# Patient Record
Sex: MALE | Race: WHITE | NOT HISPANIC OR LATINO | Employment: FULL TIME | URBAN - METROPOLITAN AREA
[De-identification: names, ages, dates, MRNs, and addresses within clinical notes are randomized per-mention and may not be internally consistent; named-entity substitution may affect disease eponyms.]

---

## 2017-09-11 ENCOUNTER — TRANSCRIBE ORDERS (OUTPATIENT)
Dept: ADMINISTRATIVE | Facility: HOSPITAL | Age: 62
End: 2017-09-11

## 2017-09-11 DIAGNOSIS — K51.90 ULCERATIVE COLITIS WITHOUT COMPLICATIONS, UNSPECIFIED LOCATION (HCC): Primary | ICD-10-CM

## 2017-09-15 ENCOUNTER — HOSPITAL ENCOUNTER (OUTPATIENT)
Dept: CT IMAGING | Facility: HOSPITAL | Age: 62
Discharge: HOME | End: 2017-09-15
Attending: COLON & RECTAL SURGERY | Admitting: COLON & RECTAL SURGERY

## 2017-09-15 ENCOUNTER — APPOINTMENT (OUTPATIENT)
Dept: PREADMISSION TESTING | Facility: HOSPITAL | Age: 62
End: 2017-09-15

## 2017-09-15 VITALS — HEIGHT: 67 IN | WEIGHT: 165.34 LBS | BODY MASS INDEX: 25.95 KG/M2

## 2017-09-15 DIAGNOSIS — K51.90 ULCERATIVE COLITIS WITHOUT COMPLICATIONS, UNSPECIFIED LOCATION (HCC): ICD-10-CM

## 2017-09-15 LAB
ABO GROUP BLD: NORMAL
ANION GAP SERPL CALCULATED.3IONS-SCNC: 8 MMOL/L (ref 3–11)
BUN BLD-MCNC: 11 MG/DL (ref 9–23)
BUN/CREAT SERPL: 13.8 (ref 7–25)
CALCIUM SPEC-SCNC: 8.7 MG/DL (ref 8.7–10.4)
CHLORIDE SERPL-SCNC: 107 MMOL/L (ref 99–109)
CO2 SERPL-SCNC: 25 MMOL/L (ref 20–31)
CREAT BLD-MCNC: 0.8 MG/DL (ref 0.6–1.3)
DEPRECATED RDW RBC AUTO: 50.3 FL (ref 37–54)
ERYTHROCYTE [DISTWIDTH] IN BLOOD BY AUTOMATED COUNT: 15.7 % (ref 11.3–14.5)
GFR SERPL CREATININE-BSD FRML MDRD: 97 ML/MIN/1.73
GLUCOSE BLD-MCNC: 124 MG/DL (ref 70–100)
HBA1C MFR BLD: 6.1 % (ref 4.8–5.6)
HCT VFR BLD AUTO: 35.1 % (ref 38.9–50.9)
HGB BLD-MCNC: 10.9 G/DL (ref 13.1–17.5)
INR PPP: 1.22
MCH RBC QN AUTO: 27.5 PG (ref 27–31)
MCHC RBC AUTO-ENTMCNC: 31.1 G/DL (ref 32–36)
MCV RBC AUTO: 88.4 FL (ref 80–99)
PLATELET # BLD AUTO: 324 10*3/MM3 (ref 150–450)
PMV BLD AUTO: 8.2 FL (ref 6–12)
POTASSIUM BLD-SCNC: 4.5 MMOL/L (ref 3.5–5.5)
PROTHROMBIN TIME: 13.4 SECONDS (ref 9.6–11.5)
RBC # BLD AUTO: 3.97 10*6/MM3 (ref 4.2–5.76)
RH BLD: POSITIVE
SODIUM BLD-SCNC: 140 MMOL/L (ref 132–146)
WBC NRBC COR # BLD: 8.71 10*3/MM3 (ref 3.5–10.8)

## 2017-09-15 PROCEDURE — 85610 PROTHROMBIN TIME: CPT | Performed by: COLON & RECTAL SURGERY

## 2017-09-15 PROCEDURE — 86900 BLOOD TYPING SEROLOGIC ABO: CPT

## 2017-09-15 PROCEDURE — 80048 BASIC METABOLIC PNL TOTAL CA: CPT | Performed by: COLON & RECTAL SURGERY

## 2017-09-15 PROCEDURE — 83036 HEMOGLOBIN GLYCOSYLATED A1C: CPT | Performed by: COLON & RECTAL SURGERY

## 2017-09-15 PROCEDURE — 74177 CT ABD & PELVIS W/CONTRAST: CPT

## 2017-09-15 PROCEDURE — 85027 COMPLETE CBC AUTOMATED: CPT | Performed by: COLON & RECTAL SURGERY

## 2017-09-15 PROCEDURE — 86901 BLOOD TYPING SEROLOGIC RH(D): CPT

## 2017-09-15 PROCEDURE — 93005 ELECTROCARDIOGRAM TRACING: CPT

## 2017-09-15 PROCEDURE — 36415 COLL VENOUS BLD VENIPUNCTURE: CPT

## 2017-09-15 PROCEDURE — 0 DIATRIZOATE MEGLUMINE & SODIUM PER 1 ML: Performed by: COLON & RECTAL SURGERY

## 2017-09-15 PROCEDURE — 93010 ELECTROCARDIOGRAM REPORT: CPT | Performed by: INTERNAL MEDICINE

## 2017-09-15 PROCEDURE — 0 IOPAMIDOL 61 % SOLUTION: Performed by: COLON & RECTAL SURGERY

## 2017-09-15 RX ORDER — SACCHAROMYCES BOULARDII 250 MG
250 CAPSULE ORAL DAILY
COMMUNITY
End: 2019-04-09

## 2017-09-15 RX ORDER — FOLIC ACID 1 MG/1
1 TABLET ORAL DAILY
COMMUNITY
End: 2019-04-09

## 2017-09-15 RX ORDER — VALSARTAN 160 MG/1
160 TABLET ORAL DAILY
Status: ON HOLD | COMMUNITY
End: 2018-09-07 | Stop reason: ALTCHOICE

## 2017-09-15 RX ORDER — AZATHIOPRINE 50 MG/1
200 TABLET ORAL DAILY
Status: ON HOLD | COMMUNITY
End: 2017-09-24

## 2017-09-15 RX ADMIN — IOPAMIDOL 95 ML: 612 INJECTION, SOLUTION INTRAVENOUS at 17:36

## 2017-09-15 RX ADMIN — Medication 15 ML: at 16:20

## 2017-09-15 NOTE — PAT
Attempted to call wound ostomy, rn for stoma markings in pat, unavailable, blue note lef ton chart for preop to notify and do in pre op- also message left with woc, rn

## 2017-09-15 NOTE — PAT
MEASURED FOR TEDS/SCDS IN PAT    CALF MEASUREMENT    14in  LENGTH MEASUREMENT 16in    cintia rodriguez gi navigator notified of pt upcoming surgery on 9-21-17    Eras instructions given to pt in pat

## 2017-09-18 NOTE — PAT
Message to Lenora/Gia regarding low hemoglobin and hx of MRSA to see if Dr. Nielsen would like vancomycin ordered.

## 2017-09-19 ENCOUNTER — HOSPITAL ENCOUNTER (INPATIENT)
Facility: HOSPITAL | Age: 62
LOS: 5 days | Discharge: HOME | End: 2017-09-24
Attending: COLON & RECTAL SURGERY | Admitting: COLON & RECTAL SURGERY

## 2017-09-19 ENCOUNTER — ANESTHESIA EVENT (OUTPATIENT)
Dept: PERIOP | Facility: HOSPITAL | Age: 62
End: 2017-09-19

## 2017-09-19 ENCOUNTER — ANESTHESIA (OUTPATIENT)
Dept: PERIOP | Facility: HOSPITAL | Age: 62
End: 2017-09-19

## 2017-09-19 DIAGNOSIS — Z74.09 IMPAIRED FUNCTIONAL MOBILITY, BALANCE, GAIT, AND ENDURANCE: Primary | ICD-10-CM

## 2017-09-19 DIAGNOSIS — K50.90 CROHN'S DISEASE (HCC): ICD-10-CM

## 2017-09-19 PROBLEM — R73.03 PREDIABETES: Status: ACTIVE | Noted: 2017-09-19

## 2017-09-19 PROBLEM — D64.9 ANEMIA: Status: ACTIVE | Noted: 2017-09-19

## 2017-09-19 PROBLEM — K51.90 ULCERATIVE COLITIS (HCC): Status: ACTIVE | Noted: 2017-09-19

## 2017-09-19 LAB
ABO GROUP BLD: NORMAL
BLD GP AB SCN SERPL QL: NEGATIVE
GLUCOSE BLDC GLUCOMTR-MCNC: 134 MG/DL (ref 70–130)
HCT VFR BLD AUTO: 35.1 % (ref 38.9–50.9)
HGB BLD-MCNC: 10.9 G/DL (ref 13.1–17.5)
POTASSIUM BLDA-SCNC: 4.24 MMOL/L (ref 3.5–5.3)
RH BLD: POSITIVE

## 2017-09-19 PROCEDURE — 25010000002 PROPOFOL 10 MG/ML EMULSION: Performed by: NURSE ANESTHETIST, CERTIFIED REGISTERED

## 2017-09-19 PROCEDURE — 85018 HEMOGLOBIN: CPT | Performed by: COLON & RECTAL SURGERY

## 2017-09-19 PROCEDURE — 25010000002 HEPARIN (PORCINE) PER 1000 UNITS: Performed by: COLON & RECTAL SURGERY

## 2017-09-19 PROCEDURE — 86850 RBC ANTIBODY SCREEN: CPT | Performed by: COLON & RECTAL SURGERY

## 2017-09-19 PROCEDURE — 82962 GLUCOSE BLOOD TEST: CPT

## 2017-09-19 PROCEDURE — 86900 BLOOD TYPING SEROLOGIC ABO: CPT | Performed by: COLON & RECTAL SURGERY

## 2017-09-19 PROCEDURE — 25010000002 DEXAMETHASONE SODIUM PHOSPHATE 10 MG/ML SOLUTION 1 ML VIAL: Performed by: NURSE ANESTHETIST, CERTIFIED REGISTERED

## 2017-09-19 PROCEDURE — 25010000002 ONDANSETRON PER 1 MG: Performed by: ANESTHESIOLOGY

## 2017-09-19 PROCEDURE — 84132 ASSAY OF SERUM POTASSIUM: CPT | Performed by: ANESTHESIOLOGY

## 2017-09-19 PROCEDURE — 86901 BLOOD TYPING SEROLOGIC RH(D): CPT | Performed by: COLON & RECTAL SURGERY

## 2017-09-19 PROCEDURE — 85014 HEMATOCRIT: CPT | Performed by: COLON & RECTAL SURGERY

## 2017-09-19 PROCEDURE — 0D1B0Z4 BYPASS ILEUM TO CUTANEOUS, OPEN APPROACH: ICD-10-PCS | Performed by: COLON & RECTAL SURGERY

## 2017-09-19 PROCEDURE — 25010000002 BUPRENORPHINE PER 0.1 MG: Performed by: NURSE ANESTHETIST, CERTIFIED REGISTERED

## 2017-09-19 PROCEDURE — 25010000002 NEOSTIGMINE PER 0.5 MG: Performed by: ANESTHESIOLOGY

## 2017-09-19 PROCEDURE — 88307 TISSUE EXAM BY PATHOLOGIST: CPT | Performed by: COLON & RECTAL SURGERY

## 2017-09-19 PROCEDURE — 25010000002 PHENYLEPHRINE PER 1 ML: Performed by: NURSE ANESTHETIST, CERTIFIED REGISTERED

## 2017-09-19 PROCEDURE — 25010000002 DEXAMETHASONE PER 1 MG: Performed by: NURSE ANESTHETIST, CERTIFIED REGISTERED

## 2017-09-19 PROCEDURE — 0DTE0ZZ RESECTION OF LARGE INTESTINE, OPEN APPROACH: ICD-10-PCS | Performed by: COLON & RECTAL SURGERY

## 2017-09-19 PROCEDURE — 94640 AIRWAY INHALATION TREATMENT: CPT

## 2017-09-19 RX ORDER — HYDROMORPHONE HYDROCHLORIDE 1 MG/ML
0.5 INJECTION, SOLUTION INTRAMUSCULAR; INTRAVENOUS; SUBCUTANEOUS
Status: DISCONTINUED | OUTPATIENT
Start: 2017-09-19 | End: 2017-09-19 | Stop reason: HOSPADM

## 2017-09-19 RX ORDER — HYDROCODONE BITARTRATE AND ACETAMINOPHEN 5; 325 MG/1; MG/1
1 TABLET ORAL EVERY 4 HOURS PRN
Status: DISCONTINUED | OUTPATIENT
Start: 2017-09-19 | End: 2017-09-24 | Stop reason: HOSPADM

## 2017-09-19 RX ORDER — FAMOTIDINE 10 MG/ML
20 INJECTION, SOLUTION INTRAVENOUS ONCE
Status: DISCONTINUED | OUTPATIENT
Start: 2017-09-19 | End: 2017-09-19

## 2017-09-19 RX ORDER — DEXTROSE MONOHYDRATE 25 G/50ML
25 INJECTION, SOLUTION INTRAVENOUS
Status: DISCONTINUED | OUTPATIENT
Start: 2017-09-19 | End: 2017-09-24 | Stop reason: HOSPADM

## 2017-09-19 RX ORDER — BUDESONIDE AND FORMOTEROL FUMARATE DIHYDRATE 80; 4.5 UG/1; UG/1
2 AEROSOL RESPIRATORY (INHALATION)
Status: DISCONTINUED | OUTPATIENT
Start: 2017-09-19 | End: 2017-09-20

## 2017-09-19 RX ORDER — FAMOTIDINE 20 MG/1
20 TABLET, FILM COATED ORAL ONCE
Status: COMPLETED | OUTPATIENT
Start: 2017-09-19 | End: 2017-09-19

## 2017-09-19 RX ORDER — BUDESONIDE 0.5 MG/2ML
0.5 INHALANT ORAL
Status: DISCONTINUED | OUTPATIENT
Start: 2017-09-19 | End: 2017-09-24 | Stop reason: HOSPADM

## 2017-09-19 RX ORDER — CELECOXIB 200 MG/1
400 CAPSULE ORAL ONCE
Status: COMPLETED | OUTPATIENT
Start: 2017-09-19 | End: 2017-09-19

## 2017-09-19 RX ORDER — HYDROMORPHONE HYDROCHLORIDE 1 MG/ML
0.5 INJECTION, SOLUTION INTRAMUSCULAR; INTRAVENOUS; SUBCUTANEOUS
Status: DISCONTINUED | OUTPATIENT
Start: 2017-09-19 | End: 2017-09-24 | Stop reason: HOSPADM

## 2017-09-19 RX ORDER — PROMETHAZINE HYDROCHLORIDE 25 MG/ML
6.25 INJECTION, SOLUTION INTRAMUSCULAR; INTRAVENOUS ONCE AS NEEDED
Status: DISCONTINUED | OUTPATIENT
Start: 2017-09-19 | End: 2017-09-19 | Stop reason: HOSPADM

## 2017-09-19 RX ORDER — ACETAMINOPHEN 325 MG/1
650 TABLET ORAL EVERY 4 HOURS PRN
Status: DISCONTINUED | OUTPATIENT
Start: 2017-09-19 | End: 2017-09-24 | Stop reason: HOSPADM

## 2017-09-19 RX ORDER — MORPHINE SULFATE 2 MG/ML
1 INJECTION, SOLUTION INTRAMUSCULAR; INTRAVENOUS EVERY 4 HOURS PRN
Status: DISCONTINUED | OUTPATIENT
Start: 2017-09-19 | End: 2017-09-24 | Stop reason: HOSPADM

## 2017-09-19 RX ORDER — HEPARIN SODIUM 5000 [USP'U]/ML
5000 INJECTION, SOLUTION INTRAVENOUS; SUBCUTANEOUS ONCE
Status: COMPLETED | OUTPATIENT
Start: 2017-09-19 | End: 2017-09-19

## 2017-09-19 RX ORDER — MONTELUKAST SODIUM 10 MG/1
10 TABLET ORAL NIGHTLY
Status: DISCONTINUED | OUTPATIENT
Start: 2017-09-19 | End: 2017-09-24 | Stop reason: HOSPADM

## 2017-09-19 RX ORDER — GLYCOPYRROLATE 0.2 MG/ML
INJECTION INTRAMUSCULAR; INTRAVENOUS AS NEEDED
Status: DISCONTINUED | OUTPATIENT
Start: 2017-09-19 | End: 2017-09-19 | Stop reason: SURG

## 2017-09-19 RX ORDER — FENTANYL CITRATE 50 UG/ML
50 INJECTION, SOLUTION INTRAMUSCULAR; INTRAVENOUS
Status: DISCONTINUED | OUTPATIENT
Start: 2017-09-19 | End: 2017-09-19 | Stop reason: HOSPADM

## 2017-09-19 RX ORDER — ALVIMOPAN 12 MG/1
12 CAPSULE ORAL 2 TIMES DAILY
Status: DISCONTINUED | OUTPATIENT
Start: 2017-09-20 | End: 2017-09-24 | Stop reason: HOSPADM

## 2017-09-19 RX ORDER — ESMOLOL HYDROCHLORIDE 10 MG/ML
INJECTION INTRAVENOUS AS NEEDED
Status: DISCONTINUED | OUTPATIENT
Start: 2017-09-19 | End: 2017-09-19 | Stop reason: SURG

## 2017-09-19 RX ORDER — ALBUTEROL SULFATE 2.5 MG/3ML
2.5 SOLUTION RESPIRATORY (INHALATION) ONCE
Status: COMPLETED | OUTPATIENT
Start: 2017-09-19 | End: 2017-09-19

## 2017-09-19 RX ORDER — FLUTICASONE PROPIONATE 50 MCG
2 SPRAY, SUSPENSION (ML) NASAL 2 TIMES DAILY
Status: DISCONTINUED | OUTPATIENT
Start: 2017-09-19 | End: 2017-09-24 | Stop reason: HOSPADM

## 2017-09-19 RX ORDER — HYDRALAZINE HYDROCHLORIDE 20 MG/ML
10 INJECTION INTRAMUSCULAR; INTRAVENOUS EVERY 6 HOURS PRN
Status: DISCONTINUED | OUTPATIENT
Start: 2017-09-19 | End: 2017-09-24 | Stop reason: HOSPADM

## 2017-09-19 RX ORDER — SODIUM CHLORIDE, SODIUM LACTATE, POTASSIUM CHLORIDE, CALCIUM CHLORIDE 600; 310; 30; 20 MG/100ML; MG/100ML; MG/100ML; MG/100ML
9 INJECTION, SOLUTION INTRAVENOUS CONTINUOUS
Status: DISCONTINUED | OUTPATIENT
Start: 2017-09-19 | End: 2017-09-24 | Stop reason: HOSPADM

## 2017-09-19 RX ORDER — NALOXONE HCL 0.4 MG/ML
0.4 VIAL (ML) INJECTION
Status: DISCONTINUED | OUTPATIENT
Start: 2017-09-19 | End: 2017-09-24 | Stop reason: HOSPADM

## 2017-09-19 RX ORDER — SODIUM CHLORIDE 0.9 % (FLUSH) 0.9 %
1-10 SYRINGE (ML) INJECTION AS NEEDED
Status: DISCONTINUED | OUTPATIENT
Start: 2017-09-19 | End: 2017-09-19 | Stop reason: HOSPADM

## 2017-09-19 RX ORDER — EPHEDRINE SULFATE 50 MG/ML
5 INJECTION, SOLUTION INTRAVENOUS ONCE AS NEEDED
Status: DISCONTINUED | OUTPATIENT
Start: 2017-09-19 | End: 2017-09-19 | Stop reason: HOSPADM

## 2017-09-19 RX ORDER — CLOBETASOL PROPIONATE 0.5 MG/ML
LOTION TOPICAL 2 TIMES DAILY
Status: ON HOLD | COMMUNITY
End: 2018-09-07

## 2017-09-19 RX ORDER — LIDOCAINE HYDROCHLORIDE 10 MG/ML
0.5 INJECTION, SOLUTION EPIDURAL; INFILTRATION; INTRACAUDAL; PERINEURAL ONCE AS NEEDED
Status: COMPLETED | OUTPATIENT
Start: 2017-09-19 | End: 2017-09-19

## 2017-09-19 RX ORDER — VITAMIN E 268 MG
400 CAPSULE ORAL 3 TIMES DAILY
COMMUNITY

## 2017-09-19 RX ORDER — IPRATROPIUM BROMIDE 42 UG/1
2 SPRAY, METERED NASAL 4 TIMES DAILY
Status: DISCONTINUED | OUTPATIENT
Start: 2017-09-19 | End: 2017-09-24 | Stop reason: HOSPADM

## 2017-09-19 RX ORDER — MULTIVITAMIN WITH IRON
100 TABLET ORAL DAILY
COMMUNITY

## 2017-09-19 RX ORDER — PROPOFOL 10 MG/ML
VIAL (ML) INTRAVENOUS CONTINUOUS PRN
Status: DISCONTINUED | OUTPATIENT
Start: 2017-09-19 | End: 2017-09-19 | Stop reason: SURG

## 2017-09-19 RX ORDER — PREGABALIN 75 MG/1
75 CAPSULE ORAL ONCE
Status: COMPLETED | OUTPATIENT
Start: 2017-09-19 | End: 2017-09-19

## 2017-09-19 RX ORDER — PROMETHAZINE HYDROCHLORIDE 25 MG/1
25 TABLET ORAL ONCE AS NEEDED
Status: DISCONTINUED | OUTPATIENT
Start: 2017-09-19 | End: 2017-09-19 | Stop reason: HOSPADM

## 2017-09-19 RX ORDER — PROPOFOL 10 MG/ML
VIAL (ML) INTRAVENOUS AS NEEDED
Status: DISCONTINUED | OUTPATIENT
Start: 2017-09-19 | End: 2017-09-19 | Stop reason: SURG

## 2017-09-19 RX ORDER — ALBUTEROL SULFATE 90 UG/1
2 AEROSOL, METERED RESPIRATORY (INHALATION) EVERY 4 HOURS PRN
COMMUNITY
End: 2018-03-13

## 2017-09-19 RX ORDER — DIAZEPAM 5 MG/ML
5 INJECTION, SOLUTION INTRAMUSCULAR; INTRAVENOUS EVERY 6 HOURS PRN
Status: DISCONTINUED | OUTPATIENT
Start: 2017-09-19 | End: 2017-09-24 | Stop reason: HOSPADM

## 2017-09-19 RX ORDER — MAGNESIUM HYDROXIDE 1200 MG/15ML
LIQUID ORAL AS NEEDED
Status: DISCONTINUED | OUTPATIENT
Start: 2017-09-19 | End: 2017-09-19 | Stop reason: HOSPADM

## 2017-09-19 RX ORDER — LIDOCAINE HYDROCHLORIDE 10 MG/ML
INJECTION, SOLUTION INFILTRATION; PERINEURAL AS NEEDED
Status: DISCONTINUED | OUTPATIENT
Start: 2017-09-19 | End: 2017-09-19 | Stop reason: SURG

## 2017-09-19 RX ORDER — ALVIMOPAN 12 MG/1
12 CAPSULE ORAL ONCE
Status: COMPLETED | OUTPATIENT
Start: 2017-09-19 | End: 2017-09-19

## 2017-09-19 RX ORDER — DEXTROSE MONOHYDRATE, SODIUM CHLORIDE, SODIUM LACTATE, POTASSIUM CHLORIDE, CALCIUM CHLORIDE 5; 600; 310; 179; 20 G/100ML; MG/100ML; MG/100ML; MG/100ML; MG/100ML
75 INJECTION, SOLUTION INTRAVENOUS CONTINUOUS
Status: DISCONTINUED | OUTPATIENT
Start: 2017-09-19 | End: 2017-09-24 | Stop reason: HOSPADM

## 2017-09-19 RX ORDER — DEXAMETHASONE SODIUM PHOSPHATE 4 MG/ML
INJECTION, SOLUTION INTRA-ARTICULAR; INTRALESIONAL; INTRAMUSCULAR; INTRAVENOUS; SOFT TISSUE AS NEEDED
Status: DISCONTINUED | OUTPATIENT
Start: 2017-09-19 | End: 2017-09-19 | Stop reason: SURG

## 2017-09-19 RX ORDER — ONDANSETRON 2 MG/ML
INJECTION INTRAMUSCULAR; INTRAVENOUS AS NEEDED
Status: DISCONTINUED | OUTPATIENT
Start: 2017-09-19 | End: 2017-09-19 | Stop reason: SURG

## 2017-09-19 RX ORDER — AMLODIPINE BESYLATE 5 MG/1
5 TABLET ORAL NIGHTLY
Status: DISCONTINUED | OUTPATIENT
Start: 2017-09-19 | End: 2017-09-24 | Stop reason: HOSPADM

## 2017-09-19 RX ORDER — BUSPIRONE HYDROCHLORIDE 15 MG/1
15 TABLET ORAL 3 TIMES DAILY
Status: DISCONTINUED | OUTPATIENT
Start: 2017-09-19 | End: 2017-09-24 | Stop reason: HOSPADM

## 2017-09-19 RX ORDER — LEVOCETIRIZINE DIHYDROCHLORIDE 5 MG/1
5 TABLET, FILM COATED ORAL EVERY EVENING
COMMUNITY

## 2017-09-19 RX ORDER — SCOLOPAMINE TRANSDERMAL SYSTEM 1 MG/1
1 PATCH, EXTENDED RELEASE TRANSDERMAL ONCE
Status: COMPLETED | OUTPATIENT
Start: 2017-09-19 | End: 2017-09-22

## 2017-09-19 RX ORDER — ACETAMINOPHEN 500 MG
1000 TABLET ORAL ONCE
Status: COMPLETED | OUTPATIENT
Start: 2017-09-19 | End: 2017-09-19

## 2017-09-19 RX ORDER — PROMETHAZINE HYDROCHLORIDE 25 MG/1
25 SUPPOSITORY RECTAL ONCE AS NEEDED
Status: DISCONTINUED | OUTPATIENT
Start: 2017-09-19 | End: 2017-09-19 | Stop reason: HOSPADM

## 2017-09-19 RX ORDER — SACCHAROMYCES BOULARDII 250 MG
250 CAPSULE ORAL 2 TIMES DAILY
Status: DISCONTINUED | OUTPATIENT
Start: 2017-09-19 | End: 2017-09-24 | Stop reason: HOSPADM

## 2017-09-19 RX ORDER — ATRACURIUM BESYLATE 10 MG/ML
INJECTION, SOLUTION INTRAVENOUS AS NEEDED
Status: DISCONTINUED | OUTPATIENT
Start: 2017-09-19 | End: 2017-09-19 | Stop reason: SURG

## 2017-09-19 RX ORDER — HEPARIN SODIUM 5000 [USP'U]/ML
5000 INJECTION, SOLUTION INTRAVENOUS; SUBCUTANEOUS EVERY 8 HOURS SCHEDULED
Status: DISCONTINUED | OUTPATIENT
Start: 2017-09-20 | End: 2017-09-24 | Stop reason: HOSPADM

## 2017-09-19 RX ORDER — NICOTINE POLACRILEX 4 MG
15 LOZENGE BUCCAL
Status: DISCONTINUED | OUTPATIENT
Start: 2017-09-19 | End: 2017-09-24 | Stop reason: HOSPADM

## 2017-09-19 RX ORDER — IPRATROPIUM BROMIDE 42 UG/1
2 SPRAY, METERED NASAL 4 TIMES DAILY
COMMUNITY
End: 2019-04-09

## 2017-09-19 RX ORDER — ALBUTEROL SULFATE 90 UG/1
2 AEROSOL, METERED RESPIRATORY (INHALATION) EVERY 4 HOURS PRN
COMMUNITY

## 2017-09-19 RX ORDER — IBUPROFEN 400 MG/1
400 TABLET ORAL EVERY 6 HOURS PRN
Status: DISCONTINUED | OUTPATIENT
Start: 2017-09-19 | End: 2017-09-24 | Stop reason: HOSPADM

## 2017-09-19 RX ORDER — ONDANSETRON 2 MG/ML
4 INJECTION INTRAMUSCULAR; INTRAVENOUS EVERY 6 HOURS PRN
Status: DISCONTINUED | OUTPATIENT
Start: 2017-09-19 | End: 2017-09-24 | Stop reason: HOSPADM

## 2017-09-19 RX ORDER — VALSARTAN 160 MG/1
160 TABLET ORAL DAILY
Status: DISCONTINUED | OUTPATIENT
Start: 2017-09-20 | End: 2017-09-24 | Stop reason: HOSPADM

## 2017-09-19 RX ADMIN — DEXAMETHASONE SODIUM PHOSPHATE 61.4 ML: 10 INJECTION, SOLUTION INTRAMUSCULAR; INTRAVENOUS at 16:07

## 2017-09-19 RX ADMIN — FLUTICASONE PROPIONATE 2 SPRAY: 50 SPRAY, METERED NASAL at 22:51

## 2017-09-19 RX ADMIN — METOPROLOL TARTRATE 2.5 MG: 5 INJECTION, SOLUTION INTRAVENOUS at 16:40

## 2017-09-19 RX ADMIN — PHENYLEPHRINE HYDROCHLORIDE 100 MCG: 10 INJECTION INTRAVENOUS at 16:35

## 2017-09-19 RX ADMIN — METOPROLOL TARTRATE 2.5 MG: 5 INJECTION, SOLUTION INTRAVENOUS at 16:50

## 2017-09-19 RX ADMIN — HEPARIN SODIUM 5000 UNITS: 5000 INJECTION, SOLUTION INTRAVENOUS; SUBCUTANEOUS at 12:49

## 2017-09-19 RX ADMIN — ATRACURIUM BESYLATE 50 MG: 10 INJECTION, SOLUTION INTRAVENOUS at 16:01

## 2017-09-19 RX ADMIN — ONDANSETRON 4 MG: 2 INJECTION INTRAMUSCULAR; INTRAVENOUS at 18:29

## 2017-09-19 RX ADMIN — AMLODIPINE BESYLATE 5 MG: 5 TABLET ORAL at 22:48

## 2017-09-19 RX ADMIN — PREGABALIN 75 MG: 75 CAPSULE ORAL at 13:27

## 2017-09-19 RX ADMIN — PHENYLEPHRINE HYDROCHLORIDE 100 MCG: 10 INJECTION INTRAVENOUS at 16:20

## 2017-09-19 RX ADMIN — ATRACURIUM BESYLATE 20 MG: 10 INJECTION, SOLUTION INTRAVENOUS at 17:15

## 2017-09-19 RX ADMIN — MONTELUKAST SODIUM 10 MG: 10 TABLET, FILM COATED ORAL at 22:48

## 2017-09-19 RX ADMIN — PROPOFOL 150 MG: 10 INJECTION, EMULSION INTRAVENOUS at 16:01

## 2017-09-19 RX ADMIN — ACETAMINOPHEN 1000 MG: 500 TABLET ORAL at 13:27

## 2017-09-19 RX ADMIN — FAMOTIDINE 20 MG: 20 TABLET ORAL at 13:28

## 2017-09-19 RX ADMIN — LIDOCAINE HYDROCHLORIDE 0.2 ML: 10 INJECTION, SOLUTION EPIDURAL; INFILTRATION; INTRACAUDAL; PERINEURAL at 12:35

## 2017-09-19 RX ADMIN — SCOPALAMINE 1 PATCH: 1 PATCH, EXTENDED RELEASE TRANSDERMAL at 13:14

## 2017-09-19 RX ADMIN — Medication 2 MG: at 18:31

## 2017-09-19 RX ADMIN — CELECOXIB 400 MG: 200 CAPSULE ORAL at 13:28

## 2017-09-19 RX ADMIN — IPRATROPIUM BROMIDE 2 SPRAY: 42 SPRAY NASAL at 22:50

## 2017-09-19 RX ADMIN — POTASSIUM CHLORIDE, SODIUM CHLORIDE, CALCIUM CHLORIDE, SODIUM LACTATE, AND DEXTROSE MONOHYDRATE 125 ML/HR: 1.79; 6; .2; 3.1; 5 INJECTION, SOLUTION INTRAVENOUS at 22:50

## 2017-09-19 RX ADMIN — BUSPIRONE HYDROCHLORIDE 15 MG: 15 TABLET ORAL at 22:48

## 2017-09-19 RX ADMIN — GLYCOPYRROLATE 0.4 MG: 0.2 INJECTION, SOLUTION INTRAMUSCULAR; INTRAVENOUS at 18:31

## 2017-09-19 RX ADMIN — PROPOFOL 25 MCG/KG/MIN: 10 INJECTION, EMULSION INTRAVENOUS at 16:15

## 2017-09-19 RX ADMIN — LIDOCAINE HYDROCHLORIDE 50 MG: 10 INJECTION, SOLUTION INFILTRATION; PERINEURAL at 16:01

## 2017-09-19 RX ADMIN — ALBUTEROL SULFATE 2.5 MG: 2.5 SOLUTION RESPIRATORY (INHALATION) at 15:28

## 2017-09-19 RX ADMIN — SODIUM CHLORIDE, POTASSIUM CHLORIDE, SODIUM LACTATE AND CALCIUM CHLORIDE 9 ML/HR: 600; 310; 30; 20 INJECTION, SOLUTION INTRAVENOUS at 12:35

## 2017-09-19 RX ADMIN — ALVIMOPAN 12 MG: 12 CAPSULE ORAL at 13:28

## 2017-09-19 RX ADMIN — ESMOLOL HYDROCHLORIDE 20 MG: 10 INJECTION, SOLUTION INTRAVENOUS at 16:01

## 2017-09-19 RX ADMIN — DEXAMETHASONE SODIUM PHOSPHATE 8 MG: 4 INJECTION, SOLUTION INTRAMUSCULAR; INTRAVENOUS at 16:01

## 2017-09-19 NOTE — H&P
"Admission HP     Patient Name: Zackary Noonan II  MRN: [id number]  : 1955  DOS: 2017    Attending: David Nielsen MD    Primary Care Provider: Jillian Layne MD      Patient Care Team:  Jillian Layne MD as PCP - General (Family Medicine)    Chief complaint:  Ulcerative colitis    Subjective   Patient is a 62 y.o. male presented for scheduled surgery by Dr. Nielsen. He anticipates a colectomy today. He has had diarrhea and abdominal cramping for at least 8 months. He reports he was hospitalized in 2017 for dehydration and diarrhea, he had sepsis, pneumonia, and required ventilatory support. He continued to have diarrhea and was found to have c-diff. In Aug 2017 he had fecal transplant by Dr. Roach [facility].     When seen preop he is doing well. He reports mild abd cramping. He denies nausea, shortness of breath or chest pain.    He does have history of several DVTs in BLE. The lastest about 3 weeks ago. He takes Coumadin and has been bridging with Lovenox.      ( Above reviewed, agreed.  Seen in his room after surgery.  He tolerated the procedure well.  His admitted to the hospital further management.  No complaints of nausea or vomiting.  Postoperative pain is under control.  No shortness breath or chest pain.)wy    Allergies:  Allergies   Allergen Reactions   • Beta Adrenergic Blockers Other (See Comments)     Vocal changes   • Levaquin [Levofloxacin In D5w] Other (See Comments)     Heaviness in legs, \"felt like lead\"       Meds:  Prescriptions Prior to Admission   Medication Sig Dispense Refill Last Dose   • albuterol (PROVENTIL HFA;VENTOLIN HFA) 108 (90 Base) MCG/ACT inhaler Inhale 2 puffs Every 4 (Four) Hours As Needed for Wheezing.   2017   • albuterol (PROVENTIL HFA;VENTOLIN HFA) 108 (90 Base) MCG/ACT inhaler Inhale 2 puffs Every 4 (Four) Hours As Needed for Wheezing.      • albuterol (PROVENTIL) (2.5 MG/3ML) 0.083% nebulizer solution Take 2.5 mg by nebulization Every 6 (Six) Hours " As Needed for Wheezing or Shortness of Air.   9/19/2017 at Unknown time   • amLODIPine (NORVASC) 5 MG tablet Take 5 mg by mouth Every Night.   9/18/2017 at Unknown time   • beclomethasone (QVAR) 80 MCG/ACT inhaler Inhale 1 puff 2 (Two) Times a Day.   9/19/2017 at 0745   • busPIRone (BUSPAR) 15 MG tablet Take 15 mg by mouth 3 (Three) Times a Day.   9/19/2017 at 0745   • clobetasol (CLOBEX) 0.05 % lotion Apply  topically 2 (Two) Times a Day.   9/19/2017 at Unknown time   • famotidine-calcium carb-mag hydroxide (PEPCID COMPLETE) -165 MG chewable tablet Chew 1 tablet Daily As Needed for Heartburn.   9/5/2017   • fluticasone (FLONASE) 50 MCG/ACT nasal spray 2 sprays into each nostril 2 (Two) Times a Day.   9/19/2017 at 0745    • Fluticasone Furoate-Vilanterol (BREO ELLIPTA) 200-25 MCG/INH aerosol powder  Inhale 1 puff Daily.   9/19/2017 at 0745   • folic acid (FOLVITE) 1 MG tablet Take 1 mg by mouth Daily.   9/18/2017 at Unknown time   • GUAIFENESIN PO Take 400 mg by mouth.   9/18/2017 at Unknown time   • ipratropium (ATROVENT) 0.06 % nasal spray 2 sprays into each nostril 4 (Four) Times a Day.   9/19/2017 at 0745   • levocetirizine (XYZAL) 5 MG tablet Take 5 mg by mouth Every Evening.   9/17/2017   • montelukast (SINGULAIR) 10 MG tablet Take 10 mg by mouth Every Night.   9/18/2017 at Unknown time   • Multiple Vitamins-Minerals (SENIOR MULTIVITAMIN PLUS PO) Take 1 tablet/day by mouth.   9/17/2017   • NON FORMULARY 40 mg.      • Pyridoxine HCl (VITAMIN B-6 PO) Take 100 mcg by mouth.   9/18/2017 at Unknown time   • Saccharomyces boulardii 250 MG pack Take  by mouth.      • Umeclidinium Bromide (INCRUSE ELLIPTA) 62.5 MCG/INH aerosol powder  Inhale 1 puff Daily.   9/18/2017 at Unknown time   • valsartan (DIOVAN) 160 MG tablet Take 160 mg by mouth Daily.   9/19/2017 at 0745   • vitamin E 400 UNIT capsule Take 400 Units by mouth Daily.   9/18/2017 at Unknown time   • azaTHIOprine (IMURAN) 50 MG tablet Take 200 mg by  mouth Daily.      • Enoxaparin Sodium (LOVENOX SC) Inject 75 mg under the skin 2 (Two) Times a Day. Pt taking to bride from warfarin, last dose will 9-16-17 per dr orr orders, pt pcp aware per pt report   9/16/2017   • saccharomyces boulardii (FLORASTOR) 250 MG capsule Take 250 mg by mouth 2 (Two) Times a Day.   9/17/2017   • valACYclovir (VALTREX) 500 MG tablet Take 500 mg by mouth Daily As Needed.      • warfarin (COUMADIN) 5 MG tablet Take 10 mg by mouth Daily.   9/12/2017       History:   Past Medical History:   Diagnosis Date   • Anxiety    • Arthritis    • Asthma    • Clostridium difficile infection 01/2017    treated per dr carter with fecal transplant 8-2017    • H/O recurrent pneumonia    • Hypertension    • MRSA infection 2016    right lower extremity wound   • Pulmonary nodule     Followed by Dr. Galaviz    • Recurrent deep vein thrombosis (DVT)     x 3 LLE; chronic anticoagulation therapy    • Ulcerative colitis    • Wears glasses    • Wears hearing aid      Past Surgical History:   Procedure Laterality Date   • BRONCHOSCOPY      by Dr. Galaviz for pulmonary nodule   • BRONCHOSCOPY N/A 11/7/2016    Procedure: BRONCHOSCOPY;  Surgeon: Eben Roberts MD;  Location: [facility] ENDOSCOPY;  Service:    • COLONOSCOPY  08/2017   • UMBILICAL HERNIA REPAIR     • VASECTOMY     • VASECTOMY REVERSAL       Family History   Problem Relation Age of Onset   • Hypertension Mother      Social History   Substance Use Topics   • Smoking status: Never Smoker   • Smokeless tobacco: Never Used   • Alcohol use Yes      Comment: 4 drinks/week   He is  with 3 children. He is a .     Review of Systems  All systems were reviewed and negative except for:  Respiratory: positive for  shortness of air  Gastrointestinal: postitive for  diarrhea    Vital Signs  /78 (BP Location: Right arm, Patient Position: Sitting)  Pulse 91  Temp 97.2 °F (36.2 °C) (Temporal Artery )   Resp 18  SpO2 97%    Physical  Exam:    General Appearance:    Alert, cooperative, in no acute distress. [place]   Head:    Normocephalic, without obvious abnormality, atraumatic   Eyes:            Lids and lashes normal, conjunctivae and sclerae normal, no   icterus, no pallor, corneas clear, PERRLA   Ears:    Ears appear intact with no abnormalities noted   Neck:   No adenopathy, supple, trachea midline, no thyromegaly   Lungs:     Clear to auscultation,respirations regular, even and                   unlabored    Heart:    Regular rhythm and normal rate, normal S1 and S2, no            murmur, no gallop, no rub, no click   Abdomen:     Normal bowel sounds, no masses, no organomegaly, soft        non-tender, non-distended, no guarding, no rebound                 Tenderness    ( Postop exam: Soft and benign, clean benign incisions, stoma is pink )wy   Genitalia:    Deferred   Extremities:   Moves all extremities well, no edema, no cyanosis, no              redness   Pulses:   Pulses palpable and equal bilaterally   Skin:   No bleeding, bruising or rash   Neurologic:   Cranial nerves 2 - 12 grossly intact, sensation intact       Results from last 7 days  Lab Units 09/15/17  1517   WBC 10*3/mm3 8.71   HEMOGLOBIN g/dL 10.9*   HEMATOCRIT % 35.1*   PLATELETS 10*3/mm3 324       Results from last 7 days  Lab Units 09/15/17  1517   SODIUM mmol/L 140   POTASSIUM mmol/L 4.5   CHLORIDE mmol/L 107   CO2 mmol/L 25.0   BUN mg/dL 11   CREATININE mg/dL 0.80   CALCIUM mg/dL 8.7   GLUCOSE mg/dL 124*     Lab Results   Component Value Date    HGBA1C 6.10 (H) 09/15/2017       Assessment and Plan:         S/p Total abdominal colectomy.     Ulcerative colitis, refractory     HTN (hypertension)    COPD (chronic obstructive pulmonary disease)    Prediabetes    Anemia    History of DVT (deep vein thrombosis)    Hx of IVC filter    Plan  1. Ambulation  2. Pain control-prns   3. IS-encourage  4. DVT proph- Mechs. Likely lovenox postop( Heparin/wy)  . Resume Coumadin when ok  with Dr. Nielsen  5. Bowel regimen  6. Resume home medications as appropriate  7. Monitor post-op labs  8. DC planning for home when evidence of return of bowel function  9. Diet/IVF per surgeon    HTN  - Continue home norvasc and diovan  - Monitor BP q4 hrs  - Holding parameters for BP meds  - PRN hydralazine for SBP >170    COPD  - Continue home albuterol, qvar, flonase, Breo, singulair, atrovent, and elipta (or pharmacy substitions)  - Monitor O2 sats  - O2 PRN    PreDM  - hgb A1c on 9/15/17 6.1  - Accuchecks ACHS with low dose SSI  - DM educator consult    Anemia  - CBC in AM    Seen and examined by me. Agree with above. Discussed with patient and wife. Discussed with SAMMY.HARMAN Lawrence  09/19/17  1:56 PM

## 2017-09-19 NOTE — OP NOTE
[facility] ([facility])    TOTAL ABDOMINAL COLECTOMY  (SUBTOTAL COLECTOMY)   Procedure Note    Zackary Noonan II  9/19/2017    Pre-op Diagnosis: Ulcerative Colitis, Refractory to Medical Therapy, Unable to work-diarrhea/rectal bleeding, Malnutrition, Hx of DVT, Filter in IVC placed preoperatively noting history of DVT.  Coumadin and Lovenox held preoperatively.    Post-op Diagnosis:   Mesh, colitis    Anesthesia: General    Staff:   Circulator: Irma Ziegler RN; Butch Mann RN  Scrub Person: Yuniel Strauss; Mague Kimble; Ivana Bullard  Assistant: SNEHA Triplett    Estimated Blood Loss: 300cc    Specimens: abdominal colon        Drains: none.    Findings: colitis, without findings to suggest Crohn's.    Complications: None evident.      David Nielsen MD     Date: 9/19/2017  Time: 6:50 PM     There've been no interval changes in health by history or physical exam in the preoperative area.    The patient been marked for potential stoma sites    The procedure and goals of postoperative course were reviewed her    With antibiotic and DVT prophylaxis the patient was brought into the operating room.    Positioning was modified lithotomy.    At Block was performed by anesthesia    An oral gastric tube and Morley were utilized.    Following timeout protocols the abdomen was prepped and draped in the procedure initiated with midline exploration.    The findings were more suggestive of ulcerative colitis with chronic inflammation of the colon.  There were no findings to suggest Crohn's disease, the small bowel was run from the ligament of Treitz to the distal ileum.    The hepatobiliary structures appeared healthy    A Bookwalter retractor was utilized as well as Tubbs retractor.    The rectosigmoid was mobilized there is particular redundancy and edematous nature of the tissues.    The left ureter was identified and left posterior lateral to the plane of  dissection    The ileocolic region was mobilized    In reverse Trendelenburg the hepatic flexure is taken down.  The atretic omentum was preserved in the left transverse colon.    The gastrocolic ligament was entered to facilitate mobilization of the splenic flexure    The left colon was mobilized    Having mobilized the entire colon the distal ileum was divided    The colonic mesentery was sequentially divided near its root with suture ligation of the right colic artery preserving the ileocolic artery.    Discontinued around the transverse colon, left colon, and sigmoid.    The distal sigmoid was divided with MARILU    The mesentery was sequentially divided with suture ligation of the left colic artery.    The rectosigmoid was secured to the left anterior abdominal wall with #1 Prolene.  Additional #1 Prolene were used to facilitate anticipated dissection of the end rectosigmoid as well her    The midline mesh was kept in place    In the preoperatively identified anticipated ileostomy site a ileostomy site was developed in the and ileum delivered across the abdominal wall which was quite thin.    All quadrants had good hemostasis on inspection    The initial and ultimately the final sponge needle and spent counts were announced as correct    Copious irrigation-aspiration was performed again demonstrating good hemostasis.    The midline was closed with internal retention above Vicryl no 1 loop PDS    Wound was irrigated and reapproximated skin clips    The right lower quadrant stoma was matured with prototype reversion utilizing 3-0 Vicryl.    The final sponge needle and spent counts were announced as correct.

## 2017-09-19 NOTE — PLAN OF CARE
Problem: Patient Care Overview (Adult)  Goal: Plan of Care Review  Outcome: Ongoing (interventions implemented as appropriate)    09/19/17 1224   Coping/Psychosocial Response Interventions   Plan Of Care Reviewed With patient   Patient Care Overview   Progress improving

## 2017-09-19 NOTE — INTERVAL H&P NOTE
H&P reviewed. The patient was examined and there are no changes to the H&P.     Heart:  RRR  Lungs: CTAB    Immunizations:    Tetanus: No      Influenza: Yes     Pneumo: Yes    Labs were reviewed.       Results for KAUR GUERRERO II (MRN [id number]) as of 9/19/2017 12:47   Ref. Range 9/15/2017 15:17   Glucose Latest Ref Range: 70 - 100 mg/dL 124 (H)   Sodium Latest Ref Range: 132 - 146 mmol/L 140   Potassium Latest Ref Range: 3.5 - 5.5 mmol/L 4.5   CO2 Latest Ref Range: 20.0 - 31.0 mmol/L 25.0   Chloride Latest Ref Range: 99 - 109 mmol/L 107   Anion Gap Latest Ref Range: 3.0 - 11.0 mmol/L 8.0   Creatinine Latest Ref Range: 0.60 - 1.30 mg/dL 0.80   BUN Latest Ref Range: 9 - 23 mg/dL 11   BUN/Creatinine Ratio Latest Ref Range: 7.0 - 25.0  13.8   Calcium Latest Ref Range: 8.7 - 10.4 mg/dL 8.7   eGFR Non African Amer Latest Ref Range: >60 mL/min/1.73 97   Hemoglobin A1C Latest Ref Range: 4.80 - 5.60 % 6.10 (H)   Protime Latest Ref Range: 9.6 - 11.5 Seconds 13.4 (H)   INR Unknown 1.22   WBC Latest Ref Range: 3.50 - 10.80 10*3/mm3 8.71   RBC Latest Ref Range: 4.20 - 5.76 10*6/mm3 3.97 (L)   Hemoglobin Latest Ref Range: 13.1 - 17.5 g/dL 10.9 (L)   Hematocrit Latest Ref Range: 38.9 - 50.9 % 35.1 (L)   RDW Latest Ref Range: 11.3 - 14.5 % 15.7 (H)   MCV Latest Ref Range: 80.0 - 99.0 fL 88.4   MCH Latest Ref Range: 27.0 - 31.0 pg 27.5   MCHC Latest Ref Range: 32.0 - 36.0 g/dL 31.1 (L)   MPV Latest Ref Range: 6.0 - 12.0 fL 8.2   Platelets Latest Ref Range: 150 - 450 10*3/mm3 324   RDW-SD Latest Ref Range: 37.0 - 54.0 fl 50.3     EKG: NSR    Plan: Total abdominal colectomy

## 2017-09-19 NOTE — PLAN OF CARE
Problem: Patient Care Overview (Adult)  Goal: Adult Individualization and Mutuality  Outcome: Ongoing (interventions implemented as appropriate)    09/19/17 1225   Individualization   Patient Specific Preferences none   Patient Specific Goals none   Patient Specific Interventions none   Mutuality/Individual Preferences   What Anxieties, Fears or Concerns Do You Have About Your Health or Care? none   What Questions Do You Have About Your Health or Care? none   What Information Would Help Us Give You More Personalized Care? none

## 2017-09-20 PROBLEM — Z95.828 PRESENCE OF IVC FILTER: Status: ACTIVE | Noted: 2017-09-20

## 2017-09-20 PROBLEM — D62 ACUTE BLOOD LOSS ANEMIA: Status: ACTIVE | Noted: 2017-09-20

## 2017-09-20 PROBLEM — Z90.49 S/P COLECTOMY: Status: ACTIVE | Noted: 2017-09-20

## 2017-09-20 LAB
ANION GAP SERPL CALCULATED.3IONS-SCNC: 2 MMOL/L (ref 3–11)
BASOPHILS # BLD AUTO: 0.02 10*3/MM3 (ref 0–0.2)
BASOPHILS NFR BLD AUTO: 0.2 % (ref 0–1)
BILIRUB UR QL STRIP: NEGATIVE
BUN BLD-MCNC: 8 MG/DL (ref 9–23)
BUN/CREAT SERPL: 10 (ref 7–25)
CALCIUM SPEC-SCNC: 8.1 MG/DL (ref 8.7–10.4)
CHLORIDE SERPL-SCNC: 105 MMOL/L (ref 99–109)
CLARITY UR: CLEAR
CO2 SERPL-SCNC: 25 MMOL/L (ref 20–31)
COLOR UR: YELLOW
CREAT BLD-MCNC: 0.8 MG/DL (ref 0.6–1.3)
DEPRECATED RDW RBC AUTO: 51 FL (ref 37–54)
EOSINOPHIL # BLD AUTO: 0.01 10*3/MM3 (ref 0–0.3)
EOSINOPHIL NFR BLD AUTO: 0.1 % (ref 0–3)
ERYTHROCYTE [DISTWIDTH] IN BLOOD BY AUTOMATED COUNT: 15.7 % (ref 11.3–14.5)
GFR SERPL CREATININE-BSD FRML MDRD: 97 ML/MIN/1.73
GLUCOSE BLD-MCNC: 168 MG/DL (ref 70–100)
GLUCOSE BLDC GLUCOMTR-MCNC: 124 MG/DL (ref 70–130)
GLUCOSE BLDC GLUCOMTR-MCNC: 134 MG/DL (ref 70–130)
GLUCOSE BLDC GLUCOMTR-MCNC: 158 MG/DL (ref 70–130)
GLUCOSE BLDC GLUCOMTR-MCNC: 177 MG/DL (ref 70–130)
GLUCOSE UR STRIP-MCNC: NEGATIVE MG/DL
HCT VFR BLD AUTO: 34.9 % (ref 38.9–50.9)
HGB BLD-MCNC: 11.1 G/DL (ref 13.1–17.5)
HGB UR QL STRIP.AUTO: NEGATIVE
IMM GRANULOCYTES # BLD: 0.07 10*3/MM3 (ref 0–0.03)
IMM GRANULOCYTES NFR BLD: 0.5 % (ref 0–0.6)
KETONES UR QL STRIP: ABNORMAL
LEUKOCYTE ESTERASE UR QL STRIP.AUTO: NEGATIVE
LYMPHOCYTES # BLD AUTO: 0.76 10*3/MM3 (ref 0.6–4.8)
LYMPHOCYTES NFR BLD AUTO: 5.8 % (ref 24–44)
MAGNESIUM SERPL-MCNC: 2 MG/DL (ref 1.3–2.7)
MCH RBC QN AUTO: 28 PG (ref 27–31)
MCHC RBC AUTO-ENTMCNC: 31.8 G/DL (ref 32–36)
MCV RBC AUTO: 87.9 FL (ref 80–99)
MONOCYTES # BLD AUTO: 1.15 10*3/MM3 (ref 0–1)
MONOCYTES NFR BLD AUTO: 8.7 % (ref 0–12)
NEUTROPHILS # BLD AUTO: 11.17 10*3/MM3 (ref 1.5–8.3)
NEUTROPHILS NFR BLD AUTO: 84.7 % (ref 41–71)
NITRITE UR QL STRIP: NEGATIVE
PH UR STRIP.AUTO: 5.5 [PH] (ref 5–8)
PLATELET # BLD AUTO: 365 10*3/MM3 (ref 150–450)
PMV BLD AUTO: 8.3 FL (ref 6–12)
POTASSIUM BLD-SCNC: 4.9 MMOL/L (ref 3.5–5.5)
PROT UR QL STRIP: NEGATIVE
RBC # BLD AUTO: 3.97 10*6/MM3 (ref 4.2–5.76)
SODIUM BLD-SCNC: 132 MMOL/L (ref 132–146)
SP GR UR STRIP: 1.02 (ref 1–1.03)
UROBILINOGEN UR QL STRIP: ABNORMAL
WBC NRBC COR # BLD: 13.18 10*3/MM3 (ref 3.5–10.8)

## 2017-09-20 PROCEDURE — 94760 N-INVAS EAR/PLS OXIMETRY 1: CPT

## 2017-09-20 PROCEDURE — 25010000002 HYDROMORPHONE PER 4 MG: Performed by: COLON & RECTAL SURGERY

## 2017-09-20 PROCEDURE — 80048 BASIC METABOLIC PNL TOTAL CA: CPT | Performed by: COLON & RECTAL SURGERY

## 2017-09-20 PROCEDURE — 83735 ASSAY OF MAGNESIUM: CPT | Performed by: COLON & RECTAL SURGERY

## 2017-09-20 PROCEDURE — 25010000002 HEPARIN (PORCINE) PER 1000 UNITS: Performed by: COLON & RECTAL SURGERY

## 2017-09-20 PROCEDURE — 97530 THERAPEUTIC ACTIVITIES: CPT

## 2017-09-20 PROCEDURE — 63710000001 INSULIN LISPRO (HUMAN) PER 5 UNITS: Performed by: NURSE PRACTITIONER

## 2017-09-20 PROCEDURE — 94799 UNLISTED PULMONARY SVC/PX: CPT

## 2017-09-20 PROCEDURE — 25010000002 ONDANSETRON PER 1 MG: Performed by: NURSE PRACTITIONER

## 2017-09-20 PROCEDURE — 81003 URINALYSIS AUTO W/O SCOPE: CPT | Performed by: INTERNAL MEDICINE

## 2017-09-20 PROCEDURE — 94640 AIRWAY INHALATION TREATMENT: CPT

## 2017-09-20 PROCEDURE — 82962 GLUCOSE BLOOD TEST: CPT

## 2017-09-20 PROCEDURE — G0108 DIAB MANAGE TRN  PER INDIV: HCPCS

## 2017-09-20 PROCEDURE — 85025 COMPLETE CBC W/AUTO DIFF WBC: CPT | Performed by: COLON & RECTAL SURGERY

## 2017-09-20 PROCEDURE — 97162 PT EVAL MOD COMPLEX 30 MIN: CPT

## 2017-09-20 RX ADMIN — BUSPIRONE HYDROCHLORIDE 15 MG: 15 TABLET ORAL at 22:06

## 2017-09-20 RX ADMIN — HEPARIN SODIUM 5000 UNITS: 5000 INJECTION, SOLUTION INTRAVENOUS; SUBCUTANEOUS at 22:06

## 2017-09-20 RX ADMIN — INSULIN LISPRO 2 UNITS: 100 INJECTION, SOLUTION INTRAVENOUS; SUBCUTANEOUS at 20:17

## 2017-09-20 RX ADMIN — HEPARIN SODIUM 5000 UNITS: 5000 INJECTION, SOLUTION INTRAVENOUS; SUBCUTANEOUS at 13:38

## 2017-09-20 RX ADMIN — IPRATROPIUM BROMIDE 2 SPRAY: 42 SPRAY NASAL at 18:50

## 2017-09-20 RX ADMIN — ONDANSETRON 4 MG: 2 INJECTION INTRAMUSCULAR; INTRAVENOUS at 16:08

## 2017-09-20 RX ADMIN — BUDESONIDE 0.5 MG: 0.5 INHALANT RESPIRATORY (INHALATION) at 19:13

## 2017-09-20 RX ADMIN — IPRATROPIUM BROMIDE 0.5 MG: 0.5 SOLUTION RESPIRATORY (INHALATION) at 12:17

## 2017-09-20 RX ADMIN — ONDANSETRON 4 MG: 2 INJECTION INTRAMUSCULAR; INTRAVENOUS at 06:00

## 2017-09-20 RX ADMIN — HYDROCODONE BITARTATE AND ACETAMINOPHEN 1 TABLET: 5; 325 TABLET ORAL at 12:29

## 2017-09-20 RX ADMIN — ONDANSETRON 4 MG: 2 INJECTION INTRAMUSCULAR; INTRAVENOUS at 23:54

## 2017-09-20 RX ADMIN — HYDROMORPHONE HYDROCHLORIDE 0.5 MG: 1 INJECTION, SOLUTION INTRAMUSCULAR; INTRAVENOUS; SUBCUTANEOUS at 13:38

## 2017-09-20 RX ADMIN — FLUTICASONE PROPIONATE 2 SPRAY: 50 SPRAY, METERED NASAL at 20:03

## 2017-09-20 RX ADMIN — HEPARIN SODIUM 5000 UNITS: 5000 INJECTION, SOLUTION INTRAVENOUS; SUBCUTANEOUS at 05:56

## 2017-09-20 RX ADMIN — HYDROCODONE BITARTATE AND ACETAMINOPHEN 1 TABLET: 5; 325 TABLET ORAL at 04:00

## 2017-09-20 RX ADMIN — Medication 250 MG: at 09:02

## 2017-09-20 RX ADMIN — IPRATROPIUM BROMIDE 0.5 MG: 0.5 SOLUTION RESPIRATORY (INHALATION) at 16:29

## 2017-09-20 RX ADMIN — IPRATROPIUM BROMIDE 2 SPRAY: 42 SPRAY NASAL at 13:39

## 2017-09-20 RX ADMIN — IPRATROPIUM BROMIDE 0.5 MG: 0.5 SOLUTION RESPIRATORY (INHALATION) at 07:46

## 2017-09-20 RX ADMIN — ALVIMOPAN 12 MG: 12 CAPSULE ORAL at 09:01

## 2017-09-20 RX ADMIN — MONTELUKAST SODIUM 10 MG: 10 TABLET, FILM COATED ORAL at 20:03

## 2017-09-20 RX ADMIN — IPRATROPIUM BROMIDE 0.5 MG: 0.5 SOLUTION RESPIRATORY (INHALATION) at 19:13

## 2017-09-20 RX ADMIN — BUSPIRONE HYDROCHLORIDE 15 MG: 15 TABLET ORAL at 13:38

## 2017-09-20 RX ADMIN — AMLODIPINE BESYLATE 5 MG: 5 TABLET ORAL at 20:03

## 2017-09-20 RX ADMIN — HYDROMORPHONE HYDROCHLORIDE 0.5 MG: 1 INJECTION, SOLUTION INTRAMUSCULAR; INTRAVENOUS; SUBCUTANEOUS at 19:51

## 2017-09-20 RX ADMIN — VALSARTAN 160 MG: 160 TABLET, FILM COATED ORAL at 09:02

## 2017-09-20 RX ADMIN — POTASSIUM CHLORIDE, SODIUM CHLORIDE, CALCIUM CHLORIDE, SODIUM LACTATE, AND DEXTROSE MONOHYDRATE 125 ML/HR: 1.79; 6; .2; 3.1; 5 INJECTION, SOLUTION INTRAVENOUS at 20:23

## 2017-09-20 RX ADMIN — ALVIMOPAN 12 MG: 12 CAPSULE ORAL at 18:50

## 2017-09-20 RX ADMIN — BUSPIRONE HYDROCHLORIDE 15 MG: 15 TABLET ORAL at 05:56

## 2017-09-20 RX ADMIN — IPRATROPIUM BROMIDE 2 SPRAY: 42 SPRAY NASAL at 08:00

## 2017-09-20 RX ADMIN — HYDROMORPHONE HYDROCHLORIDE 0.5 MG: 1 INJECTION, SOLUTION INTRAMUSCULAR; INTRAVENOUS; SUBCUTANEOUS at 06:01

## 2017-09-20 RX ADMIN — BUDESONIDE 0.5 MG: 0.5 INHALANT RESPIRATORY (INHALATION) at 07:46

## 2017-09-20 RX ADMIN — IPRATROPIUM BROMIDE 2 SPRAY: 42 SPRAY NASAL at 20:03

## 2017-09-20 RX ADMIN — Medication 250 MG: at 18:50

## 2017-09-20 NOTE — PROGRESS NOTES
Discharge Planning Assessment  [facility]     Patient Name: Zackary Noonan II  MRN: [id number]  Today's Date: 9/20/2017    Admit Date: 9/19/2017          Discharge Needs Assessment       09/20/17 1115    Living Environment    Lives With spouse    Living Arrangements house    Home Accessibility bed and bath are not on the first floor;bed and bath on same level;stairs within home    Number of Stairs to Enter Home 1    Number of Stairs Within Home 12    Stair Railings at Home inside, present at both sides    Type of Financial/Environmental Concern none    Transportation Available car    Living Environment Comment CM spoke with pt in room with permission in regards to discharge planning.  Pt resides in a house with spouse in [facility]. Pt is independent of ADLs    Living Environment    Provides Primary Care For no one    Quality Of Family Relationships supportive    Able to Return to Prior Living Arrangements yes    Living Arrangement Comments Spouse is supporitive and will help him at home.     Discharge Needs Assessment    Concerns To Be Addressed denies needs/concerns at this time    Readmission Within The Last 30 Days no previous admission in last 30 days    Anticipated Changes Related to Illness other (see comments)   denies discharge needs at this time.    Equipment Needed After Discharge Ostomy supplies    Discharge Disposition still a patient    Discharge Contact Information if Applicable Ronda Noonan(spouse):  [phone]    Discharge Planning Comments Pt has [employer] insurance and denies recent changes in insurance. Pt states usually has an affordable copay and pt uses [facility] in [place]. Plan is home with spouse when medically ready for discharge. Pt denies discharge needs. CM will cont to follow.             Discharge Plan       09/20/17 1120    Case Management/Social Work Plan    Plan discharge plan    Patient/Family In Agreement With Plan yes    Additional Comments Plan is home with  spouse when medically ready for discharge and spouse will help him.  P denies discharge needs. CM will cont  to follow        Discharge Placement     No information found        Expected Discharge Date and Time     Expected Discharge Date Expected Discharge Time    Sep 23, 2017               Demographic Summary       09/20/17 1113    Primary Care Physician Information    Name Jillian Layne            Functional Status       09/20/17 1114    Functional Status Prior    Ambulation 0-->independent    Transferring 0-->independent    Toileting 0-->independent    Bathing 0-->independent    Dressing 0-->independent    Eating 0-->independent    Communication 0-->understands/communicates without difficulty    Swallowing 0-->swallows foods/liquids without difficulty            Psychosocial     None            Abuse/Neglect     None            Legal     None            Substance Abuse     None            Patient Forms     None          Lashawn Wilburn RN

## 2017-09-20 NOTE — PLAN OF CARE
Problem: Patient Care Overview (Adult)  Goal: Plan of Care Review  Outcome: Ongoing (interventions implemented as appropriate)    09/20/17 1036   Coping/Psychosocial Response Interventions   Plan Of Care Reviewed With patient;spouse   Patient Care Overview   Progress no change   Outcome Evaluation   Outcome Summary/Follow up Plan Patient is new end ileostomy per Dr. Nielsen r/t ulcerative colitis. Appliance is intact. Stoma is red and moist. Dropped of education folder. Brief education performed with patient and spouse. Will follow daily for stoma support and education. Please contact [facility] nurse if needs arise. Thanks          Problem: Ileostomy (Adult)  Goal: Signs and Symptoms of Listed Potential Problems Will be Absent or Manageable (Ileostomy)  Outcome: Ongoing (interventions implemented as appropriate)    09/20/17 1036   Ileostomy   Problems Assessed (Ileostomy) all   Problems Present (Ileostomy) none

## 2017-09-20 NOTE — THERAPY EVALUATION
Acute Care - Physical Therapy Initial Evaluation  [facility]     Patient Name: Zackary Noonan II  : 1955  MRN: [id number]  Today's Date: 2017   Onset of Illness/Injury or Date of Surgery Date: 17  Date of Referral to PT: 17  Referring Physician: MD Gia      Admit Date: 2017     Visit Dx:    ICD-10-CM ICD-9-CM   1. Impaired functional mobility, balance, gait, and endurance Z74.09 V49.89   2. Crohn's disease K50.90 555.9     Patient Active Problem List   Diagnosis   • Pneumonia   • Sepsis   • Supratherapeutic INR   • History of DVT (deep vein thrombosis)   • HTN (hypertension)   • Leukocytosis   • Diarrhea   • Crohn's colitis   • Acute kidney injury   • COPD (chronic obstructive pulmonary disease)   • Asthma   • Infection of wound due to methicillin resistant Staphylococcus aureus (MRSA)   • H/O Pulmonary nodule   • Prediabetes   • Anemia   • Ulcerative colitis     Past Medical History:   Diagnosis Date   • Anxiety    • Arthritis    • Asthma    • Clostridium difficile infection 2017    treated per dr carter with fecal transplant     • H/O recurrent pneumonia    • Hypertension    • MRSA infection 2016    right lower extremity wound   • Pulmonary nodule     Followed by Dr. Galaviz    • Recurrent deep vein thrombosis (DVT)     x 3 LLE; chronic anticoagulation therapy    • Ulcerative colitis    • Wears glasses    • Wears hearing aid      Past Surgical History:   Procedure Laterality Date   • BRONCHOSCOPY      by Dr. Galaviz for pulmonary nodule   • BRONCHOSCOPY N/A 2016    Procedure: BRONCHOSCOPY;  Surgeon: Eben Roberts MD;  Location: [facility] ENDOSCOPY;  Service:    • COLONOSCOPY  2017   • UMBILICAL HERNIA REPAIR     • VASECTOMY     • VASECTOMY REVERSAL            PT ASSESSMENT (last 72 hours)      PT Evaluation       17 1115 17 1001    Rehab Evaluation    Document Type  evaluation  -    Subjective Information  agree to therapy;no complaints  -EH     Symptoms Noted During/After Treatment  increased pain  -    Symptoms Noted Comment  pain with mobility  -    General Information    Patient Profile Review  yes  -EH    Onset of Illness/Injury or Date of Surgery Date  09/19/17  -    Referring Physician  MD Gia  -    General Observations  Pt supine in bed with wound care NSG and NSG in room.  -    Pertinent History Of Current Problem  Pt s/p total abdominal colectomy and creation of iliostomy after hx of 8 months of abdominal cramping, diarrhea and dehydration.   -    Precautions/Limitations  other (see comments)   iliostomy.   -    Prior Level of Function  independent:;all household mobility;community mobility   had not worked since eary 2017  -    Equipment Currently Used at Home  none  -    Plans/Goals Discussed With  patient;spouse/S.O.;agreed upon  -    Risks Reviewed  patient:;spouse/S.O.:;LOB;increased discomfort;dizziness  -    Benefits Reviewed  patient:;improve function;increase independence  -    Barriers to Rehab  medically complex  -    Living Environment    Lives With spouse  -CS significant other  -    Living Arrangements house  - house  -    Home Accessibility bed and bath are not on the first floor;bed and bath on same level;stairs within home  - stairs to enter home;stairs within home  -    Number of Stairs to Enter Home 1  -CS 1  -EH    Number of Stairs Within Home 12  -CS 14   with landing after 7  -EH    Stair Railings at Home inside, present at both sides  -     Type of Financial/Environmental Concern none  -     Transportation Available car  -     Living Environment Comment CM spoke with pt in room with permission in regards to discharge planning.  Pt resides in a house with spouse in [place]. Pt is independent of ADLs  - works as , , [employer]  -    Clinical Impression    Date of Referral to PT  09/19/17  -    PT Diagnosis  decreased independence with mobility,  "decreased functional mobility, decreased strength after abdominal sx.  -    Criteria for Skilled Therapeutic Interventions Met  yes;treatment indicated  -    Rehab Potential  good, to achieve stated therapy goals  -    Pain Assessment    Pain Assessment  Buchanan-Sanches FACES  -    Buchanan-Baker FACES Pain Rating  4  -    Pain Type  Acute pain  -    Pain Location  Abdomen  -    Pain Orientation  Left;Right  -    Pain Intervention(s)  Medication (See MAR);Ambulation/increased activity  -    Vision Assessment/Intervention    Visual Impairment  WFL with corrective lenses  -    Cognitive Assessment/Intervention    Current Cognitive/Communication Assessment  functional  -    Orientation Status  oriented x 4  -    Follows Commands/Answers Questions  100% of the time;able to follow single-step instructions  -    Personal Safety  WNL/WFL  -    Personal Safety Interventions  gait belt;nonskid shoes/slippers when out of bed;fall prevention program maintained  -    ROM (Range of Motion)    General ROM Detail  WNL BLEs; Pt reports \"mild arthritis\" in shoulders.  -    MMT (Manual Muscle Testing)    General MMT Assessment Detail  5/5 knee extension; 4+/5 LLE, 5/5 RLE DF; 5/5 jd PF.  -    Bed Mobility, Assessment/Treatment    Bed Mobility, Roll Left, [place]  contact guard assist;verbal cues required  -    Bed Mob, Sidelying to Sit, [place]  contact guard assist;verbal cues required  -    Bed Mob, Sit to Sidelying, [place]  verbal cues required;minimum assist (75% patient effort)   with RLE only  -    Bed Mobility, Comment  used log roll technique on flat bed. Cues for pt to hold pillow, and for sequencing for log roll, sidelying to sit. MIn A for RLE into bed for sit>sidelying.   -    Transfer Assessment/Treatment    Transfers, Sit-Stand [place]  contact guard assist  -    Transfers, Stand-Sit [place]  contact guard assist  -    Gait Assessment/Treatment    Gait, " [place] Level  contact guard assist  -    Gait, Distance (Feet)  1210  -    Gait, Gait Pattern Analysis  swing-through gait  -    Gait, Gait Deviations  stride width increased;tika decreased   BLE externally rotated slightly  -    Gait, Safety Issues  step length decreased  -    Gait, Impairments  pain;strength decreased  -    Gait, Comment  Pt demonstrates gait abnormalities due to pain.   -    Sensory Assessment/Intervention    Light Touch  LLE;RLE  -    LLE Light Touch  WNL  -    RLE Light Touch  WNL  -    Positioning and Restraints    Pre-Treatment Position  in bed  -    Post Treatment Position  bed  -    In Bed  notified nsg;supine;call light within reach;encouraged to call for assist;with family/caregiver  -      09/19/17 2006       Living Environment    Lives With significant other  -       User Key  (r) = Recorded By, (t) = Taken By, (c) = Cosigned By    Initials Name Provider Type     Krysten Acosta, PT Physical Therapist    CS Lashawn Wilburn, RN Case Manager    DB Chinyere Tucker, RN Registered Nurse          Physical Therapy Education     Title: PT OT SLP Therapies (Active)     Topic: Physical Therapy (Active)     Point: Mobility training (Done)    Learning Progress Summary    Learner Readiness Method Response Comment Documented by Status   Patient Acceptance E VU,NR Education on log roll, use of pillow for abdominal bracing, importance of activity, POC for PT including stair training, safety.  09/20/17 1131 Done               Point: Body mechanics (Done)    Learning Progress Summary    Learner Readiness Method Response Comment Documented by Status   Patient Acceptance E VU,NR Education on log roll, use of pillow for abdominal bracing, importance of activity, POC for PT including stair training, safety.  09/20/17 1131 Done               Point: Precautions (Done)    Learning Progress Summary    Learner Readiness Method Response Comment Documented by Status    Patient Acceptance E VU,NR Education on log roll, use of pillow for abdominal bracing, importance of activity, POC for PT including stair training, safety.  09/20/17 1131 Done                      User Key     Initials Effective Dates Name Provider Type Discipline     06/19/15 -  Krysten Acosta, PT Physical Therapist PT                PT Recommendation and Plan  Anticipated Equipment Needs At Discharge:  (none)  Anticipated Discharge Disposition: home with assist (OPPT if needed to return to higher level activities (work))  Planned Therapy Interventions: stair training, gait training  PT Frequency: daily  Plan of Care Review  Plan Of Care Reviewed With: patient  Progress: improving  Outcome Summary/Follow up Plan: Pt demonstrates all mobility with CGA with VC. Pt educated on importance of mobility with MD recommendation of 5x per day and POC for stair training. Ambulates 1210ft this visit.          IP PT Goals       09/20/17 1132          Bed Mobility PT LTG    Bed Mobility PT LTG, Date Established 09/20/17  -      Bed Mobility PT LTG, Time to Achieve 2 wks  -EH      Bed Mobility PT LTG, Activity Type all bed mobility  -EH      Bed Mobility PT LTG, [place] Level independent  -EH      Transfer Training PT LTG    Transfer Training PT LTG, Date Established 09/20/17  -      Transfer Training PT LTG, Time to Achieve 2 wks  -EH      Transfer Training PT LTG, Activity Type sit to stand/stand to sit  -      Transfer Training PT LTG, [place] Level independent  -EH      Gait Training PT LTG    Gait Training Goal PT LTG, Date Established 09/20/17  -      Gait Training Goal PT LTG, Time to Achieve 2 wks  -EH      Gait Training Goal PT LTG, [place] Level independent  -EH      Gait Training Goal PT LTG, Distance to Achieve 350  -EH      Stair Training PT LTG    Stair Training Goal PT LTG, Date Established 09/20/17  -      Stair Training Goal PT LTG, Time to Achieve 2 wks  -EH      Stair Training  Goal PT LTG, Number of Steps 14  -      Stair Training Goal PT LTG, [place] Level supervision required  -      Stair Training Goal PT LTG, Assist Device 1 handrail  -        User Key  (r) = Recorded By, (t) = Taken By, (c) = Cosigned By    Initials Name Provider Type     Krysten Acosta PT Physical Therapist                Outcome Measures       09/20/17 1001          How much help from another person do you currently need...    Turning from your back to your side while in flat bed without using bedrails? 3  -EH      Moving from lying on back to sitting on the side of a flat bed without bedrails? 3  -EH      Moving to and from a bed to a chair (including a wheelchair)? 3  -EH      Standing up from a chair using your arms (e.g., wheelchair, bedside chair)? 3  -EH      Climbing 3-5 steps with a railing? 3  -EH      To walk in hospital room? 3  -EH      AM-PAC 6 Clicks Score 18  -      Functional Assessment    Outcome Measure Options AM-PAC 6 Clicks Basic Mobility (PT)  -        User Key  (r) = Recorded By, (t) = Taken By, (c) = Cosigned By    Initials Name Provider Type     Krysten Acosta, PT Physical Therapist           Time Calculation:         PT Charges       09/20/17 1136          Time Calculation    Start Time 1001  -      PT Received On 09/20/17  -      PT Goal Re-Cert Due Date 09/30/17  -      Time Calculation- PT    Total Timed Code Minutes- PT 12 minute(s)  -        User Key  (r) = Recorded By, (t) = Taken By, (c) = Cosigned By    Initials Name Provider Type    EH Krysten Acosta PT Physical Therapist          Therapy Charges for Today     Code Description Service Date Service Provider Modifiers Qty    61392458597 HC PT EVAL MOD COMPLEXITY 4 9/20/2017 Krysten Acosta, PT GP 1    66908176677 HC PT THERAPEUTIC ACT EA 15 MIN 9/20/2017 Krysten Acosta, PT GP 1          PT G-Codes  Outcome Measure Options: AM-PAC 6 Clicks Basic Mobility (PT)      Krysten Acosta,  PT  9/20/2017

## 2017-09-20 NOTE — PROGRESS NOTES
"IM progress note      Zackary Noonan II  [id number]  1955     LOS: 1 day     Attending: David Nielsen MD    Primary Care Provider: Jillian Layne MD      Chief Complaint/Reason for visit:  Abdominal pain    Subjective   Doing well. Pain control is adequate.  Tolerating liquids. Ambulating in halls. Light brown liquid from stoma. Denies f/c/n/v/sob/cp.    Objective     Vital Signs  Blood pressure 132/73, pulse 99, temperature 97.7 °F (36.5 °C), temperature source Oral, resp. rate 18, height 66.5\" (168.9 cm), weight 154 lb (69.9 kg), SpO2 96 %.  Temp (24hrs), Av.9 °F (36.6 °C), Min:97.7 °F (36.5 °C), Max:98 °F (36.7 °C)      Intake/Output:    Intake/Output Summary (Last 24 hours) at 17  Last data filed at 17 1911   Gross per 24 hour   Intake             1856 ml   Output             2275 ml   Net             -419 ml       Nutrition: CL    Respiratory: RA    Physical Therapy: Pt demonstrates all mobility with CGA with VC. Pt educated on importance of mobility with MD recommendation of 5x per day and POC for stair training. Ambulates 1210ft this visit.    Physical Exam:     General Appearance:    Alert, cooperative, in no acute distress   Head:    Normocephalic, without obvious abnormality, atraumatic    Lungs:     Normal effort, symmetric chest rise, no crepitus, clear to      auscultation bilaterally             Heart:    Regular rhythm and normal rate, normal S1 and S2   Abdomen:     Abd soft, mild expected tenderness. Soft and benign, clean benign incisions, stoma is pink, light brown liquid in ostomy bag   Extremities:   No clubbing, cyanosis or edema.  No deformities.    Pulses:   Pulses palpable and equal bilaterally   Skin:   No bleeding, bruising or rash   Neurologic:   Moves all extremities with no obvious focal motor deficit.  Cranial nerves 2 - 12 grossly intact     Results Review:     I reviewed the patient's new clinical results.     Results from last 7 days  Lab Units " 09/20/17  0425 09/19/17  1859 09/15/17  1517   WBC 10*3/mm3 13.18*  --  8.71   HEMOGLOBIN g/dL 11.1* 10.9* 10.9*   HEMATOCRIT % 34.9* 35.1* 35.1*   PLATELETS 10*3/mm3 365  --  324       Results from last 7 days  Lab Units 09/20/17  0425 09/15/17  1517   SODIUM mmol/L 132 140   POTASSIUM mmol/L 4.9 4.5   CHLORIDE mmol/L 105 107   CO2 mmol/L 25.0 25.0   BUN mg/dL 8* 11   CREATININE mg/dL 0.80 0.80   CALCIUM mg/dL 8.1* 8.7   GLUCOSE mg/dL 168* 124*     I reviewed the patient's new imaging including images and reports.    All medications reviewed.     alvimopan 12 mg Oral BID   amLODIPine 5 mg Oral Nightly   budesonide 0.5 mg Nebulization BID - RT   busPIRone 15 mg Oral TID   fluticasone 2 spray Nasal BID   heparin (porcine) 5,000 Units Subcutaneous Q8H   insulin lispro 0-7 Units Subcutaneous 4x Daily AC & at Bedtime   ipratropium 2 spray Each Nare 4x Daily   ipratropium 0.5 mg Nebulization 4x Daily - RT   montelukast 10 mg Oral Nightly   saccharomyces boulardii 250 mg Oral BID   Scopolamine 1 patch Transdermal Once   valsartan 160 mg Oral Daily       Assessment/Plan   Principal Problem:    S/P total abdominal colectomy  Active Problems:    History of DVT (deep vein thrombosis)    HTN (hypertension)    Leukocytosis, mild, likely reactive    COPD (chronic obstructive pulmonary disease)    Prediabetes    Anemia    Ulcerative colitis    Acute blood loss anemia, mild, asymptomatic    HO of IVC filter      Plan  1. Ambulation  2. Pain control-prns   3. IS-encouraged  4. DVT proph- mechs/ Heparin. Resume Coumadin when ok with Dr. Nielsen  5. Bowel regimen  6. Diet/IVF per surgery  7. Monitor post-op labs  8. DC planning for home when evidence of return of bowel function    HTN  - Continue home norvasc and diovan  - Monitor BP q4 hrs  - Holding parameters for BP meds  - PRN hydralazine for SBP >170     COPD  - Continue home albuterol, qvar, flonase, Breo, singulair, atrovent, and elipta (or pharmacy substitions)  - Monitor O2  sats  - O2 PRN     PreDM  - hgb A1c on 9/15/17 6.1  - Accuchecks ACHS with low dose SSI  - DM educator consult    Seen and examined by me. Agree with above. Discussed with patient and wife. Discussed with SAMMY.      Jennifer Mallory MD  09/20/17  10:03 PM

## 2017-09-20 NOTE — PLAN OF CARE
Problem: Patient Care Overview (Adult)  Goal: Plan of Care Review  Outcome: Ongoing (interventions implemented as appropriate)    09/20/17 1132   Coping/Psychosocial Response Interventions   Plan Of Care Reviewed With patient   Patient Care Overview   Progress improving   Outcome Evaluation   Outcome Summary/Follow up Plan Pt demonstrates all mobility with CGA with VC. Pt educated on importance of mobility with MD recommendation of 5x per day and POC for stair training. Ambulates 1210 ft this visit.         Problem: Inpatient Physical Therapy  Goal: Bed Mobility Goal LTG- PT  Outcome: Ongoing (interventions implemented as appropriate)    09/20/17 1132   Bed Mobility PT LTG   Bed Mobility PT LTG, Date Established 09/20/17   Bed Mobility PT LTG, Time to Achieve 2 wks   Bed Mobility PT LTG, Activity Type all bed mobility   Bed Mobility PT LTG, [place] Level independent       Goal: Transfer Training Goal 1 LTG- PT  Outcome: Ongoing (interventions implemented as appropriate)    09/20/17 1132   Transfer Training PT LTG   Transfer Training PT LTG, Date Established 09/20/17   Transfer Training PT LTG, Time to Achieve 2 wks   Transfer Training PT LTG, Activity Type sit to stand/stand to sit   Transfer Training PT LTG, [place] Level independent       Goal: Gait Training Goal LTG- PT  Outcome: Ongoing (interventions implemented as appropriate)    09/20/17 1132   Gait Training PT LTG   Gait Training Goal PT LTG, Date Established 09/20/17   Gait Training Goal PT LTG, Time to Achieve 2 wks   Gait Training Goal PT LTG, [place] Level independent   Gait Training Goal PT LTG, Distance to Achieve 350       Goal: Stair Training Goal LTG- PT  Outcome: Ongoing (interventions implemented as appropriate)    09/20/17 1132   Stair Training PT LTG   Stair Training Goal PT LTG, Date Established 09/20/17   Stair Training Goal PT LTG, Time to Achieve 2 wks   Stair Training Goal PT LTG, Number of Steps 14   Stair Training Goal PT  LTG, [place] Level supervision required   Stair Training Goal PT LTG, Assist Device 1 handrail

## 2017-09-20 NOTE — CONSULTS
"Diabetes Education  Assessment/Teaching    Patient Name:  Zackary Noonan II  YOB: 1955  MRN: [id number]  Admit Date:  9/19/2017      Assessment Date:  9/20/2017       Most Recent Value    General Information      Referral From:  A1c, Blood glucose, MD order    Height  5' 6.5\" (1.689 m)    Height Method  Stated    Weight  154 lb (69.9 kg)    Weight Method  Standing scale    Pregnancy Assessment     Diabetes History     What type of diabetes do you have?  Pre-diabetes    Length of Diabetes Diagnosis  Newly diagnosed <6 months    Current DM knowledge  poor    Have you had diabetes education/teaching in the past?  no    Do you test your blood sugar at home?  no    Education Preferences     What areas of diabetes would you like to learn about?  avoiding high blood sugar, diabetes complications, diet information, exercise information, understanding diabetes, testing my blood sugar at home, stress/coping skills, resources on diabetes    Nutrition Information     Assessment Topics     Healthy Eating - Assessment  Needs education    Being Active - Assessment  Needs education    Problem Solving - Assessment  Needs education    Reducing Risk - Assessment  Needs education    Healthy Coping - Assessment  Needs education    Monitoring - Assessment  Needs education    DM Goals     Healthy Eating - Goal  0-7 days from discharge    Being Active - Goal  0-30 days from discharge    Problem Solving - Goal  0-7 days from discharge    Reducing Risk - Goal  0-7 days from discharge    Healthy Coping - Goal  0-7 days from discharge    Monitoring - Goal  0-7 days from discharge               Most Recent Value    DM Education Needs     Meter  Meter provided    Meter Type  One Touch    Frequency of Testing  3 times a week    Blood Glucose Target Range  <100    Reducing Risks  A1C testing, Blood pressure, Immunizations    Healthy Eating  RD consult, Reviewed meal plan    Physical Activity  Walking    Physical Activity Frequency  " Occasionally    Healthy Coping  Appropriate    Motivation  Moderate    Teaching Method  Explanation, Discussion, Demonstration, Handouts, Teach back    Patient Response  Verbalized understanding            Other Comments:    Patient educated on Pre-diabetes, diabetes and the disease process, types of DM, diagnosis/A1C, monitoring, signs and symptoms, activity and exercise and how to prevent disease from progressing. Changing behavior and goal setting relative to diet, exercise and healthy lifestyle was emphasized. Patient provided a new donated meter and taught how to use it. Pt. was also advised to contact PCP for prescription for lancets and strips. Pt. verbalized understanding and also completed a return demonstration on using the meter using Teach Back method. Pt advised to consult with PCP for further instructions, discuss A1C result, and POC. Education handouts provided, questions answered and pt. advised to call with any other questions or concerns.        Electronically signed by:  Yogesh Blanc RN  09/20/17 1:55 PM

## 2017-09-20 NOTE — POST-PROCEDURE NOTE
"[facility] ([facility]    Length of stay: 1 days    Subjective:  Stable Post - Op course.    Vital Signs:  Blood pressure 129/79, pulse 93, temperature 98 °F (36.7 °C), temperature source Oral, resp. rate 18, height 66.5\" (168.9 cm), weight 154 lb (69.9 kg), SpO2 96 %.    Labs past 24 hours:  Lab Results (last 24 hours)     Procedure Component Value Units Date/Time    Hemoglobin & Hematocrit, Blood [[id number]]  (Abnormal) Collected:  09/19/17 1859    Specimen:  Blood Updated:  09/19/17 1911     Hemoglobin 10.9 (L) g/dL      Hematocrit 35.1 (L) %     POC Glucose Fingerstick [[id number]]  (Abnormal) Collected:  09/19/17 2045    Specimen:  Blood Updated:  09/19/17 2047     Glucose 134 (H) mg/dL     Narrative:       Meter: [id number] : [id number] Filmalter Joann    Urinalysis With / Culture If Indicated [[id number]]  (Abnormal) Collected:  09/20/17 0053    Specimen:  Urine from Urine, Catheter Updated:  09/20/17 0058     Color, UA Yellow     Appearance, UA Clear     pH, UA 5.5     Specific Gravity, UA 1.017     Glucose, UA Negative     Ketones, UA 40 mg/dL (2+) (A)     Bilirubin, UA Negative     Blood, UA Negative     Protein, UA Negative     Leuk Esterase, UA Negative     Nitrite, UA Negative     Urobilinogen, UA 0.2 E.U./dL    Narrative:       Urine microscopic not indicated.    CBC & Differential [[id number]] Collected:  09/20/17 0425    Specimen:  Blood Updated:  09/20/17 0527    Narrative:       The following orders were created for panel order CBC & Differential.  Procedure                               Abnormality         Status                     ---------                               -----------         ------                     CBC Auto Differential[[id number]]        Abnormal            Final result                 Please view results for these tests on the individual orders.    CBC Auto Differential [[id number]]  (Abnormal) Collected:  09/20/17 0425    Specimen:  Blood " Updated:  09/20/17 0527     WBC 13.18 (H) 10*3/mm3      RBC 3.97 (L) 10*6/mm3      Hemoglobin 11.1 (L) g/dL      Hematocrit 34.9 (L) %      MCV 87.9 fL      MCH 28.0 pg      MCHC 31.8 (L) g/dL      RDW 15.7 (H) %      RDW-SD 51.0 fl      MPV 8.3 fL      Platelets 365 10*3/mm3      Neutrophil % 84.7 (H) %      Lymphocyte % 5.8 (L) %      Monocyte % 8.7 %      Eosinophil % 0.1 %      Basophil % 0.2 %      Immature Grans % 0.5 %      Neutrophils, Absolute 11.17 (H) 10*3/mm3      Lymphocytes, Absolute 0.76 10*3/mm3      Monocytes, Absolute 1.15 (H) 10*3/mm3      Eosinophils, Absolute 0.01 10*3/mm3      Basophils, Absolute 0.02 10*3/mm3      Immature Grans, Absolute 0.07 (H) 10*3/mm3     Magnesium [[id number]]  (Normal) Collected:  09/20/17 0425    Specimen:  Blood Updated:  09/20/17 0603     Magnesium 2.0 mg/dL     Basic Metabolic Panel [[id number]]  (Abnormal) Collected:  09/20/17 0425    Specimen:  Blood Updated:  09/20/17 0603     Glucose 168 (H) mg/dL      BUN 8 (L) mg/dL      Creatinine 0.80 mg/dL      Sodium 132 mmol/L      Potassium 4.9 mmol/L      Chloride 105 mmol/L      CO2 25.0 mmol/L      Calcium 8.1 (L) mg/dL      eGFR Non African Amer 97 mL/min/1.73      BUN/Creatinine Ratio 10.0     Anion Gap 2.0 (L) mmol/L     Narrative:       [employer] Guidelines    Stage     Description        GFR  1         Normal or High     90+  2         Mild decrease      60-89  3         Moderate decrease  30-59  4         Severe decrease    15-29  5         Kidney failure     <15    Tissue Pathology Exam [[id number]] Collected:  09/19/17 1826    Specimen:  Tissue from Large Intestine, Left / Descending Colon Updated:  09/20/17 0734    POC Glucose Fingerstick [[id number]]  (Normal) Collected:  09/20/17 0816    Specimen:  Blood Updated:  09/20/17 0817     Glucose 124 mg/dL     Narrative:       Meter: [id number] : [id number] [facility]    POC Glucose Fingerstick [[id number]]  (Abnormal) Collected:  09/20/17  1235    Specimen:  Blood Updated:  09/20/17 1238     Glucose 134 (H) mg/dL     Narrative:       Meter: [id number] : [id number] Gary Samano          I/O last shift:  I/O this shift:  In: 1238 [I.V.:1238]  Out: -      PHYSICAL EXAM-  Comfortable  cv- rsr  Chest- clear, =  Abd- soft, mild distention, dressing ok, stoma -OK.    Pathology:    Order Name Source Comment Collection Info Order Time   OR POTASSIUM   Collected By: Karoline Mosher RN 9/19/2017 12:18 PM   HEMOGLOBIN AND HEMATOCRIT, BLOOD   Collected By: Abdirashid Castillo RN 9/19/2017  6:55 PM   TISSUE PATHOLOGY EXAM Large Intestine, Left / Descending Colon   COLON AND OMENTUM Collected By: David Nielsen MD 9/19/2017  6:49 PM   .    Assessment and Plan:  Seen at 6a- interval lab work results-OK.  Satisfactory early course.  Goal of ambulation. / Diet as tolerated.    David Nielsen MD  09/20/17  2:06 PM

## 2017-09-21 LAB
GLUCOSE BLDC GLUCOMTR-MCNC: 126 MG/DL (ref 70–130)
GLUCOSE BLDC GLUCOMTR-MCNC: 127 MG/DL (ref 70–130)
GLUCOSE BLDC GLUCOMTR-MCNC: 128 MG/DL (ref 70–130)
GLUCOSE BLDC GLUCOMTR-MCNC: 139 MG/DL (ref 70–130)

## 2017-09-21 PROCEDURE — 82962 GLUCOSE BLOOD TEST: CPT

## 2017-09-21 PROCEDURE — 94760 N-INVAS EAR/PLS OXIMETRY 1: CPT

## 2017-09-21 PROCEDURE — 94640 AIRWAY INHALATION TREATMENT: CPT

## 2017-09-21 PROCEDURE — 25010000002 HYDROMORPHONE PER 4 MG: Performed by: COLON & RECTAL SURGERY

## 2017-09-21 PROCEDURE — 25010000002 HEPARIN (PORCINE) PER 1000 UNITS: Performed by: COLON & RECTAL SURGERY

## 2017-09-21 PROCEDURE — 94799 UNLISTED PULMONARY SVC/PX: CPT

## 2017-09-21 PROCEDURE — 97116 GAIT TRAINING THERAPY: CPT

## 2017-09-21 RX ORDER — CETIRIZINE HYDROCHLORIDE 10 MG/1
10 TABLET ORAL DAILY
Status: DISCONTINUED | OUTPATIENT
Start: 2017-09-21 | End: 2017-09-24 | Stop reason: HOSPADM

## 2017-09-21 RX ADMIN — IPRATROPIUM BROMIDE 2 SPRAY: 42 SPRAY NASAL at 08:00

## 2017-09-21 RX ADMIN — Medication 250 MG: at 08:14

## 2017-09-21 RX ADMIN — HYDROMORPHONE HYDROCHLORIDE 0.5 MG: 1 INJECTION, SOLUTION INTRAMUSCULAR; INTRAVENOUS; SUBCUTANEOUS at 05:20

## 2017-09-21 RX ADMIN — HYDROMORPHONE HYDROCHLORIDE 0.5 MG: 1 INJECTION, SOLUTION INTRAMUSCULAR; INTRAVENOUS; SUBCUTANEOUS at 16:53

## 2017-09-21 RX ADMIN — IPRATROPIUM BROMIDE 2 SPRAY: 42 SPRAY NASAL at 18:32

## 2017-09-21 RX ADMIN — ALVIMOPAN 12 MG: 12 CAPSULE ORAL at 18:32

## 2017-09-21 RX ADMIN — BUSPIRONE HYDROCHLORIDE 15 MG: 15 TABLET ORAL at 14:42

## 2017-09-21 RX ADMIN — HEPARIN SODIUM 5000 UNITS: 5000 INJECTION, SOLUTION INTRAVENOUS; SUBCUTANEOUS at 20:55

## 2017-09-21 RX ADMIN — SODIUM CHLORIDE, POTASSIUM CHLORIDE, SODIUM LACTATE AND CALCIUM CHLORIDE 500 ML: 600; 310; 30; 20 INJECTION, SOLUTION INTRAVENOUS at 07:15

## 2017-09-21 RX ADMIN — MONTELUKAST SODIUM 10 MG: 10 TABLET, FILM COATED ORAL at 20:56

## 2017-09-21 RX ADMIN — FLUTICASONE PROPIONATE 2 SPRAY: 50 SPRAY, METERED NASAL at 20:56

## 2017-09-21 RX ADMIN — HYDROMORPHONE HYDROCHLORIDE 0.5 MG: 1 INJECTION, SOLUTION INTRAMUSCULAR; INTRAVENOUS; SUBCUTANEOUS at 14:48

## 2017-09-21 RX ADMIN — HYDROMORPHONE HYDROCHLORIDE 0.5 MG: 1 INJECTION, SOLUTION INTRAMUSCULAR; INTRAVENOUS; SUBCUTANEOUS at 23:23

## 2017-09-21 RX ADMIN — BUDESONIDE 0.5 MG: 0.5 INHALANT RESPIRATORY (INHALATION) at 07:24

## 2017-09-21 RX ADMIN — IPRATROPIUM BROMIDE 0.5 MG: 0.5 SOLUTION RESPIRATORY (INHALATION) at 19:45

## 2017-09-21 RX ADMIN — HEPARIN SODIUM 5000 UNITS: 5000 INJECTION, SOLUTION INTRAVENOUS; SUBCUTANEOUS at 05:15

## 2017-09-21 RX ADMIN — BUSPIRONE HYDROCHLORIDE 15 MG: 15 TABLET ORAL at 05:15

## 2017-09-21 RX ADMIN — IPRATROPIUM BROMIDE 0.5 MG: 0.5 SOLUTION RESPIRATORY (INHALATION) at 07:23

## 2017-09-21 RX ADMIN — ALVIMOPAN 12 MG: 12 CAPSULE ORAL at 08:14

## 2017-09-21 RX ADMIN — CETIRIZINE HYDROCHLORIDE 10 MG: 10 TABLET, FILM COATED ORAL at 16:53

## 2017-09-21 RX ADMIN — Medication 250 MG: at 18:34

## 2017-09-21 RX ADMIN — AMLODIPINE BESYLATE 5 MG: 5 TABLET ORAL at 20:56

## 2017-09-21 RX ADMIN — VALSARTAN 160 MG: 160 TABLET, FILM COATED ORAL at 08:14

## 2017-09-21 RX ADMIN — IPRATROPIUM BROMIDE 2 SPRAY: 42 SPRAY NASAL at 20:56

## 2017-09-21 RX ADMIN — BUSPIRONE HYDROCHLORIDE 15 MG: 15 TABLET ORAL at 20:55

## 2017-09-21 RX ADMIN — HYDROMORPHONE HYDROCHLORIDE 0.5 MG: 1 INJECTION, SOLUTION INTRAMUSCULAR; INTRAVENOUS; SUBCUTANEOUS at 08:22

## 2017-09-21 RX ADMIN — HEPARIN SODIUM 5000 UNITS: 5000 INJECTION, SOLUTION INTRAVENOUS; SUBCUTANEOUS at 14:42

## 2017-09-21 RX ADMIN — IPRATROPIUM BROMIDE 0.5 MG: 0.5 SOLUTION RESPIRATORY (INHALATION) at 11:18

## 2017-09-21 RX ADMIN — IPRATROPIUM BROMIDE 0.5 MG: 0.5 SOLUTION RESPIRATORY (INHALATION) at 15:07

## 2017-09-21 RX ADMIN — HYDROMORPHONE HYDROCHLORIDE 0.5 MG: 1 INJECTION, SOLUTION INTRAMUSCULAR; INTRAVENOUS; SUBCUTANEOUS at 02:51

## 2017-09-21 RX ADMIN — BUDESONIDE 0.5 MG: 0.5 INHALANT RESPIRATORY (INHALATION) at 19:45

## 2017-09-21 RX ADMIN — FLUTICASONE PROPIONATE 2 SPRAY: 50 SPRAY, METERED NASAL at 08:17

## 2017-09-21 NOTE — POST-PROCEDURE NOTE
"[facility]      Length of stay: 2 days    Subjective:  Stable Post - Op course.    Vital Signs:  Blood pressure 128/72, pulse 95, temperature 99 °F (37.2 °C), temperature source Oral, resp. rate 18, height 66.5\" (168.9 cm), weight 154 lb (69.9 kg), SpO2 96 %.    Labs past 24 hours:  Lab Results (last 24 hours)     Procedure Component Value Units Date/Time    Tissue Pathology Exam [[id number]] Collected:  09/19/17 1826    Specimen:  Tissue from Large Intestine, Left / Descending Colon Updated:  09/20/17 0734    POC Glucose Fingerstick [[id number]]  (Normal) Collected:  09/20/17 0816    Specimen:  Blood Updated:  09/20/17 0817     Glucose 124 mg/dL     Narrative:       Meter: [id number] : [id number] [employer]    POC Glucose Fingerstick [[id number]]  (Abnormal) Collected:  09/20/17 1235    Specimen:  Blood Updated:  09/20/17 1238     Glucose 134 (H) mg/dL     Narrative:       Meter: [id number] : [id number] [employer]    POC Glucose Fingerstick [[id number]]  (Abnormal) Collected:  09/20/17 1649    Specimen:  Blood Updated:  09/20/17 1651     Glucose 177 (H) mg/dL     Narrative:       Meter: [id number] : [id number] [employer]    POC Glucose Fingerstick [[id number]]  (Abnormal) Collected:  09/20/17 2016    Specimen:  Blood Updated:  09/20/17 2028     Glucose 158 (H) mg/dL     Narrative:       Meter: [id number] : [id number] Caitlin Whitlock          I/O last shift:  I/O this shift:  In: -   Out: 2775 [Urine:1075; Other:1600; Stool:100]     PHYSICAL EXAM-  Comfortable  cv- rsr  Chest- clear, =  Abd- soft, mild distention    Pathology:    Order Name Source Comment Collection Info Order Time   OR POTASSIUM   Collected By: Karoline Mosher RN 9/19/2017 12:18 PM   HEMOGLOBIN AND HEMATOCRIT, BLOOD   Collected By: Abdirashid Castillo RN 9/19/2017  6:55 PM   TISSUE PATHOLOGY EXAM Large Intestine, Left / Descending Colon   COLON AND OMENTUM Collected By: David PLATT" MD Gia 9/19/2017  6:49 PM   .    Assessment and Plan:  Better after NG (much).  Some output (bilious) from the stoma.  Continue/increase ambulation.  Clamp NG at 6a Friday.    David Nielsen MD  09/21/17  6:41 AM

## 2017-09-21 NOTE — PLAN OF CARE
Problem: Patient Care Overview (Adult)  Goal: Plan of Care Review  Outcome: Ongoing (interventions implemented as appropriate)    09/21/17 6409   Coping/Psychosocial Response Interventions   Plan Of Care Reviewed With patient   Patient Care Overview   Progress no change   Outcome Evaluation   Outcome Summary/Follow up Plan Appliance is intact. Stoma is red and moist. NG-tube still in place. Please contact [facility] nurse in needs arise. Thanks

## 2017-09-21 NOTE — PROGRESS NOTES
"IM progress note      Zackary Noonan II  [id number]  1955     LOS: 2 days     Attending: David Nielsen MD    Primary Care Provider: Jillian Layne MD      Chief Complaint/Reason for visit:  Abdominal pain    Subjective   Had increased nausea and abdominal discomfort last night, improved greatly after NGT decompression. Brown liquid in ostomy bag. Denies f/c/n/v/sob/cp at this time.  ( noted and agree)wy  Objective     Vital Signs  Blood pressure 130/68, pulse 99, temperature 98.3 °F (36.8 °C), temperature source Oral, resp. rate 16, height 66.5\" (168.9 cm), weight 154 lb (69.9 kg), SpO2 95 %.  Temp (24hrs), Av.2 °F (36.8 °C), Min:97.7 °F (36.5 °C), Max:99 °F (37.2 °C)      Intake/Output:    Intake/Output Summary (Last 24 hours) at 17 1711  Last data filed at 17 1137   Gross per 24 hour   Intake          1964.25 ml   Output             3475 ml   Net         -1510.75 ml       Nutrition: NPO    Respiratory: RA    Physical Therapy: Pt demonstrates all mobility with CGA with VC. Pt educated on importance of mobility with MD recommendation of 5x per day and POC for stair training. Ambulates 1210ft this visit.    Physical Exam:     General Appearance:    Alert, cooperative, in no acute distress   Head:    Normocephalic, without obvious abnormality, atraumatic. NGT present    Lungs:     Normal effort, symmetric chest rise, no crepitus, clear to      auscultation bilaterally             Heart:    Regular rhythm and normal rate, normal S1 and S2   Abdomen:     Abd soft, mild distention. clean benign incisions, stoma is pink, brown liquid in ostomy bag   Extremities:   No clubbing, cyanosis or edema.  No deformities.    Pulses:   Pulses palpable and equal bilaterally   Skin:   No bleeding, bruising or rash          Results Review:     I reviewed the patient's new clinical results.     Results from last 7 days  Lab Units 17  0425 17  1859 09/15/17  1517   WBC 10*3/mm3 13.18*  --  8.71 "   HEMOGLOBIN g/dL 11.1* 10.9* 10.9*   HEMATOCRIT % 34.9* 35.1* 35.1*   PLATELETS 10*3/mm3 365  --  324       Results from last 7 days  Lab Units 09/20/17  0425 09/15/17  1517   SODIUM mmol/L 132 140   POTASSIUM mmol/L 4.9 4.5   CHLORIDE mmol/L 105 107   CO2 mmol/L 25.0 25.0   BUN mg/dL 8* 11   CREATININE mg/dL 0.80 0.80   CALCIUM mg/dL 8.1* 8.7   GLUCOSE mg/dL 168* 124*   Results for KAUR GUERRERO II (MRN [id number]) as of 9/21/2017 10:26   Ref. Range 9/20/2017 08:16 9/20/2017 12:35 9/20/2017 16:49 9/20/2017 20:16 9/21/2017 07:37   Glucose Latest Ref Range: 70 - 130 mg/dL 124 134 (H) 177 (H) 158 (H) 126     I reviewed the patient's new imaging including images and reports.    All medications reviewed.     alvimopan 12 mg Oral BID   amLODIPine 5 mg Oral Nightly   budesonide 0.5 mg Nebulization BID - RT   busPIRone 15 mg Oral TID   cetirizine 10 mg Oral Daily   fluticasone 2 spray Nasal BID   heparin (porcine) 5,000 Units Subcutaneous Q8H   insulin lispro 0-7 Units Subcutaneous 4x Daily AC & at Bedtime   ipratropium 2 spray Each Nare 4x Daily   ipratropium 0.5 mg Nebulization 4x Daily - RT   montelukast 10 mg Oral Nightly   saccharomyces boulardii 250 mg Oral BID   Scopolamine 1 patch Transdermal Once   valsartan 160 mg Oral Daily       Assessment/Plan   Principal Problem:    S/P total abdominal colectomy  Active Problems:    History of DVT (deep vein thrombosis)    HTN (hypertension)    Leukocytosis, mild, likely reactive    COPD (chronic obstructive pulmonary disease)    Prediabetes    Anemia    Ulcerative colitis    Acute blood loss anemia, mild, asymptomatic    HO of IVC filter      Plan  1. Ambulation  2. Pain control-prns   3. IS-encouraged  4. DVT proph- mechs/ Heparin. Resume Coumadin when ok with Dr. Nielsen  5. Bowel regimen- entereg  6. Diet, wont advance till NGT is out/ IVF per surgery  7. Monitor post-op labs, BG  8. DC planning for home when tolerating po diet    HTN  - Continue home norvasc and  diovan  - Monitor BP q4 hrs  - Holding parameters for BP meds  - PRN hydralazine for SBP >170     COPD  - Continue home albuterol, qvar, flonase, Breo, singulair, atrovent, and elipta (or pharmacy substitions)  - Monitor O2 sats  - O2 PRN     PreDM  - hgb A1c on 9/15/17 6.1  - Accuchecks ACHS with low dose SSI  - Seen by DM educator 9/20    Follow up WBC tomorrow.     Jennifer Mallory MD  09/21/17  5:11 PM

## 2017-09-21 NOTE — THERAPY TREATMENT NOTE
Acute Care - Physical Therapy Treatment Note  [facility]     Patient Name: Zackary Noonan II  : 1955  MRN: [id number]  Today's Date: 2017  Onset of Illness/Injury or Date of Surgery Date: 17  Date of Referral to PT: 17  Referring Physician: MD Gia    Admit Date: 2017    Visit Dx:    ICD-10-CM ICD-9-CM   1. Impaired functional mobility, balance, gait, and endurance Z74.09 V49.89   2. Crohn's disease K50.90 555.9     Patient Active Problem List   Diagnosis   • Pneumonia   • Sepsis   • Supratherapeutic INR   • History of DVT (deep vein thrombosis)   • HTN (hypertension)   • Leukocytosis, mild, likely reactive   • Diarrhea   • Crohn's colitis   • Acute kidney injury   • COPD (chronic obstructive pulmonary disease)   • Asthma   • Infection of wound due to methicillin resistant Staphylococcus aureus (MRSA)   • H/O Pulmonary nodule   • Prediabetes   • Anemia   • Ulcerative colitis   • Acute blood loss anemia, mild, asymptomatic   • S/P total abdominal colectomy   • HO of IVC filter               Adult Rehabilitation Note       17 1458          Rehab Assessment/Intervention    Discipline physical therapy assistant  -UD      Document Type therapy note (daily note)  -UD      Subjective Information agree to therapy;complains of;pain;weakness  -UD      Patient Effort, Rehab Treatment excellent  -UD      Symptoms Noted During/After Treatment fatigue  -UD      Recorded by [UD] Kayleen Larose PTA      Vital Signs    Pre Patient Position Supine  -UD      Intra Patient Position Standing  -UD      Post Patient Position Supine  -UD      Recorded by [UD] Kayleen Larose PTA      Pain Assessment    Pain Assessment Buchanan-Sanches FACES  -UD      Buchanan-Sanches FACES Pain Rating 2  -UD      Pain Type Acute pain  -UD      Pain Location Abdomen  -UD      Pain Intervention(s) Medication (See MAR)  -UD      Recorded by [UD] Kayleen Larose PTA      Vision Assessment/Intervention    Visual Impairment WFL with  corrective lenses  -UD      Recorded by [UD] Kayleen Larose PTA      Cognitive Assessment/Intervention    Orientation Status oriented x 4  -UD      Follows Commands/Answers Questions 100% of the time  -UD      Recorded by [UD] Kayleen Larose PTA      Bed Mobility, Assessment/Treatment    Bed Mob, Supine to Sit, [place] contact guard assist  -UD      Bed Mob, Sit to Supine, [place] contact guard assist  -UD      Recorded by [UD] Kayleen Larose PTA      Transfer Assessment/Treatment    Transfers, Sit-Stand [place] contact guard assist  -UD      Transfers, Stand-Sit [place] contact guard assist  -UD      Transfers, Sit-Stand-Sit, Assist Device other (see comments)   iv pole  -UD      Recorded by [UD] Kayleen Larose PTA      Gait Assessment/Treatment    Gait, [place] Level stand by assist  -UD      Gait, Distance (Feet) 800  -UD      Gait, Gait Deviations tika decreased;step length decreased  -UD      Gait, Safety Issues step length decreased  -UD      Gait, Impairments strength decreased  -UD      Recorded by [UD] Kayleen Larose PTA      Positioning and Restraints    Pre-Treatment Position in bed  -UD      Post Treatment Position bed  -UD      In Bed notified nsg;supine;call light within reach;side rails up x2  -UD      Recorded by [UD] Kayleen Larose PTA        User Key  (r) = Recorded By, (t) = Taken By, (c) = Cosigned By    Initials Name Effective Dates    UD Kayleen Larose PTA 06/22/15 -                 IP PT Goals       09/21/17 1521 09/20/17 1132       Bed Mobility PT LTG    Bed Mobility PT LTG, Date Established  09/20/17  -     Bed Mobility PT LTG, Time to Achieve  2 wks  -     Bed Mobility PT LTG, Activity Type  all bed mobility  -EH     Bed Mobility PT LTG, [place] Level  independent  -EH     Bed Mobility PT LTG, Outcome goal ongoing  -UD      Transfer Training PT LTG    Transfer Training PT LTG, Date Established  09/20/17  -     Transfer Training PT LTG, Time to Achieve  2 wks   -     Transfer Training PT LTG, Activity Type  sit to stand/stand to sit  -     Transfer Training PT LTG, [place] Level  independent  -     Transfer Training PT LTG, Outcome goal ongoing  -      Gait Training PT LTG    Gait Training Goal PT LTG, Date Established  09/20/17  -     Gait Training Goal PT LTG, Time to Achieve  2 wks  -     Gait Training Goal PT LTG, [place] Level  independent  -     Gait Training Goal PT LTG, Distance to Achieve  350  -     Gait Training Goal PT LTG, Outcome goal partially met  -UD      Stair Training PT LTG    Stair Training Goal PT LTG, Date Established  09/20/17  -     Stair Training Goal PT LTG, Time to Achieve  2 wks  -     Stair Training Goal PT LTG, Number of Steps  14  -     Stair Training Goal PT LTG, Sampson Level  supervision required  -     Stair Training Goal PT LTG, Assist Device  1 handrail  -     Stair Training Goal PT LTG, Outcome goal ongoing  -        User Key  (r) = Recorded By, (t) = Taken By, (c) = Cosigned By    Initials Name Provider Type     Kayleen Larose, PTA Physical Therapy Assistant     Krysten Acosta, PT Physical Therapist          Physical Therapy Education     Title: PT OT SLP Therapies (Done)     Topic: Physical Therapy (Done)     Point: Mobility training (Done)    Learning Progress Summary    Learner Readiness Method Response Comment Documented by Status   Patient BHAKTI Real,NR   09/21/17 1521 Done    Acceptance E VU,NR Education on log roll, use of pillow for abdominal bracing, importance of activity, POC for PT including stair training, safety.  09/20/17 1131 Done               Point: Home exercise program (Done)    Learning Progress Summary    Learner Readiness Method Response Comment Documented by Status   Patient BHAKTI Real,NR   09/21/17 1521 Done               Point: Body mechanics (Done)    Learning Progress Summary    Learner Readiness Method Response Comment Documented by Status    Patient Aydiner BHAKTI DEGROOT,NR   09/21/17 1521 Done    Acceptance E VU,NR Education on log roll, use of pillow for abdominal bracing, importance of activity, POC for PT including stair training, safety.  09/20/17 1131 Done               Point: Precautions (Done)    Learning Progress Summary    Learner Readiness Method Response Comment Documented by Status   Patient BHAKTI Real,NR   09/21/17 1521 Done    Acceptance E VU,NR Education on log roll, use of pillow for abdominal bracing, importance of activity, POC for PT including stair training, safety.  09/20/17 1131 Done                      User Key     Initials Effective Dates Name Provider Type Discipline     06/22/15 -  Kayleen Larose, PTA Physical Therapy Assistant PT     06/19/15 -  Krysten Acosta, PT Physical Therapist PT                    PT Recommendation and Plan  Anticipated Equipment Needs At Discharge:  (none)  Anticipated Discharge Disposition: home with assist (OPPT if needed to return to higher level activities (work))  Planned Therapy Interventions: stair training, gait training  PT Frequency: daily  Plan of Care Review  Plan Of Care Reviewed With: patient  Progress: progress toward functional goals as expected  Outcome Summary/Follow up Plan: pt still lots of abdom. pain,has ng.still needs a little assist for transfers ,uses iv pole for gait.try some steps          Outcome Measures       09/21/17 1458 09/20/17 1001       How much help from another person do you currently need...    Turning from your back to your side while in flat bed without using bedrails? 4  -UD 3  -EH     Moving from lying on back to sitting on the side of a flat bed without bedrails? 3  -UD 3  -EH     Moving to and from a bed to a chair (including a wheelchair)? 3  -UD 3  -EH     Standing up from a chair using your arms (e.g., wheelchair, bedside chair)? 3  -UD 3  -EH     Climbing 3-5 steps with a railing? 3  -UD 3  -EH     To walk in hospital room? 3  -UD 3  -EH      AM-PAC 6 Clicks Score 19  -UD 18  -EH     Functional Assessment    Outcome Measure Options  AM-PAC 6 Clicks Basic Mobility (PT)  -       User Key  (r) = Recorded By, (t) = Taken By, (c) = Cosigned By    Initials Name Provider Type    ROSSANA Larose PTA Physical Therapy Assistant     Krysten Acosta, PT Physical Therapist           Time Calculation:         PT Charges       09/21/17 1524          Time Calculation    PT Received On 09/21/17  -      PT Goal Re-Cert Due Date 09/30/17  -      Time Calculation- PT    Total Timed Code Minutes- PT 15 minute(s)  -UD        User Key  (r) = Recorded By, (t) = Taken By, (c) = Cosigned By    Initials Name Provider Type    ROSSANA Larose PTA Physical Therapy Assistant          Therapy Charges for Today     Code Description Service Date Service Provider Modifiers Qty    44802656814 HC GAIT TRAINING EA 15 MIN 9/21/2017 Kayleen Larose PTA GP 1          PT G-Codes  Outcome Measure Options: AM-PAC 6 Clicks Basic Mobility (PT)    Kayleen Larose PTA  9/21/2017

## 2017-09-21 NOTE — CONSULTS
Adult Nutrition  Assessment/PES    Patient Name:  Zackary Noonan II  YOB: 1955  MRN: [id number]  Admit Date:  9/19/2017    Assessment Date:  9/21/2017        Reason for Assessment       09/21/17 1515    Reason for Assessment    Reason For Assessment/Visit nurse/nurse practitioner consult    Identified At Risk By Screening Criteria new diagnosis of diabetes or cancer;need for education    Time Spent (min) 15    Endocrine Pre-diabetes    Gastrointestinal Ileostomy                  Labs/Tests/Procedures/Meds       09/21/17 1517    Labs/Tests/Procedures/Meds    Diagnostic Test/Procedure Review reviewed    Labs/Tests Review Reviewed;Hgb A1C                Nutrition Prescription Ordered       09/21/17 1517    Nutrition Prescription PO    Current PO Diet Clear Liquid            Evaluation of Received Nutrient/Fluid Intake       09/21/17 1517    PO Evaluation    Number of Days PO Intake Evaluated Insufficient Data              Problem/Interventions:        Problem 1       09/21/17 1518    Nutrition Diagnoses Problem 1    Problem 1 Knowledge Deficit    Etiology (related to) Medical Diagnosis    Endocrine Pre-diabetes    Gastrointestinal Ileostomy   S/p colectomy and ileostomy    Signs/Symptoms (evidenced by) Reported  Information Deficit                    Intervention Goal       09/21/17 1519    Intervention Goal    General Provide information regarding MNT for treatment/condition            Nutrition Intervention       09/21/17 1519    Nutrition Intervention    RD/Tech Action Follow Tx progress;Care plan reviewd              Education/Evaluation       09/21/17 1519    Education    Education Will Instruct as appropriate;Other (comment)   Pt would like wife to be present for education. Left materials at bedside, will f/u tomorrow (9/22) when pt states wife will be here.     Monitor/Evaluation    Monitor Per protocol        Comments:      Electronically signed by:  Margaret Lopez  09/21/17 3:21 PM

## 2017-09-21 NOTE — PLAN OF CARE
Problem: Patient Care Overview (Adult)  Goal: Plan of Care Review  Outcome: Ongoing (interventions implemented as appropriate)    09/21/17 1521   Coping/Psychosocial Response Interventions   Plan Of Care Reviewed With patient   Patient Care Overview   Progress progress toward functional goals as expected   Outcome Evaluation   Outcome Summary/Follow up Plan pt still lots of abdom. pain,has ng.still needs a little assist for transfers ,uses iv pole for gait.try some steps         Problem: Inpatient Physical Therapy  Goal: Bed Mobility Goal LTG- PT  Outcome: Ongoing (interventions implemented as appropriate)    09/20/17 1132 09/21/17 1521   Bed Mobility PT LTG   Bed Mobility PT LTG, Date Established 09/20/17 --    Bed Mobility PT LTG, Time to Achieve 2 wks --    Bed Mobility PT LTG, Activity Type all bed mobility --    Bed Mobility PT LTG, [place] Level independent --    Bed Mobility PT LTG, Outcome --  goal ongoing       Goal: Transfer Training Goal 1 LTG- PT  Outcome: Ongoing (interventions implemented as appropriate)    09/20/17 1132 09/21/17 1521   Transfer Training PT LTG   Transfer Training PT LTG, Date Established 09/20/17 --    Transfer Training PT LTG, Time to Achieve 2 wks --    Transfer Training PT LTG, Activity Type sit to stand/stand to sit --    Transfer Training PT LTG, [place] Level independent --    Transfer Training PT LTG, Outcome --  goal ongoing       Goal: Gait Training Goal LTG- PT  Outcome: Outcome(s) achieved Date Met:  09/21/17 09/20/17 1132 09/21/17 1521   Gait Training PT LTG   Gait Training Goal PT LTG, Date Established 09/20/17 --    Gait Training Goal PT LTG, Time to Achieve 2 wks --    Gait Training Goal PT LTG, [place] Level independent --    Gait Training Goal PT LTG, Distance to Achieve 350 --    Gait Training Goal PT LTG, Outcome --  goal partially met       Goal: Stair Training Goal LTG- PT  Outcome: Ongoing (interventions implemented as appropriate)    09/20/17  1132 09/21/17 1521   Stair Training PT LTG   Stair Training Goal PT LTG, Date Established 09/20/17 --    Stair Training Goal PT LTG, Time to Achieve 2 wks --    Stair Training Goal PT LTG, Number of Steps 14 --    Stair Training Goal PT LTG, [place] Level supervision required --    Stair Training Goal PT LTG, Assist Device 1 handrail --    Stair Training Goal PT LTG, Outcome --  goal ongoing

## 2017-09-21 NOTE — PLAN OF CARE
Problem: Ileostomy (Adult)  Goal: Signs and Symptoms of Listed Potential Problems Will be Absent or Manageable (Ileostomy)  Outcome: Ongoing (interventions implemented as appropriate)

## 2017-09-22 LAB
ANION GAP SERPL CALCULATED.3IONS-SCNC: 1 MMOL/L (ref 3–11)
BUN BLD-MCNC: 5 MG/DL (ref 9–23)
BUN/CREAT SERPL: 7.1 (ref 7–25)
CALCIUM SPEC-SCNC: 8.5 MG/DL (ref 8.7–10.4)
CHLORIDE SERPL-SCNC: 104 MMOL/L (ref 99–109)
CO2 SERPL-SCNC: 28 MMOL/L (ref 20–31)
CREAT BLD-MCNC: 0.7 MG/DL (ref 0.6–1.3)
DEPRECATED RDW RBC AUTO: 51 FL (ref 37–54)
ERYTHROCYTE [DISTWIDTH] IN BLOOD BY AUTOMATED COUNT: 16.1 % (ref 11.3–14.5)
GFR SERPL CREATININE-BSD FRML MDRD: 114 ML/MIN/1.73
GLUCOSE BLD-MCNC: 114 MG/DL (ref 70–100)
GLUCOSE BLDC GLUCOMTR-MCNC: 106 MG/DL (ref 70–130)
GLUCOSE BLDC GLUCOMTR-MCNC: 108 MG/DL (ref 70–130)
GLUCOSE BLDC GLUCOMTR-MCNC: 117 MG/DL (ref 70–130)
GLUCOSE BLDC GLUCOMTR-MCNC: 147 MG/DL (ref 70–130)
HCT VFR BLD AUTO: 33.3 % (ref 38.9–50.9)
HGB BLD-MCNC: 10.5 G/DL (ref 13.1–17.5)
MCH RBC QN AUTO: 27.3 PG (ref 27–31)
MCHC RBC AUTO-ENTMCNC: 31.5 G/DL (ref 32–36)
MCV RBC AUTO: 86.5 FL (ref 80–99)
PLATELET # BLD AUTO: 292 10*3/MM3 (ref 150–450)
PMV BLD AUTO: 8.5 FL (ref 6–12)
POTASSIUM BLD-SCNC: 4.1 MMOL/L (ref 3.5–5.5)
RBC # BLD AUTO: 3.85 10*6/MM3 (ref 4.2–5.76)
SODIUM BLD-SCNC: 133 MMOL/L (ref 132–146)
WBC NRBC COR # BLD: 12.03 10*3/MM3 (ref 3.5–10.8)

## 2017-09-22 PROCEDURE — 25010000002 HYDROMORPHONE PER 4 MG: Performed by: COLON & RECTAL SURGERY

## 2017-09-22 PROCEDURE — 94799 UNLISTED PULMONARY SVC/PX: CPT

## 2017-09-22 PROCEDURE — 80048 BASIC METABOLIC PNL TOTAL CA: CPT | Performed by: INTERNAL MEDICINE

## 2017-09-22 PROCEDURE — 85027 COMPLETE CBC AUTOMATED: CPT | Performed by: INTERNAL MEDICINE

## 2017-09-22 PROCEDURE — 82962 GLUCOSE BLOOD TEST: CPT

## 2017-09-22 PROCEDURE — 25010000002 ONDANSETRON PER 1 MG: Performed by: NURSE PRACTITIONER

## 2017-09-22 PROCEDURE — 94760 N-INVAS EAR/PLS OXIMETRY 1: CPT

## 2017-09-22 PROCEDURE — 25010000002 MORPHINE SULFATE (PF) 2 MG/ML SOLUTION: Performed by: COLON & RECTAL SURGERY

## 2017-09-22 PROCEDURE — 25010000002 HEPARIN (PORCINE) PER 1000 UNITS: Performed by: COLON & RECTAL SURGERY

## 2017-09-22 RX ADMIN — AMLODIPINE BESYLATE 5 MG: 5 TABLET ORAL at 21:31

## 2017-09-22 RX ADMIN — MORPHINE SULFATE 1 MG: 2 INJECTION, SOLUTION INTRAMUSCULAR; INTRAVENOUS at 16:12

## 2017-09-22 RX ADMIN — MONTELUKAST SODIUM 10 MG: 10 TABLET, FILM COATED ORAL at 21:31

## 2017-09-22 RX ADMIN — BUDESONIDE 0.5 MG: 0.5 INHALANT RESPIRATORY (INHALATION) at 07:18

## 2017-09-22 RX ADMIN — HEPARIN SODIUM 5000 UNITS: 5000 INJECTION, SOLUTION INTRAVENOUS; SUBCUTANEOUS at 05:29

## 2017-09-22 RX ADMIN — FLUTICASONE PROPIONATE 2 SPRAY: 50 SPRAY, METERED NASAL at 21:34

## 2017-09-22 RX ADMIN — ONDANSETRON 4 MG: 2 INJECTION INTRAMUSCULAR; INTRAVENOUS at 13:28

## 2017-09-22 RX ADMIN — IPRATROPIUM BROMIDE 0.5 MG: 0.5 SOLUTION RESPIRATORY (INHALATION) at 07:18

## 2017-09-22 RX ADMIN — ONDANSETRON 4 MG: 2 INJECTION INTRAMUSCULAR; INTRAVENOUS at 21:31

## 2017-09-22 RX ADMIN — HYDROMORPHONE HYDROCHLORIDE 0.5 MG: 1 INJECTION, SOLUTION INTRAMUSCULAR; INTRAVENOUS; SUBCUTANEOUS at 21:45

## 2017-09-22 RX ADMIN — ACETAMINOPHEN 650 MG: 325 TABLET, FILM COATED ORAL at 02:27

## 2017-09-22 RX ADMIN — IPRATROPIUM BROMIDE 2 SPRAY: 42 SPRAY NASAL at 18:43

## 2017-09-22 RX ADMIN — IPRATROPIUM BROMIDE 2 SPRAY: 42 SPRAY NASAL at 13:52

## 2017-09-22 RX ADMIN — VALSARTAN 160 MG: 160 TABLET, FILM COATED ORAL at 08:40

## 2017-09-22 RX ADMIN — BUDESONIDE 0.5 MG: 0.5 INHALANT RESPIRATORY (INHALATION) at 19:33

## 2017-09-22 RX ADMIN — POTASSIUM CHLORIDE, SODIUM CHLORIDE, CALCIUM CHLORIDE, SODIUM LACTATE, AND DEXTROSE MONOHYDRATE 75 ML/HR: 1.79; 6; .2; 3.1; 5 INJECTION, SOLUTION INTRAVENOUS at 22:33

## 2017-09-22 RX ADMIN — IPRATROPIUM BROMIDE 2 SPRAY: 42 SPRAY NASAL at 08:38

## 2017-09-22 RX ADMIN — Medication 250 MG: at 18:41

## 2017-09-22 RX ADMIN — Medication 250 MG: at 08:40

## 2017-09-22 RX ADMIN — IPRATROPIUM BROMIDE 2 SPRAY: 42 SPRAY NASAL at 21:35

## 2017-09-22 RX ADMIN — ALVIMOPAN 12 MG: 12 CAPSULE ORAL at 18:41

## 2017-09-22 RX ADMIN — HEPARIN SODIUM 5000 UNITS: 5000 INJECTION, SOLUTION INTRAVENOUS; SUBCUTANEOUS at 13:27

## 2017-09-22 RX ADMIN — HYDROCODONE BITARTATE AND ACETAMINOPHEN 1 TABLET: 5; 325 TABLET ORAL at 08:49

## 2017-09-22 RX ADMIN — BUSPIRONE HYDROCHLORIDE 15 MG: 15 TABLET ORAL at 05:29

## 2017-09-22 RX ADMIN — FLUTICASONE PROPIONATE 2 SPRAY: 50 SPRAY, METERED NASAL at 08:37

## 2017-09-22 RX ADMIN — BUSPIRONE HYDROCHLORIDE 15 MG: 15 TABLET ORAL at 21:31

## 2017-09-22 RX ADMIN — BUSPIRONE HYDROCHLORIDE 15 MG: 15 TABLET ORAL at 13:27

## 2017-09-22 RX ADMIN — HYDROMORPHONE HYDROCHLORIDE 0.5 MG: 1 INJECTION, SOLUTION INTRAMUSCULAR; INTRAVENOUS; SUBCUTANEOUS at 13:28

## 2017-09-22 RX ADMIN — HEPARIN SODIUM 5000 UNITS: 5000 INJECTION, SOLUTION INTRAVENOUS; SUBCUTANEOUS at 21:31

## 2017-09-22 RX ADMIN — CETIRIZINE HYDROCHLORIDE 10 MG: 10 TABLET, FILM COATED ORAL at 08:41

## 2017-09-22 RX ADMIN — HYDROCODONE BITARTATE AND ACETAMINOPHEN 1 TABLET: 5; 325 TABLET ORAL at 18:34

## 2017-09-22 RX ADMIN — IPRATROPIUM BROMIDE 0.5 MG: 0.5 SOLUTION RESPIRATORY (INHALATION) at 16:28

## 2017-09-22 RX ADMIN — IPRATROPIUM BROMIDE 0.5 MG: 0.5 SOLUTION RESPIRATORY (INHALATION) at 19:33

## 2017-09-22 NOTE — PLAN OF CARE
Problem: Patient Care Overview (Adult)  Goal: Plan of Care Review  Outcome: Ongoing (interventions implemented as appropriate)    09/22/17 1124   Coping/Psychosocial Response Interventions   Plan Of Care Reviewed With patient;spouse   Patient Care Overview   Progress improving   Outcome Evaluation   Outcome Summary/Follow up Plan Appliance change performed and placed Belva 8331. Stoma is red and moist. Good output noted at this time. Stoma teaching performed with patient and spouse. Will continue to follow until discharge to provide discharge supplies and stomal support. Please contact [facility] nurse if needs arise. Thanks          Problem: Ileostomy (Adult)  Goal: Signs and Symptoms of Listed Potential Problems Will be Absent or Manageable (Ileostomy)  Outcome: Ongoing (interventions implemented as appropriate)    09/22/17 1124   Ileostomy   Problems Assessed (Ileostomy) all   Problems Present (Ileostomy) none

## 2017-09-22 NOTE — PLAN OF CARE
Problem: Patient Care Overview (Adult)  Goal: Plan of Care Review  Outcome: Ongoing (interventions implemented as appropriate)    09/22/17 4357   Coping/Psychosocial Response Interventions   Plan Of Care Reviewed With patient   Patient Care Overview   Progress no change

## 2017-09-22 NOTE — PROGRESS NOTES
Adult Nutrition  Assessment/PES    Patient Name:  Zackary Noonan II  YOB: 1955  MRN: [id number]  Admit Date:  9/19/2017    Assessment Date:  9/22/2017        Reason for Assessment       09/22/17 1451    Reason for Assessment    Reason For Assessment/Visit NPO/Clr Policy    Time Spent (min) 30      09/22/17 1446    Reason for Assessment    Reason For Assessment/Visit education    Time Spent (min) 20    Diagnosis --   Per notes this admission.   Principal Problem:    S/P total abdominal colectomy  Active Problems:    History of DVT (deep vein thrombosis)    HTN (hypertension)    Leukocytosis, mild, likely reactive    COPD (chronic obstructive pulmonary disease)    Prediabetes    Anemia    Ulcerative colitis    Acute blood loss anemia, mild, asymptomatic    HO of IVC filter             Nutrition/Diet History       09/22/17 1451    Nutrition/Diet History    Reported GI Symptoms --   Pt has NG      09/22/17 1448    Nutrition/Diet History    Reported/Observed By Patient    Reported GI Symptoms Nausea    Other Pt states his wife has looked over the educational materials left yesterday at bedside. States he did not look at them but will check with his wife when she comes in to see it there are any questions.  Advised pt to notify nsg if they wish to have RD go over info over the weekend.                     Nutrition Prescription Ordered       09/22/17 1450    Nutrition Prescription PO    Current PO Diet Clear Liquid            Evaluation of Received Nutrient/Fluid Intake       09/22/17 1452    PO Evaluation    Number of Meals 3    % PO Intake 25              Problem/Interventions:        Problem 1       09/22/17 1457    Nutrition Diagnoses Problem 1    Problem 1 Knowledge Deficit    Etiology (related to) Medical Diagnosis    Endocrine Pre-diabetes    Gastrointestinal Ileostomy   S/p colectomy and ileostomy    Signs/Symptoms (evidenced by) Reported  Information Deficit            Problem 2       09/22/17 1456     Nutrition Diagnoses Problem 2    Problem 2 Inadequate Intake/Infusion    Inadequate Intake Type Oral    Macronutrient Kcal    Etiology (related to) Other (comment)   clinical condition    Signs/Symptoms (evidenced by) Clear Liquid Diet                  Intervention Goal       09/22/17 1457    Intervention Goal    General Nutrition support treatment;Provide information regarding MNT for treatment/condition    PO Tolerate PO;Increase intake;Advance diet            Nutrition Intervention       09/22/17 1457    Nutrition Intervention    RD/Tech Action Interview for preference;Follow Tx progress;Care plan reviewd;Supplement provided;Encourage intake            Nutrition Prescription       09/22/17 1457    Nutrition Prescription PO    PO Prescription Begin/change supplement    Supplement Boost Breeze    Supplement Frequency 3 times a day    New PO Prescription Ordered? Yes            Education/Evaluation       09/22/17 1457    Education    Education Education not appropriate at this time   Pt states his wife looked at materials left 9/21.  Provided pt with additional Outpt brochure for education and DM management tips. Advised pt to have RN call RD if his wife wants to speak with her before discharge if d/c this weekend.    Please explain Other (comment)   Pt very nauseated asked to defer.     Monitor/Evaluation    Monitor Per protocol;PO intake;Supplement intake;Symptoms;Skin status        Comments:      Electronically signed by:  Margaret Lopez  09/22/17 3:01 PM

## 2017-09-22 NOTE — PROGRESS NOTES
"IM progress note      Zackary Noonan II  [id number]  1955     LOS: 3 days     Attending: David Nielsen MD    Primary Care Provider: Jillian Layne MD      Chief Complaint/Reason for visit:  Abdominal pain    Subjective   Feels better today. Wants NGT out. Tolerating sips of clears. Ambulating. Denies f/c/n/v/sob/cp.  ( NGT clamped)    Objective     Vital Signs  Blood pressure 146/74, pulse 107, temperature 98.1 °F (36.7 °C), temperature source Oral, resp. rate 13, height 66.5\" (168.9 cm), weight 154 lb (69.9 kg), SpO2 96 %.  Temp (24hrs), Av.4 °F (36.9 °C), Min:98.1 °F (36.7 °C), Max:98.7 °F (37.1 °C)      Intake/Output:    Intake/Output Summary (Last 24 hours) at 17 1514  Last data filed at 17 1300   Gross per 24 hour   Intake           598.75 ml   Output             3650 ml   Net         -3051.25 ml       Nutrition: NPO    Respiratory: RA    Physical Therapy: pt still lots of abdom. pain,has ng.still needs a little assist for transfers ,uses iv pole for gait.try some steps    Physical Exam:     General Appearance:    Alert, cooperative, in no acute distress   Head:    Normocephalic, without obvious abnormality, atraumatic. NGT present    Lungs:     Normal effort, symmetric chest rise, no crepitus, clear to      auscultation bilaterally             Heart:    Regular rhythm and normal rate, normal S1 and S2   Abdomen:     Abd soft, mild distention. clean benign incisions, stoma is pink, brown liquid in ostomy bag   Extremities:   No clubbing, cyanosis or edema.  No deformities.    Pulses:   Pulses palpable and equal bilaterally   Skin:   No bleeding, bruising or rash          Results Review:     I reviewed the patient's new clinical results.     Results from last 7 days  Lab Units 17  0503 17  0425 17  1859 09/15/17  1517   WBC 10*3/mm3 12.03* 13.18*  --  8.71   HEMOGLOBIN g/dL 10.5* 11.1* 10.9* 10.9*   HEMATOCRIT % 33.3* 34.9* 35.1* 35.1*   PLATELETS 10*3/mm3 292 365  --  " 324       Results from last 7 days  Lab Units 09/22/17  0503 09/20/17  0425 09/15/17  1517   SODIUM mmol/L 133 132 140   POTASSIUM mmol/L 4.1 4.9 4.5   CHLORIDE mmol/L 104 105 107   CO2 mmol/L 28.0 25.0 25.0   BUN mg/dL 5* 8* 11   CREATININE mg/dL 0.70 0.80 0.80   CALCIUM mg/dL 8.5* 8.1* 8.7   GLUCOSE mg/dL 114* 168* 124*   Results for KAUR GUERRERO II (MRN [id number]) as of 9/22/2017 13:04   Ref. Range 9/21/2017 21:04 9/22/2017 05:03 9/22/2017 07:26 9/22/2017 11:16   Glucose Latest Ref Range: 70 - 130 mg/dL 139 (H) 114 (H) 108 106     I reviewed the patient's new imaging including images and reports.    All medications reviewed.     alvimopan 12 mg Oral BID   amLODIPine 5 mg Oral Nightly   budesonide 0.5 mg Nebulization BID - RT   busPIRone 15 mg Oral TID   cetirizine 10 mg Oral Daily   fluticasone 2 spray Nasal BID   heparin (porcine) 5,000 Units Subcutaneous Q8H   insulin lispro 0-7 Units Subcutaneous 4x Daily AC & at Bedtime   ipratropium 2 spray Each Nare 4x Daily   ipratropium 0.5 mg Nebulization 4x Daily - RT   montelukast 10 mg Oral Nightly   saccharomyces boulardii 250 mg Oral BID   valsartan 160 mg Oral Daily       Assessment/Plan   Principal Problem:    S/P total abdominal colectomy  Active Problems:    History of DVT (deep vein thrombosis)    HTN (hypertension)    Leukocytosis, mild, likely reactive    COPD (chronic obstructive pulmonary disease)    Prediabetes    Anemia    Ulcerative colitis    Acute blood loss anemia, mild, asymptomatic    HO of IVC filter      Plan  1. Ambulation  2. Pain control-prns   3. IS-encouraged  4. DVT proph- mechs/ Heparin. Resume Coumadin when ok with Dr. Nielsen  5. Bowel regimen- entereg  6. Diet, wont advance till NGT is out/ IVF per surgery  7. Monitor post-op labs, BG  8. DC planning for home when tolerating po diet    HTN  - Continue home norvasc and diovan  - Monitor BP q4 hrs  - Holding parameters for BP meds  - PRN hydralazine for SBP >170     COPD  - Continue home  albuterol, qvar, flonase, Breo, singulair, atrovent, and elipta (or pharmacy substitions)  - Monitor O2 sats  - O2 PRN     PreDM  - hgb A1c on 9/15/17 6.1  - Accuchecks ACHS with low dose SSI  - Seen by DM educator 9/20    Possibly d/c NGT today. wy  Seen and examined by me. Agree with above. Discussed with patient.     Jennifer Mallory MD  09/22/17  3:14 PM

## 2017-09-22 NOTE — POST-PROCEDURE NOTE
"[facility]        Length of stay: 3 days    Subjective:  Stable Post - Op course.    Vital Signs:  Blood pressure 146/74, pulse 107, temperature 98.1 °F (36.7 °C), temperature source Oral, resp. rate 13, height 66.5\" (168.9 cm), weight 154 lb (69.9 kg), SpO2 96 %.    Labs past 24 hours:  Lab Results (last 24 hours)     Procedure Component Value Units Date/Time    POC Glucose Fingerstick [[id number]]  (Normal) Collected:  09/21/17 1649    Specimen:  Blood Updated:  09/21/17 1651     Glucose 127 mg/dL     Narrative:       Meter: [id number] : [id number] Radha Patterson    POC Glucose Fingerstick [[id number]]  (Abnormal) Collected:  09/21/17 2104    Specimen:  Blood Updated:  09/21/17 2116     Glucose 139 (H) mg/dL     Narrative:       Meter: [id number] : [id number] Racquel Carvalho    CBC (No Diff) [[id number]]  (Abnormal) Collected:  09/22/17 0503    Specimen:  Blood Updated:  09/22/17 0545     WBC 12.03 (H) 10*3/mm3      RBC 3.85 (L) 10*6/mm3      Hemoglobin 10.5 (L) g/dL      Hematocrit 33.3 (L) %      MCV 86.5 fL      MCH 27.3 pg      MCHC 31.5 (L) g/dL      RDW 16.1 (H) %      RDW-SD 51.0 fl      MPV 8.5 fL      Platelets 292 10*3/mm3     Basic Metabolic Panel [[id number]]  (Abnormal) Collected:  09/22/17 0503    Specimen:  Blood Updated:  09/22/17 0625     Glucose 114 (H) mg/dL      BUN 5 (L) mg/dL      Creatinine 0.70 mg/dL      Sodium 133 mmol/L      Potassium 4.1 mmol/L      Chloride 104 mmol/L      CO2 28.0 mmol/L      Calcium 8.5 (L) mg/dL      eGFR Non African Amer 114 mL/min/1.73      BUN/Creatinine Ratio 7.1     Anion Gap 1.0 (L) mmol/L     Narrative:       [employer] Guidelines    Stage     Description        GFR  1         Normal or High     90+  2         Mild decrease      60-89  3         Moderate decrease  30-59  4         Severe decrease    15-29  5         Kidney failure     <15    POC Glucose Fingerstick [[id number]]  (Normal) " Collected:  09/22/17 0726    Specimen:  Blood Updated:  09/22/17 0738     Glucose 108 mg/dL     Narrative:       Meter: [id number] : [id number] VanBussum Marixa    POC Glucose Fingerstick [[id number]]  (Normal) Collected:  09/22/17 1116    Specimen:  Blood Updated:  09/22/17 1128     Glucose 106 mg/dL     Narrative:       Meter: [id number] : [id number] VanBussum Marixa          I/O last shift:  I/O this shift:  In: 120 [P.O.:120]  Out: 1375 [Stool:1375]     PHYSICAL EXAM-  Comfortable  cv- rsr  Chest- clear, =  Abd- soft, mild distention    Pathology:    Order Name Source Comment Collection Info Order Time   OR POTASSIUM   Collected By: Karoline Mosher RN 9/19/2017 12:18 PM   HEMOGLOBIN AND HEMATOCRIT, BLOOD   Collected By: Abdirashid Castillo RN 9/19/2017  6:55 PM   TISSUE PATHOLOGY EXAM Large Intestine, Left / Descending Colon   COLON AND OMENTUM Collected By: David Nielsen MD 9/19/2017  6:49 PM   .    Assessment and Plan:  Brisk stoma function  Imodium for use as needed at home discussed  NG removed  Diet as tolerated.  Likely home Sunday.    David Nielsen MD  09/22/17  4:03 PM

## 2017-09-22 NOTE — PLAN OF CARE
Problem: Ileostomy (Adult)  Goal: Signs and Symptoms of Listed Potential Problems Will be Absent or Manageable (Ileostomy)  Outcome: Ongoing (interventions implemented as appropriate)    09/22/17 1825   Ileostomy   Problems Assessed (Ileostomy) all   Problems Present (Ileostomy) none

## 2017-09-23 LAB
CYTO UR: NORMAL
GLUCOSE BLDC GLUCOMTR-MCNC: 104 MG/DL (ref 70–130)
GLUCOSE BLDC GLUCOMTR-MCNC: 125 MG/DL (ref 70–130)
GLUCOSE BLDC GLUCOMTR-MCNC: 138 MG/DL (ref 70–130)
GLUCOSE BLDC GLUCOMTR-MCNC: 145 MG/DL (ref 70–130)
INR PPP: 1.03
LAB AP CASE REPORT: NORMAL
LAB AP CLINICAL INFORMATION: NORMAL
Lab: NORMAL
PATH REPORT.FINAL DX SPEC: NORMAL
PATH REPORT.GROSS SPEC: NORMAL
PROTHROMBIN TIME: 11.2 SECONDS (ref 9.6–11.5)

## 2017-09-23 PROCEDURE — 94799 UNLISTED PULMONARY SVC/PX: CPT

## 2017-09-23 PROCEDURE — 82962 GLUCOSE BLOOD TEST: CPT

## 2017-09-23 PROCEDURE — 25010000002 ONDANSETRON PER 1 MG: Performed by: NURSE PRACTITIONER

## 2017-09-23 PROCEDURE — 94760 N-INVAS EAR/PLS OXIMETRY 1: CPT

## 2017-09-23 PROCEDURE — 85610 PROTHROMBIN TIME: CPT | Performed by: INTERNAL MEDICINE

## 2017-09-23 PROCEDURE — 94640 AIRWAY INHALATION TREATMENT: CPT

## 2017-09-23 PROCEDURE — 25010000002 HEPARIN (PORCINE) PER 1000 UNITS: Performed by: COLON & RECTAL SURGERY

## 2017-09-23 RX ORDER — WARFARIN SODIUM 5 MG/1
10 TABLET ORAL
Status: DISCONTINUED | OUTPATIENT
Start: 2017-09-23 | End: 2017-09-24 | Stop reason: HOSPADM

## 2017-09-23 RX ADMIN — IPRATROPIUM BROMIDE 2 SPRAY: 42 SPRAY NASAL at 08:51

## 2017-09-23 RX ADMIN — IPRATROPIUM BROMIDE 0.5 MG: 0.5 SOLUTION RESPIRATORY (INHALATION) at 13:31

## 2017-09-23 RX ADMIN — ONDANSETRON 4 MG: 2 INJECTION INTRAMUSCULAR; INTRAVENOUS at 04:31

## 2017-09-23 RX ADMIN — MONTELUKAST SODIUM 10 MG: 10 TABLET, FILM COATED ORAL at 21:15

## 2017-09-23 RX ADMIN — HEPARIN SODIUM 5000 UNITS: 5000 INJECTION, SOLUTION INTRAVENOUS; SUBCUTANEOUS at 06:11

## 2017-09-23 RX ADMIN — ALVIMOPAN 12 MG: 12 CAPSULE ORAL at 17:07

## 2017-09-23 RX ADMIN — BUSPIRONE HYDROCHLORIDE 15 MG: 15 TABLET ORAL at 06:11

## 2017-09-23 RX ADMIN — IPRATROPIUM BROMIDE 2 SPRAY: 42 SPRAY NASAL at 11:51

## 2017-09-23 RX ADMIN — Medication 250 MG: at 17:07

## 2017-09-23 RX ADMIN — BUSPIRONE HYDROCHLORIDE 15 MG: 15 TABLET ORAL at 21:15

## 2017-09-23 RX ADMIN — Medication 250 MG: at 08:50

## 2017-09-23 RX ADMIN — IPRATROPIUM BROMIDE 0.5 MG: 0.5 SOLUTION RESPIRATORY (INHALATION) at 15:30

## 2017-09-23 RX ADMIN — HYDROCODONE BITARTATE AND ACETAMINOPHEN 1 TABLET: 5; 325 TABLET ORAL at 09:57

## 2017-09-23 RX ADMIN — IPRATROPIUM BROMIDE 2 SPRAY: 42 SPRAY NASAL at 21:14

## 2017-09-23 RX ADMIN — IPRATROPIUM BROMIDE 0.5 MG: 0.5 SOLUTION RESPIRATORY (INHALATION) at 07:53

## 2017-09-23 RX ADMIN — FLUTICASONE PROPIONATE 2 SPRAY: 50 SPRAY, METERED NASAL at 21:14

## 2017-09-23 RX ADMIN — IPRATROPIUM BROMIDE 2 SPRAY: 42 SPRAY NASAL at 17:12

## 2017-09-23 RX ADMIN — BUDESONIDE 0.5 MG: 0.5 INHALANT RESPIRATORY (INHALATION) at 19:37

## 2017-09-23 RX ADMIN — BUSPIRONE HYDROCHLORIDE 15 MG: 15 TABLET ORAL at 14:19

## 2017-09-23 RX ADMIN — CETIRIZINE HYDROCHLORIDE 10 MG: 10 TABLET, FILM COATED ORAL at 08:50

## 2017-09-23 RX ADMIN — WARFARIN SODIUM 10 MG: 5 TABLET ORAL at 17:07

## 2017-09-23 RX ADMIN — HEPARIN SODIUM 5000 UNITS: 5000 INJECTION, SOLUTION INTRAVENOUS; SUBCUTANEOUS at 14:19

## 2017-09-23 RX ADMIN — POTASSIUM CHLORIDE, SODIUM CHLORIDE, CALCIUM CHLORIDE, SODIUM LACTATE, AND DEXTROSE MONOHYDRATE 75 ML/HR: 1.79; 6; .2; 3.1; 5 INJECTION, SOLUTION INTRAVENOUS at 11:51

## 2017-09-23 RX ADMIN — FLUTICASONE PROPIONATE 2 SPRAY: 50 SPRAY, METERED NASAL at 08:51

## 2017-09-23 RX ADMIN — AMLODIPINE BESYLATE 5 MG: 5 TABLET ORAL at 21:15

## 2017-09-23 RX ADMIN — HEPARIN SODIUM 5000 UNITS: 5000 INJECTION, SOLUTION INTRAVENOUS; SUBCUTANEOUS at 21:15

## 2017-09-23 RX ADMIN — HYDROCODONE BITARTATE AND ACETAMINOPHEN 1 TABLET: 5; 325 TABLET ORAL at 15:05

## 2017-09-23 RX ADMIN — ALBUTEROL SULFATE 2.5 MG: 2.5 SOLUTION RESPIRATORY (INHALATION) at 15:30

## 2017-09-23 RX ADMIN — ACETAMINOPHEN 650 MG: 325 TABLET, FILM COATED ORAL at 21:41

## 2017-09-23 RX ADMIN — BUDESONIDE 0.5 MG: 0.5 INHALANT RESPIRATORY (INHALATION) at 07:54

## 2017-09-23 RX ADMIN — IPRATROPIUM BROMIDE 0.5 MG: 0.5 SOLUTION RESPIRATORY (INHALATION) at 19:37

## 2017-09-23 NOTE — PLAN OF CARE
Problem: Patient Care Overview (Adult)  Goal: Plan of Care Review  Outcome: Ongoing (interventions implemented as appropriate)    09/23/17 0000 09/23/17 0344   Coping/Psychosocial Response Interventions   Plan Of Care Reviewed With patient --    Patient Care Overview   Progress --  progress toward functional goals as expected   Outcome Evaluation   Outcome Summary/Follow up Plan --  Encouraged independence when emptying appliance.       Goal: Adult Individualization and Mutuality  Outcome: Ongoing (interventions implemented as appropriate)    09/19/17 1225   Individualization   Patient Specific Preferences none   Patient Specific Goals none   Patient Specific Interventions none   Mutuality/Individual Preferences   What Anxieties, Fears or Concerns Do You Have About Your Health or Care? none   What Questions Do You Have About Your Health or Care? none   What Information Would Help Us Give You More Personalized Care? none       Goal: Discharge Needs Assessment  Outcome: Ongoing (interventions implemented as appropriate)    Problem: Perioperative Period (Adult)  Goal: Signs and Symptoms of Listed Potential Problems Will be Absent or Manageable (Perioperative Period)  Outcome: Ongoing (interventions implemented as appropriate)    Problem: Ileostomy (Adult)  Goal: Signs and Symptoms of Listed Potential Problems Will be Absent or Manageable (Ileostomy)  Outcome: Ongoing (interventions implemented as appropriate)    09/22/17 1825   Ileostomy   Problems Assessed (Ileostomy) all   Problems Present (Ileostomy) none

## 2017-09-23 NOTE — PLAN OF CARE
Problem: Ileostomy (Adult)  Goal: Signs and Symptoms of Listed Potential Problems Will be Absent or Manageable (Ileostomy)  Outcome: Ongoing (interventions implemented as appropriate)    09/23/17 1533   Ileostomy   Problems Assessed (Ileostomy) situational response   Problems Present (Ileostomy) none         Problem: Respiratory Insufficiency (Adult)  Goal: Identify Related Risk Factors and Signs and Symptoms  Outcome: Outcome(s) achieved Date Met:  09/23/17  Goal: Acid/Base Balance  Outcome: Ongoing (interventions implemented as appropriate)    09/23/17 1533   Respiratory Insufficiency (Adult)   Acid/Base Balance making progress toward outcome       Goal: Effective Ventilation  Outcome: Ongoing (interventions implemented as appropriate)    09/23/17 1533   Respiratory Insufficiency (Adult)   Effective Ventilation making progress toward outcome

## 2017-09-23 NOTE — POST-PROCEDURE NOTE
"[facility]    Length of stay: 4 days    Subjective:  Stable Post - Op course.    Vital Signs:  Blood pressure 108/65, pulse 103, temperature 97.9 °F (36.6 °C), temperature source Oral, resp. rate 18, height 66.5\" (168.9 cm), weight 154 lb (69.9 kg), SpO2 95 %.    Labs past 24 hours:  Lab Results (last 24 hours)     Procedure Component Value Units Date/Time    POC Glucose Fingerstick [[id number]]  (Normal) Collected:  09/22/17 1610    Specimen:  Blood Updated:  09/22/17 1611     Glucose 117 mg/dL     Narrative:       Meter: [id number] : [id number] Alderman Scott    POC Glucose Fingerstick [[id number]]  (Abnormal) Collected:  09/22/17 2111    Specimen:  Blood Updated:  09/22/17 2113     Glucose 147 (H) mg/dL     Narrative:       Meter: [id number] : [id number] Deven Bartlett    POC Glucose Fingerstick [[id number]]  (Abnormal) Collected:  09/23/17 0819    Specimen:  Blood Updated:  09/23/17 0821     Glucose 145 (H) mg/dL     Narrative:       Meter: [id number] : [id number] Kilo Walsh    POC Glucose Fingerstick [[id number]]  (Normal) Collected:  09/23/17 1224    Specimen:  Blood Updated:  09/23/17 1225     Glucose 104 mg/dL     Narrative:       Meter: [id number] : [id number] Kilo Walsh          I/O last shift:  I/O this shift:  In: -   Out: 175 [Stool:175]     PHYSICAL EXAM-  Comfortable  cv- rsr  Chest- clear, =  Abd- soft, mild distention, stoma with good function.    Pathology:    Order Name Source Comment Collection Info Order Time   OR POTASSIUM   Collected By: Karoline Mosher RN 9/19/2017 12:18 PM   HEMOGLOBIN AND HEMATOCRIT, BLOOD   Collected By: Abdirashid Castillo RN 9/19/2017  6:55 PM   TISSUE PATHOLOGY EXAM Large Intestine, Left / Descending Colon   COLON AND OMENTUM Collected By: David Nielsen MD 9/19/2017  6:49 PM   .    Assessment and Plan:  NG out last evening.  Home when proves able to tolerate diet and maintain hydration - " Sunday?  Discharge instructions reviewed.  (potential to use Imodium prn at home discussed)    David Nielsen MD  09/23/17  1:21 PM

## 2017-09-23 NOTE — PROGRESS NOTES
"IM progress note      Zackary Noonan II  [id number]  1955     LOS: 4 days     Attending: David Nielsen MD    Primary Care Provider: Jillian Layne MD      Chief Complaint/Reason for visit:  Abdominal pain    Subjective   NGT out yesterday. Tolerating po softs.. Good stoma output.   Objective     Vital Signs  Blood pressure 108/65, pulse 103, temperature 97.9 °F (36.6 °C), temperature source Oral, resp. rate 18, height 66.5\" (168.9 cm), weight 154 lb (69.9 kg), SpO2 95 %.  Temp (24hrs), Av.3 °F (36.8 °C), Min:97.9 °F (36.6 °C), Max:98.6 °F (37 °C)      Intake/Output:    Intake/Output Summary (Last 24 hours) at 17 1335  Last data filed at 17 1000   Gross per 24 hour   Intake             4859 ml   Output             1150 ml   Net             3709 ml       Nutrition: Soft GI.    Respiratory: RA    Physical Exam:     General Appearance:    Alert, cooperative, in no acute distress   Head:    Normocephalic, without obvious abnormality, atraumatic.      Lungs:     Normal effort, symmetric chest rise, no crepitus, clear to      auscultation bilaterally             Heart:    Regular rhythm and normal rate, normal S1 and S2   Abdomen:     Abd soft, mild distention. clean benign incisions, staples present., stoma is pink, brown liquid in ostomy bag   Extremities:   No clubbing, cyanosis or edema.  No deformities.    Pulses:   Pulses palpable and equal bilaterally   Skin:   No bleeding, bruising or rash          Results Review:     I reviewed the patient's new clinical results.     Results from last 7 days  Lab Units 17  0503 17  0425 17  1859   WBC 10*3/mm3 12.03* 13.18*  --    HEMOGLOBIN g/dL 10.5* 11.1* 10.9*   HEMATOCRIT % 33.3* 34.9* 35.1*   PLATELETS 10*3/mm3 292 365  --        Results from last 7 days  Lab Units 17  0503 17  0425   SODIUM mmol/L 133 132   POTASSIUM mmol/L 4.1 4.9   CHLORIDE mmol/L 104 105   CO2 mmol/L 28.0 25.0   BUN mg/dL 5* 8*   CREATININE mg/dL " 0.70 0.80   CALCIUM mg/dL 8.5* 8.1*   GLUCOSE mg/dL 114* 168*     Results for KAUR GUERRERO II (MRN [id number]) as of 9/23/2017 13:36   Ref. Range 9/22/2017 11:16 9/22/2017 16:10 9/22/2017 21:11 9/23/2017 08:19 9/23/2017 12:24   Glucose Latest Ref Range: 70 - 130 mg/dL 106 117 147 (H) 145 (H) 104     I reviewed the patient's new imaging including images and reports.    All medications reviewed.     alvimopan 12 mg Oral BID   amLODIPine 5 mg Oral Nightly   budesonide 0.5 mg Nebulization BID - RT   busPIRone 15 mg Oral TID   cetirizine 10 mg Oral Daily   fluticasone 2 spray Nasal BID   heparin (porcine) 5,000 Units Subcutaneous Q8H   insulin lispro 0-7 Units Subcutaneous 4x Daily AC & at Bedtime   ipratropium 2 spray Each Nare 4x Daily   ipratropium 0.5 mg Nebulization 4x Daily - RT   montelukast 10 mg Oral Nightly   saccharomyces boulardii 250 mg Oral BID   valsartan 160 mg Oral Daily       Assessment/Plan   Principal Problem:    S/P total abdominal colectomy  Active Problems:    History of DVT (deep vein thrombosis)    HTN (hypertension)    Leukocytosis, mild, likely reactive    COPD (chronic obstructive pulmonary disease)    Prediabetes    Anemia    Ulcerative colitis    Acute blood loss anemia, mild, asymptomatic    HO of IVC filter      Plan  1. Ambulation  2. Pain control-prns   3. IS-encouraged  4. DVT proph- mechs/ Heparin. Resuming Coumadin today.  5. Bowel regimen- entereg  6. Diet, PO as tolerated. Soft diet.  7. Monitor post-op labs, BG  8. DC planning for home possibly tomorrow.    HTN  - Continue home norvasc and diovan  - Monitor BP q4 hrs  - Holding parameters for BP meds  - PRN hydralazine for SBP >170     COPD  - Continue home albuterol, qvar, flonase, Breo, singulair, atrovent, and elipta (or pharmacy substitions)  - Monitor O2 sats  - O2 PRN     PreDM  - hgb A1c on 9/15/17 6.1  - Accuchecks ACHS with low dose SSI  - Seen by DM educator 9/20       Jennifer Mallory MD  09/23/17  1:35 PM

## 2017-09-24 VITALS
HEART RATE: 96 BPM | RESPIRATION RATE: 18 BRPM | HEIGHT: 67 IN | WEIGHT: 154 LBS | BODY MASS INDEX: 24.17 KG/M2 | TEMPERATURE: 98.2 F | DIASTOLIC BLOOD PRESSURE: 68 MMHG | OXYGEN SATURATION: 98 % | SYSTOLIC BLOOD PRESSURE: 125 MMHG

## 2017-09-24 LAB
GLUCOSE BLDC GLUCOMTR-MCNC: 110 MG/DL (ref 70–130)
GLUCOSE BLDC GLUCOMTR-MCNC: 113 MG/DL (ref 70–130)
INR PPP: 1.22
PROTHROMBIN TIME: 13.4 SECONDS (ref 9.6–11.5)

## 2017-09-24 PROCEDURE — 85610 PROTHROMBIN TIME: CPT

## 2017-09-24 PROCEDURE — 94799 UNLISTED PULMONARY SVC/PX: CPT

## 2017-09-24 PROCEDURE — 94760 N-INVAS EAR/PLS OXIMETRY 1: CPT

## 2017-09-24 PROCEDURE — 25010000002 HEPARIN (PORCINE) PER 1000 UNITS: Performed by: COLON & RECTAL SURGERY

## 2017-09-24 PROCEDURE — 94640 AIRWAY INHALATION TREATMENT: CPT

## 2017-09-24 PROCEDURE — 82962 GLUCOSE BLOOD TEST: CPT

## 2017-09-24 PROCEDURE — 25010000002 ONDANSETRON PER 1 MG: Performed by: NURSE PRACTITIONER

## 2017-09-24 RX ORDER — HYDROCODONE BITARTRATE AND ACETAMINOPHEN 5; 325 MG/1; MG/1
1 TABLET ORAL EVERY 4 HOURS PRN
Start: 2017-09-24 | End: 2017-09-29

## 2017-09-24 RX ORDER — AZATHIOPRINE 50 MG/1
200 TABLET ORAL DAILY
Start: 2017-09-24 | End: 2018-03-13

## 2017-09-24 RX ADMIN — POTASSIUM CHLORIDE, SODIUM CHLORIDE, CALCIUM CHLORIDE, SODIUM LACTATE, AND DEXTROSE MONOHYDRATE 75 ML/HR: 1.79; 6; .2; 3.1; 5 INJECTION, SOLUTION INTRAVENOUS at 06:10

## 2017-09-24 RX ADMIN — CETIRIZINE HYDROCHLORIDE 10 MG: 10 TABLET, FILM COATED ORAL at 08:29

## 2017-09-24 RX ADMIN — BUSPIRONE HYDROCHLORIDE 15 MG: 15 TABLET ORAL at 05:46

## 2017-09-24 RX ADMIN — GUAIFENESIN 200 MG: 100 SOLUTION ORAL at 12:35

## 2017-09-24 RX ADMIN — VALSARTAN 160 MG: 160 TABLET, FILM COATED ORAL at 08:29

## 2017-09-24 RX ADMIN — IPRATROPIUM BROMIDE 0.5 MG: 0.5 SOLUTION RESPIRATORY (INHALATION) at 09:40

## 2017-09-24 RX ADMIN — BUSPIRONE HYDROCHLORIDE 15 MG: 15 TABLET ORAL at 14:48

## 2017-09-24 RX ADMIN — HYDROCODONE BITARTATE AND ACETAMINOPHEN 1 TABLET: 5; 325 TABLET ORAL at 02:24

## 2017-09-24 RX ADMIN — ONDANSETRON 4 MG: 2 INJECTION INTRAMUSCULAR; INTRAVENOUS at 02:24

## 2017-09-24 RX ADMIN — Medication 250 MG: at 08:29

## 2017-09-24 RX ADMIN — IPRATROPIUM BROMIDE 2 SPRAY: 42 SPRAY NASAL at 12:35

## 2017-09-24 RX ADMIN — IPRATROPIUM BROMIDE 2 SPRAY: 42 SPRAY NASAL at 08:30

## 2017-09-24 RX ADMIN — HEPARIN SODIUM 5000 UNITS: 5000 INJECTION, SOLUTION INTRAVENOUS; SUBCUTANEOUS at 05:46

## 2017-09-24 RX ADMIN — BUDESONIDE 0.5 MG: 0.5 INHALANT RESPIRATORY (INHALATION) at 09:40

## 2017-09-24 RX ADMIN — IPRATROPIUM BROMIDE 0.5 MG: 0.5 SOLUTION RESPIRATORY (INHALATION) at 12:44

## 2017-09-24 RX ADMIN — FLUTICASONE PROPIONATE 2 SPRAY: 50 SPRAY, METERED NASAL at 08:30

## 2017-09-24 NOTE — PLAN OF CARE
Problem: Patient Care Overview (Adult)  Goal: Plan of Care Review  Outcome: Ongoing (interventions implemented as appropriate)    09/23/17 0344 09/23/17 2200   Coping/Psychosocial Response Interventions   Plan Of Care Reviewed With --  patient   Patient Care Overview   Progress progress toward functional goals as expected --    Outcome Evaluation   Outcome Summary/Follow up Plan Encouraged independence when emptying appliance. --        Goal: Adult Individualization and Mutuality  Outcome: Ongoing (interventions implemented as appropriate)    09/19/17 1225   Individualization   Patient Specific Preferences none   Patient Specific Goals none   Patient Specific Interventions none   Mutuality/Individual Preferences   What Anxieties, Fears or Concerns Do You Have About Your Health or Care? none   What Questions Do You Have About Your Health or Care? none   What Information Would Help Us Give You More Personalized Care? none       Goal: Discharge Needs Assessment  Outcome: Ongoing (interventions implemented as appropriate)    09/20/17 1001 09/20/17 1115 09/22/17 0508   Discharge Needs Assessment   Concerns To Be Addressed --  --  denies needs/concerns at this time   Readmission Within The Last 30 Days --  no previous admission in last 30 days --    Equipment Needed After Discharge --  none --    Discharge Disposition --  --  still a patient   Discharge Planning Comments --  Pt has [employer] insurance and denies recent changes in insurance. Pt states usually has an affordable copay and pt uses [email or web address] in [place]. Plan is home with spouse when medically ready for discharge. Pt denies discharge needs. CM will cont to follow.  --    Current Health   Anticipated Changes Related to Illness --  other (see comments)  (denies discharge needs at this time.) --    Living Environment   Transportation Available --  car --    Self-Care   Equipment Currently Used at Home none --  --          Problem: Perioperative  Period (Adult)  Goal: Signs and Symptoms of Listed Potential Problems Will be Absent or Manageable (Perioperative Period)  Outcome: Ongoing (interventions implemented as appropriate)    09/23/17 0344 09/24/17 0230   Perioperative Period   Problems Assessed (Perioperative Period) all --    Problems Present (Perioperative Period) --  pain;situational response         Problem: Ileostomy (Adult)  Goal: Signs and Symptoms of Listed Potential Problems Will be Absent or Manageable (Ileostomy)  Outcome: Ongoing (interventions implemented as appropriate)    09/23/17 1533   Ileostomy   Problems Assessed (Ileostomy) situational response   Problems Present (Ileostomy) none         Problem: Respiratory Insufficiency (Adult)  Goal: Acid/Base Balance  Outcome: Ongoing (interventions implemented as appropriate)    09/24/17 0230   Respiratory Insufficiency (Adult)   Acid/Base Balance making progress toward outcome       Goal: Effective Ventilation  Outcome: Ongoing (interventions implemented as appropriate)    09/24/17 0230   Respiratory Insufficiency (Adult)   Effective Ventilation making progress toward outcome

## 2017-09-24 NOTE — NURSING NOTE
WOC nurse for discharge teaching and appliance change.  Pt changed appliance with stand by coaching from WOC nurse. Small amount of redness noted close to stoma, crusting done with stoma powder and spray. Discharge supplies given to patient, [facility] information and other ostomy information  given to patient and reviewed.  All questions answered to the best of my ability. Patient is to follow up with WOC nurse as needed, contact information given to patient.

## 2017-09-24 NOTE — PLAN OF CARE
Problem: Respiratory Insufficiency (Adult)  Goal: Effective Ventilation  Outcome: Ongoing (interventions implemented as appropriate)    09/24/17 1520   Respiratory Insufficiency (Adult)   Effective Ventilation making progress toward outcome

## 2017-09-24 NOTE — DISCHARGE SUMMARY
Patient Name: Zackary Noonan II  MRN: [id number]  : 1955  DOS: 2017    Attending: David Nielsen MD    Primary Care Provider: Jillian Layne MD    Date of Admission:.2017 10:43 AM    Date of Discharge:  2017    Discharge Diagnosis: Principal Problem:    S/P total abdominal colectomy  Active Problems:    History of DVT (deep vein thrombosis)    HTN (hypertension)    Leukocytosis, mild, likely reactive    COPD (chronic obstructive pulmonary disease)    Prediabetes    Anemia    Ulcerative colitis    Acute blood loss anemia, mild, asymptomatic    HO of IVC filter      Hospital Course  Patient is a 62 y.o. male presented for total abdominal colectomy by Dr. Nielsen. He has had diarrhea and abdominal cramping for at least 8 months. He reports he was hospitalized in 2017 for dehydration and diarrhea, he had sepsis, pneumonia, and required ventilatory support. He continued to have diarrhea and was found to have c-diff. In Aug 2017 he had fecal transplant by Dr. Roach, [facility].      He does have history of several DVTs in BLE. The lastest about 3 weeks ago. He takes Coumadin and has been bridging with Lovenox.  His coumadin was resumed prior to discharge.    He was provided pain medication as needed for pain control.  He received DVT prophylaxis with subcutaneous Heparin as well as mechanicals    He was seen by PT and OT and has progressed well over his stay.  He used an IS for atelectasis prophylaxis.  During his stay, he had output from his ostomy and was seen by [facility]OCN for education on ostomy care. He tolerated his by mouth diet well.  He was provided a bowel regimen and was encouraged to ambulate frequently which he did.  Home medications were resumed as appropriate, and labs were monitored and remained fairly stable.   His Hgb A1C was elevated on her pre-op labs. A diabetes educator provided him with diabetic education and a glucometer.    With the progress he has made, he is ready for [place] home  today.    Discussed with patient regarding plan and he shows understanding and agreement.       Procedures Performed  Procedure(s):  TOTAL ABDOMINAL COLECTOMY WITH CREATION OF ILEOSTOMY       Pertinent Test Results:    I reviewed the patient's new clinical results.     Results from last 7 days  Lab Units 09/22/17  0503 09/20/17  0425 09/19/17  1859   WBC 10*3/mm3 12.03* 13.18*  --    HEMOGLOBIN g/dL 10.5* 11.1* 10.9*   HEMATOCRIT % 33.3* 34.9* 35.1*   PLATELETS 10*3/mm3 292 365  --        Results from last 7 days  Lab Units 09/22/17  0503 09/20/17  0425   SODIUM mmol/L 133 132   POTASSIUM mmol/L 4.1 4.9   CHLORIDE mmol/L 104 105   CO2 mmol/L 28.0 25.0   BUN mg/dL 5* 8*   CREATININE mg/dL 0.70 0.80   CALCIUM mg/dL 8.5* 8.1*   GLUCOSE mg/dL 114* 168*   Results for KAUR GUERRERO II (MRN [id number]) as of 9/28/2017 15:39   Ref. Range 9/23/2017 16:58 9/23/2017 20:34 9/24/2017 05:16 9/24/2017 07:58 9/24/2017 11:54   Glucose Latest Ref Range: 70 - 130 mg/dL 138 (H) 125  113 110   Protime Latest Ref Range: 9.6 - 11.5 Seconds   13.4 (H)     INR Unknown   1.22     Tissue Pathology Exam   Order: [id number]   Status:  Final result   Visible to patient:  No (Not Released) Dx:  Crohn's disease      9d ago     Clinical Information       The working history is ulcerative colitis.    Final Diagnosis   DESCENDING COLON AND APPENDIX:  Inflammatory bowel disease most consistent with Crohn's.  No dysplastic mucosal features or neoplasia identified.  Vermiform appendix with fibrotic obliteration of the distal tip.  Lymph nodes x2; no tumor or granulomas identified.      DGD/mbc    Electronically signed by Britton Ann MD on 9/23/2017 at 1334   Gross Description       Received in formalin labeled as colon and omentum is a 62 cm in length by 6.5 cm average circumference colon resection with attached appendix. Terminal ileum is present. Mesenteric fat is attached along the entire length of the specimen. The serosa is tan/gray and smooth  with focally roughened areas. Starting at the distal margin and running 25 cm proximal is tan/gray ulcerated nodular mucosa, which has lost its normal folding pattern. There is a sharp demarcation between the normal mucosa and the ulcerated nodular mucosa. The remaining mucosa proximal to this area is tan with a normal folding pattern. There is a 1.5 x 1.5 cm area of slightly discolored, dusky mucosa which is 22 cm from the proximal margin and 20 cm from the start of the ulcerated, nodular mucosal. No other polyps or masses are identified. The bowel wall thickness averages 0.2 cm. The appendix is 6.5 cm in length by 0.5 cm in diameter. The serosa is tan/gray, glistening and finely vascular. No gross defects are identified. Sectioning reveals a grossly unremarkable central lumen, and a wall thickness averaging 0.2 cm. No fecaliths are identified. Two lymph node candidates are identified in the attached fat averaging 0.5 x 0.4 x 0.4 cm. Additionally received is a 22 x 9 x 1 cm portion of omental fat. Sectioning reveals no gross nodules or masses. Representative sections are submitted as follows: A - proximal margin taken en face; B-C - distal margin taken en face; D-I - representative mucosa submitted from proximal to distal, with block E has the area of dusky mucosa; G - transition from normal to ulcerated mucosa; H-I - ulcerated mucosa; J - appendix, bisected tip and representative cross sections; K - two lymph node candidates; L-M - omentum. LED/klb    Microscopic Description       Sections of the segmental resection of the ileocolonic tissue show the ileum with slightly accentuated inflammatory elements in the lamina propria; however, they do not extend into the ileal villi. The sections of colon show skip areas of involved and uninvolved bowel and a number of sections show the interface between these transitions. The involved bowel shows a surface that is occasionally eroded and the lamina propria that displays  "an intense lymphoplasmacytic and polymorphonuclear inflammatory reaction with numerous crypt abscesses and crypts involved by cryptitis. The inflammatory reaction extends through the submucosa but does not appear to involve the muscularis propria. No granulomas are encountered. The skip areas of the colonic mucosa are quite dramatic. No evidence of mucosal dysplasia or neoplasia is seen within the planes of section examined. The vermiform appendix shows fibrofatty obliteration of the lumen with no mucosal abnormalities. Serosa and wall of the appendix show no evidence of inflammation. A total of two mesenteric lymph nodes are examined microscopically and these show no metastatic tumor or granulomatous inflammation. There is no evidence of effacement of the mateo architecture by monotonous population of lymphocytes. Sections through the mesentery show no abnormalities of the mesenteric adipose of serosal surfaces.       Resulting Agency [facility]      Specimen Collected: 17  6:26 PM Last Resulted: 17  1:34 PM                I reviewed the patient's new imaging including images and reports.    Physical therapy: pt still lots of abdom. pain,has ng.still needs a little assist for transfers ,uses iv pole for gait.try some steps    Discharge Assessment:    Vital Signs  /68 (BP Location: Right arm, Patient Position: Lying)  Pulse 96  Temp 98.2 °F (36.8 °C) (Oral)   Resp 18  Ht 66.5\" (168.9 cm)  Wt 154 lb (69.9 kg)  SpO2 98%  BMI 24.48 kg/m2  Temp (24hrs), Av.2 °F (37.3 °C), Min:98.2 °F (36.8 °C), Max:100 °F (37.8 °C)      General Appearance:    Alert, cooperative, in no acute distress   Lungs:     Clear to auscultation,respirations regular, even and                   unlabored    Heart:    Regular rhythm and normal rate, normal S1 and S2, no            murmur, no gallop, no rub, no click   Abdomen:     Abd soft, mild distention. clean benign incisions, staples present., stoma is pink, brown " liquid in ostomy bag   Extremities:   Moves all extremities well, no edema, no cyanosis, no              redness   Pulses:   Pulses palpable and equal bilaterally   Skin:   No bleeding, bruising or rash   Neurologic:   Cranial nerves 2 - 12 grossly intact, sensation intact, DTR        present and equal bilaterally       Discharge Disposition: Home    Discharge Medications   Zackary Noonan II   Home Medication Instructions CLARITZA:[id number]    Printed on:09/24/17 1235   Medication Information                      albuterol (PROVENTIL HFA;VENTOLIN HFA) 108 (90 Base) MCG/ACT inhaler  Inhale 2 puffs Every 4 (Four) Hours As Needed for Wheezing.             albuterol (PROVENTIL HFA;VENTOLIN HFA) 108 (90 Base) MCG/ACT inhaler  Inhale 2 puffs Every 4 (Four) Hours As Needed for Wheezing.             albuterol (PROVENTIL) (2.5 MG/3ML) 0.083% nebulizer solution  Take 2.5 mg by nebulization Every 6 (Six) Hours As Needed for Wheezing or Shortness of Air.             amLODIPine (NORVASC) 5 MG tablet  Take 5 mg by mouth Every Night.             azaTHIOprine (IMURAN) 50 MG tablet  Take 4 tablets by mouth Daily. Resume if  prescribes.             beclomethasone (QVAR) 80 MCG/ACT inhaler  Inhale 1 puff 2 (Two) Times a Day.             busPIRone (BUSPAR) 15 MG tablet  Take 15 mg by mouth 3 (Three) Times a Day.             clobetasol (CLOBEX) 0.05 % lotion  Apply  topically 2 (Two) Times a Day.             Enoxaparin Sodium (LOVENOX SC)  Inject 75 mg under the skin 2 (Two) Times a Day. Pt taking to [place] from warfarin, last dose will 9-16-17 per dr orr orders, pt pcp aware per pt report             famotidine-calcium carb-mag hydroxide (PEPCID COMPLETE) -165 MG chewable tablet  Chew 1 tablet Daily As Needed for Heartburn.             fluticasone (FLONASE) 50 MCG/ACT nasal spray  2 sprays into each nostril 2 (Two) Times a Day.             Fluticasone Furoate-Vilanterol (BREO ELLIPTA) 200-25 MCG/INH aerosol powder    Inhale 1 puff Daily.             folic acid (FOLVITE) 1 MG tablet  Take 1 mg by mouth Daily.             GUAIFENESIN PO  Take 400 mg by mouth.             HYDROcodone-acetaminophen (NORCO) 5-325 MG per tablet  Take 1 tablet by mouth Every 4 (Four) Hours As Needed for Moderate Pain  for up to 5 days.             ipratropium (ATROVENT) 0.06 % nasal spray  2 sprays into each nostril 4 (Four) Times a Day.             levocetirizine (XYZAL) 5 MG tablet  Take 5 mg by mouth Every Evening.             montelukast (SINGULAIR) 10 MG tablet  Take 10 mg by mouth Every Night.             Multiple Vitamins-Minerals (SENIOR MULTIVITAMIN PLUS PO)  Take 1 tablet/day by mouth.             Pyridoxine HCl (VITAMIN B-6 PO)  Take 100 mcg by mouth.             saccharomyces boulardii (FLORASTOR) 250 MG capsule  Take 250 mg by mouth 2 (Two) Times a Day.             Umeclidinium Bromide (INCRUSE ELLIPTA) 62.5 MCG/INH aerosol powder   Inhale 1 puff Daily.             valACYclovir (VALTREX) 500 MG tablet  Take 500 mg by mouth Daily As Needed.             valsartan (DIOVAN) 160 MG tablet  Take 160 mg by mouth Daily.             vitamin E 400 UNIT capsule  Take 400 Units by mouth Daily.             warfarin (COUMADIN) 5 MG tablet  Take 10 mg by mouth Daily.                 Discharge Diet: soft, bland diet    Activity at Discharge: ambulate    Follow-up Appointments  Dr. Nielsen per his orders    Discharge took over 30 min     Jennifer Mallory MD  09/24/17  12:30 PM

## 2017-10-04 ENCOUNTER — HOSPITAL ENCOUNTER (OUTPATIENT)
Dept: WOUND CARE | Facility: HOSPITAL | Age: 62
Discharge: HOME | End: 2017-10-04
Admitting: COLON & RECTAL SURGERY

## 2017-10-04 PROCEDURE — G0463 HOSPITAL OUTPT CLINIC VISIT: HCPCS | Performed by: NURSE PRACTITIONER

## 2017-10-04 NOTE — NURSING NOTE
Pt seen by [facility] nurse for peristomal skin irritation and issues with appliance leaking. Patient has mc separation for the 6-11:00 position which was crusted with peristomal skin powder and barrier spray. When patient sits up stoma is in a valley which has caused his leaking issues. Convatec convexity appliance was applied and samples were sent home. Education done with patient and wife. Pt also was given samples of Coloplast convexity Morgan appliance. Notified to call [facility] with any questions or concerns.

## 2017-10-23 ENCOUNTER — HOSPITAL ENCOUNTER (OUTPATIENT)
Dept: WOUND CARE | Facility: HOSPITAL | Age: 62
Discharge: HOME | End: 2017-10-23
Admitting: COLON & RECTAL SURGERY

## 2017-10-23 PROCEDURE — G0463 HOSPITAL OUTPT CLINIC VISIT: HCPCS | Performed by: NURSE PRACTITIONER

## 2017-10-23 NOTE — NURSING NOTE
Patient seen for ostomy care and education. Stoma is red, moist and retracts when patient sits up. He is wearing a Coloplast one piece convex appliance. Pt changed appliance with minimal assistance from WOCN. Paste was added and education done. Patient is doing well and may require a few more silver nitrate sessions to heal the hypergranulation areas. Patient notified to call with any further issues or concerns.

## 2017-10-31 ENCOUNTER — HOSPITAL ENCOUNTER (OUTPATIENT)
Dept: WOUND CARE | Facility: HOSPITAL | Age: 62
Discharge: HOME | End: 2017-10-31
Admitting: COLON & RECTAL SURGERY

## 2017-10-31 PROCEDURE — G0463 HOSPITAL OUTPT CLINIC VISIT: HCPCS | Performed by: NURSE PRACTITIONER

## 2017-11-22 ENCOUNTER — HOSPITAL ENCOUNTER (OUTPATIENT)
Dept: WOUND CARE | Facility: HOSPITAL | Age: 62
Discharge: HOME | End: 2017-11-22
Admitting: COLON & RECTAL SURGERY

## 2017-11-22 PROCEDURE — G0463 HOSPITAL OUTPT CLINIC VISIT: HCPCS

## 2017-11-22 NOTE — NURSING NOTE
Pt seen in out patient ostomy clinic for nonhealing open wound to peristomal skin.  Wound appears worse, Dr Nielsen called and informed of my concerns.  He wanted to continue to treat conservatively.  At the 5 o'clock position there is a pin hole opening that is hard, red and purulent drainage can be expressed out.  Per patient it is tender.  Endoform was used in  wound from 6-11 o'clock, NS moistened Hydraferna blue used over with Rufina paste used to fill in the cracks.  Coloplast 1 piece convexity was used with belt.  I wonder if a Lizzy appliance would work better for his skin issues, samples ordered from [employer] while patient was here. Pt to follow up with [facility] nurse next week to reassess wound.  Pt has appointment with Dr Nielsen on Dec 4th at 0945, if needed.  Also pt may benefit from seeing a dermatologist, +/- a biopsy of the wound or wound culture.  Pt is call or see MD if increase in pain or runs a fever.

## 2017-11-30 ENCOUNTER — HOSPITAL ENCOUNTER (OUTPATIENT)
Dept: WOUND CARE | Facility: HOSPITAL | Age: 62
Discharge: HOME | End: 2017-11-30
Admitting: COLON & RECTAL SURGERY

## 2017-11-30 PROCEDURE — G0463 HOSPITAL OUTPT CLINIC VISIT: HCPCS

## 2018-03-13 ENCOUNTER — HOSPITAL ENCOUNTER (INPATIENT)
Facility: HOSPITAL | Age: 63
LOS: 5 days | Discharge: HOME | End: 2018-03-18
Attending: EMERGENCY MEDICINE | Admitting: INTERNAL MEDICINE

## 2018-03-13 ENCOUNTER — APPOINTMENT (OUTPATIENT)
Dept: GENERAL RADIOLOGY | Facility: HOSPITAL | Age: 63
End: 2018-03-13

## 2018-03-13 DIAGNOSIS — J18.9 PNEUMONIA OF BOTH LOWER LOBES DUE TO INFECTIOUS ORGANISM: Primary | ICD-10-CM

## 2018-03-13 DIAGNOSIS — Z79.01 ANTICOAGULATED ON WARFARIN: ICD-10-CM

## 2018-03-13 DIAGNOSIS — R73.9 HYPERGLYCEMIA: ICD-10-CM

## 2018-03-13 DIAGNOSIS — Z86.718 HISTORY OF DVT (DEEP VEIN THROMBOSIS): ICD-10-CM

## 2018-03-13 DIAGNOSIS — A41.9 SEPSIS, DUE TO UNSPECIFIED ORGANISM: ICD-10-CM

## 2018-03-13 PROBLEM — E87.1 HYPONATREMIA: Status: ACTIVE | Noted: 2018-03-13

## 2018-03-13 PROBLEM — Z86.19 HISTORY OF CLOSTRIDIUM DIFFICILE COLITIS: Status: ACTIVE | Noted: 2018-03-13

## 2018-03-13 LAB
ALBUMIN SERPL-MCNC: 4.2 G/DL (ref 3.2–4.8)
ALBUMIN/GLOB SERPL: 1.1 G/DL (ref 1.5–2.5)
ALP SERPL-CCNC: 144 U/L (ref 25–100)
ALT SERPL W P-5'-P-CCNC: 18 U/L (ref 7–40)
ANION GAP SERPL CALCULATED.3IONS-SCNC: 10 MMOL/L (ref 3–11)
AST SERPL-CCNC: 16 U/L (ref 0–33)
BASOPHILS # BLD MANUAL: 0 10*3/MM3 (ref 0–0.2)
BASOPHILS NFR BLD AUTO: 0 % (ref 0–1)
BILIRUB SERPL-MCNC: 0.2 MG/DL (ref 0.3–1.2)
BNP SERPL-MCNC: 12 PG/ML (ref 0–100)
BUN BLD-MCNC: 22 MG/DL (ref 9–23)
BUN/CREAT SERPL: 15.7 (ref 7–25)
CALCIUM SPEC-SCNC: 9.9 MG/DL (ref 8.7–10.4)
CHLORIDE SERPL-SCNC: 98 MMOL/L (ref 99–109)
CO2 SERPL-SCNC: 22 MMOL/L (ref 20–31)
CREAT BLD-MCNC: 1.4 MG/DL (ref 0.6–1.3)
D-LACTATE SERPL-SCNC: 1.5 MMOL/L (ref 0.5–2)
DEPRECATED RDW RBC AUTO: 51.6 FL (ref 37–54)
EOSINOPHIL # BLD MANUAL: 0 10*3/MM3 (ref 0.1–0.3)
EOSINOPHIL NFR BLD MANUAL: 0 % (ref 0–3)
ERYTHROCYTE [DISTWIDTH] IN BLOOD BY AUTOMATED COUNT: 17.1 % (ref 11.3–14.5)
GFR SERPL CREATININE-BSD FRML MDRD: 51 ML/MIN/1.73
GLOBULIN UR ELPH-MCNC: 3.7 GM/DL
GLUCOSE BLD-MCNC: 221 MG/DL (ref 70–100)
GLUCOSE BLDC GLUCOMTR-MCNC: 177 MG/DL (ref 70–130)
HCT VFR BLD AUTO: 38.7 % (ref 38.9–50.9)
HGB BLD-MCNC: 12.4 G/DL (ref 13.1–17.5)
HOLD SPECIMEN: NORMAL
HOLD SPECIMEN: NORMAL
INR PPP: 1.07 (ref 0.91–1.09)
LYMPHOCYTES # BLD MANUAL: 1.32 10*3/MM3 (ref 0.6–4.8)
LYMPHOCYTES NFR BLD MANUAL: 4 % (ref 0–12)
LYMPHOCYTES NFR BLD MANUAL: 4 % (ref 24–44)
MCH RBC QN AUTO: 26.2 PG (ref 27–31)
MCHC RBC AUTO-ENTMCNC: 32 G/DL (ref 32–36)
MCV RBC AUTO: 81.8 FL (ref 80–99)
MONOCYTES # BLD AUTO: 1.32 10*3/MM3 (ref 0–1)
NEUTROPHILS # BLD AUTO: 30.38 10*3/MM3 (ref 1.5–8.3)
NEUTROPHILS NFR BLD MANUAL: 90 % (ref 41–71)
NEUTS BAND NFR BLD MANUAL: 2 % (ref 0–5)
PLAT MORPH BLD: NORMAL
PLATELET # BLD AUTO: 370 10*3/MM3 (ref 150–450)
PMV BLD AUTO: 8.8 FL (ref 6–12)
POTASSIUM BLD-SCNC: 4.4 MMOL/L (ref 3.5–5.5)
PROCALCITONIN SERPL-MCNC: 11.91 NG/ML
PROT SERPL-MCNC: 7.9 G/DL (ref 5.7–8.2)
PROTHROMBIN TIME: 11.2 SECONDS (ref 9.6–11.5)
RBC # BLD AUTO: 4.73 10*6/MM3 (ref 4.2–5.76)
RBC MORPH BLD: NORMAL
SCAN SLIDE: NORMAL
SODIUM BLD-SCNC: 130 MMOL/L (ref 132–146)
TROPONIN I SERPL-MCNC: 0 NG/ML (ref 0–0.07)
TROPONIN I SERPL-MCNC: 0.01 NG/ML (ref 0–0.07)
WBC MORPH BLD: NORMAL
WBC NRBC COR # BLD: 33.02 10*3/MM3 (ref 3.5–10.8)
WHOLE BLOOD HOLD SPECIMEN: NORMAL
WHOLE BLOOD HOLD SPECIMEN: NORMAL

## 2018-03-13 PROCEDURE — 84484 ASSAY OF TROPONIN QUANT: CPT

## 2018-03-13 PROCEDURE — 82962 GLUCOSE BLOOD TEST: CPT

## 2018-03-13 PROCEDURE — 85025 COMPLETE CBC W/AUTO DIFF WBC: CPT | Performed by: EMERGENCY MEDICINE

## 2018-03-13 PROCEDURE — 83880 ASSAY OF NATRIURETIC PEPTIDE: CPT | Performed by: EMERGENCY MEDICINE

## 2018-03-13 PROCEDURE — 94799 UNLISTED PULMONARY SVC/PX: CPT

## 2018-03-13 PROCEDURE — 71045 X-RAY EXAM CHEST 1 VIEW: CPT

## 2018-03-13 PROCEDURE — 85007 BL SMEAR W/DIFF WBC COUNT: CPT | Performed by: EMERGENCY MEDICINE

## 2018-03-13 PROCEDURE — 80053 COMPREHEN METABOLIC PANEL: CPT | Performed by: EMERGENCY MEDICINE

## 2018-03-13 PROCEDURE — 25010000002 AZITHROMYCIN: Performed by: PHYSICIAN ASSISTANT

## 2018-03-13 PROCEDURE — 87040 BLOOD CULTURE FOR BACTERIA: CPT | Performed by: PHYSICIAN ASSISTANT

## 2018-03-13 PROCEDURE — 84145 PROCALCITONIN (PCT): CPT | Performed by: PHYSICIAN ASSISTANT

## 2018-03-13 PROCEDURE — 87449 NOS EACH ORGANISM AG IA: CPT | Performed by: PHYSICIAN ASSISTANT

## 2018-03-13 PROCEDURE — 83605 ASSAY OF LACTIC ACID: CPT | Performed by: PHYSICIAN ASSISTANT

## 2018-03-13 PROCEDURE — 85610 PROTHROMBIN TIME: CPT | Performed by: EMERGENCY MEDICINE

## 2018-03-13 PROCEDURE — 25010000002 CEFTRIAXONE PER 250 MG: Performed by: PHYSICIAN ASSISTANT

## 2018-03-13 PROCEDURE — 87899 AGENT NOS ASSAY W/OPTIC: CPT | Performed by: PHYSICIAN ASSISTANT

## 2018-03-13 PROCEDURE — 94640 AIRWAY INHALATION TREATMENT: CPT

## 2018-03-13 PROCEDURE — 99285 EMERGENCY DEPT VISIT HI MDM: CPT

## 2018-03-13 PROCEDURE — 93005 ELECTROCARDIOGRAM TRACING: CPT | Performed by: PHYSICIAN ASSISTANT

## 2018-03-13 PROCEDURE — 99223 1ST HOSP IP/OBS HIGH 75: CPT | Performed by: HOSPITALIST

## 2018-03-13 PROCEDURE — 25010000002 ENOXAPARIN PER 10 MG: Performed by: HOSPITALIST

## 2018-03-13 PROCEDURE — 93005 ELECTROCARDIOGRAM TRACING: CPT | Performed by: EMERGENCY MEDICINE

## 2018-03-13 PROCEDURE — 94760 N-INVAS EAR/PLS OXIMETRY 1: CPT

## 2018-03-13 RX ORDER — CETIRIZINE HYDROCHLORIDE 10 MG/1
10 TABLET ORAL DAILY
Status: DISCONTINUED | OUTPATIENT
Start: 2018-03-13 | End: 2018-03-18 | Stop reason: HOSPADM

## 2018-03-13 RX ORDER — BUDESONIDE AND FORMOTEROL FUMARATE DIHYDRATE 160; 4.5 UG/1; UG/1
2 AEROSOL RESPIRATORY (INHALATION)
Status: ON HOLD | COMMUNITY
End: 2018-03-14

## 2018-03-13 RX ORDER — SODIUM CHLORIDE 0.9 % (FLUSH) 0.9 %
10 SYRINGE (ML) INJECTION AS NEEDED
Status: DISCONTINUED | OUTPATIENT
Start: 2018-03-13 | End: 2018-03-18 | Stop reason: HOSPADM

## 2018-03-13 RX ORDER — LOPERAMIDE HYDROCHLORIDE 2 MG/1
2 CAPSULE ORAL 4 TIMES DAILY PRN
Status: DISCONTINUED | OUTPATIENT
Start: 2018-03-13 | End: 2018-03-18 | Stop reason: HOSPADM

## 2018-03-13 RX ORDER — BUSPIRONE HYDROCHLORIDE 15 MG/1
15 TABLET ORAL EVERY 8 HOURS SCHEDULED
Status: DISCONTINUED | OUTPATIENT
Start: 2018-03-13 | End: 2018-03-18 | Stop reason: HOSPADM

## 2018-03-13 RX ORDER — MESALAMINE 1000 MG/1
1000 SUPPOSITORY RECTAL NIGHTLY
COMMUNITY
End: 2018-09-11 | Stop reason: HOSPADM

## 2018-03-13 RX ORDER — FLUTICASONE PROPIONATE 50 MCG
2 SPRAY, SUSPENSION (ML) NASAL 2 TIMES DAILY
Status: DISCONTINUED | OUTPATIENT
Start: 2018-03-13 | End: 2018-03-18 | Stop reason: HOSPADM

## 2018-03-13 RX ORDER — FOLIC ACID 1 MG/1
1 TABLET ORAL DAILY
Status: DISCONTINUED | OUTPATIENT
Start: 2018-03-13 | End: 2018-03-18 | Stop reason: HOSPADM

## 2018-03-13 RX ORDER — IPRATROPIUM BROMIDE AND ALBUTEROL SULFATE 2.5; .5 MG/3ML; MG/3ML
3 SOLUTION RESPIRATORY (INHALATION) ONCE
Status: COMPLETED | OUTPATIENT
Start: 2018-03-13 | End: 2018-03-13

## 2018-03-13 RX ORDER — MESALAMINE 1000 MG/1
1000 SUPPOSITORY RECTAL NIGHTLY
Status: DISCONTINUED | OUTPATIENT
Start: 2018-03-13 | End: 2018-03-18 | Stop reason: HOSPADM

## 2018-03-13 RX ORDER — BUDESONIDE 0.5 MG/2ML
0.5 INHALANT ORAL
Status: DISCONTINUED | OUTPATIENT
Start: 2018-03-13 | End: 2018-03-18 | Stop reason: HOSPADM

## 2018-03-13 RX ORDER — ONDANSETRON 2 MG/ML
4 INJECTION INTRAMUSCULAR; INTRAVENOUS EVERY 6 HOURS PRN
Status: DISCONTINUED | OUTPATIENT
Start: 2018-03-13 | End: 2018-03-18 | Stop reason: HOSPADM

## 2018-03-13 RX ORDER — OSELTAMIVIR PHOSPHATE 75 MG/1
75 CAPSULE ORAL 2 TIMES DAILY
COMMUNITY
Start: 2018-03-11 | End: 2018-03-13

## 2018-03-13 RX ORDER — FAMOTIDINE 20 MG/1
20 TABLET, FILM COATED ORAL DAILY
Status: DISCONTINUED | OUTPATIENT
Start: 2018-03-13 | End: 2018-03-18 | Stop reason: HOSPADM

## 2018-03-13 RX ORDER — SODIUM CHLORIDE 0.9 % (FLUSH) 0.9 %
1-10 SYRINGE (ML) INJECTION AS NEEDED
Status: DISCONTINUED | OUTPATIENT
Start: 2018-03-13 | End: 2018-03-18 | Stop reason: HOSPADM

## 2018-03-13 RX ORDER — CEFTRIAXONE SODIUM 1 G/50ML
1 INJECTION, SOLUTION INTRAVENOUS ONCE
Status: COMPLETED | OUTPATIENT
Start: 2018-03-13 | End: 2018-03-13

## 2018-03-13 RX ORDER — MONTELUKAST SODIUM 10 MG/1
10 TABLET ORAL NIGHTLY
Status: DISCONTINUED | OUTPATIENT
Start: 2018-03-13 | End: 2018-03-18 | Stop reason: HOSPADM

## 2018-03-13 RX ORDER — VITAMIN E 268 MG
400 CAPSULE ORAL DAILY
Status: DISCONTINUED | OUTPATIENT
Start: 2018-03-13 | End: 2018-03-18 | Stop reason: HOSPADM

## 2018-03-13 RX ORDER — IPRATROPIUM BROMIDE 42 UG/1
2 SPRAY, METERED NASAL 4 TIMES DAILY
Status: DISCONTINUED | OUTPATIENT
Start: 2018-03-13 | End: 2018-03-18 | Stop reason: HOSPADM

## 2018-03-13 RX ORDER — SACCHAROMYCES BOULARDII 250 MG
250 CAPSULE ORAL 2 TIMES DAILY
Status: DISCONTINUED | OUTPATIENT
Start: 2018-03-13 | End: 2018-03-18 | Stop reason: HOSPADM

## 2018-03-13 RX ORDER — ACETAMINOPHEN 325 MG/1
650 TABLET ORAL EVERY 4 HOURS PRN
Status: DISCONTINUED | OUTPATIENT
Start: 2018-03-13 | End: 2018-03-18 | Stop reason: HOSPADM

## 2018-03-13 RX ORDER — SODIUM CHLORIDE 9 MG/ML
75 INJECTION, SOLUTION INTRAVENOUS CONTINUOUS
Status: DISCONTINUED | OUTPATIENT
Start: 2018-03-13 | End: 2018-03-14

## 2018-03-13 RX ORDER — LANOLIN ALCOHOL/MO/W.PET/CERES
50 CREAM (GRAM) TOPICAL DAILY
Status: DISCONTINUED | OUTPATIENT
Start: 2018-03-13 | End: 2018-03-18 | Stop reason: HOSPADM

## 2018-03-13 RX ORDER — CEFTRIAXONE SODIUM 1 G/50ML
1 INJECTION, SOLUTION INTRAVENOUS EVERY 24 HOURS
Status: DISCONTINUED | OUTPATIENT
Start: 2018-03-14 | End: 2018-03-16

## 2018-03-13 RX ADMIN — VITAMIN E CAP 400 UNIT 400 UNITS: 400 CAP at 20:54

## 2018-03-13 RX ADMIN — Medication 250 MG: at 20:51

## 2018-03-13 RX ADMIN — FOLIC ACID 1 MG: 1 TABLET ORAL at 20:58

## 2018-03-13 RX ADMIN — LOPERAMIDE HYDROCHLORIDE 2 MG: 2 CAPSULE ORAL at 20:52

## 2018-03-13 RX ADMIN — AZITHROMYCIN MONOHYDRATE 500 MG: 500 INJECTION, POWDER, LYOPHILIZED, FOR SOLUTION INTRAVENOUS at 15:41

## 2018-03-13 RX ADMIN — FAMOTIDINE 20 MG: 20 TABLET, FILM COATED ORAL at 20:58

## 2018-03-13 RX ADMIN — ACETAMINOPHEN 650 MG: 325 TABLET ORAL at 20:50

## 2018-03-13 RX ADMIN — SODIUM CHLORIDE 1300 ML: 9 INJECTION, SOLUTION INTRAVENOUS at 17:14

## 2018-03-13 RX ADMIN — MONTELUKAST SODIUM 10 MG: 10 TABLET, COATED ORAL at 20:51

## 2018-03-13 RX ADMIN — FLUTICASONE PROPIONATE 2 SPRAY: 50 SPRAY, METERED NASAL at 20:59

## 2018-03-13 RX ADMIN — IPRATROPIUM BROMIDE AND ALBUTEROL SULFATE 3 ML: 2.5; .5 SOLUTION RESPIRATORY (INHALATION) at 14:57

## 2018-03-13 RX ADMIN — CEFTRIAXONE SODIUM 1 G: 1 INJECTION, SOLUTION INTRAVENOUS at 15:15

## 2018-03-13 RX ADMIN — DOXYCYCLINE 100 MG: 100 INJECTION, POWDER, LYOPHILIZED, FOR SOLUTION INTRAVENOUS at 21:33

## 2018-03-13 RX ADMIN — BUSPIRONE HYDROCHLORIDE 15 MG: 15 TABLET ORAL at 20:51

## 2018-03-13 RX ADMIN — BUDESONIDE 0.5 MG: 0.5 INHALANT RESPIRATORY (INHALATION) at 19:44

## 2018-03-13 RX ADMIN — ENOXAPARIN SODIUM 80 MG: 80 INJECTION SUBCUTANEOUS at 20:52

## 2018-03-13 RX ADMIN — SODIUM CHLORIDE 1000 ML: 9 INJECTION, SOLUTION INTRAVENOUS at 15:15

## 2018-03-13 RX ADMIN — MESALAMINE 1000 MG: 1000 SUPPOSITORY RECTAL at 20:52

## 2018-03-13 RX ADMIN — SODIUM CHLORIDE 75 ML/HR: 9 INJECTION, SOLUTION INTRAVENOUS at 18:57

## 2018-03-13 NOTE — H&P
[facility] Medicine Services  HISTORY AND PHYSICAL    Patient Name: Zackary Noonan II  : 1955  MRN: [id number]  Primary Care Physician: Jillian Layne MD    Subjective     Chief Complaint: fever, malaise, cough, SOA     HPI:  Zackary Noonan II is a 62 y.o. 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 Roach),  inflammatory bowel disease s/p total abdominal colectomy with ileostomy by Dr. Nielsen 2017.     He presented to [facility] ED on 3/13/18 for evaluation of a three day history of fever (102 F)/chills, fatigue, body aches, malaise, cough and dyspnea, worsened with exertion. Wife diagnosed with Influenza B two weeks ago, he was treated with 7 days of Tamiflu prophylaxis, last dose on 3/5. On Enrique 3/11, he began to feel poorly. Was seen at a [facility]. Rapid flu was negative.     Does report he has been on prednisone x 10 days per his ENT physician.     In the ED, patient tachycardic with , O2 saturation 90% on room air. WBCs 33K with 90% neutrophils. Sodium 130. RAMYA with creatinine 1.4 (no renal dysfunction at baseline). He was given IV fluids, duoneb, O2 and IV Rocephin/Azithromycin. Hospital medicine will admit.     Review of Systems   Constitutional: Positive for appetite change, chills, diaphoresis, fatigue and fever.   HENT: Positive for congestion.    Respiratory: Positive for cough and shortness of breath.    Cardiovascular: Negative for chest pain and palpitations.   Gastrointestinal: Positive for nausea. Negative for abdominal pain, constipation, diarrhea and vomiting.   Genitourinary: Negative.    Musculoskeletal: Positive for myalgias.   Skin: Negative.    Neurological: Negative for dizziness and syncope.   Psychiatric/Behavioral: Negative for confusion.      Otherwise 10-system  "ROS reviewed and is negative except as mentioned in the HPI.    Personal History     Past Medical History:   Diagnosis Date   • Anxiety    • Arthritis    • Asthma    • Clostridium difficile infection 01/2017    treated per dr carter with fecal transplant 8-2017    • H/O recurrent pneumonia    • Hypertension    • MRSA infection 2016    right lower extremity wound   • Pulmonary nodule     Followed by Dr. Galaviz    • Recurrent deep vein thrombosis (DVT)     x 3 LLE; chronic anticoagulation therapy    • Ulcerative colitis    • Wears glasses    • Wears hearing aid      Past Surgical History:   Procedure Laterality Date   • BRONCHOSCOPY      by Dr. Galaviz for pulmonary nodule   • BRONCHOSCOPY N/A 11/7/2016    Procedure: BRONCHOSCOPY;  Surgeon: Eben Roberts MD;  Location:  [facility];  Service:    • COLON RESECTION N/A 9/19/2017    Procedure: TOTAL ABDOMINAL COLECTOMY WITH CREATION OF ILEOSTOMY;  Surgeon: David Nielsen MD;  Location:  [facility];  Service:    • COLONOSCOPY  08/2017   • UMBILICAL HERNIA REPAIR     • VASECTOMY     • VASECTOMY REVERSAL       Family History: family history includes Hypertension in his mother.     Social History:  reports that he has never smoked. He has never used smokeless tobacco. He reports that he drinks alcohol. He reports that he does not use drugs.     Social History     Social History Narrative   • No narrative on file     Medications:    (Not in a hospital admission)    Allergies   Allergen Reactions   • Beta Adrenergic Blockers Other (See Comments)     Vocal changes   • Levaquin [Levofloxacin In D5w] Other (See Comments)     Heaviness in legs, \"felt like lead\"     Objective     Vital Signs:   Temp:  [98.6 °F (37 °C)] 98.6 °F (37 °C)  Heart Rate:  [114-117] 114  Resp:  [16] 16  BP: (155)/(83) 155/83        Physical Exam   Constitutional: No acute distress, awake, alert and conversant.   Eyes: PERRLA, sclerae anicteric, no conjunctival injection  HENT: NCAT, mucous membranes " moist  Neck: Supple, no thyromegaly, no lymphadenopathy, trachea midline  Respiratory: Bibasilar rales (R > L) with coarse rhonchi and minimal wheezing. On 2L NC.   Cardiovascular: Tachycardia, regular rhythm without m/r/g, palpable pedal pulses bilaterally  Gastrointestinal: Positive bowel sounds, soft, nontender, nondistended. Right-sided ileostomy.   Musculoskeletal: No bilateral ankle edema, no clubbing or cyanosis to extremities  Psychiatric: Appropriate affect, cooperative  Neurologic: Oriented x 3, strength symmetric in all extremities, Cranial Nerves grossly intact to confrontation, speech clear  Skin: No rashes    Results Reviewed:  I have personally reviewed current lab, radiology, and data and agree.      Results from last 7 days  Lab Units 03/13/18  1413   WBC 10*3/mm3 33.02*   HEMOGLOBIN g/dL 12.4*   HEMATOCRIT % 38.7*   PLATELETS 10*3/mm3 370   INR  1.07       Results from last 7 days  Lab Units 03/13/18  1413   SODIUM mmol/L 130*   POTASSIUM mmol/L 4.4   CHLORIDE mmol/L 98*   CO2 mmol/L 22.0   BUN mg/dL 22   CREATININE mg/dL 1.40*   GLUCOSE mg/dL 221*   CALCIUM mg/dL 9.9   ALT (SGPT) U/L 18   AST (SGOT) U/L 16     Estimated Creatinine Clearance: 56.8 mL/min (by C-G formula based on SCr of 1.4 mg/dL (H)).  Brief Urine Lab Results  (Last result in the past 365 days)      Color   Clarity   Blood   Leuk Est   Nitrite   Protein   CREAT   Urine HCG        09/20/17 0053 Yellow Clear Negative Negative Negative Negative             BNP   Date Value Ref Range Status   03/13/2018 12.0 0.0 - 100.0 pg/mL Final     Comment:     Results may be falsely decreased if patient taking Biotin.     No results found for: PHART  Imaging Results (last 24 hours)     Procedure Component Value Units Date/Time    XR Chest 1 View [[id number]] Collected:  03/13/18 1544     Updated:  03/13/18 1544    Narrative:       EXAMINATION: XR CHEST 1 VW-03/13/2018:      INDICATION: SOA, triage protocol.      COMPARISON: 11/09/2016.      FINDINGS: The heart is upper normal size. The vasculature is mildly  cephalized. Patchy interstitial changes in the left base appear roughly  stable. There is new hazy interstitial opacity of the right base. No  densely consolidated lung significant effusion or pneumothorax is seen.            Impression:       1. Mild pulmonary venous hypertension.   2. Patchy new right lower lung disease whether atelectasis or pneumonia.  Correlate with the patient's physical exam findings.     D:  03/13/2018  E:  03/13/2018                 Assessment / Plan     Hospital Problem List     * (Principal)Sepsis    History of DVT (deep vein thrombosis)    Overview Signed 11/5/2016  5:17 AM by HARMAN Quintanilla; x 3; chronic coumadin therapy         HTN (hypertension)    Leukocytosis    Acute kidney injury    COPD (chronic obstructive pulmonary disease)    Asthma    Prediabetes    Chronic anemia    S/P total abdominal colectomy    HO of IVC filter    Pneumonia of right lower lobe due to infectious organism    Hyponatremia    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)        Zackary Noonan II is a 62 y.o. male with PMH significant for anxiety, asthma, bronchiectasis, COPD, HTN, recurrent DVT (x 3, s/p IVC filter, chronic anticoagulation), recurrent pneumonia (hospitalized x 3), recurrent C. Diff colitis (s/p fecal transplant 8/2017 by Dr. Roach),  inflammatory bowel disease s/p total abdominal colectomy with ileostomy by Dr. Nielsen 9/2017.  Admitted to [facility] 3/3/18 with sepsis secondary to bilateral lower lobe pneumonia.     Sepsis secondary to bilateral lower lobe pneumonia  - As evidenced by tachycardia, leukocytosis and acute kidney injury   - Continue IV fluids, transition IV antibiotics to Rocephin/Doxycycline  - O2 PRN, scheduled and PRN nebs  - Mucinex     Acute kidney injury  - Creatinine 1.4, continue IV fluids     Hyponatremia  - Suspect secondary to dehydration, IV fluids, recheck in AM      Chronic anemia  - Check anemia studies     Pre-diabetes  - Glucose up today but patient has been on steroids under guidance of ENT physician  - Check Hgb A1c   - Start accuchecks and SSI as needed     HTN  - Hold home antihypertensives for now given sepsis     Inflammatory bowel disease s/p total abdominal colectomy  - WOCN to see patient for ongoing ostomy care     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)  - Monitor patient on IV abx     Asthma  Bronchiectasis   COPD  - As above. Continue home medications      History of DVT x 3   Chronic anticoagulation  - s/p IVC filter. Continue anticoagulation     DVT prophylaxis: Lovenox  CODE STATUS: FULL     Admission Status:  I believe this patient meets INPATIENT status due to the need for care which can only be reasonably provided in an hospital setting such as aggressive/expedited ancillary services and/or consultation services, the necessity for IV medications, close physician monitoring and/or the possible need for procedures.  In such, I feel patient’s risk for adverse outcomes and need for care warrant INPATIENT evaluation and predict the patient’s care encounter to likely last beyond 2 midnights.      Electronically signed by Liseth Pereira PA-C, 03/13/18, 4:23 PM.        Brief Attending Admission Attestation     I have seen and examined the patient, performing an independent face-to-face diagnostic evaluation with plan of care reviewed and developed with the advanced practice clinician (APC).      Brief Summary Statement/HPI:   Zackary Noonan II is a 62 y.o. male with hx of asthma, COPD, HTN, recurrent DVT s/p IVC filter and on chronic Coumadin, recurrent PNA, recurrent C.diff s/p fecal transplant, and inflammatory bowel disease (unclear if UC or Crohn's, data deficit) s/p total colectomy in Sept 2017 presents with 3 day history of not feeling well. Wife had flu B 2 weeks ago and he was treated prophylactically for 7 days with  Tamiflu (last dose 3/5/18). He has had fever up to 102, chills, cough, and SOA worse with exertion. In the ER, CXR showed RLL PNA. He was 90% on room air and tachycardic to 117.     Attending Physical Exam:  Constitutional: No acute distress, awake, alert  Eyes: PERRLA, sclerae anicteric, no conjunctival injection  HENT: NCAT, mucous membranes moist  Neck: Supple, no thyromegaly, no lymphadenopathy, trachea midline  Respiratory: coarse rhonchi bilaterally, nonlabored respirations, no wheezes  Cardiovascular: tachycardic, no murmurs, rubs, or gallops, palpable pedal pulses bilaterally  Gastrointestinal: Positive bowel sounds, soft, nontender, nondistended, right ileostomy intact/clean and dry without any drainage or bleeding  Musculoskeletal: No bilateral ankle edema, no clubbing or cyanosis to extremities  Psychiatric: Appropriate affect, cooperative  Neurologic: Oriented x 3, strength symmetric in all extremities, Cranial Nerves grossly intact to confrontation, speech clear  Skin: No rashes        Brief Assessment/Plan :  See above for further detailed assessment and plan developed with APC which I have reviewed and/or edited.    61 yo M with hx of asthma, COPD, recurrent DVT s/p IVC filter, inflammatory bowel disease s/p total colectomy, and C.diff s/p fecal transplant presents due to cough, SOA, fever, chills, and malaise. Found to have sepsis due to RLL PNA. Recently completed prophylactic course of Tamiflu (last dose 3/5/18) as his wife was diagnosed with Flu B.     PLAN:  --Continue Rocephin and Doxycycline. Significant leukocytosis. Follow cultures.   --O2 as needed. Scheduled and PRN nebs.  --Continue IVF's. Repeat labs in AM to monitor renal function/electrolytes.  --Continue Lovenox BID while off Coumadin, per PCP, as he has been having bleeding issues with his stoma. Ostomy nurse to see.     Electronically signed by Apoorva Solis MD, 03/13/18, 5:41 PM

## 2018-03-13 NOTE — ED PROVIDER NOTES
Subjective   Zackary Noonan II is a 62 y.o. male with a hx of recurrent PNA, DVT, and asthma who presents to the ED with c/o SoA. The patient reports that he began to feel unwell about 2 days ago and reported to the [facility] shortly after, where he was told that he received a negative flu swab. Yesterday he noted of SoA that has been increasingly worsening which prompted his visit to the ED. He also complains of fever, intermittent chills, congestion, and a minor cough, but denies N/V/D, or any other acute complaints at this time.. The patient also notes that his wife was ill with the same symptoms about 1 week ago and that his PCP placed him on tamiflu once a day.His last breathing treatment was about 3 hours PTA.         History provided by:  Patient  Shortness of Breath   Severity:  Unable to specify  Onset quality:  Gradual  Duration:  2 days  Timing:  Constant  Progression:  Worsening  Chronicity:  New  Relieved by:  None tried  Worsened by:  Nothing  Ineffective treatments:  None tried  Associated symptoms: cough and fever    Associated symptoms: no vomiting    Risk factors: hx of PE/DVT        Review of Systems   Constitutional: Positive for chills and fever.   HENT: Positive for congestion.    Respiratory: Positive for cough and shortness of breath.    Gastrointestinal: Negative for diarrhea, nausea and vomiting.   All other systems reviewed and are negative.      Past Medical History:   Diagnosis Date   • Anxiety    • Arthritis    • Asthma    • Clostridium difficile infection 01/2017    treated per dr carter with fecal transplant 8-2017    • H/O recurrent pneumonia    • Hypertension    • MRSA infection 2016    right lower extremity wound   • Pulmonary nodule     Followed by Dr. Galaviz    • Recurrent deep vein thrombosis (DVT)     x 3 LLE; chronic anticoagulation therapy    • Ulcerative colitis    • Wears glasses    • Wears hearing aid        Allergies   Allergen Reactions   • Beta Adrenergic Blockers Other (See  "Comments)     Vocal changes   • Levaquin [Levofloxacin In D5w] Other (See Comments)     Heaviness in legs, \"felt like lead\"       Past Surgical History:   Procedure Laterality Date   • BRONCHOSCOPY      by Dr. Galaviz for pulmonary nodule   • BRONCHOSCOPY N/A 11/7/2016    Procedure: BRONCHOSCOPY;  Surgeon: Eben Roberts MD;  Location:  [facility];  Service:    • COLON RESECTION N/A 9/19/2017    Procedure: TOTAL ABDOMINAL COLECTOMY WITH CREATION OF ILEOSTOMY;  Surgeon: David Nielsen MD;  Location:  [facility];  Service:    • COLONOSCOPY  08/2017   • UMBILICAL HERNIA REPAIR     • VASECTOMY     • VASECTOMY REVERSAL         Family History   Problem Relation Age of Onset   • Hypertension Mother        Social History     Social History   • Marital status:      Social History Main Topics   • Smoking status: Never Smoker   • Smokeless tobacco: Never Used   • Alcohol use Yes      Comment: 4 drinks/week   • Drug use: No   • Sexual activity: Defer     Other Topics Concern   • Not on file         Objective   Physical Exam   Constitutional: He is oriented to person, place, and time. He appears well-developed and well-nourished.  Non-toxic appearance. No distress.   HENT:   Head: Normocephalic and atraumatic.   Nose: Nose normal.   Mouth/Throat: Mucous membranes are dry.   Eyes: Conjunctivae are normal. No scleral icterus.   Neck: Normal range of motion. Neck supple.   Cardiovascular: Normal rate, regular rhythm, normal heart sounds and intact distal pulses.    No murmur heard.  Pulmonary/Chest: Effort normal. No accessory muscle usage. No tachypnea. No respiratory distress. He has rhonchi (Coarse midfield rhonchi). He has rales (Few rales in bases bilaterally, left greater than right).   Abdominal: Soft. Bowel sounds are normal. There is no tenderness.   Stoma in the right mid abdomen. No organomegaly.   Musculoskeletal: Normal range of motion. He exhibits no edema (In all extremities).   Neurological: He is alert " and oriented to person, place, and time.   Skin: Skin is warm and dry.   Psychiatric: He has a normal mood and affect. His behavior is normal.   Nursing note and vitals reviewed.      Procedures       Recent Results (from the past 24 hour(s))   POC Troponin, Rapid    Collection Time: 03/13/18  2:12 PM   Result Value Ref Range    Troponin I 0.01 0.00 - 0.07 ng/mL   Comprehensive Metabolic Panel    Collection Time: 03/13/18  2:13 PM   Result Value Ref Range    Glucose 221 (H) 70 - 100 mg/dL    BUN 22 9 - 23 mg/dL    Creatinine 1.40 (H) 0.60 - 1.30 mg/dL    Sodium 130 (L) 132 - 146 mmol/L    Potassium 4.4 3.5 - 5.5 mmol/L    Chloride 98 (L) 99 - 109 mmol/L    CO2 22.0 20.0 - 31.0 mmol/L    Calcium 9.9 8.7 - 10.4 mg/dL    Total Protein 7.9 5.7 - 8.2 g/dL    Albumin 4.20 3.20 - 4.80 g/dL    ALT (SGPT) 18 7 - 40 U/L    AST (SGOT) 16 0 - 33 U/L    Alkaline Phosphatase 144 (H) 25 - 100 U/L    Total Bilirubin 0.2 (L) 0.3 - 1.2 mg/dL    eGFR Non African Amer 51 (L) >60 mL/min/1.73    Globulin 3.7 gm/dL    A/G Ratio 1.1 (L) 1.5 - 2.5 g/dL    BUN/Creatinine Ratio 15.7 7.0 - 25.0    Anion Gap 10.0 3.0 - 11.0 mmol/L   BNP    Collection Time: 03/13/18  2:13 PM   Result Value Ref Range    BNP 12.0 0.0 - 100.0 pg/mL   Protime-INR    Collection Time: 03/13/18  2:13 PM   Result Value Ref Range    Protime 11.2 9.6 - 11.5 Seconds    INR 1.07 0.91 - 1.09   Light Blue Top    Collection Time: 03/13/18  2:13 PM   Result Value Ref Range    Extra Tube hold for add-on    Green Top (Gel)    Collection Time: 03/13/18  2:13 PM   Result Value Ref Range    Extra Tube Hold for add-ons.    Lavender Top    Collection Time: 03/13/18  2:13 PM   Result Value Ref Range    Extra Tube hold for add-on    Gold Top - SST    Collection Time: 03/13/18  2:13 PM   Result Value Ref Range    Extra Tube Hold for add-ons.    CBC Auto Differential    Collection Time: 03/13/18  2:13 PM   Result Value Ref Range    WBC 33.02 (H) 3.50 - 10.80 10*3/mm3    RBC 4.73 4.20 -  "5.76 10*6/mm3    Hemoglobin 12.4 (L) 13.1 - 17.5 g/dL    Hematocrit 38.7 (L) 38.9 - 50.9 %    MCV 81.8 80.0 - 99.0 fL    MCH 26.2 (L) 27.0 - 31.0 pg    MCHC 32.0 32.0 - 36.0 g/dL    RDW 17.1 (H) 11.3 - 14.5 %    RDW-SD 51.6 37.0 - 54.0 fl    MPV 8.8 6.0 - 12.0 fL    Platelets 370 150 - 450 10*3/mm3   Scan Slide    Collection Time: 03/13/18  2:13 PM   Result Value Ref Range    Scan Slide     Manual Differential    Collection Time: 03/13/18  2:13 PM   Result Value Ref Range    Neutrophil % 90.0 (H) 41.0 - 71.0 %    Lymphocyte % 4.0 (L) 24.0 - 44.0 %    Monocyte % 4.0 0.0 - 12.0 %    Eosinophil % 0.0 0.0 - 3.0 %    Basophil % 0.0 0.0 - 1.0 %    Bands %  2.0 0.0 - 5.0 %    Neutrophils Absolute 30.38 (H) 1.50 - 8.30 10*3/mm3    Lymphocytes Absolute 1.32 0.60 - 4.80 10*3/mm3    Monocytes Absolute 1.32 (H) 0.00 - 1.00 10*3/mm3    Eosinophils Absolute 0.00 (L) 0.10 - 0.30 10*3/mm3    Basophils Absolute 0.00 0.00 - 0.20 10*3/mm3    RBC Morphology Normal Normal    WBC Morphology Normal Normal    Platelet Morphology Normal Normal   Lactic Acid, Plasma    Collection Time: 03/13/18  3:16 PM   Result Value Ref Range    Lactate 1.5 0.5 - 2.0 mmol/L     Note: In addition to lab results from this visit, the labs listed above may include labs taken at another facility or during a different encounter within the last 24 hours. Please correlate lab times with ED admission and discharge times for further clarification of the services performed during this visit.    XR Chest 1 View    (Results Pending)     Vitals:    03/13/18 1407 03/13/18 1409 03/13/18 1410 03/13/18 1457   BP:  155/83 155/83    Pulse: 117 117  114   Resp: 16      Temp: 98.6 °F (37 °C)      TempSrc: Oral      SpO2: (S) 90% 92%  96%   Weight:   75.3 kg (166 lb)    Height:   167.6 cm (66\")      Medications   sodium chloride 0.9 % flush 10 mL (not administered)   AZITHROMYCIN 500 MG/250 ML 0.9% NS IVPB (MBP) (500 mg Intravenous New Bag 3/13/18 1892)   sodium chloride 0.9 % " bolus 1,000 mL (1,000 mL Intravenous New Bag 3/13/18 1515)   sodium chloride 0.9 % bolus 1,300 mL (not administered)   cefTRIAXone (ROCEPHIN) IVPB 1 g (0 g Intravenous Stopped 3/13/18 1538)   ipratropium-albuterol (DUO-NEB) nebulizer solution 3 mL (3 mL Nebulization Given 3/13/18 1457)     ECG/EMG Results (last 24 hours)     Procedure Component Value Units Date/Time    ECG 12 Lead [[id number]] Collected:  03/13/18 1409     Updated:  03/13/18 1503    Narrative:       Test Reason : SOA Protocol  Blood Pressure : **/** mmHG  Vent. Rate : 117 BPM     Atrial Rate : 117 BPM     P-R Int : 142 ms          QRS Dur : 122 ms      QT Int : 348 ms       P-R-T Axes : 047 029 009 degrees     QTc Int : 485 ms    Sinus tachycardia  Right bundle branch block  Inferior infarct , age undetermined  Abnormal ECG  When compared with ECG of 15-SEP-2017 15:27,  QRS has increased slightly  Confirmed by BHARAT GARDINER MD ([id number]) on 3/13/2018 3:03:33 PM    Referred By:  braxton           Confirmed By:BHARAT GARDINER MD        ED Course  ED Course   Value Comment By Time   WBC: (!) 33.02 (Reviewed) Buddy Frey PA-C 03/13 1512   Neutrophils, Absolute: (!) 30.38 (Reviewed) Buddy Frey PA-C 03/13 1512   Troponin I: 0.01 (Reviewed) Buddy Frey PA-C 03/13 1512   BNP: 12.0 (Reviewed) Buddy Frey PA-C 03/13 1512    CXR - new right basilar pneumonia per radiology.   Buddy Frey PA-C 03/13 1514    Discussed with Dr. Solis, hospitalist, who agrees to admit the patient.  Watson Garcia 03/13 1538                     [facility]    Final diagnoses:   Pneumonia of both lower lobes due to infectious organism   Sepsis, due to unspecified organism   History of DVT (deep vein thrombosis)   Anticoagulated on warfarin   Hyperglycemia       Documentation assistance provided by yaritza Garcia.  Information recorded by the scribe was done at my direction and has been verified and validated by me.     Watson Garcia  03/13/18 1430       Buddy España  MIKE Frey  03/13/18 1545

## 2018-03-13 NOTE — PLAN OF CARE
Problem: Patient Care Overview  Goal: Plan of Care Review   03/13/18 5378   Coping/Psychosocial   Plan of Care Reviewed With patient   Plan of Care Review   Progress improving   OTHER   Outcome Summary Pt admitted from ER. IVF, IV ABX, WOC to see for stoma. Continue to monitor.        Problem: Sepsis/Septic Shock (Adult)  Goal: Signs and Symptoms of Listed Potential Problems Will be Absent, Minimized or Managed (Sepsis/Septic Shock)  Outcome: Ongoing (interventions implemented as appropriate)      Problem: Pneumonia (Adult)  Goal: Signs and Symptoms of Listed Potential Problems Will be Absent, Minimized or Managed (Pneumonia)  Outcome: Ongoing (interventions implemented as appropriate)

## 2018-03-14 LAB
ANION GAP SERPL CALCULATED.3IONS-SCNC: 6 MMOL/L (ref 3–11)
BASOPHILS # BLD AUTO: 0.05 10*3/MM3 (ref 0–0.2)
BASOPHILS NFR BLD AUTO: 0.2 % (ref 0–1)
BUN BLD-MCNC: 18 MG/DL (ref 9–23)
BUN/CREAT SERPL: 16.4 (ref 7–25)
CALCIUM SPEC-SCNC: 8.5 MG/DL (ref 8.7–10.4)
CHLORIDE SERPL-SCNC: 106 MMOL/L (ref 99–109)
CO2 SERPL-SCNC: 22 MMOL/L (ref 20–31)
CREAT BLD-MCNC: 1.1 MG/DL (ref 0.6–1.3)
DEPRECATED RDW RBC AUTO: 52.4 FL (ref 37–54)
EOSINOPHIL # BLD AUTO: 0.22 10*3/MM3 (ref 0–0.3)
EOSINOPHIL NFR BLD AUTO: 1 % (ref 0–3)
ERYTHROCYTE [DISTWIDTH] IN BLOOD BY AUTOMATED COUNT: 17.3 % (ref 11.3–14.5)
FERRITIN SERPL-MCNC: 105 NG/ML (ref 22–322)
FOLATE SERPL-MCNC: 17.53 NG/ML (ref 3.2–20)
GFR SERPL CREATININE-BSD FRML MDRD: 67 ML/MIN/1.73
GLUCOSE BLD-MCNC: 113 MG/DL (ref 70–100)
GLUCOSE BLDC GLUCOMTR-MCNC: 104 MG/DL (ref 70–130)
GLUCOSE BLDC GLUCOMTR-MCNC: 106 MG/DL (ref 70–130)
GLUCOSE BLDC GLUCOMTR-MCNC: 138 MG/DL (ref 70–130)
GLUCOSE BLDC GLUCOMTR-MCNC: 92 MG/DL (ref 70–130)
HBA1C MFR BLD: 6.5 % (ref 4.8–5.6)
HCT VFR BLD AUTO: 32.1 % (ref 38.9–50.9)
HGB BLD-MCNC: 10.1 G/DL (ref 13.1–17.5)
IMM GRANULOCYTES # BLD: 0.22 10*3/MM3 (ref 0–0.03)
IMM GRANULOCYTES NFR BLD: 1 % (ref 0–0.6)
IRON 24H UR-MRATE: 7 MCG/DL (ref 50–175)
IRON SATN MFR SERPL: 2 % (ref 20–50)
LYMPHOCYTES # BLD AUTO: 0.89 10*3/MM3 (ref 0.6–4.8)
LYMPHOCYTES NFR BLD AUTO: 3.9 % (ref 24–44)
MCH RBC QN AUTO: 25.7 PG (ref 27–31)
MCHC RBC AUTO-ENTMCNC: 31.5 G/DL (ref 32–36)
MCV RBC AUTO: 81.7 FL (ref 80–99)
MONOCYTES # BLD AUTO: 1.89 10*3/MM3 (ref 0–1)
MONOCYTES NFR BLD AUTO: 8.3 % (ref 0–12)
NEUTROPHILS # BLD AUTO: 19.49 10*3/MM3 (ref 1.5–8.3)
NEUTROPHILS NFR BLD AUTO: 85.6 % (ref 41–71)
PLATELET # BLD AUTO: 311 10*3/MM3 (ref 150–450)
PMV BLD AUTO: 9 FL (ref 6–12)
POTASSIUM BLD-SCNC: 4 MMOL/L (ref 3.5–5.5)
RBC # BLD AUTO: 3.93 10*6/MM3 (ref 4.2–5.76)
RETICS/RBC NFR AUTO: 1.46 % (ref 0.5–1.5)
SODIUM BLD-SCNC: 134 MMOL/L (ref 132–146)
TIBC SERPL-MCNC: 315 MCG/DL (ref 250–450)
VIT B12 BLD-MCNC: 1114 PG/ML (ref 211–911)
WBC NRBC COR # BLD: 22.76 10*3/MM3 (ref 3.5–10.8)

## 2018-03-14 PROCEDURE — 99233 SBSQ HOSP IP/OBS HIGH 50: CPT | Performed by: INTERNAL MEDICINE

## 2018-03-14 PROCEDURE — 83036 HEMOGLOBIN GLYCOSYLATED A1C: CPT | Performed by: PHYSICIAN ASSISTANT

## 2018-03-14 PROCEDURE — 82962 GLUCOSE BLOOD TEST: CPT

## 2018-03-14 PROCEDURE — 25010000002 CEFTRIAXONE PER 250 MG: Performed by: PHYSICIAN ASSISTANT

## 2018-03-14 PROCEDURE — 83550 IRON BINDING TEST: CPT | Performed by: PHYSICIAN ASSISTANT

## 2018-03-14 PROCEDURE — 82607 VITAMIN B-12: CPT | Performed by: PHYSICIAN ASSISTANT

## 2018-03-14 PROCEDURE — 83540 ASSAY OF IRON: CPT | Performed by: PHYSICIAN ASSISTANT

## 2018-03-14 PROCEDURE — 80048 BASIC METABOLIC PNL TOTAL CA: CPT | Performed by: PHYSICIAN ASSISTANT

## 2018-03-14 PROCEDURE — 94799 UNLISTED PULMONARY SVC/PX: CPT

## 2018-03-14 PROCEDURE — 25010000002 ENOXAPARIN PER 10 MG: Performed by: HOSPITALIST

## 2018-03-14 PROCEDURE — 85045 AUTOMATED RETICULOCYTE COUNT: CPT | Performed by: PHYSICIAN ASSISTANT

## 2018-03-14 PROCEDURE — 82746 ASSAY OF FOLIC ACID SERUM: CPT | Performed by: PHYSICIAN ASSISTANT

## 2018-03-14 PROCEDURE — 82728 ASSAY OF FERRITIN: CPT | Performed by: PHYSICIAN ASSISTANT

## 2018-03-14 PROCEDURE — 85025 COMPLETE CBC W/AUTO DIFF WBC: CPT | Performed by: PHYSICIAN ASSISTANT

## 2018-03-14 PROCEDURE — 94640 AIRWAY INHALATION TREATMENT: CPT

## 2018-03-14 RX ORDER — AMLODIPINE BESYLATE 5 MG/1
5 TABLET ORAL NIGHTLY
Status: DISCONTINUED | OUTPATIENT
Start: 2018-03-14 | End: 2018-03-18 | Stop reason: HOSPADM

## 2018-03-14 RX ORDER — LOPERAMIDE HYDROCHLORIDE 2 MG/1
2 CAPSULE ORAL 4 TIMES DAILY PRN
Status: DISCONTINUED | OUTPATIENT
Start: 2018-03-14 | End: 2018-03-18 | Stop reason: HOSPADM

## 2018-03-14 RX ORDER — LOPERAMIDE HYDROCHLORIDE 2 MG/1
4 CAPSULE ORAL EVERY 4 HOURS
COMMUNITY

## 2018-03-14 RX ORDER — VALSARTAN 160 MG/1
160 TABLET ORAL DAILY
Status: DISCONTINUED | OUTPATIENT
Start: 2018-03-14 | End: 2018-03-18 | Stop reason: HOSPADM

## 2018-03-14 RX ORDER — DIPHENOXYLATE HYDROCHLORIDE AND ATROPINE SULFATE 2.5; .025 MG/1; MG/1
1-2 TABLET ORAL EVERY 6 HOURS
COMMUNITY

## 2018-03-14 RX ORDER — DIPHENOXYLATE HYDROCHLORIDE AND ATROPINE SULFATE 2.5; .025 MG/1; MG/1
1 TABLET ORAL 4 TIMES DAILY PRN
Status: DISCONTINUED | OUTPATIENT
Start: 2018-03-14 | End: 2018-03-18 | Stop reason: HOSPADM

## 2018-03-14 RX ADMIN — ACETAMINOPHEN 650 MG: 325 TABLET ORAL at 21:59

## 2018-03-14 RX ADMIN — BUSPIRONE HYDROCHLORIDE 15 MG: 15 TABLET ORAL at 08:52

## 2018-03-14 RX ADMIN — ENOXAPARIN SODIUM 80 MG: 80 INJECTION SUBCUTANEOUS at 08:52

## 2018-03-14 RX ADMIN — BUDESONIDE 0.5 MG: 0.5 INHALANT RESPIRATORY (INHALATION) at 20:27

## 2018-03-14 RX ADMIN — FAMOTIDINE 20 MG: 20 TABLET, FILM COATED ORAL at 08:52

## 2018-03-14 RX ADMIN — CEFTRIAXONE SODIUM 1 G: 1 INJECTION, SOLUTION INTRAVENOUS at 05:12

## 2018-03-14 RX ADMIN — DOXYCYCLINE 100 MG: 100 INJECTION, POWDER, LYOPHILIZED, FOR SOLUTION INTRAVENOUS at 08:51

## 2018-03-14 RX ADMIN — VITAMIN E CAP 400 UNIT 400 UNITS: 400 CAP at 08:53

## 2018-03-14 RX ADMIN — Medication 250 MG: at 20:50

## 2018-03-14 RX ADMIN — ENOXAPARIN SODIUM 80 MG: 80 INJECTION SUBCUTANEOUS at 20:50

## 2018-03-14 RX ADMIN — FOLIC ACID 1 MG: 1 TABLET ORAL at 08:53

## 2018-03-14 RX ADMIN — FLUTICASONE PROPIONATE 2 SPRAY: 50 SPRAY, METERED NASAL at 08:53

## 2018-03-14 RX ADMIN — LOPERAMIDE HYDROCHLORIDE 2 MG: 2 CAPSULE ORAL at 04:13

## 2018-03-14 RX ADMIN — FLUTICASONE PROPIONATE 2 SPRAY: 50 SPRAY, METERED NASAL at 21:06

## 2018-03-14 RX ADMIN — CETIRIZINE HYDROCHLORIDE 10 MG: 10 TABLET, FILM COATED ORAL at 08:51

## 2018-03-14 RX ADMIN — MESALAMINE 1000 MG: 1000 SUPPOSITORY RECTAL at 20:51

## 2018-03-14 RX ADMIN — PYRIDOXINE HCL TAB 50 MG 50 MG: 50 TAB at 08:51

## 2018-03-14 RX ADMIN — DIPHENOXYLATE HYDROCHLORIDE AND ATROPINE SULFATE 1 TABLET: 2.5; .025 TABLET ORAL at 21:59

## 2018-03-14 RX ADMIN — DOXYCYCLINE 100 MG: 100 INJECTION, POWDER, LYOPHILIZED, FOR SOLUTION INTRAVENOUS at 20:51

## 2018-03-14 RX ADMIN — LOPERAMIDE HYDROCHLORIDE 2 MG: 2 CAPSULE ORAL at 22:02

## 2018-03-14 RX ADMIN — IPRATROPIUM BROMIDE 2 SPRAY: 42 SPRAY NASAL at 08:52

## 2018-03-14 RX ADMIN — BUDESONIDE 0.5 MG: 0.5 INHALANT RESPIRATORY (INHALATION) at 08:11

## 2018-03-14 RX ADMIN — Medication 250 MG: at 08:51

## 2018-03-14 RX ADMIN — SODIUM CHLORIDE 75 ML/HR: 9 INJECTION, SOLUTION INTRAVENOUS at 11:24

## 2018-03-14 RX ADMIN — ACETAMINOPHEN 650 MG: 325 TABLET ORAL at 04:13

## 2018-03-14 RX ADMIN — AMLODIPINE BESYLATE 5 MG: 5 TABLET ORAL at 20:50

## 2018-03-14 RX ADMIN — MONTELUKAST SODIUM 10 MG: 10 TABLET, COATED ORAL at 20:50

## 2018-03-14 RX ADMIN — BUSPIRONE HYDROCHLORIDE 15 MG: 15 TABLET ORAL at 15:28

## 2018-03-14 RX ADMIN — BUSPIRONE HYDROCHLORIDE 15 MG: 15 TABLET ORAL at 20:51

## 2018-03-14 NOTE — PROGRESS NOTES
Discharge Planning Assessment  [facility]     Patient Name: Zackary Noonan II  MRN: [id number]  Today's Date: 3/14/2018    Admit Date: 3/13/2018          Discharge Needs Assessment     Row Name 03/14/18 1041       Living Environment    Lives With child(mariajose), dependent;spouse   children ages 19 and 16    Current Living Arrangements home/apartment/condo    Primary Care Provided by self    Provides Primary Care For no one    Quality of Family Relationships unable to assess    Able to Return to Prior Arrangements yes       Resource/Environmental Concerns    Resource/Environmental Concerns none    Transportation Concerns car, none       Transition Planning    Patient/Family Anticipates Transition to home    Patient/Family Anticipated Services at Transition none    Transportation Anticipated family or friend will provide       Discharge Needs Assessment    Readmission Within the Last 30 Days no previous admission in last 30 days    Concerns to be Addressed denies needs/concerns at this time    Equipment Currently Used at Home none    Anticipated Changes Related to Illness none    Equipment Needed After Discharge none    Current Discharge Risk chronically ill            Discharge Plan     Row Name 03/14/18 1042       Plan    Plan Return home with family    Patient/Family in Agreement with Plan yes    Plan Comments Met with patient at bedside. He lives in a house in [facility] with his wife, Ronda, and his two children - ages 19 and 16. He was independent with all ADLs prior to admission. He does not use any assistive devices for ambulation and doesn't have any other DME. He says he did have home health once years ago for IV infusion of remicade, but not since. He has never been to an inpatient rehab facility. Plans to return home with his family at discharge. CM will continue to follow for discharge planning needs. Anny Cates RN [phone]        Destination     No service coordination in this encounter.      Durable  Medical Equipment     No service coordination in this encounter.      Dialysis/Infusion     No service coordination in this encounter.      Home Medical Care     No service coordination in this encounter.      Social Care     No service coordination in this encounter.                Demographic Summary     Row Name 03/14/18 1040       General Information    Arrived From home    Referral Source admission list    Preferred Language English       Contact Information    Permission Granted to Share Info With             Functional Status     Row Name 03/14/18 1040       Functional Status    Usual Activity Tolerance moderate    Current Activity Tolerance fair       Functional Status, IADL    Medications independent    Meal Preparation independent    Housekeeping independent    Laundry independent    Shopping independent       Employment/    Employment/ Comments [employer] employee with Rx medication coverage            Psychosocial    No documentation.           Abuse/Neglect    No documentation.           Legal    No documentation.           Substance Abuse    No documentation.           Patient Forms    No documentation.         Anny Cates RN

## 2018-03-14 NOTE — PLAN OF CARE
Problem: Patient Care Overview  Goal: Plan of Care Review  Outcome: Ongoing (interventions implemented as appropriate)   03/14/18 1045   Coping/Psychosocial   Plan of Care Reviewed With patient   Plan of Care Review   Progress no change   OTHER   Outcome Summary WOC nurse consulted to assess ileostomy; end ileostomy was done September 2017. Pt well known to [facility] nurses. Stoma red with ulcer lateral side at 8-10:00 position; crusted with stoma powder and barrier spray, Hydrofera blue placed over ulcer; Rufina ring placed lateral edge of stoma; One piece convex Coloplast Morgan appliance used. Ulcer is granulating with significant improvement. Pt did appliance change with assistance. WO nurse will f/u. Please contact WO nurse as needed for concerns.        Problem: Ileostomy (Adult)  Goal: Signs and Symptoms of Listed Potential Problems Will be Absent, Minimized or Managed (Ileostomy)  Outcome: Ongoing (interventions implemented as appropriate)   03/14/18 1045   Goal/Outcome Evaluation   Problems Assessed (Ileostomy) all   Problems Present (Ileostomy) stomal complications

## 2018-03-14 NOTE — PLAN OF CARE
Problem: Patient Care Overview  Goal: Plan of Care Review  Outcome: Ongoing (interventions implemented as appropriate)   03/14/18 2092   Coping/Psychosocial   Plan of Care Reviewed With patient   Plan of Care Review   Progress no change   OTHER   Outcome Summary Pt awake most of the night. VSS, afebrile. Pt c/o generalized pain, prn tylenol given. Will cont to monitor.

## 2018-03-15 LAB
ANION GAP SERPL CALCULATED.3IONS-SCNC: 6 MMOL/L (ref 3–11)
BUN BLD-MCNC: 8 MG/DL (ref 9–23)
BUN/CREAT SERPL: 8.9 (ref 7–25)
CALCIUM SPEC-SCNC: 8.2 MG/DL (ref 8.7–10.4)
CHLORIDE SERPL-SCNC: 113 MMOL/L (ref 99–109)
CO2 SERPL-SCNC: 23 MMOL/L (ref 20–31)
CREAT BLD-MCNC: 0.9 MG/DL (ref 0.6–1.3)
DEPRECATED RDW RBC AUTO: 51.8 FL (ref 37–54)
ERYTHROCYTE [DISTWIDTH] IN BLOOD BY AUTOMATED COUNT: 17.2 % (ref 11.3–14.5)
GFR SERPL CREATININE-BSD FRML MDRD: 85 ML/MIN/1.73
GLUCOSE BLD-MCNC: 111 MG/DL (ref 70–100)
HCT VFR BLD AUTO: 32 % (ref 38.9–50.9)
HGB BLD-MCNC: 10.1 G/DL (ref 13.1–17.5)
MCH RBC QN AUTO: 25.7 PG (ref 27–31)
MCHC RBC AUTO-ENTMCNC: 31.6 G/DL (ref 32–36)
MCV RBC AUTO: 81.4 FL (ref 80–99)
PLATELET # BLD AUTO: 266 10*3/MM3 (ref 150–450)
PMV BLD AUTO: 8.8 FL (ref 6–12)
POTASSIUM BLD-SCNC: 3.8 MMOL/L (ref 3.5–5.5)
RBC # BLD AUTO: 3.93 10*6/MM3 (ref 4.2–5.76)
SODIUM BLD-SCNC: 142 MMOL/L (ref 132–146)
VIT B12 BLD-MCNC: 932 PG/ML (ref 211–911)
WBC NRBC COR # BLD: 17.5 10*3/MM3 (ref 3.5–10.8)

## 2018-03-15 PROCEDURE — 99233 SBSQ HOSP IP/OBS HIGH 50: CPT | Performed by: INTERNAL MEDICINE

## 2018-03-15 PROCEDURE — 80048 BASIC METABOLIC PNL TOTAL CA: CPT | Performed by: INTERNAL MEDICINE

## 2018-03-15 PROCEDURE — 25010000002 ENOXAPARIN PER 10 MG: Performed by: HOSPITALIST

## 2018-03-15 PROCEDURE — 85027 COMPLETE CBC AUTOMATED: CPT | Performed by: INTERNAL MEDICINE

## 2018-03-15 PROCEDURE — 82607 VITAMIN B-12: CPT | Performed by: INTERNAL MEDICINE

## 2018-03-15 PROCEDURE — 87040 BLOOD CULTURE FOR BACTERIA: CPT | Performed by: NURSE PRACTITIONER

## 2018-03-15 PROCEDURE — 94640 AIRWAY INHALATION TREATMENT: CPT

## 2018-03-15 PROCEDURE — 94799 UNLISTED PULMONARY SVC/PX: CPT

## 2018-03-15 PROCEDURE — 25010000002 CEFTRIAXONE PER 250 MG: Performed by: PHYSICIAN ASSISTANT

## 2018-03-15 RX ORDER — DOXYCYCLINE HYCLATE 100 MG/1
100 TABLET, DELAYED RELEASE ORAL 2 TIMES DAILY
Qty: 10 TABLET | Refills: 0 | Status: SHIPPED | OUTPATIENT
Start: 2018-03-15 | End: 2018-03-18 | Stop reason: HOSPADM

## 2018-03-15 RX ORDER — CEFDINIR 300 MG/1
300 CAPSULE ORAL 2 TIMES DAILY
Qty: 10 CAPSULE | Refills: 0 | Status: SHIPPED | OUTPATIENT
Start: 2018-03-15 | End: 2018-03-18 | Stop reason: HOSPADM

## 2018-03-15 RX ORDER — AMOXICILLIN AND CLAVULANATE POTASSIUM 875; 125 MG/1; MG/1
1 TABLET, FILM COATED ORAL EVERY 12 HOURS SCHEDULED
Qty: 10 TABLET | Refills: 0 | Status: SHIPPED | OUTPATIENT
Start: 2018-03-15 | End: 2018-03-15 | Stop reason: HOSPADM

## 2018-03-15 RX ADMIN — FAMOTIDINE 20 MG: 20 TABLET, FILM COATED ORAL at 08:28

## 2018-03-15 RX ADMIN — BUDESONIDE 0.5 MG: 0.5 INHALANT RESPIRATORY (INHALATION) at 21:20

## 2018-03-15 RX ADMIN — LOPERAMIDE HYDROCHLORIDE 2 MG: 2 CAPSULE ORAL at 21:31

## 2018-03-15 RX ADMIN — BUDESONIDE 0.5 MG: 0.5 INHALANT RESPIRATORY (INHALATION) at 09:18

## 2018-03-15 RX ADMIN — FLUTICASONE PROPIONATE 2 SPRAY: 50 SPRAY, METERED NASAL at 20:22

## 2018-03-15 RX ADMIN — BUSPIRONE HYDROCHLORIDE 15 MG: 15 TABLET ORAL at 13:37

## 2018-03-15 RX ADMIN — CETIRIZINE HYDROCHLORIDE 10 MG: 10 TABLET, FILM COATED ORAL at 08:31

## 2018-03-15 RX ADMIN — CEFTRIAXONE SODIUM 1 G: 1 INJECTION, SOLUTION INTRAVENOUS at 05:42

## 2018-03-15 RX ADMIN — VITAMIN E CAP 400 UNIT 400 UNITS: 400 CAP at 08:28

## 2018-03-15 RX ADMIN — DOXYCYCLINE 100 MG: 100 INJECTION, POWDER, LYOPHILIZED, FOR SOLUTION INTRAVENOUS at 08:34

## 2018-03-15 RX ADMIN — DIPHENOXYLATE HYDROCHLORIDE AND ATROPINE SULFATE 1 TABLET: 2.5; .025 TABLET ORAL at 08:27

## 2018-03-15 RX ADMIN — FOLIC ACID 1 MG: 1 TABLET ORAL at 08:28

## 2018-03-15 RX ADMIN — DOXYCYCLINE 100 MG: 100 INJECTION, POWDER, LYOPHILIZED, FOR SOLUTION INTRAVENOUS at 21:26

## 2018-03-15 RX ADMIN — LOPERAMIDE HYDROCHLORIDE 2 MG: 2 CAPSULE ORAL at 08:27

## 2018-03-15 RX ADMIN — Medication 250 MG: at 08:28

## 2018-03-15 RX ADMIN — ACETAMINOPHEN 650 MG: 325 TABLET ORAL at 20:21

## 2018-03-15 RX ADMIN — MONTELUKAST SODIUM 10 MG: 10 TABLET, COATED ORAL at 20:21

## 2018-03-15 RX ADMIN — IPRATROPIUM BROMIDE 2 SPRAY: 42 SPRAY NASAL at 08:28

## 2018-03-15 RX ADMIN — Medication 250 MG: at 20:21

## 2018-03-15 RX ADMIN — FLUTICASONE PROPIONATE 2 SPRAY: 50 SPRAY, METERED NASAL at 08:30

## 2018-03-15 RX ADMIN — DIPHENOXYLATE HYDROCHLORIDE AND ATROPINE SULFATE 1 TABLET: 2.5; .025 TABLET ORAL at 21:31

## 2018-03-15 RX ADMIN — BUSPIRONE HYDROCHLORIDE 15 MG: 15 TABLET ORAL at 05:42

## 2018-03-15 RX ADMIN — ENOXAPARIN SODIUM 80 MG: 80 INJECTION SUBCUTANEOUS at 08:28

## 2018-03-15 RX ADMIN — VALSARTAN 160 MG: 160 TABLET, FILM COATED ORAL at 08:27

## 2018-03-15 RX ADMIN — BUSPIRONE HYDROCHLORIDE 15 MG: 15 TABLET ORAL at 20:21

## 2018-03-15 RX ADMIN — ENOXAPARIN SODIUM 80 MG: 80 INJECTION SUBCUTANEOUS at 20:22

## 2018-03-15 RX ADMIN — MESALAMINE 1000 MG: 1000 SUPPOSITORY RECTAL at 20:21

## 2018-03-15 RX ADMIN — AMLODIPINE BESYLATE 5 MG: 5 TABLET ORAL at 20:21

## 2018-03-15 RX ADMIN — ACETAMINOPHEN 650 MG: 325 TABLET ORAL at 08:27

## 2018-03-15 RX ADMIN — PYRIDOXINE HCL TAB 50 MG 50 MG: 50 TAB at 08:27

## 2018-03-15 NOTE — CONSULTS
Zackary Noonan   1955  [id number]  3/15/2018      Referring Provider: Jason Abdalla M.D. Hospital medicine  Reason for Consultation: Hectic fever, leukocytosis, and right lower lobe pneumonia in immunocompromised host.      Subjective   History of present illness:  This is a pleasant 62-year-old gentleman who is known to me from previous [facility] admissions.  He has a history of severe Crohn's ileocolitis with complicating Clostridium difficile infection.  He is now 6 months out from total colectomy and ileostomy.   Some residual inflammatory changes in his rectal stump which is being treated topically by Dr. David Nielsen of colorectal surgery.  He presented to the emergency department with a 48 hour history of increasing shortness of breath and cough.  He had little in the way of sputum production.  He was afebrile and hemodynamically stable at the time of admission.  His peripheral leukocyte count was 33,100 with 90% segmented neutrophils and 2% band forms.  A moderately dense right lower lobe pulmonary infiltrate was noted.  There was a mild lactic acidosis and the procalcitonin level was massively elevated at 11.9.  He was started on ceftriaxone and doxycycline.  Of note, he previously received Tamiflu at an urgent treatment center/ though rapid influenza  A and B antigens were both negative.  Apparently his wife had experienced antigen proven influenza 1 week before.  The patient was scheduled for discharge home this morning but spiked a temperature to 102.3° with marked diaphoresis.  He was noted to have a resting tachycardia at 115 bpm.  Admission blood cultures ×2 are negative.  There is no sputum in the microbiology laboratory.  Urinary antigen assays for Streptococcus pneumoniae and Legionella are both outstanding.  His peripheral leukocyte count is actually down to 17,500.  Asked to assist with antimicrobial therapy and diagnostic evaluation in this context.  Recently completed a Medrol  "Dosepak 48 hours ago.  The duration of therapy should not have been long enough to place him at significant risk for an addisonian crisis.  He does not appear to be clinically toxic and is quite adamant about leaving the hospital today.    Past Medical History:   Diagnosis Date   • Anxiety    • Arthritis    • Asthma    • Clostridium difficile infection 01/2017    treated per dr carter with fecal transplant 8-2017    • H/O recurrent pneumonia    • Hypertension    • MRSA infection 2016    right lower extremity wound   • Pulmonary nodule     Followed by Dr. Galaviz    • Recurrent deep vein thrombosis (DVT)     x 3 LLE; chronic anticoagulation therapy    • Ulcerative colitis    • Wears glasses    • Wears hearing aid        Past Surgical History:   Procedure Laterality Date   • BRONCHOSCOPY      by Dr. Galaviz for pulmonary nodule   • BRONCHOSCOPY N/A 11/7/2016    Procedure: BRONCHOSCOPY;  Surgeon: Eben Roberts MD;  Location:  [facility];  Service:    • COLON RESECTION N/A 9/19/2017    Procedure: TOTAL ABDOMINAL COLECTOMY WITH CREATION OF ILEOSTOMY;  Surgeon: David Nielsen MD;  Location:  [facility];  Service:    • COLONOSCOPY  08/2017   • UMBILICAL HERNIA REPAIR     • VASECTOMY     • VASECTOMY REVERSAL         Pediatric History   Patient Guardian Status   • Not on file.     Other Topics Concern   • Not on file     Social History Narrative   • No narrative on file       family history includes Hypertension in his mother.    Allergies   Allergen Reactions   • Beta Adrenergic Blockers Other (See Comments)     Vocal changes   • Levaquin [Levofloxacin In D5w] Other (See Comments)     Heaviness in legs, \"felt like lead\"       Medication:MAR reviewed.  Antibiotics: See below.  Anti-Infectives     Ordered     Dose/Rate Route Frequency Start Stop    03/15/18 0923  doxycycline (DORYX) 100 MG enteric coated tablet     Ordering Provider:  Jason Abdalla MD    100 mg Oral 2 Times Daily 03/15/18 0000 03/20/18 2359    03/15/18 " "1101  cefdinir (OMNICEF) 300 MG capsule     Ordering Provider:  Jason Abdalla MD    300 mg Oral 2 Times Daily 03/15/18 0000 03/20/18 2359    03/13/18 1822  cefTRIAXone (ROCEPHIN) IVPB 1 g     Ordering Provider:  Liseth Pereira PA-C    1 g  100 mL/hr over 30 Minutes Intravenous Every 24 Hours 03/14/18 0600 03/21/18 0559    03/13/18 1822  doxycycline (VIBRAMYCIN) 100 mg/100 mL 0.9% NS MBP     Ordering Provider:  Liseth Pereira PA-C    100 mg  over 60 Minutes Intravenous Every 12 Hours 03/13/18 2100 03/20/18 2059    03/13/18 1433  cefTRIAXone (ROCEPHIN) IVPB 1 g     Ordering Provider:  Buddy Frey PA-C    1 g  100 mL/hr over 30 Minutes Intravenous Once 03/13/18 1435 03/13/18 1538    03/13/18 1433  AZITHROMYCIN 500 MG/250 ML 0.9% NS IVPB (MBP)     Ordering Provider:  Buddy Frey PA-C    500 mg  over 60 Minutes Intravenous Once 03/13/18 1435 03/13/18 1714          Please refer to the medical record for a full medication list    Review of Systems  Pertinent items are noted in HPI, all other systems reviewed and negative    Objective     Physical Exam: See below.  Vital Signs   Temp:  [98.2 °F (36.8 °C)-102.3 °F (39.1 °C)] 98.2 °F (36.8 °C)  Heart Rate:  [] 115  Resp:  [16-20] 20  BP: (147-159)/(70-82) 152/71    Blood pressure 152/71, pulse 115, temperature 98.2 °F (36.8 °C), temperature source Oral, resp. rate 20, height 167.6 cm (66\"), weight 75.3 kg (166 lb), SpO2 91 %.  GENERAL: Awake and alert, in no acute distress.  The patient is sitting upright in bed and is stressed for hospital discharge.  His wife is present during the examination and interview.  He does not appear to be clinically toxic.  HEENT: The tongue is coated.  Oropharynx without thrush. Sinuses nontender. Dentition in good repair. No cervical adenopathy. No carotid bruits/ jugular venous distention.   EYES: PERRL. No conjunctival injection. No icterus. EOMI.  LYMPHATICS: No lymphadenopathy of the neck or axillary or " inguinal regions.   HEART: No murmur, gallop, or pericardial friction rub.   LUNGS: Clear to auscultation anteriorly. No percussion dullness.  There are some tubular breath sounds in the right lung base with occasional rhonchi.  No significant bronchospasm.  ABDOMEN: Soft, nontender, nondistended. No appreciable HSM.  Ileostomy stoma is noted in the right lower quadrant with bag in place.  SKIN: Warm and dry without cutaneous eruptions. No embolic stigmata.   PSYCHIATRIC: Mental status lucid. Cranial nerve function intact.       Results Review:   I reviewed the patient's new clinical results.  I reviewed the patient's new imaging results and agree with the interpretation.    Lab Results   Component Value Date    WBC 17.50 (H) 03/15/2018    HGB 10.1 (L) 03/15/2018    HCT 32.0 (L) 03/15/2018    MCV 81.4 03/15/2018     03/15/2018       Lab Results   Component Value Date    GLUCOSE 111 (H) 03/15/2018    BUN 8 (L) 03/15/2018    CREATININE 0.90 03/15/2018    EGFRIFNONA 85 03/15/2018    BCR 8.9 03/15/2018    CO2 23.0 03/15/2018    CALCIUM 8.2 (L) 03/15/2018    ALBUMIN 4.20 03/13/2018    LABIL2 1.1 (L) 03/13/2018    AST 16 03/13/2018    ALT 18 03/13/2018       Estimated Creatinine Clearance: 88.3 mL/min (by C-G formula based on SCr of 0.9 mg/dL).      Microbiology: No sputum in Microbiology lab. Bloodcultures×2negative.Legionella and Streptococcuspneumoniaeurinaryantigenassayspending.      Radiology:  Imaging Results (last 72 hours)     Procedure Component Value Units Date/Time    XR Chest 1 View [[id number]] Collected:  03/13/18 1544     Updated:  03/13/18 2159    Narrative:       EXAMINATION: XR CHEST 1 VW-03/13/2018:      INDICATION: SOA, triage protocol.      COMPARISON: 11/09/2016.     FINDINGS: The heart is upper normal size. The vasculature is mildly  cephalized. Patchy interstitial changes in the left base appear roughly  stable. There is new hazy interstitial opacity of the right base. No  densely  consolidated lung significant effusion or pneumothorax is seen.            Impression:       1. Mild pulmonary venous hypertension.   2. Patchy new right lower lung disease whether atelectasis or pneumonia.  Correlate with the patient's physical exam findings.     D:  03/13/2018  E:  03/13/2018     This report was finalized on 3/13/2018 9:57 PM by DR. Davi Lemons MD.             ASSESSMENT AND PLAN: Hectic fever to 102.3°.  Symptomatic dyspnea.  Right lower lobe pulmonary infiltrate.  Marked leukocytosis on presentation/33,100 with 90% segmented neutrophils and 2 band forms.  Massively elevated pro-calcitonin level 11.9.  Lactic acidosis.  Severe Crohn's ileocolitis status post colectomy.  History of Clostridium difficile disease.  History of MRSA pneumonia.  The patient's current temperature is down to 98.2°.  He is hemodynamically stable but has a resting tachycardia at 115 bpm.  His peripheral leukocyte count is fallen to 17,500 in relation to the admission value.  Blood cultures ×2 remain negative.  No sputum culture in the laboratory.  He does not appear to be clinically toxic.  There are suggestions on auscultation of a right lower lobe pneumonia.  He did complete a Medrol Dosepak 48 hours ago.  He currently has no electrolyte abnormalities that would suggest an evolving addisonian crisis.  Furthermore he has not recently received immunosuppressive therapy with biologic agents.  He is treating some inflammatory changes in his rectal stump with topical agents.  I do not know the exact etiology of this morning's temperature spike.  He does not appear to be clinically toxic and is quite adamant about returning home today.  I've asked him to remain in the hospital until tomorrow morning.  Hopefully the urinary antigen assays will be completed at that point in time.  The patient has had greater than 4 episodes in the last year of radiographically proven pneumonia with clinical response to antimicrobials.  He should  probably undergo immunoglobulin testing in addition to C3 and C4 complement levels.  While there are no renal function abnormalities currently or cutaneous manifestations of vasculitis, an ANCA profile may be helpful.  I will plan to see him on rounds tomorrow morning.  If he has additional temperature spikes, follow-up blood cultures should be submitted and the patient's doxycycline switched to Zyvox for increased alveolar lining fluid concentrations, in light of a previous MRSA pneumonia.  Complex medical decision making.  Discussed with spouse and patient at length.       I discussed the patients findings and my recommendations with patient, family and primary care team    Thank you for this consult.  Our group would be pleased to follow this patient over the course of their hospitalization and assist with outpatient antimicrobial therapy, as indicated.     Zackary Roach MD  3/15/2018

## 2018-03-15 NOTE — PLAN OF CARE
Problem: Patient Care Overview  Goal: Plan of Care Review  Outcome: Ongoing (interventions implemented as appropriate)   03/15/18 0435   Coping/Psychosocial   Plan of Care Reviewed With patient   Plan of Care Review   Progress improving   OTHER   Outcome Summary pt rested on and off through the night. possible d/c home today.      Goal: Individualization and Mutuality  Outcome: Ongoing (interventions implemented as appropriate)    Goal: Discharge Needs Assessment  Outcome: Ongoing (interventions implemented as appropriate)      Problem: Sepsis/Septic Shock (Adult)  Goal: Signs and Symptoms of Listed Potential Problems Will be Absent, Minimized or Managed (Sepsis/Septic Shock)  Outcome: Ongoing (interventions implemented as appropriate)

## 2018-03-15 NOTE — PROGRESS NOTES
[facility] Medicine Services  PROGRESS NOTE    Patient Name: Zackary Noonan II  : 1955  MRN: [id number]    Date of Admission: 3/13/2018  Length of Stay: 1  Primary Care Physician: Jillian Layne MD    Subjective   Subjective     CC:  F/u PNA    HPI:  Feels better today.  No fever overnight.  Complains of cough, non-productive.    Review of Systems  Gen- + fevers, chills  CV- No chest pain, palpitations  Resp- + cough, dyspnea  GI- No N/V/D, abd pain      Otherwise ROS is negative except as mentioned in the HPI.    Objective   Objective     Vital Signs:   Temp:  [98.1 °F (36.7 °C)-99.4 °F (37.4 °C)] 99.4 °F (37.4 °C)  Heart Rate:  [] 109  Resp:  [16-18] 18  BP: (128-151)/(70-82) 151/82        Physical Exam:  Constitutional: No acute distress, awake, alert  HENT: NCAT, mucous membranes moist  Respiratory: diffuse rhonchi on right side, left is clear, respiratory effort normal   Cardiovascular: RRR, no murmurs, rubs, or gallops  Gastrointestinal: Positive bowel sounds, soft, nontender, nondistended.  Ostomy empty.  Musculoskeletal: No bilateral ankle edema  Psychiatric: Appropriate affect, cooperative  Neurologic: Oriented x 3, strength symmetric in all extremities, Cranial Nerves grossly intact to confrontation, speech clear  Skin: No rashes      Results Reviewed:  I have personally reviewed current lab, radiology, and data and agree.      Results from last 7 days  Lab Units 18  04118  1413   WBC 10*3/mm3 22.76* 33.02*   HEMOGLOBIN g/dL 10.1* 12.4*   HEMATOCRIT % 32.1* 38.7*   PLATELETS 10*3/mm3 311 370   INR   --  1.07       Results from last 7 days  Lab Units 18  0419 18  1413   SODIUM mmol/L 134 130*   POTASSIUM mmol/L 4.0 4.4   CHLORIDE mmol/L 106 98*   CO2 mmol/L 22.0 22.0   BUN mg/dL 18 22   CREATININE mg/dL 1.10 1.40*   GLUCOSE mg/dL 113* 221*   CALCIUM mg/dL 8.5* 9.9   ALT (SGPT) U/L  --  18   AST (SGOT) U/L  --  16     Estimated Creatinine  Clearance: 72.3 mL/min (by C-G formula based on SCr of 1.1 mg/dL).  BNP   Date Value Ref Range Status   03/13/2018 12.0 0.0 - 100.0 pg/mL Final     Comment:     Results may be falsely decreased if patient taking Biotin.     No results found for: PHART    Microbiology Results Abnormal     Procedure Component Value - Date/Time    Blood Culture - Blood, [[id number]]  (Normal) Collected:  03/13/18 1515    Lab Status:  Preliminary result Specimen:  Blood from Arm, Left Updated:  03/14/18 1546     Blood Culture No growth at 24 hours    Blood Culture - Blood, [[id number]]  (Normal) Collected:  03/13/18 1510    Lab Status:  Preliminary result Specimen:  Blood from Arm, Right Updated:  03/14/18 1546     Blood Culture No growth at 24 hours          Imaging Results (last 24 hours)     Procedure Component Value Units Date/Time    XR Chest 1 View [[id number]] Collected:  03/13/18 1544     Updated:  03/13/18 2159    Narrative:       EXAMINATION: XR CHEST 1 VW-03/13/2018:      INDICATION: SOA, triage protocol.      COMPARISON: 11/09/2016.     FINDINGS: The heart is upper normal size. The vasculature is mildly  cephalized. Patchy interstitial changes in the left base appear roughly  stable. There is new hazy interstitial opacity of the right base. No  densely consolidated lung significant effusion or pneumothorax is seen.            Impression:       1. Mild pulmonary venous hypertension.   2. Patchy new right lower lung disease whether atelectasis or pneumonia.  Correlate with the patient's physical exam findings.     D:  03/13/2018  E:  03/13/2018     This report was finalized on 3/13/2018 9:57 PM by DR. Davi Lemons MD.                I have reviewed the medications.    Assessment/Plan   Assessment / Plan     Hospital Problem List     * (Principal)Sepsis    Pneumonia of right lower lobe due to infectious organism    History of DVT (deep vein thrombosis)    Overview Signed 11/5/2016  5:17 AM by HARMAN Quintanilla; x 3;  "chronic coumadin therapy         HTN (hypertension)    Acute kidney injury    COPD (chronic obstructive pulmonary disease)    Asthma    Prediabetes    Chronic anemia    Ulcerative colitis    S/P total abdominal colectomy    HO of IVC filter    Hyponatremia    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)           Brief Hospital Course to date:  Zackary Noonan II is a 62 y.o. male with hx of UC on humira, s/p total colectomy, prior c diff presents with fevers, cough, malaise.  Found to have right sided PNA.      Assessment & Plan:  - clinically improved.  Will continue treatment for CAP with doxy and rocephin.    - signifcant leukocytosis to 33k on admission, down to 22k today.  Did get recent steroid from ENT for \"inflammed vocal cords\"  - is on humira and immunosuppressed, consider consult to Dr. Roach if no clinical improvement.  - on lovenox for anticoagulation (managed by PCP, recent changed from coumadin due to stoma bleeding from ulcer)  - Cr improving.  Resume ARB, check AM.  - Na+ improved  - prior hx of c diff, but given colectomy should not have to worry about recurrence..  Resume his chronic anti-diarrheal meds.  - possible home 1-2 days depending on clinical course    DVT Prophylaxis:  Therapeutic lovenox    CODE STATUS: Full Code    Disposition: I expect the patient to be discharged home in 1-2 days.    Electronically signed by Jason Abdalla MD, 03/14/18, 9:17 PM.      "

## 2018-03-15 NOTE — DISCHARGE SUMMARY
[facility] Medicine Services  DISCHARGE SUMMARY    Patient Name: Zackary Noonan II  : 1955  MRN: [id number]    Date of Admission: 3/13/2018  Date of Discharge:  03/15/18  Primary Care Physician: Jillian Layne MD    Consults     No orders found from 2018 to 3/14/2018.        Hospital Course     Presenting Problem:   Pneumonia of both lower lobes due to infectious organism [J18.9]    Active Hospital Problems (** Indicates Principal Problem)    Diagnosis Date Noted   • **Sepsis [A41.9] 2016   • Pneumonia of right lower lobe due to infectious organism [J18.1] 2018     Priority: Medium   • Hyponatremia [E87.1] 2018   • History of C. difficile colitis s/p fecal transplant (2017) [Z86.19] 2018   • HO of IVC filter [Z95.828] 2017   • S/P total abdominal colectomy [Z90.49] 2017   • Chronic anemia [D64.9] 2017   • Prediabetes [R73.03] 2017   • Ulcerative colitis [K51.90] 2017   • Asthma [J45.909] 2016   • Acute kidney injury [N17.9] 2016   • COPD (chronic obstructive pulmonary disease) [J44.9] 2016   • History of DVT (deep vein thrombosis) [Z86.718] 2016   • HTN (hypertension) [I10] 2016      Resolved Hospital Problems    Diagnosis Date Noted Date Resolved   No resolved problems to display.          Hospital Course:  Zackary Noonan II is a 62 y.o. 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  Wife recently had influenza B but he swabbed negative and was treated with prophylactic dose of Tamiflu.  In the emergency room he is not to be tachycardic in the 1 teens) blood cell count was 33,000 with 90% neutrophils.  Has acute renal failure with creatinine to  1.4.  Chest x-ray was done which showed some right-sided infiltrates.  He was started on empiric antibiotics for community acquired pneumonia.  His white blood cell count trended down from 33,000 to 17,000 at day of discharge.  No fevers while in the hospital.  Remained stable on room air.  Heart rate still elevated in the low 100s but otherwise is getting closer to baseline.  She is okay for discharge to complete an additional 5 days of oral omnicef and doxycycline.  I would like him to follow-ups primary care physician in one week.  No other changes to his chronic medications.         Day of Discharge     HPI:   No problems overnight.  Main complaint is generalized fatigue.  Breathing is ok.  On room air.  Cough is improved.  Feels ready for home.    Review of Systems   Constitutional: Positive for fatigue and fever.   Respiratory: Positive for cough.    Cardiovascular: Negative for chest pain.   Gastrointestinal: Positive for diarrhea.   All other systems reviewed and are negative.    Otherwise ROS is negative except as mentioned in the HPI.    Vital Signs:   Temp:  [99.2 °F (37.3 °C)-99.4 °F (37.4 °C)] 99.2 °F (37.3 °C)  Heart Rate:  [] 114  Resp:  [16-20] 20  BP: (147-159)/(70-82) 152/71     Physical Exam:  Constitutional: No acute distress, awake, alert, sitting up in bed  HENT: NCAT, mucous membranes moist  Respiratory: some right sided rhonchi but improved from yesterday, mostly clear on the left respiratory effort normal   Cardiovascular: tachy (~110) and regular, no murmurs, rubs, or gallops  Gastrointestinal: Positive bowel sounds, soft, nontender, nondistended, stoma in place  Musculoskeletal: No bilateral ankle edema  Psychiatric: Appropriate affect, cooperative  Neurologic: Oriented x 3, no focal deficits  Skin: No rashes      Pertinent  and/or Most Recent Results       Results from last 7 days  Lab Units 03/15/18  0531 03/14/18  0419 03/13/18  1413   WBC 10*3/mm3 17.50* 22.76* 33.02*      HEMOGLOBIN g/dL 10.1* 10.1* 12.4*   HEMATOCRIT % 32.0* 32.1* 38.7*   PLATELETS 10*3/mm3 266 311 370   SODIUM mmol/L 142 134 130*   POTASSIUM mmol/L 3.8 4.0 4.4   CHLORIDE mmol/L 113* 106 98*   CO2 mmol/L 23.0 22.0 22.0   BUN mg/dL 8* 18 22   CREATININE mg/dL 0.90 1.10 1.40*   GLUCOSE mg/dL 111* 113* 221*   CALCIUM mg/dL 8.2* 8.5* 9.9       Results from last 7 days  Lab Units 03/13/18  1413   BILIRUBIN mg/dL 0.2*   ALK PHOS U/L 144*   ALT (SGPT) U/L 18   AST (SGOT) U/L 16   PROTIME Seconds 11.2   INR  1.07           Invalid input(s): TG, LDLCALC, LDLREALC    Results from last 7 days  Lab Units 03/14/18  0419 03/13/18  1413   HEMOGLOBIN A1C % 6.50*  --    BNP pg/mL  --  12.0     Brief Urine Lab Results  (Last result in the past 365 days)      Color   Clarity   Blood   Leuk Est   Nitrite   Protein   CREAT   Urine HCG        09/20/17 0053 Yellow Clear Negative Negative Negative Negative               Microbiology Results Abnormal     Procedure Component Value - Date/Time    Blood Culture - Blood, [[id number]]  (Normal) Collected:  03/13/18 1515    Lab Status:  Preliminary result Specimen:  Blood from Arm, Left Updated:  03/14/18 1546     Blood Culture No growth at 24 hours    Blood Culture - Blood, [[id number]]  (Normal) Collected:  03/13/18 1510    Lab Status:  Preliminary result Specimen:  Blood from Arm, Right Updated:  03/14/18 1546     Blood Culture No growth at 24 hours          Imaging Results (all)     Procedure Component Value Units Date/Time    XR Chest 1 View [[id number]] Collected:  03/13/18 1544     Updated:  03/13/18 2159    Narrative:       EXAMINATION: XR CHEST 1 VW-03/13/2018:      INDICATION: SOA, triage protocol.      COMPARISON: 11/09/2016.     FINDINGS: The heart is upper normal size. The vasculature is mildly  cephalized. Patchy interstitial changes in the left base appear roughly  stable. There is new hazy interstitial opacity of the right base. No  densely consolidated lung significant effusion or  pneumothorax is seen.            Impression:       1. Mild pulmonary venous hypertension.   2. Patchy new right lower lung disease whether atelectasis or pneumonia.  Correlate with the patient's physical exam findings.     D:  03/13/2018  E:  03/13/2018     This report was finalized on 3/13/2018 9:57 PM by DR. Davi Lemons MD.                             Order Current Status    Legionella Antigen, Urine - Urine, Urine, Clean Catch In process    S. Pneumo Ag Urine or CSF - Urine, Urine, Clean Catch In process    Blood Culture - Blood, Preliminary result    Blood Culture - Blood, Preliminary result        Discharge Details      Zackary Noonan II   Home Medication Instructions CLARITZA:[id number]    Printed on:03/15/18 1102   Medication Information                      albuterol (PROVENTIL HFA;VENTOLIN HFA) 108 (90 Base) MCG/ACT inhaler  Inhale 2 puffs Every 4 (Four) Hours As Needed for Wheezing.             albuterol (PROVENTIL) (2.5 MG/3ML) 0.083% nebulizer solution  Take 2.5 mg by nebulization Every 6 (Six) Hours As Needed for Wheezing or Shortness of Air.             amLODIPine (NORVASC) 5 MG tablet  Take 5 mg by mouth Every Night.             beclomethasone (QVAR) 80 MCG/ACT inhaler  Inhale 1 puff 2 (Two) Times a Day.             busPIRone (BUSPAR) 15 MG tablet  Take 15 mg by mouth 3 (Three) Times a Day.             cefdinir (OMNICEF) 300 MG capsule  Take 1 capsule by mouth 2 (Two) Times a Day for 5 days.             clobetasol (CLOBEX) 0.05 % lotion  Apply  topically 2 (Two) Times a Day. Apply to fingers and hands             diphenoxylate-atropine (LOMOTIL) 2.5-0.025 MG per tablet  Take 1 tablet by mouth 4 (Four) Times a Day As Needed for Diarrhea.             doxycycline (DORYX) 100 MG enteric coated tablet  Take 1 tablet by mouth 2 (Two) Times a Day for 5 days.             enoxaparin (LOVENOX) 80 MG/0.8ML solution syringe  Inject 75 mg under the skin Every 12 (Twelve) Hours.             famotidine-calcium carb-mag  hydroxide (PEPCID COMPLETE) -165 MG chewable tablet  Chew 1 tablet Daily As Needed for Heartburn.             fluticasone (FLONASE) 50 MCG/ACT nasal spray  2 sprays into each nostril 2 (Two) Times a Day.             folic acid (FOLVITE) 1 MG tablet  Take 1 mg by mouth Daily.             ipratropium (ATROVENT) 0.06 % nasal spray  2 sprays into each nostril 4 (Four) Times a Day.             levocetirizine (XYZAL) 5 MG tablet  Take 5 mg by mouth Every Evening.             loperamide (IMODIUM) 2 MG capsule  Take 2 mg by mouth 4 (Four) Times a Day As Needed for Diarrhea.             mesalamine (CANASA) 1000 MG suppository  Insert 1,000 mg into the rectum Every Night.             montelukast (SINGULAIR) 10 MG tablet  Take 10 mg by mouth Every Night.             Multiple Vitamins-Minerals (SENIOR MULTIVITAMIN PLUS PO)  Take 1 tablet/day by mouth.             Pyridoxine HCl (VITAMIN B-6 PO)  Take 100 mcg by mouth.             saccharomyces boulardii (FLORASTOR) 250 MG capsule  Take 250 mg by mouth Daily.             valACYclovir (VALTREX) 500 MG tablet  Take 500 mg by mouth Daily As Needed.             valsartan (DIOVAN) 160 MG tablet  Take 160 mg by mouth Daily.             vitamin E 400 UNIT capsule  Take 400 Units by mouth Daily.                   Discharge Disposition:  Home or Self Care    Discharge Diet:  Diet Instructions     Diet: Regular       Discharge Diet:  Regular          Discharge Activity:   Activity Instructions     Activity as Tolerated             No future appointments.    Additional Instructions for the Follow-ups that You Need to Schedule     Discharge Follow-up with PCP    As directed      Follow Up Details:  PCP (Catina Layne) 1 week               Time Spent on Discharge:  35 minutes    Electronically signed by Jason Abdalla MD, 03/15/18, 9:23 AM.

## 2018-03-15 NOTE — PROGRESS NOTES
[facility] Medicine Services  PROGRESS NOTE    Patient Name: Zackary Noonan II  : 1955  MRN: [id number]    Date of Admission: 3/13/2018  Length of Stay: 2  Primary Care Physician: Jillian Layne MD    Subjective   Subjective     CC:  F/u PNA    HPI:  Feels about the same today.  Continues to complain of fatigue.  Febrile this morning to 102.3    Review of Systems  Gen- + fevers, chills  CV- No chest pain, palpitations  Resp- + cough, dyspnea  GI- No N/V/D, abd pain      Otherwise ROS is negative except as mentioned in the HPI.    Objective   Objective     Vital Signs:   Temp:  [99.2 °F (37.3 °C)-102.3 °F (39.1 °C)] 102.3 °F (39.1 °C)  Heart Rate:  [] 114  Resp:  [16-20] 20  BP: (147-159)/(70-82) 152/71        Physical Exam:  Constitutional: No acute distress, awake, alert  HENT: NCAT, mucous membranes moist  Respiratory: diffuse rhonchi on right side but improved from yesterday, left is mostly clear, respiratory effort normal   Cardiovascular: RRR, no murmurs, rubs, or gallops  Gastrointestinal: Positive bowel sounds, soft, nontender, nondistended.  Ostomy empty.  Musculoskeletal: No bilateral ankle edema  Psychiatric: Appropriate affect, cooperative  Neurologic: Oriented x 3, strength symmetric in all extremities, Cranial Nerves grossly intact to confrontation, speech clear  Skin: No rashes      Results Reviewed:  I have personally reviewed current lab, radiology, and data and agree.      Results from last 7 days  Lab Units 03/15/18  0531 18  0419 18  1413   WBC 10*3/mm3 17.50* 22.76* 33.02*   HEMOGLOBIN g/dL 10.1* 10.1* 12.4*   HEMATOCRIT % 32.0* 32.1* 38.7*   PLATELETS 10*3/mm3 266 311 370   INR   --   --  1.07       Results from last 7 days  Lab Units 03/15/18  0531 18  0419 18  1413   SODIUM mmol/L 142 134 130*   POTASSIUM mmol/L 3.8 4.0 4.4   CHLORIDE mmol/L 113* 106 98*   CO2 mmol/L 23.0 22.0 22.0   BUN mg/dL 8* 18 22   CREATININE mg/dL 0.90  1.10 1.40*   GLUCOSE mg/dL 111* 113* 221*   CALCIUM mg/dL 8.2* 8.5* 9.9   ALT (SGPT) U/L  --   --  18   AST (SGOT) U/L  --   --  16     Estimated Creatinine Clearance: 88.3 mL/min (by C-G formula based on SCr of 0.9 mg/dL).  BNP   Date Value Ref Range Status   03/13/2018 12.0 0.0 - 100.0 pg/mL Final     Comment:     Results may be falsely decreased if patient taking Biotin.     No results found for: PHART    Microbiology Results Abnormal     Procedure Component Value - Date/Time    Blood Culture - Blood, [[id number]]  (Normal) Collected:  03/13/18 1515    Lab Status:  Preliminary result Specimen:  Blood from Arm, Left Updated:  03/14/18 1546     Blood Culture No growth at 24 hours    Blood Culture - Blood, [[id number]]  (Normal) Collected:  03/13/18 1510    Lab Status:  Preliminary result Specimen:  Blood from Arm, Right Updated:  03/14/18 1546     Blood Culture No growth at 24 hours          Imaging Results (last 24 hours)     ** No results found for the last 24 hours. **             I have reviewed the medications.    Assessment/Plan   Assessment / Plan     Hospital Problem List     * (Principal)Sepsis    Pneumonia of right lower lobe due to infectious organism    History of DVT (deep vein thrombosis)    Overview Signed 11/5/2016  5:17 AM by HARMAN Quintanilla; x 3; chronic coumadin therapy         HTN (hypertension)    Acute kidney injury    COPD (chronic obstructive pulmonary disease)    Asthma    Prediabetes    Chronic anemia    Ulcerative colitis    S/P total abdominal colectomy    HO of IVC filter    Hyponatremia    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)           Brief Hospital Course to date:  Zackary Noonan II is a 62 y.o. male with hx of UC on humira, s/p total colectomy, prior c diff presents with fevers, cough, malaise.  Found to have right sided PNA.      Assessment & Plan:  - Spiked fever this AM to 102.3.  Given immunosupression with humira, I'm going to ask Dr. Roach  "to eval patient.  WBC does continue to trend down.  Did get recent steroid from ENT for \"inflammed vocal cords\"  - prior hx of c diff, but given colectomy this seems unlikely cause of fever.  Has chronic diarrhea from ostomy post-colectomy without recent changes.  - on lovenox for anticoagulation (managed by PCP, recent changed from coumadin due to stoma bleeding from ulcer)  - ARF has resolved  - Na+ improved    DVT Prophylaxis:  Therapeutic lovenox    CODE STATUS: Full Code    Disposition: I expect the patient to be discharged home in 1-2 days.    Electronically signed by Jason Abdalla MD, 03/15/18, 11:08 AM.      "

## 2018-03-16 ENCOUNTER — APPOINTMENT (OUTPATIENT)
Dept: GENERAL RADIOLOGY | Facility: HOSPITAL | Age: 63
End: 2018-03-16
Attending: INTERNAL MEDICINE

## 2018-03-16 ENCOUNTER — APPOINTMENT (OUTPATIENT)
Dept: GENERAL RADIOLOGY | Facility: HOSPITAL | Age: 63
End: 2018-03-16

## 2018-03-16 LAB
ANION GAP SERPL CALCULATED.3IONS-SCNC: 11 MMOL/L (ref 3–11)
BACTERIA FLD CULT: NORMAL
BASOPHILS # BLD MANUAL: 0 10*3/MM3 (ref 0–0.2)
BASOPHILS NFR BLD AUTO: 0 % (ref 0–1)
BUN BLD-MCNC: 8 MG/DL (ref 9–23)
BUN/CREAT SERPL: 7.3 (ref 7–25)
CALCIUM SPEC-SCNC: 9.1 MG/DL (ref 8.7–10.4)
CHLORIDE SERPL-SCNC: 103 MMOL/L (ref 99–109)
CO2 SERPL-SCNC: 23 MMOL/L (ref 20–31)
CREAT BLD-MCNC: 1.1 MG/DL (ref 0.6–1.3)
CRP SERPL-MCNC: 25.95 MG/DL (ref 0–1)
DEPRECATED RDW RBC AUTO: 52.1 FL (ref 37–54)
EOSINOPHIL # BLD MANUAL: 0 10*3/MM3 (ref 0.1–0.3)
EOSINOPHIL NFR BLD MANUAL: 0 % (ref 0–3)
ERYTHROCYTE [DISTWIDTH] IN BLOOD BY AUTOMATED COUNT: 17.1 % (ref 11.3–14.5)
GFR SERPL CREATININE-BSD FRML MDRD: 67 ML/MIN/1.73
GLUCOSE BLD-MCNC: 113 MG/DL (ref 70–100)
HCT VFR BLD AUTO: 35.5 % (ref 38.9–50.9)
HGB BLD-MCNC: 11 G/DL (ref 13.1–17.5)
HYPOCHROMIA BLD QL: ABNORMAL
L PNEUMO1 AG UR QL IA: NEGATIVE
LYMPHOCYTES # BLD MANUAL: 2.15 10*3/MM3 (ref 0.6–4.8)
LYMPHOCYTES NFR BLD MANUAL: 14 % (ref 0–12)
LYMPHOCYTES NFR BLD MANUAL: 9 % (ref 24–44)
Lab: NORMAL
MAGNESIUM SERPL-MCNC: 1.8 MG/DL (ref 1.3–2.7)
MCH RBC QN AUTO: 25.5 PG (ref 27–31)
MCHC RBC AUTO-ENTMCNC: 31 G/DL (ref 32–36)
MCV RBC AUTO: 82.2 FL (ref 80–99)
METAMYELOCYTES NFR BLD MANUAL: 2 % (ref 0–0)
MONOCYTES # BLD AUTO: 3.34 10*3/MM3 (ref 0–1)
NEUTROPHILS # BLD AUTO: 17.42 10*3/MM3 (ref 1.5–8.3)
NEUTROPHILS NFR BLD MANUAL: 72 % (ref 41–71)
NEUTS BAND NFR BLD MANUAL: 1 % (ref 0–5)
ORGANISM ID: NORMAL
PLAT MORPH BLD: NORMAL
PLATELET # BLD AUTO: 309 10*3/MM3 (ref 150–450)
PMV BLD AUTO: 9 FL (ref 6–12)
POLYCHROMASIA BLD QL SMEAR: ABNORMAL
POTASSIUM BLD-SCNC: 3.3 MMOL/L (ref 3.5–5.5)
POTASSIUM BLD-SCNC: 4 MMOL/L (ref 3.5–5.5)
PROCALCITONIN SERPL-MCNC: 2.74 NG/ML
RBC # BLD AUTO: 4.32 10*6/MM3 (ref 4.2–5.76)
S PNEUM AG SPEC QL LA: NEGATIVE
SODIUM BLD-SCNC: 137 MMOL/L (ref 132–146)
SPECIMEN SOURCE: NORMAL
VARIANT LYMPHS NFR BLD MANUAL: 2 % (ref 0–5)
WBC MORPH BLD: NORMAL
WBC NRBC COR # BLD: 23.86 10*3/MM3 (ref 3.5–10.8)

## 2018-03-16 PROCEDURE — 87070 CULTURE OTHR SPECIMN AEROBIC: CPT | Performed by: PHYSICIAN ASSISTANT

## 2018-03-16 PROCEDURE — 71046 X-RAY EXAM CHEST 2 VIEWS: CPT

## 2018-03-16 PROCEDURE — 94640 AIRWAY INHALATION TREATMENT: CPT

## 2018-03-16 PROCEDURE — 86140 C-REACTIVE PROTEIN: CPT | Performed by: INTERNAL MEDICINE

## 2018-03-16 PROCEDURE — 94760 N-INVAS EAR/PLS OXIMETRY 1: CPT

## 2018-03-16 PROCEDURE — 87205 SMEAR GRAM STAIN: CPT | Performed by: PHYSICIAN ASSISTANT

## 2018-03-16 PROCEDURE — 99233 SBSQ HOSP IP/OBS HIGH 50: CPT | Performed by: INTERNAL MEDICINE

## 2018-03-16 PROCEDURE — 25010000002 PIPERACILLIN SOD-TAZOBACTAM PER 1 G

## 2018-03-16 PROCEDURE — 25010000002 LINEZOLID 600 MG/300ML SOLUTION: Performed by: INTERNAL MEDICINE

## 2018-03-16 PROCEDURE — 25010000002 ENOXAPARIN PER 10 MG: Performed by: HOSPITALIST

## 2018-03-16 PROCEDURE — 25010000002 CEFTRIAXONE PER 250 MG: Performed by: PHYSICIAN ASSISTANT

## 2018-03-16 PROCEDURE — 83735 ASSAY OF MAGNESIUM: CPT | Performed by: INTERNAL MEDICINE

## 2018-03-16 PROCEDURE — 94799 UNLISTED PULMONARY SVC/PX: CPT

## 2018-03-16 PROCEDURE — 85007 BL SMEAR W/DIFF WBC COUNT: CPT | Performed by: INTERNAL MEDICINE

## 2018-03-16 PROCEDURE — 85025 COMPLETE CBC W/AUTO DIFF WBC: CPT | Performed by: INTERNAL MEDICINE

## 2018-03-16 PROCEDURE — 84145 PROCALCITONIN (PCT): CPT | Performed by: INTERNAL MEDICINE

## 2018-03-16 PROCEDURE — 84132 ASSAY OF SERUM POTASSIUM: CPT | Performed by: INTERNAL MEDICINE

## 2018-03-16 PROCEDURE — 71045 X-RAY EXAM CHEST 1 VIEW: CPT

## 2018-03-16 PROCEDURE — 80048 BASIC METABOLIC PNL TOTAL CA: CPT | Performed by: INTERNAL MEDICINE

## 2018-03-16 RX ORDER — POTASSIUM CHLORIDE 750 MG/1
40 CAPSULE, EXTENDED RELEASE ORAL AS NEEDED
Status: DISCONTINUED | OUTPATIENT
Start: 2018-03-16 | End: 2018-03-18 | Stop reason: HOSPADM

## 2018-03-16 RX ORDER — POTASSIUM CHLORIDE 1.5 G/1.77G
40 POWDER, FOR SOLUTION ORAL AS NEEDED
Status: DISCONTINUED | OUTPATIENT
Start: 2018-03-16 | End: 2018-03-18 | Stop reason: HOSPADM

## 2018-03-16 RX ORDER — LINEZOLID 2 MG/ML
600 INJECTION, SOLUTION INTRAVENOUS EVERY 12 HOURS
Status: DISCONTINUED | OUTPATIENT
Start: 2018-03-16 | End: 2018-03-18 | Stop reason: HOSPADM

## 2018-03-16 RX ADMIN — CEFTRIAXONE SODIUM 1 G: 1 INJECTION, SOLUTION INTRAVENOUS at 06:39

## 2018-03-16 RX ADMIN — DOXYCYCLINE 100 MG: 100 INJECTION, POWDER, LYOPHILIZED, FOR SOLUTION INTRAVENOUS at 08:15

## 2018-03-16 RX ADMIN — LOPERAMIDE HYDROCHLORIDE 2 MG: 2 CAPSULE ORAL at 10:34

## 2018-03-16 RX ADMIN — LOPERAMIDE HYDROCHLORIDE 2 MG: 2 CAPSULE ORAL at 21:26

## 2018-03-16 RX ADMIN — CETIRIZINE HYDROCHLORIDE 10 MG: 10 TABLET, FILM COATED ORAL at 08:14

## 2018-03-16 RX ADMIN — DIPHENOXYLATE HYDROCHLORIDE AND ATROPINE SULFATE 1 TABLET: 2.5; .025 TABLET ORAL at 21:26

## 2018-03-16 RX ADMIN — ENOXAPARIN SODIUM 80 MG: 80 INJECTION SUBCUTANEOUS at 08:14

## 2018-03-16 RX ADMIN — ACETAMINOPHEN 650 MG: 325 TABLET ORAL at 08:31

## 2018-03-16 RX ADMIN — Medication 250 MG: at 08:14

## 2018-03-16 RX ADMIN — LINEZOLID 600 MG: 600 INJECTION, SOLUTION INTRAVENOUS at 10:34

## 2018-03-16 RX ADMIN — Medication 250 MG: at 21:21

## 2018-03-16 RX ADMIN — ENOXAPARIN SODIUM 80 MG: 80 INJECTION SUBCUTANEOUS at 21:21

## 2018-03-16 RX ADMIN — BUSPIRONE HYDROCHLORIDE 15 MG: 15 TABLET ORAL at 13:53

## 2018-03-16 RX ADMIN — TAZOBACTAM SODIUM AND PIPERACILLIN SODIUM 4.5 G: 500; 4 INJECTION, SOLUTION INTRAVENOUS at 18:14

## 2018-03-16 RX ADMIN — DIPHENOXYLATE HYDROCHLORIDE AND ATROPINE SULFATE 1 TABLET: 2.5; .025 TABLET ORAL at 13:53

## 2018-03-16 RX ADMIN — LOPERAMIDE HYDROCHLORIDE 2 MG: 2 CAPSULE ORAL at 08:13

## 2018-03-16 RX ADMIN — DIPHENOXYLATE HYDROCHLORIDE AND ATROPINE SULFATE 1 TABLET: 2.5; .025 TABLET ORAL at 18:14

## 2018-03-16 RX ADMIN — MESALAMINE 1000 MG: 1000 SUPPOSITORY RECTAL at 21:21

## 2018-03-16 RX ADMIN — VITAMIN E CAP 400 UNIT 400 UNITS: 400 CAP at 08:16

## 2018-03-16 RX ADMIN — LOPERAMIDE HYDROCHLORIDE 2 MG: 2 CAPSULE ORAL at 13:53

## 2018-03-16 RX ADMIN — POTASSIUM CHLORIDE 40 MEQ: 750 CAPSULE, EXTENDED RELEASE ORAL at 18:14

## 2018-03-16 RX ADMIN — AMLODIPINE BESYLATE 5 MG: 5 TABLET ORAL at 21:21

## 2018-03-16 RX ADMIN — DIPHENOXYLATE HYDROCHLORIDE AND ATROPINE SULFATE 1 TABLET: 2.5; .025 TABLET ORAL at 10:34

## 2018-03-16 RX ADMIN — MONTELUKAST SODIUM 10 MG: 10 TABLET, COATED ORAL at 21:21

## 2018-03-16 RX ADMIN — ACETAMINOPHEN 650 MG: 325 TABLET ORAL at 18:14

## 2018-03-16 RX ADMIN — BUSPIRONE HYDROCHLORIDE 15 MG: 15 TABLET ORAL at 21:21

## 2018-03-16 RX ADMIN — FOLIC ACID 1 MG: 1 TABLET ORAL at 08:13

## 2018-03-16 RX ADMIN — BUDESONIDE 0.5 MG: 0.5 INHALANT RESPIRATORY (INHALATION) at 09:19

## 2018-03-16 RX ADMIN — LINEZOLID 600 MG: 600 INJECTION, SOLUTION INTRAVENOUS at 22:56

## 2018-03-16 RX ADMIN — PYRIDOXINE HCL TAB 50 MG 50 MG: 50 TAB at 08:14

## 2018-03-16 RX ADMIN — BUDESONIDE 0.5 MG: 0.5 INHALANT RESPIRATORY (INHALATION) at 20:24

## 2018-03-16 RX ADMIN — VALSARTAN 160 MG: 160 TABLET, FILM COATED ORAL at 08:14

## 2018-03-16 RX ADMIN — FAMOTIDINE 20 MG: 20 TABLET, FILM COATED ORAL at 08:13

## 2018-03-16 RX ADMIN — POTASSIUM CHLORIDE 40 MEQ: 750 CAPSULE, EXTENDED RELEASE ORAL at 13:53

## 2018-03-16 RX ADMIN — FLUTICASONE PROPIONATE 2 SPRAY: 50 SPRAY, METERED NASAL at 21:22

## 2018-03-16 RX ADMIN — BUSPIRONE HYDROCHLORIDE 15 MG: 15 TABLET ORAL at 06:38

## 2018-03-16 RX ADMIN — LOPERAMIDE HYDROCHLORIDE 2 MG: 2 CAPSULE ORAL at 18:14

## 2018-03-16 RX ADMIN — IPRATROPIUM BROMIDE 2 SPRAY: 42 SPRAY NASAL at 08:16

## 2018-03-16 NOTE — PROGRESS NOTES
Continued Stay Note  [facility]     Patient Name: Zackary Noonan II  MRN: [id number]  Today's Date: 3/16/2018    Admit Date: 3/13/2018          Discharge Plan     Row Name 03/16/18 1549       Plan    Plan Return home with family    Patient/Family in Agreement with Plan yes    Plan Comments Met with patient at bedside. Still plans to return home with his family at D/C. Anticipate that he will be in the hospital through the weekend based on ID's note. No D/C needs at this time. Anny Cates RN [phone]              Discharge Codes    No documentation.       Expected Discharge Date and Time     Expected Discharge Date Expected Discharge Time    Mar 15, 2018             Anny Cates RN

## 2018-03-16 NOTE — PROGRESS NOTES
[facility] Medicine Services  PROGRESS NOTE    Patient Name: Zackary Noonan II  : 1955  MRN: [id number]    Date of Admission: 3/13/2018  Length of Stay: 3  Primary Care Physician: Jillian Layne MD    Subjective   Subjective     CC:  F/u PNA    HPI:  Says he feels better today.  Breathing is about the same, but overall strength and energy are better.  No fevers overnight.    Review of Systems  Gen- + fevers, chills  CV- No chest pain, palpitations  Resp- + cough, dyspnea  GI- No N/V/D, abd pain      Otherwise ROS is negative except as mentioned in the HPI.    Objective   Objective     Vital Signs:   Temp:  [98.2 °F (36.8 °C)-101.9 °F (38.8 °C)] 98.8 °F (37.1 °C)  Heart Rate:  [] 99  Resp:  [18] 18  BP: (128-150)/(68-76) 128/70        Physical Exam:  Constitutional: No acute distress, awake, alert, sitting up in bed  HENT: NCAT, mucous membranes moist  Respiratory: mild rhonchi and diminshed at right base,left is mostly clear, respiratory effort normal   Cardiovascular: RRR, no murmurs, rubs, or gallops  Gastrointestinal: Positive bowel sounds, soft, nontender, nondistended.  Ostomy empty.  Musculoskeletal: No bilateral ankle edema  Psychiatric: Appropriate affect, cooperative  Neurologic: Oriented x 3, strength symmetric in all extremities, Cranial Nerves grossly intact to confrontation, speech clear  Skin: No rashes      Results Reviewed:  I have personally reviewed current lab, radiology, and data and agree.      Results from last 7 days  Lab Units 18  0735 03/15/18  0531 18  1413   WBC 10*3/mm3 23.86* 17.50* 22.76* 33.02*   HEMOGLOBIN g/dL 11.0* 10.1* 10.1* 12.4*   HEMATOCRIT % 35.5* 32.0* 32.1* 38.7*   PLATELETS 10*3/mm3 309 266 311 370   INR   --   --   --  1.07       Results from last 7 days  Lab Units 18  0735 03/15/18  0531 18  0419 18  1413   SODIUM mmol/L 137 142 134 130*   POTASSIUM mmol/L 3.3* 3.8 4.0 4.4   CHLORIDE  mmol/L 103 113* 106 98*   CO2 mmol/L 23.0 23.0 22.0 22.0   BUN mg/dL 8* 8* 18 22   CREATININE mg/dL 1.10 0.90 1.10 1.40*   GLUCOSE mg/dL 113* 111* 113* 221*   CALCIUM mg/dL 9.1 8.2* 8.5* 9.9   ALT (SGPT) U/L  --   --   --  18   AST (SGOT) U/L  --   --   --  16     Estimated Creatinine Clearance: 72.4 mL/min (by C-G formula based on SCr of 1.1 mg/dL).  No results found for: BNP  No results found for: PHART    Microbiology Results Abnormal     Procedure Component Value - Date/Time    Legionella Antigen, Urine - Urine, Urine, Clean Catch [[id number]] Collected:  03/13/18 2205    Lab Status:  Final result Specimen:  Urine from Urine, Clean Catch Updated:  03/16/18 1716     L. pneumophila Serogp 1 Ur Ag Negative     Comment: Presumptive negative for L. pneumophila serogroup 1 antigen in urine,  suggesting no recent or current infection. Legionnaires' disease  cannot be ruled out since other serogroups and species may also cause  disease.       Narrative:       Performed at:  [address] [place] [address]  [id number]  : Niranjan Austin MD, Phone:  [phone]    Blood Culture - Blood, [[id number]]  (Normal) Collected:  03/13/18 1515    Lab Status:  Preliminary result Specimen:  Blood from Arm, Left Updated:  03/16/18 1546     Blood Culture No growth at 3 days    Blood Culture - Blood, [[id number]]  (Normal) Collected:  03/13/18 1510    Lab Status:  Preliminary result Specimen:  Blood from Arm, Right Updated:  03/16/18 1546     Blood Culture No growth at 3 days    S. Pneumo Ag Urine or CSF - Urine, Urine, Clean Catch [[id number]] Collected:  03/13/18 2205    Lab Status:  Final result Specimen:  Urine from Urine, Clean Catch Updated:  03/16/18 1516     Specimen Source Urine     STREP PNEUMONIAE ANTIGEN Negative     Body Fluid Culture, Sterile Not Indicated     Organism ID Not indicated.     Please note Comment     Comment: College [employer] standards require a culture to  be  performed on CSF specimens submitted for bacterial antigen testing.  (CAP [id number]) Urine specimens will not be cultured.       Narrative:       Performed at:  [address]  [id number]  : Niranjan Austin MD, Phone:  [phone]    Blood Culture - Blood, [[id number]]  (Normal) Collected:  03/15/18 2010    Lab Status:  Preliminary result Specimen:  Blood from Hand, Right Updated:  03/16/18 0846     Blood Culture No growth at less than 24 hours    Narrative:       Aerobic bottle only    Blood Culture - Blood, [[id number]]  (Normal) Collected:  03/15/18 2015    Lab Status:  Preliminary result Specimen:  Blood from Arm, Right Updated:  03/16/18 0846     Blood Culture No growth at less than 24 hours    Narrative:       Aerobic bottle only          Imaging Results (last 24 hours)     Procedure Component Value Units Date/Time    XR Chest PA & Lateral [[id number]] Collected:  03/16/18 1216     Updated:  03/16/18 1349    Narrative:       EXAMINATION: XR CHEST PA AND LATERAL- 03/16/2018     INDICATION: J18.9-Pneumonia, unspecified organism; A41.9-Sepsis,  unspecified organism; Z86.718-Personal history of other venous  thrombosis and embolism; Z79.01-Long term (current) use of  anticoagulants; R73.9-Hyperglycemia, unspecified      COMPARISON: 03/16/2018     FINDINGS: PA and lateral views of the chest reveal cardiac silhouette to  be borderline enlarged. Patchy airspace disease seen within the mid and  lower lung fields bilaterally. Degenerative changes seen within the  spine. Mild elevation of the right hemidiaphragm. Small bilateral  pleural effusions.           Impression:       Patchy airspace disease seen within the mid and lower lung  fields bilaterally with small bilateral pleural effusions. Findings are  unchanged.     D:  03/16/2018  E:  03/16/2018     This report was finalized on 3/16/2018 1:47 PM by Dr. Patience Blue MD.       XR Chest 1 View [[id number]]  Collected:  03/16/18 0905     Updated:  03/16/18 1022    Narrative:       EXAMINATION: XR CHEST 1 VW-      INDICATION: Increased fever with pneumonia; J18.9-Pneumonia, unspecified  organism; A41.9-Sepsis, unspecified organism; Z86.718-Personal history  of other venous thrombosis and embolism; Z79.01-Long term (current) use  of anticoagulants; R73.9-Hyperglycemia, unspecified.      COMPARISON: Chest x-ray 03/13/2018.     FINDINGS: Cardiac size within normal limits. Central pulmonary  vascularity has increased with bibasilar pulmonary opacifications  similar to prior. Lung apices remain grossly clear. No pneumothorax or  large pleural effusion.           Impression:       No significant interval change.     D:  03/16/2018  E:  03/16/2018     This report was finalized on 3/16/2018 10:20 AM by Dr. Robinson Garvey.                I have reviewed the medications.    Assessment/Plan   Assessment / Plan     Hospital Problem List     * (Principal)Sepsis    Pneumonia of right lower lobe due to infectious organism    History of DVT (deep vein thrombosis)    Overview Signed 11/5/2016  5:17 AM by HARMAN Quintanilla; x 3; chronic coumadin therapy         HTN (hypertension)    Acute kidney injury    COPD (chronic obstructive pulmonary disease)    Asthma    Prediabetes    Chronic anemia    Ulcerative colitis    S/P total abdominal colectomy    HO of IVC filter    Hyponatremia    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)           Brief Hospital Course to date:  Zackary Noonan II is a 62 y.o. male with hx of UC on humira, s/p total colectomy, prior c diff presents with fevers, cough, malaise.  Found to have right sided PNA.      Assessment & Plan:  - Spiked yesterday AM to 102.3.  Given immunosupression with humira, I asked Dr. Roach to eval patient.    - today CXR looks about the same with stable infiltrates.  WBC up some.  Procalcitonin trending down.  Dr. Roach changed abx to zyvox and zosyn today.     - prior hx of c diff, but given colectomy this seems unlikely cause of fever.  Has chronic diarrhea from ostomy post-colectomy without recent changes.  - on lovenox for anticoagulation (managed by PCP, recent changed from coumadin due to stoma bleeding from ulcer)  - ARF has resolved  - Na+ improved  - possible home in ~2 days    DVT Prophylaxis:  Therapeutic lovenox    CODE STATUS: Full Code    Disposition: I expect the patient to be discharged home in 1-2 days.    Electronically signed by Jason Abdalla MD, 03/16/18, 5:42 PM.

## 2018-03-16 NOTE — PLAN OF CARE
Problem: Patient Care Overview  Goal: Plan of Care Review  Outcome: Ongoing (interventions implemented as appropriate)   03/16/18 0354   Coping/Psychosocial   Plan of Care Reviewed With patient   Plan of Care Review   Progress declining   OTHER   Outcome Summary pt rested on and off through the night. spiked a temperature of 101.9 at the beginning of the shift, treated per orders. blood cultures redrawn. chest xray this morning. continue to monitor     Goal: Individualization and Mutuality  Outcome: Ongoing (interventions implemented as appropriate)    Goal: Discharge Needs Assessment  Outcome: Ongoing (interventions implemented as appropriate)      Problem: Pneumonia (Adult)  Goal: Signs and Symptoms of Listed Potential Problems Will be Absent, Minimized or Managed (Pneumonia)  Outcome: Ongoing (interventions implemented as appropriate)

## 2018-03-16 NOTE — PLAN OF CARE
Problem: Patient Care Overview  Goal: Plan of Care Review  Outcome: Ongoing (interventions implemented as appropriate)   03/16/18 3910   Coping/Psychosocial   Plan of Care Reviewed With patient   Plan of Care Review   Progress improving   OTHER   Outcome Summary Pt feels better this afternoon. IV ABX changed, encourage IS and ambulation. Continue to monitor. Pt will remain in the hospital over the weekend.        Problem: Pneumonia (Adult)  Goal: Signs and Symptoms of Listed Potential Problems Will be Absent, Minimized or Managed (Pneumonia)  Outcome: Ongoing (interventions implemented as appropriate)

## 2018-03-16 NOTE — PROGRESS NOTES
Zackary Noonan II  1955  [id number]  3/16/2018    CC: Fever and shortness of breath.     Zackary Noonan II is a 62 y.o. male here for right lower lobe pneumonia.      Past medical history:  Past Medical History:   Diagnosis Date   • Anxiety    • Arthritis    • Asthma    • Clostridium difficile infection 01/2017    treated per dr carter with fecal transplant 8-2017    • H/O recurrent pneumonia    • Hypertension    • MRSA infection 2016    right lower extremity wound   • Pulmonary nodule     Followed by Dr. Galaviz    • Recurrent deep vein thrombosis (DVT)     x 3 LLE; chronic anticoagulation therapy    • Ulcerative colitis    • Wears glasses    • Wears hearing aid        Medications:   Current Facility-Administered Medications:   •  acetaminophen (TYLENOL) tablet 650 mg, 650 mg, Oral, Q4H PRN, Liseth Pereira PA-C, 650 mg at 03/16/18 0831  •  amLODIPine (NORVASC) tablet 5 mg, 5 mg, Oral, Nightly, Jason Abdalla MD, 5 mg at 03/15/18 2021  •  budesonide (PULMICORT) nebulizer solution 0.5 mg, 0.5 mg, Nebulization, BID - RT, Liseth Pereira PA-C, 0.5 mg at 03/15/18 2120  •  busPIRone (BUSPAR) tablet 15 mg, 15 mg, Oral, Q8H, Liseth Pereira PA-C, 15 mg at 03/16/18 0638  •  cefTRIAXone (ROCEPHIN) IVPB 1 g, 1 g, Intravenous, Q24H, Last Rate: 100 mL/hr at 03/16/18 0639, 1 g at 03/16/18 0639 **AND** doxycycline (VIBRAMYCIN) 100 mg/100 mL 0.9% NS MBP, 100 mg, Intravenous, Q12H, Liseth Pereira PA-C, 100 mg at 03/16/18 0815  •  cetirizine (zyrTEC) tablet 10 mg, 10 mg, Oral, Daily, Liseth Pereira PA-C, 10 mg at 03/16/18 0814  •  diphenoxylate-atropine (LOMOTIL) 2.5-0.025 MG per tablet 1 tablet, 1 tablet, Oral, 4x Daily PRN, Jason Abdalla MD, 1 tablet at 03/15/18 2131  •  enoxaparin (LOVENOX) syringe 80 mg, 80 mg, Subcutaneous, Q12H, Apoorva WALTON MD, 80 mg at 03/16/18 0814  •  famotidine (PEPCID) tablet 20 mg, 20 mg, Oral, Daily, Liseth Pereira PA-C, 20 mg at 03/16/18 0813  •   fluticasone (FLONASE) 50 MCG/ACT nasal spray 2 spray, 2 spray, Nasal, BID, Liseth Pereira PA-C, 2 spray at 03/15/18 2022  •  folic acid (FOLVITE) tablet 1 mg, 1 mg, Oral, Daily, Liseth Pereira PA-C, 1 mg at 03/16/18 0813  •  ipratropium (ATROVENT) nasal spray 2 spray, 2 spray, Each Nare, 4x Daily, Liseth Pereira PA-C, 2 spray at 03/16/18 0816  •  loperamide (IMODIUM) capsule 2 mg, 2 mg, Oral, 4x Daily PRN, Apoorva WALTON MD, 2 mg at 03/14/18 2202  •  loperamide (IMODIUM) capsule 2 mg, 2 mg, Oral, 4x Daily PRN, Jason Abdalla MD, 2 mg at 03/16/18 0813  •  mesalamine (CANASA) suppository 1,000 mg, 1,000 mg, Rectal, Nightly, Liseth Pereira PA-C, 1,000 mg at 03/15/18 2021  •  montelukast (SINGULAIR) tablet 10 mg, 10 mg, Oral, Nightly, Liseth Pereira PA-C, 10 mg at 03/15/18 2021  •  ondansetron (ZOFRAN) injection 4 mg, 4 mg, Intravenous, Q6H PRN, Apoorva WALTON MD  •  saccharomyces boulardii (FLORASTOR) capsule 250 mg, 250 mg, Oral, BID, Liseth Pereira PA-C, 250 mg at 03/16/18 0814  •  sodium chloride 0.9 % flush 1-10 mL, 1-10 mL, Intravenous, PRN, Liseth Pereira PA-C  •  sodium chloride 0.9 % flush 10 mL, 10 mL, Intravenous, PRN, Bipin Tatum MD  •  valsartan (DIOVAN) tablet 160 mg, 160 mg, Oral, Daily, Jason Abdalla MD, 160 mg at 03/16/18 0814  •  vitamin B-6 (PYRIDOXINE) tablet 50 mg, 50 mg, Oral, Daily, Liseth Pereira PA-C, 50 mg at 03/16/18 0814  •  vitamin E capsule 400 Units, 400 Units, Oral, Daily, Liseth Pereira PA-C, 400 Units at 03/16/18 0816  Antibiotics:  Anti-Infectives     Ordered     Dose/Rate Route Frequency Start Stop    03/15/18 0923  doxycycline (DORYX) 100 MG enteric coated tablet     Ordering Provider:  Jason Abdalla MD    100 mg Oral 2 Times Daily 03/15/18 0000 03/20/18 2294    03/15/18 1101  cefdinir (OMNICEF) 300 MG capsule     Ordering Provider:  Jason Abdalla MD    300 mg Oral 2 Times Daily 03/15/18 0000 03/20/18 6191     "03/13/18 1822  cefTRIAXone (ROCEPHIN) IVPB 1 g     Ordering Provider:  ZAFAR Kelley-C    1 g  100 mL/hr over 30 Minutes Intravenous Every 24 Hours 03/14/18 0600 03/21/18 0559    03/13/18 1822  doxycycline (VIBRAMYCIN) 100 mg/100 mL 0.9% NS MBP     Ordering Provider:  ZAFAR Kelley-C    100 mg  over 60 Minutes Intravenous Every 12 Hours 03/13/18 2100 03/20/18 2059    03/13/18 1433  cefTRIAXone (ROCEPHIN) IVPB 1 g     Ordering Provider:  ZAFAR Carty-C    1 g  100 mL/hr over 30 Minutes Intravenous Once 03/13/18 1435 03/13/18 1538    03/13/18 1433  AZITHROMYCIN 500 MG/250 ML 0.9% NS IVPB (MBP)     Ordering Provider:  ZAFAR Carty-C    500 mg  over 60 Minutes Intravenous Once 03/13/18 1435 03/13/18 1714          Allergies:  is allergic to beta adrenergic blockers and levaquin [levofloxacin in d5w].    Review of Systems: All other reviewed and negative except as per HPI    Blood pressure 128/70, pulse 110, temperature 100 °F (37.8 °C), temperature source Oral, resp. rate 18, height 167.6 cm (66\"), weight 75.5 kg (166 lb 6.4 oz), SpO2 91 %.  GENERAL: Awake and alert, in no acute distress. Nasal oxygen in place.  Resting tachycardia at 115 bpm.  Minimal cough.  No sputum production.  Denies right allergic chest discomfort.  No hemoptysis.  Over low-grade fever.  Transient night sweats.  HEENT: Oropharynx without thrush. Sinuses nontender. Dentition in good repair. No cervical adenopathy. No carotid bruits/ jugular venous distention.   EYES: PERRL. No conjunctival injection. No icterus. EOMI.  LYMPHATICS: No lymphadenopathy of the neck or axillary or inguinal regions.   HEART: No murmur, gallop, or pericardial friction rub.   LUNGS: Clear to auscultation anteriorly. No percussion dullness.  Changes in the right lung base to auscultation are very minimal.  ABDOMEN: Soft, nontender, nondistended. No appreciable HSM.  Right lower quadrant ileostomy stoma.  SKIN: Warm and dry without " cutaneous eruptions. No embolic stigmata.   PSYCHIATRIC: Mental status lucid. Cranial nerve function intact.       DIAGNOSTICS:  Lab Results   Component Value Date    WBC 17.50 (H) 03/15/2018    HGB 10.1 (L) 03/15/2018    HCT 32.0 (L) 03/15/2018     03/15/2018     Lab Results   Component Value Date    CRP 8.1 07/22/2015     No results found for: SEDRATE  Lab Results   Component Value Date    GLUCOSE 111 (H) 03/15/2018    BUN 8 (L) 03/15/2018    CREATININE 0.90 03/15/2018    EGFRIFNONA 85 03/15/2018    BCR 8.9 03/15/2018    CO2 23.0 03/15/2018    CALCIUM 8.2 (L) 03/15/2018    ALBUMIN 4.20 03/13/2018    LABIL2 1.1 (L) 03/13/2018    AST 16 03/13/2018    ALT 18 03/13/2018       Microbiology:Blood cultures ×2 remain negative.  Pneumococcal and Legionella urinary antigens still not back.  No sputum in laboratory.    RADIOLOGY:  Imaging Results (last 72 hours)     Procedure Component Value Units Date/Time    XR Chest 1 View [[id number]] Updated:  03/16/18 0600    XR Chest 1 View [[id number]] Collected:  03/13/18 1544     Updated:  03/13/18 2159    Narrative:       EXAMINATION: XR CHEST 1 VW-03/13/2018:      INDICATION: SOA, triage protocol.      COMPARISON: 11/09/2016.     FINDINGS: The heart is upper normal size. The vasculature is mildly  cephalized. Patchy interstitial changes in the left base appear roughly  stable. There is new hazy interstitial opacity of the right base. No  densely consolidated lung significant effusion or pneumothorax is seen.            Impression:       1. Mild pulmonary venous hypertension.   2. Patchy new right lower lung disease whether atelectasis or pneumonia.  Correlate with the patient's physical exam findings.     D:  03/13/2018  E:  03/13/2018     This report was finalized on 3/13/2018 9:57 PM by DR. Davi Lemons MD.             Assessment and Plan: Shortness of breath.  Fever.  Right lower lobe pneumonia.  Severe Crohn's ileocolitis.  Recent total colectomy with ileostomy.   "Immunocompromised host/recent corticosteroids.  No biologic therapy of late.  Marked leukocytosis on admission to 33,000.  Elevated pro-calcitonin level at 11.9.  Maximum temperature is 100.0 degrees.  Resting tachycardia persists at 115 bpm.  Yesterday's peripheral leukocyte count was 17,500 which is down from 33,000 on admission.  The paatient is upset that he \"only got intravenous antibiotics once yesterday\".  I explained to him that this intravenous ceftriaxone has a long serum half-life and is only given every 24 hours.  I would suggest repeating a chest x-ray today to assess for any radiographic progression of disease.  It may be worthwhile repeating a pro-calcitonin to see if the trend is favorable given his admission value of 11.9.  Utilization management:  I'm concerned about healthcare associated pneumonia and we will discontinue doxycycline and ceftriaxone with a switch to Zyvox for coverage of MRSA and piperacillin-tazobactam for expanded gram-negative, antipseudomonal, and anaerobic coverage.  He is likely to continue in the hospital for the next 48 hours or so.  I will not have [facility]  round on the patient this weekend but they are available for any clinical deterioration.      Zackary Roach MD  3/16/2018    "

## 2018-03-17 LAB
DEPRECATED RDW RBC AUTO: 51.5 FL (ref 37–54)
ERYTHROCYTE [DISTWIDTH] IN BLOOD BY AUTOMATED COUNT: 17 % (ref 11.3–14.5)
HCT VFR BLD AUTO: 30.3 % (ref 38.9–50.9)
HGB BLD-MCNC: 9.4 G/DL (ref 13.1–17.5)
MCH RBC QN AUTO: 25.3 PG (ref 27–31)
MCHC RBC AUTO-ENTMCNC: 31 G/DL (ref 32–36)
MCV RBC AUTO: 81.5 FL (ref 80–99)
PLATELET # BLD AUTO: 280 10*3/MM3 (ref 150–450)
PMV BLD AUTO: 8.9 FL (ref 6–12)
RBC # BLD AUTO: 3.72 10*6/MM3 (ref 4.2–5.76)
WBC NRBC COR # BLD: 14.87 10*3/MM3 (ref 3.5–10.8)

## 2018-03-17 PROCEDURE — 25010000002 PIPERACILLIN SOD-TAZOBACTAM PER 1 G

## 2018-03-17 PROCEDURE — 94760 N-INVAS EAR/PLS OXIMETRY 1: CPT

## 2018-03-17 PROCEDURE — 94799 UNLISTED PULMONARY SVC/PX: CPT

## 2018-03-17 PROCEDURE — 85027 COMPLETE CBC AUTOMATED: CPT | Performed by: INTERNAL MEDICINE

## 2018-03-17 PROCEDURE — 25010000002 LINEZOLID 600 MG/300ML SOLUTION: Performed by: INTERNAL MEDICINE

## 2018-03-17 PROCEDURE — 94640 AIRWAY INHALATION TREATMENT: CPT

## 2018-03-17 PROCEDURE — 25010000002 ENOXAPARIN PER 10 MG: Performed by: HOSPITALIST

## 2018-03-17 PROCEDURE — 99232 SBSQ HOSP IP/OBS MODERATE 35: CPT | Performed by: INTERNAL MEDICINE

## 2018-03-17 RX ADMIN — LINEZOLID 600 MG: 600 INJECTION, SOLUTION INTRAVENOUS at 11:42

## 2018-03-17 RX ADMIN — VALSARTAN 160 MG: 160 TABLET, FILM COATED ORAL at 08:09

## 2018-03-17 RX ADMIN — FAMOTIDINE 20 MG: 20 TABLET, FILM COATED ORAL at 08:08

## 2018-03-17 RX ADMIN — VITAMIN E CAP 400 UNIT 400 UNITS: 400 CAP at 08:09

## 2018-03-17 RX ADMIN — LOPERAMIDE HYDROCHLORIDE 2 MG: 2 CAPSULE ORAL at 02:33

## 2018-03-17 RX ADMIN — LOPERAMIDE HYDROCHLORIDE 2 MG: 2 CAPSULE ORAL at 17:04

## 2018-03-17 RX ADMIN — ACETAMINOPHEN 650 MG: 325 TABLET ORAL at 23:46

## 2018-03-17 RX ADMIN — LOPERAMIDE HYDROCHLORIDE 2 MG: 2 CAPSULE ORAL at 21:04

## 2018-03-17 RX ADMIN — ENOXAPARIN SODIUM 80 MG: 80 INJECTION SUBCUTANEOUS at 21:04

## 2018-03-17 RX ADMIN — DIPHENOXYLATE HYDROCHLORIDE AND ATROPINE SULFATE 1 TABLET: 2.5; .025 TABLET ORAL at 02:33

## 2018-03-17 RX ADMIN — Medication 250 MG: at 08:09

## 2018-03-17 RX ADMIN — PYRIDOXINE HCL TAB 50 MG 50 MG: 50 TAB at 08:09

## 2018-03-17 RX ADMIN — CETIRIZINE HYDROCHLORIDE 10 MG: 10 TABLET, FILM COATED ORAL at 08:08

## 2018-03-17 RX ADMIN — BUDESONIDE 0.5 MG: 0.5 INHALANT RESPIRATORY (INHALATION) at 20:23

## 2018-03-17 RX ADMIN — ENOXAPARIN SODIUM 80 MG: 80 INJECTION SUBCUTANEOUS at 08:10

## 2018-03-17 RX ADMIN — AMLODIPINE BESYLATE 5 MG: 5 TABLET ORAL at 21:04

## 2018-03-17 RX ADMIN — DIPHENOXYLATE HYDROCHLORIDE AND ATROPINE SULFATE 1 TABLET: 2.5; .025 TABLET ORAL at 14:31

## 2018-03-17 RX ADMIN — FLUTICASONE PROPIONATE 2 SPRAY: 50 SPRAY, METERED NASAL at 21:05

## 2018-03-17 RX ADMIN — BUSPIRONE HYDROCHLORIDE 15 MG: 15 TABLET ORAL at 08:08

## 2018-03-17 RX ADMIN — ACETAMINOPHEN 650 MG: 325 TABLET ORAL at 18:15

## 2018-03-17 RX ADMIN — FLUTICASONE PROPIONATE 2 SPRAY: 50 SPRAY, METERED NASAL at 08:15

## 2018-03-17 RX ADMIN — BUDESONIDE 0.5 MG: 0.5 INHALANT RESPIRATORY (INHALATION) at 09:10

## 2018-03-17 RX ADMIN — MESALAMINE 1000 MG: 1000 SUPPOSITORY RECTAL at 21:04

## 2018-03-17 RX ADMIN — DIPHENOXYLATE HYDROCHLORIDE AND ATROPINE SULFATE 1 TABLET: 2.5; .025 TABLET ORAL at 21:04

## 2018-03-17 RX ADMIN — IPRATROPIUM BROMIDE 2 SPRAY: 42 SPRAY NASAL at 21:05

## 2018-03-17 RX ADMIN — BUSPIRONE HYDROCHLORIDE 15 MG: 15 TABLET ORAL at 21:04

## 2018-03-17 RX ADMIN — DIPHENOXYLATE HYDROCHLORIDE AND ATROPINE SULFATE 1 TABLET: 2.5; .025 TABLET ORAL at 08:08

## 2018-03-17 RX ADMIN — LOPERAMIDE HYDROCHLORIDE 2 MG: 2 CAPSULE ORAL at 08:09

## 2018-03-17 RX ADMIN — TAZOBACTAM SODIUM AND PIPERACILLIN SODIUM 4.5 G: 500; 4 INJECTION, SOLUTION INTRAVENOUS at 01:57

## 2018-03-17 RX ADMIN — ACETAMINOPHEN 650 MG: 325 TABLET ORAL at 00:02

## 2018-03-17 RX ADMIN — FOLIC ACID 1 MG: 1 TABLET ORAL at 08:09

## 2018-03-17 RX ADMIN — Medication 250 MG: at 21:04

## 2018-03-17 RX ADMIN — TAZOBACTAM SODIUM AND PIPERACILLIN SODIUM 4.5 G: 500; 4 INJECTION, SOLUTION INTRAVENOUS at 17:04

## 2018-03-17 RX ADMIN — ACETAMINOPHEN 650 MG: 325 TABLET ORAL at 11:42

## 2018-03-17 RX ADMIN — BUSPIRONE HYDROCHLORIDE 15 MG: 15 TABLET ORAL at 14:14

## 2018-03-17 RX ADMIN — TAZOBACTAM SODIUM AND PIPERACILLIN SODIUM 4.5 G: 500; 4 INJECTION, SOLUTION INTRAVENOUS at 08:10

## 2018-03-17 RX ADMIN — MONTELUKAST SODIUM 10 MG: 10 TABLET, COATED ORAL at 21:04

## 2018-03-17 RX ADMIN — LINEZOLID 600 MG: 600 INJECTION, SOLUTION INTRAVENOUS at 23:46

## 2018-03-17 NOTE — PLAN OF CARE
Problem: Patient Care Overview  Goal: Plan of Care Review  Outcome: Ongoing (interventions implemented as appropriate)   03/17/18 1024   Coping/Psychosocial   Plan of Care Reviewed With patient   Plan of Care Review   Progress improving   OTHER   Outcome Summary Assisted patient in appliance change. Used home supplies. Coloplast Sen srikanth Morgan convexity pre-cut. Hydrofera blue used at 8-10 peristomal ulceration and Rufina barrier ring. Will continue to follow and assist patient PRN. Thanks

## 2018-03-17 NOTE — PLAN OF CARE
Problem: Patient Care Overview  Goal: Plan of Care Review  Outcome: Ongoing (interventions implemented as appropriate)   03/17/18 0313   Coping/Psychosocial   Plan of Care Reviewed With patient   Plan of Care Review   Progress improving   OTHER   Outcome Summary pt slept on and off through the night. room air. up ad lamont. antibx adjusted per ID. continue to monitor.      Goal: Individualization and Mutuality  Outcome: Ongoing (interventions implemented as appropriate)    Goal: Discharge Needs Assessment  Outcome: Ongoing (interventions implemented as appropriate)      Problem: Pneumonia (Adult)  Goal: Signs and Symptoms of Listed Potential Problems Will be Absent, Minimized or Managed (Pneumonia)  Outcome: Ongoing (interventions implemented as appropriate)

## 2018-03-17 NOTE — PLAN OF CARE
Problem: Patient Care Overview  Goal: Plan of Care Review  Outcome: Ongoing (interventions implemented as appropriate)   03/17/18 3995   Coping/Psychosocial   Plan of Care Reviewed With patient   Plan of Care Review   Progress improving   OTHER   Outcome Summary Pt doing well today, ambulated well around room, some SOA at times otherwise on RA, Continue IV ABX, home on Monday.        Problem: Pneumonia (Adult)  Goal: Signs and Symptoms of Listed Potential Problems Will be Absent, Minimized or Managed (Pneumonia)  Outcome: Ongoing (interventions implemented as appropriate)

## 2018-03-18 VITALS
RESPIRATION RATE: 20 BRPM | HEART RATE: 94 BPM | SYSTOLIC BLOOD PRESSURE: 150 MMHG | HEIGHT: 66 IN | TEMPERATURE: 97.5 F | WEIGHT: 166.6 LBS | OXYGEN SATURATION: 95 % | DIASTOLIC BLOOD PRESSURE: 76 MMHG | BODY MASS INDEX: 26.78 KG/M2

## 2018-03-18 LAB
BACTERIA SPEC AEROBE CULT: NORMAL
BACTERIA SPEC AEROBE CULT: NORMAL
BACTERIA SPEC RESP CULT: NORMAL
BACTERIA SPEC RESP CULT: NORMAL
GRAM STN SPEC: NORMAL

## 2018-03-18 PROCEDURE — 94799 UNLISTED PULMONARY SVC/PX: CPT

## 2018-03-18 PROCEDURE — 94640 AIRWAY INHALATION TREATMENT: CPT

## 2018-03-18 PROCEDURE — 99239 HOSP IP/OBS DSCHRG MGMT >30: CPT | Performed by: INTERNAL MEDICINE

## 2018-03-18 PROCEDURE — 25010000002 PIPERACILLIN-TAZOBACTAM

## 2018-03-18 PROCEDURE — 25010000002 LINEZOLID 600 MG/300ML SOLUTION: Performed by: INTERNAL MEDICINE

## 2018-03-18 PROCEDURE — 25010000002 ENOXAPARIN PER 10 MG: Performed by: HOSPITALIST

## 2018-03-18 RX ORDER — LINEZOLID 600 MG/1
600 TABLET, FILM COATED ORAL EVERY 12 HOURS SCHEDULED
Qty: 14 TABLET | Refills: 0 | Status: SHIPPED | OUTPATIENT
Start: 2018-03-18 | End: 2018-03-25

## 2018-03-18 RX ORDER — AMOXICILLIN AND CLAVULANATE POTASSIUM 875; 125 MG/1; MG/1
1 TABLET, FILM COATED ORAL EVERY 12 HOURS SCHEDULED
Qty: 14 TABLET | Refills: 0 | Status: SHIPPED | OUTPATIENT
Start: 2018-03-18 | End: 2018-03-25

## 2018-03-18 RX ADMIN — PYRIDOXINE HCL TAB 50 MG 50 MG: 50 TAB at 08:59

## 2018-03-18 RX ADMIN — LINEZOLID 600 MG: 600 INJECTION, SOLUTION INTRAVENOUS at 14:08

## 2018-03-18 RX ADMIN — Medication 250 MG: at 08:40

## 2018-03-18 RX ADMIN — BUSPIRONE HYDROCHLORIDE 15 MG: 15 TABLET ORAL at 08:39

## 2018-03-18 RX ADMIN — TAZOBACTAM SODIUM AND PIPERACILLIN SODIUM 4.5 G: .5; 4 INJECTION, POWDER, LYOPHILIZED, FOR SOLUTION INTRAVENOUS at 01:37

## 2018-03-18 RX ADMIN — FAMOTIDINE 20 MG: 20 TABLET, FILM COATED ORAL at 08:40

## 2018-03-18 RX ADMIN — LOPERAMIDE HYDROCHLORIDE 2 MG: 2 CAPSULE ORAL at 09:29

## 2018-03-18 RX ADMIN — LOPERAMIDE HYDROCHLORIDE 2 MG: 2 CAPSULE ORAL at 04:38

## 2018-03-18 RX ADMIN — TAZOBACTAM SODIUM AND PIPERACILLIN SODIUM 4.5 G: .5; 4 INJECTION, POWDER, LYOPHILIZED, FOR SOLUTION INTRAVENOUS at 09:02

## 2018-03-18 RX ADMIN — LOPERAMIDE HYDROCHLORIDE 2 MG: 2 CAPSULE ORAL at 01:37

## 2018-03-18 RX ADMIN — FLUTICASONE PROPIONATE 2 SPRAY: 50 SPRAY, METERED NASAL at 09:00

## 2018-03-18 RX ADMIN — BUDESONIDE 0.5 MG: 0.5 INHALANT RESPIRATORY (INHALATION) at 08:34

## 2018-03-18 RX ADMIN — VALSARTAN 160 MG: 160 TABLET, FILM COATED ORAL at 08:39

## 2018-03-18 RX ADMIN — CETIRIZINE HYDROCHLORIDE 10 MG: 10 TABLET, FILM COATED ORAL at 08:39

## 2018-03-18 RX ADMIN — VITAMIN E CAP 400 UNIT 400 UNITS: 400 CAP at 08:59

## 2018-03-18 RX ADMIN — DIPHENOXYLATE HYDROCHLORIDE AND ATROPINE SULFATE 1 TABLET: 2.5; .025 TABLET ORAL at 04:38

## 2018-03-18 RX ADMIN — ENOXAPARIN SODIUM 80 MG: 80 INJECTION SUBCUTANEOUS at 08:40

## 2018-03-18 RX ADMIN — ACETAMINOPHEN 650 MG: 325 TABLET ORAL at 04:38

## 2018-03-18 RX ADMIN — FOLIC ACID 1 MG: 1 TABLET ORAL at 08:39

## 2018-03-18 RX ADMIN — DIPHENOXYLATE HYDROCHLORIDE AND ATROPINE SULFATE 1 TABLET: 2.5; .025 TABLET ORAL at 09:33

## 2018-03-18 RX ADMIN — DIPHENOXYLATE HYDROCHLORIDE AND ATROPINE SULFATE 1 TABLET: 2.5; .025 TABLET ORAL at 01:37

## 2018-03-18 NOTE — PLAN OF CARE
Problem: Patient Care Overview  Goal: Plan of Care Review  Outcome: Ongoing (interventions implemented as appropriate)    Goal: Plan of Care Review  Outcome: Ongoing (interventions implemented as appropriate)    Goal: Individualization and Mutuality  Outcome: Ongoing (interventions implemented as appropriate)    Goal: Discharge Needs Assessment  Outcome: Ongoing (interventions implemented as appropriate)    Goal: Interprofessional Rounds/Family Conf  Outcome: Ongoing (interventions implemented as appropriate)      Problem: Pneumonia (Adult)  Goal: Signs and Symptoms of Listed Potential Problems Will be Absent, Minimized or Managed (Pneumonia)  Outcome: Ongoing (interventions implemented as appropriate)

## 2018-03-18 NOTE — NURSING NOTE
Appliance is intact. No concerns at this time. Possibly home tomorrow. Please contact [facility] nurse if needs arise. Thanks

## 2018-03-18 NOTE — DISCHARGE INSTRUCTIONS
Nystatin oral suspension  What is this medicine?  NYSTATIN (amrit STAT in) is an antifungal medicine. It is used to treat certain kinds of fungal or yeast infections.  This medicine may be used for other purposes; ask your health care provider or pharmacist if you have questions.  COMMON BRAND NAME(S): Mycostatin, Nystex  What should I tell my health care provider before I take this medicine?  They need to know if you have any of these conditions:  -diabetes  -kidney disease  -an unusual or allergic reaction to nystatin, ethylenediamine, parabens, thimerosal, other foods, dyes or preservatives  -pregnant or trying to get pregnant  -breast-feeding  How should I use this medicine?  Follow the directions on the prescription label. Shake well before using. Use a specially marked dropper to measure every dose. Ask your pharmacist if you do not have one. Put one half of the dose in each side of your mouth. Swish the medicine around in your mouth and gargle. Hold your dose in your mouth for as long as you can. Swallow or spit out as directed by your doctor. Take your medicine at regular intervals. Do not take your medicine more often than directed. Do not skip doses or stop your medicine early even if you feel better. Do not stop taking except on your doctor's advice.  Talk to your pediatrician regarding the use of this medicine in children. Special care may be needed.  Overdosage: If you think you have taken too much of this medicine contact a poison control center or emergency room at once.  NOTE: This medicine is only for you. Do not share this medicine with others.  What if I miss a dose?  If you miss a dose, take it as soon as you can. If it is almost time for your next dose, take only that dose. Do not take double or extra doses.  What may interact with this medicine?  Interactions are not expected.  This list may not describe all possible interactions. Give your health care provider a list of all the medicines, herbs,  non-prescription drugs, or dietary supplements you use. Also tell them if you smoke, drink alcohol, or use illegal drugs. Some items may interact with your medicine.  What should I watch for while using this medicine?  Tell your doctor or health care professional if your symptoms do not improve or get worse. If you wear dentures talk to your doctor about how to clean them.  What side effects may I notice from receiving this medicine?  Side effects that you should report to your doctor or health care professional as soon as possible:  -allergic reactions like skin rash, itching or hives, swelling of the face, lips, or tongue  -fast heart beat  -redness, blistering, peeling or loosening of the skin, including inside the mouth  -trouble breathing  Side effects that usually do not require medical attention (report to your doctor or health care professional if they continue or are bothersome):  -diarrhea  -muscle aches or pains  -nausea, vomiting  -stomach upset  This list may not describe all possible side effects. Call your doctor for medical advice about side effects. You may report side effects to FDA at 2-492-FDA-9975.  Where should I keep my medicine?  Keep out of the reach of children.  Store at room temperature between 15 and 25 degrees C (59 and 77 degrees F). Protect from light. Throw away any unused medicine after the expiration date.  NOTE: This sheet is a summary. It may not cover all possible information. If you have questions about this medicine, talk to your doctor, pharmacist, or health care provider.  © 2018 [employer]/Gold Standard (2010-01-19 13:21:00)  Linezolid tablets  What is this medicine?  LINEZOLID (asael sawanth lid) is an oxazolidinone antibiotic. It is used to treat certain kinds of bacterial infections. It will not work for colds, flu, or other viral infections.  This medicine may be used for other purposes; ask your health care provider or pharmacist if you have questions.  COMMON BRAND NAME(S):  Zyvox  What should I tell my health care provider before I take this medicine?  They need to know if you have any of these conditions:  -  cancer  -diabetes  -  heart disease  -  high blood pressure  -  kidney disease  -  pheochromocytoma  -  untreated thyroid disease  -  an unusual or allergic reaction to linezolid, other antibiotics or medicines, foods, dyes, or preservatives  -  pregnant or trying to get pregnant  -  breast-feeding  How should I use this medicine?  Take this medicine by mouth with a glass of water. Follow the directions on the prescription label. Take with food or on an empty stomach. Take your medicine at regular intervals. Do not take your medicine more often than directed. Take all of your medicine as directed even if you think your are better. Do not skip doses or stop your medicine early.  Talk to your pediatrician regarding the use of this medicine in children. While this drug may be prescribed for selected conditions, precautions do apply.  Overdosage: If you think you have taken too much of this medicine contact a poison control center or emergency room at once.  NOTE: This medicine is only for you. Do not share this medicine with others.  What if I miss a dose?  If you miss a dose, take it as soon as you can. If it is almost time for your next dose, take only that dose. Do not take double or extra doses.  What may interact with this medicine?  Do not take this medicine with any of the following medications:  -bupropion  -certain medicines for depression, anxiety, or psychotic disturbances  -doxepin  -fluoxetine  -furazolidone  -green tea  -MAOIs like Carbex, Eldepryl, Marplan, Nardil, and Parnate  -milnacipran  -procarbazine  -rasagiline  -selegiline  -Doris's wort  -tryptophan  This medicine may also interact with the following medications:  -birth control pills  -medicines for allergies or colds like phenylpropanolamine, pseudoephedrine  -medicines for blood pressure  -stimulant  medicines for attention disorders, weight loss, or to stay awake  This list may not describe all possible interactions. Give your health care provider a list of all the medicines, herbs, non-prescription drugs, or dietary supplements you use. Also tell them if you smoke, drink alcohol, or use illegal drugs. Some items may interact with your medicine.  What should I watch for while using this medicine?  Tell your doctor or health care professional if your symptoms do not begin to improve or if you get new symptoms.  You will need to be on a special diet while taking this medicine. Ask your doctor or health care professional for a list of foods that you should try to avoid. This includes, but is not limited to, smoked or processed meats, aged cheeses, soy sauce, red mikel and beer.  Do not treat diarrhea with over-the-counter products. Contact your doctor if you have diarrhea that lasts more than 2 days or if the diarrhea is severe and watery.  What side effects may I notice from receiving this medicine?  Side effects that you should report to your doctor or health care professional as soon as possible:  -allergic reactions like skin rash, itching or hives, swelling of the face, lips, or tongue  -breathing problems  -burning, numbness, or tingling  -changes in vision  -confused, restless  -discolored, sore mouth  -fever  -irregular heart beat, blood pressure  -seizures  -tremor, trouble walking  -unusual bleeding or bruising  -unusually weak or tired  Side effects that usually do not require medical attention (report to your doctor or health care professional if they continue or are bothersome):  -changes in taste  -constipation or diarrhea  -dizzy  -headache  -nausea, vomiting  -stomach upset  -trouble sleeping  -vaginal itch, irritation  This list may not describe all possible side effects. Call your doctor for medical advice about side effects. You may report side effects to FDA at 5-940-FDA-3537.  Where should I  keep my medicine?  Keep out of the reach of children.  Store at room temperature between 15 and 30 degrees C (59 and 86 degrees F). Keep container tightly closed to protect from light and moisture. Throw away any unused medicine after the expiration date.  NOTE: This sheet is a summary. It may not cover all possible information. If you have questions about this medicine, talk to your doctor, pharmacist, or health care provider.  © 2018 [employer]/Gold Standard (2014-07-25 14:02:20)

## 2018-03-18 NOTE — DISCHARGE SUMMARY
[facility] Medicine Services  DISCHARGE SUMMARY    Patient Name: Zackary Noonan II  : 1955  MRN: [id number]    Date of Admission: 3/13/2018  Date of Discharge:  18  Primary Care Physician: Jillian Layne MD    Consults     Date and Time Order Name Status Description    3/15/2018 1107 Inpatient Infectious Diseases Consult Completed         Hospital Course     Presenting Problem:   Pneumonia of both lower lobes due to infectious organism [J18.9]    Active Hospital Problems (** Indicates Principal Problem)    Diagnosis Date Noted   • **Sepsis [A41.9] 2016   • Pneumonia of right lower lobe due to infectious organism [J18.1] 2018     Priority: Medium   • Hyponatremia [E87.1] 2018   • History of C. difficile colitis s/p fecal transplant (2017) [Z86.19] 2018   • HO of IVC filter [Z95.828] 2017   • S/P total abdominal colectomy [Z90.49] 2017   • Chronic anemia [D64.9] 2017   • Prediabetes [R73.03] 2017   • Ulcerative colitis [K51.90] 2017   • Asthma [J45.909] 2016   • Acute kidney injury [N17.9] 2016   • COPD (chronic obstructive pulmonary disease) [J44.9] 2016   • History of DVT (deep vein thrombosis) [Z86.718] 2016   • HTN (hypertension) [I10] 2016      Resolved Hospital Problems    Diagnosis Date Noted Date Resolved   No resolved problems to display.          Hospital Course:  Zackary Noonan II is a 62 y.o. 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  Wife recently had influenza B but he swabbed negative and was treated with prophylactic dose of Tamiflu.  In the emergency room he is not to be tachycardic in the 1 teens) blood cell count was 33,000  with 90% neutrophils.  Has acute renal failure with creatinine to 1.4.  Chest x-ray was done which showed some right-sided infiltrates.  He was started on empiric antibiotics for community acquired pneumonia.  Was initially scheduled for discharge on March 16 however that morning spiked another fever to 102.3.  Previously seen by Dr. Roach from infectious disease so he was consult.  Repeat chest x-ray showed persistent infiltrates.  Procalcitonin was trending down.  Dr. Roach broaden his antibiotics to Zyvox and Zosyn.  Clinically did well on this regimen for the next 48 hours.  The day of discharge she is feeling improved and ready for discharge home.  Will be prescribed an additional 7 days of Augmentin and oral Zyvox.  He'll follow-up with Dr. Roach in approximately 2 weeks.           Day of Discharge     HPI:   Continues to feel better overall.  Still feels fatigued, but breathing is improved.  He has decided he is feeling up to going home today.    Review of Systems  Gen- + fevers, chills, + fatigue  CV- No chest pain, palpitations  Resp- + cough, dyspnea  GI- No N/V/D, abd pain    Otherwise ROS is negative except as mentioned in the HPI.    Vital Signs:   Temp:  [97.5 °F (36.4 °C)] 97.5 °F (36.4 °C)  Heart Rate:  [] 94  Resp:  [20] 20  BP: (150)/(76) 150/76     Physical Exam:  Constitutional: No acute distress, awake, alert  HENT: NCAT, mucous membranes moist  Respiratory: slight bilateral rhonchi, respiratory effort normal   Cardiovascular: RRR, no murmurs, rubs, or gallops, palpable pedal pulses bilaterally  Gastrointestinal: Positive bowel sounds, soft, nontender, nondistended, ostomy in place  Musculoskeletal: No bilateral ankle edema  Psychiatric: Appropriate affect, cooperative  Neurologic: Oriented x 3, strength symmetric in all extremities, Cranial Nerves grossly intact to confrontation, speech clear  Skin: No rashes        Pertinent  and/or Most Recent Results       Results from last 7  days  Lab Units 03/17/18  0507 03/16/18  2219 03/16/18  0735 03/15/18  0531 03/14/18  0419 03/13/18  1413   WBC 10*3/mm3 14.87*  --  23.86* 17.50* 22.76* 33.02*   HEMOGLOBIN g/dL 9.4*  --  11.0* 10.1* 10.1* 12.4*   HEMATOCRIT % 30.3*  --  35.5* 32.0* 32.1* 38.7*   PLATELETS 10*3/mm3 280  --  309 266 311 370   SODIUM mmol/L  --   --  137 142 134 130*   POTASSIUM mmol/L  --  4.0 3.3* 3.8 4.0 4.4   CHLORIDE mmol/L  --   --  103 113* 106 98*   CO2 mmol/L  --   --  23.0 23.0 22.0 22.0   BUN mg/dL  --   --  8* 8* 18 22   CREATININE mg/dL  --   --  1.10 0.90 1.10 1.40*   GLUCOSE mg/dL  --   --  113* 111* 113* 221*   CALCIUM mg/dL  --   --  9.1 8.2* 8.5* 9.9       Results from last 7 days  Lab Units 03/13/18  1413   BILIRUBIN mg/dL 0.2*   ALK PHOS U/L 144*   ALT (SGPT) U/L 18   AST (SGOT) U/L 16   PROTIME Seconds 11.2   INR  1.07           Invalid input(s): TG, LDLCALC, LDLREALC    Results from last 7 days  Lab Units 03/14/18  0419 03/13/18  1413   HEMOGLOBIN A1C % 6.50*  --    BNP pg/mL  --  12.0     Brief Urine Lab Results  (Last result in the past 365 days)      Color   Clarity   Blood   Leuk Est   Nitrite   Protein   CREAT   Urine HCG        09/20/17 0053 Yellow Clear Negative Negative Negative Negative               Microbiology Results Abnormal     Procedure Component Value - Date/Time    Respiratory Culture - Sputum, Cough [[id number]] Collected:  03/16/18 1328    Lab Status:  Final result Specimen:  Sputum from Cough Updated:  03/18/18 0431     Respiratory Culture --      Light growth (2+) Normal Respiratory Preeti     Gram Stain Result Many (4+) WBCs seen      Few (2+) Gram positive cocci in groups      Less than 10 Epithelial cells seen    Blood Culture - Blood, [[id number]]  (Normal) Collected:  03/15/18 2010    Lab Status:  Preliminary result Specimen:  Blood from Hand, Right Updated:  03/17/18 2046     Blood Culture No growth at 2 days    Narrative:       Aerobic bottle only    Blood Culture - Blood, [[id number]]   (Normal) Collected:  03/15/18 2015    Lab Status:  Preliminary result Specimen:  Blood from Arm, Right Updated:  03/17/18 2046     Blood Culture No growth at 2 days    Narrative:       Aerobic bottle only    Blood Culture - Blood, [[id number]]  (Normal) Collected:  03/13/18 1515    Lab Status:  Preliminary result Specimen:  Blood from Arm, Left Updated:  03/17/18 1546     Blood Culture No growth at 4 days    Blood Culture - Blood, [[id number]]  (Normal) Collected:  03/13/18 1510    Lab Status:  Preliminary result Specimen:  Blood from Arm, Right Updated:  03/17/18 1546     Blood Culture No growth at 4 days    Legionella Antigen, Urine - Urine, Urine, Clean Catch [[id number]] Collected:  03/13/18 2205    Lab Status:  Final result Specimen:  Urine from Urine, Clean Catch Updated:  03/16/18 1716     L. pneumophila Serogp 1 Ur Ag Negative     Comment: Presumptive negative for L. pneumophila serogroup 1 antigen in urine,  suggesting no recent or current infection. Legionnaires' disease  cannot be ruled out since other serogroups and species may also cause  disease.       Narrative:       Performed at:  [address] [place] [address]  [id number]  : Niranjan Austin MD, Phone:  [phone]    S. Pneumo Ag Urine or CSF - Urine, Urine, Clean Catch [[id number]] Collected:  03/13/18 2205    Lab Status:  Final result Specimen:  Urine from Urine, Clean Catch Updated:  03/16/18 1516     Specimen Source Urine     STREP PNEUMONIAE ANTIGEN Negative     Body Fluid Culture, Sterile Not Indicated     Organism ID Not indicated.     Please note Comment     Comment: College of [employer] Pathologists standards require a culture to be  performed on CSF specimens submitted for bacterial antigen testing.  (CAP [id number]) Urine specimens will not be cultured.       Narrative:       Performed at:  [address] [place] [address]  [id number]  : Niranjan Austin MD, Phone:   [phone]          Imaging Results (all)     Procedure Component Value Units Date/Time    XR Chest PA & Lateral [[id number]] Collected:  03/16/18 1216     Updated:  03/16/18 1349    Narrative:       EXAMINATION: XR CHEST PA AND LATERAL- 03/16/2018     INDICATION: J18.9-Pneumonia, unspecified organism; A41.9-Sepsis,  unspecified organism; Z86.718-Personal history of other venous  thrombosis and embolism; Z79.01-Long term (current) use of  anticoagulants; R73.9-Hyperglycemia, unspecified      COMPARISON: 03/16/2018     FINDINGS: PA and lateral views of the chest reveal cardiac silhouette to  be borderline enlarged. Patchy airspace disease seen within the mid and  lower lung fields bilaterally. Degenerative changes seen within the  spine. Mild elevation of the right hemidiaphragm. Small bilateral  pleural effusions.           Impression:       Patchy airspace disease seen within the mid and lower lung  fields bilaterally with small bilateral pleural effusions. Findings are  unchanged.     D:  03/16/2018  E:  03/16/2018     This report was finalized on 3/16/2018 1:47 PM by Dr. Patience Blue MD.       XR Chest 1 View [[id number]] Collected:  03/16/18 0905     Updated:  03/16/18 1022    Narrative:       EXAMINATION: XR CHEST 1 VW-      INDICATION: Increased fever with pneumonia; J18.9-Pneumonia, unspecified  organism; A41.9-Sepsis, unspecified organism; Z86.718-Personal history  of other venous thrombosis and embolism; Z79.01-Long term (current) use  of anticoagulants; R73.9-Hyperglycemia, unspecified.      COMPARISON: Chest x-ray 03/13/2018.     FINDINGS: Cardiac size within normal limits. Central pulmonary  vascularity has increased with bibasilar pulmonary opacifications  similar to prior. Lung apices remain grossly clear. No pneumothorax or  large pleural effusion.           Impression:       No significant interval change.     D:  03/16/2018  E:  03/16/2018     This report was finalized on 3/16/2018 10:20 AM by  Dr. Robinson Garvey.       XR Chest 1 View [[id number]] Collected:  03/13/18 1544     Updated:  03/13/18 2159    Narrative:       EXAMINATION: XR CHEST 1 VW-03/13/2018:      INDICATION: SOA, triage protocol.      COMPARISON: 11/09/2016.     FINDINGS: The heart is upper normal size. The vasculature is mildly  cephalized. Patchy interstitial changes in the left base appear roughly  stable. There is new hazy interstitial opacity of the right base. No  densely consolidated lung significant effusion or pneumothorax is seen.            Impression:       1. Mild pulmonary venous hypertension.   2. Patchy new right lower lung disease whether atelectasis or pneumonia.  Correlate with the patient's physical exam findings.     D:  03/13/2018  E:  03/13/2018     This report was finalized on 3/13/2018 9:57 PM by DR. Davi Lemons MD.                             Order Current Status    Blood Culture - Blood, Preliminary result    Blood Culture - Blood, Preliminary result    Blood Culture - Blood, Preliminary result    Blood Culture - Blood, Preliminary result        Discharge Details      Zackary Noonan ROXY II   Home Medication Instructions CLARITZA:[id number]    Printed on:03/18/18 1506   Medication Information                      albuterol (PROVENTIL HFA;VENTOLIN HFA) 108 (90 Base) MCG/ACT inhaler  Inhale 2 puffs Every 4 (Four) Hours As Needed for Wheezing.             albuterol (PROVENTIL) (2.5 MG/3ML) 0.083% nebulizer solution  Take 2.5 mg by nebulization Every 6 (Six) Hours As Needed for Wheezing or Shortness of Air.             amLODIPine (NORVASC) 5 MG tablet  Take 5 mg by mouth Every Night.             amoxicillin-clavulanate (AUGMENTIN) 875-125 MG per tablet  Take 1 tablet by mouth Every 12 (Twelve) Hours for 7 days.             beclomethasone (QVAR) 80 MCG/ACT inhaler  Inhale 1 puff 2 (Two) Times a Day.             busPIRone (BUSPAR) 15 MG tablet  Take 15 mg by mouth 3 (Three) Times a Day.             clobetasol (CLOBEX) 0.05 %  lotion  Apply  topically 2 (Two) Times a Day. Apply to fingers and hands             diphenoxylate-atropine (LOMOTIL) 2.5-0.025 MG per tablet  Take 1 tablet by mouth 4 (Four) Times a Day As Needed for Diarrhea.             enoxaparin (LOVENOX) 80 MG/0.8ML solution syringe  Inject 75 mg under the skin Every 12 (Twelve) Hours.             famotidine-calcium carb-mag hydroxide (PEPCID COMPLETE) -165 MG chewable tablet  Chew 1 tablet Daily As Needed for Heartburn.             fluticasone (FLONASE) 50 MCG/ACT nasal spray  2 sprays into each nostril 2 (Two) Times a Day.             folic acid (FOLVITE) 1 MG tablet  Take 1 mg by mouth Daily.             ipratropium (ATROVENT) 0.06 % nasal spray  2 sprays into each nostril 4 (Four) Times a Day.             levocetirizine (XYZAL) 5 MG tablet  Take 5 mg by mouth Every Evening.             linezolid (ZYVOX) 600 MG tablet  Take 1 tablet by mouth Every 12 (Twelve) Hours for 7 days.             loperamide (IMODIUM) 2 MG capsule  Take 2 mg by mouth 4 (Four) Times a Day As Needed for Diarrhea.             mesalamine (CANASA) 1000 MG suppository  Insert 1,000 mg into the rectum Every Night.             montelukast (SINGULAIR) 10 MG tablet  Take 10 mg by mouth Every Night.             Multiple Vitamins-Minerals (SENIOR MULTIVITAMIN PLUS PO)  Take 1 tablet/day by mouth.             nystatin (MYCOSTATIN) 617021 UNIT/ML suspension  Take 5 mL by mouth 4 (Four) Times a Day.             Pyridoxine HCl (VITAMIN B-6 PO)  Take 100 mcg by mouth.             saccharomyces boulardii (FLORASTOR) 250 MG capsule  Take 250 mg by mouth Daily.             valACYclovir (VALTREX) 500 MG tablet  Take 500 mg by mouth Daily As Needed.             valsartan (DIOVAN) 160 MG tablet  Take 160 mg by mouth Daily.             vitamin E 400 UNIT capsule  Take 400 Units by mouth Daily.                   Discharge Disposition:  Home or Self Care    Discharge Diet:  Diet Instructions     Diet: Regular        Discharge Diet:  Regular          Discharge Activity:   Activity Instructions     Activity as Tolerated           No future appointments.    Additional Instructions for the Follow-ups that You Need to Schedule     Discharge Follow-up with PCP    As directed      Follow Up Details:  PCP (Catina Layne) 1 week         Discharge Follow-up with Specified Provider: Doc; 2 Weeks    As directed      To:  Doc    Follow Up:  2 Weeks               Time Spent on Discharge:  40 minutes    Electronically signed by Jason Abdalla MD, 03/18/18, 3:02 PM.

## 2018-03-20 LAB
BACTERIA SPEC AEROBE CULT: NORMAL
BACTERIA SPEC AEROBE CULT: NORMAL

## 2018-03-27 ENCOUNTER — LAB REQUISITION (OUTPATIENT)
Dept: LAB | Facility: HOSPITAL | Age: 63
End: 2018-03-27

## 2018-03-27 DIAGNOSIS — Z00.00 ROUTINE GENERAL MEDICAL EXAMINATION AT A HEALTH CARE FACILITY: ICD-10-CM

## 2018-03-27 PROCEDURE — 36415 COLL VENOUS BLD VENIPUNCTURE: CPT | Performed by: INTERNAL MEDICINE

## 2018-04-10 ENCOUNTER — LAB REQUISITION (OUTPATIENT)
Dept: LAB | Facility: HOSPITAL | Age: 63
End: 2018-04-10

## 2018-04-10 DIAGNOSIS — K50.80 CROHN'S DISEASE OF BOTH SMALL AND LARGE INTESTINE WITHOUT COMPLICATION (HCC): ICD-10-CM

## 2018-04-10 DIAGNOSIS — J18.1 LOBAR PNEUMONIA (HCC): ICD-10-CM

## 2018-04-10 DIAGNOSIS — Z79.52 LONG TERM CURRENT USE OF SYSTEMIC STEROIDS: ICD-10-CM

## 2018-04-10 DIAGNOSIS — Z00.00 ROUTINE GENERAL MEDICAL EXAMINATION AT A HEALTH CARE FACILITY: ICD-10-CM

## 2018-04-10 DIAGNOSIS — Z86.14 HISTORY OF METHICILLIN RESISTANT STAPHYLOCOCCUS AUREUS INFECTION: ICD-10-CM

## 2018-04-10 PROCEDURE — 36415 COLL VENOUS BLD VENIPUNCTURE: CPT | Performed by: INTERNAL MEDICINE

## 2018-08-21 NOTE — PROGRESS NOTES
[facility] Medicine Services  PROGRESS NOTE    Patient Name: Zackary Noonan II  : 1955  MRN: [id number]    Date of Admission: 3/13/2018  Length of Stay: 4  Primary Care Physician: Jillian Layne MD    Subjective   Subjective     CC:  F/u PNA    HPI:  Continues to feel improved.  No fevers overnight.  Energy better.  Breathing is stable.    Review of Systems  Gen- + fevers, chills  CV- No chest pain, palpitations  Resp- + cough, dyspnea  GI- No N/V/D, abd pain      Otherwise ROS is negative except as mentioned in the HPI.    Objective   Objective     Vital Signs:   Temp:  [97.4 °F (36.3 °C)-98.9 °F (37.2 °C)] 97.4 °F (36.3 °C)  Heart Rate:  [] 101  Resp:  [16-18] 18  BP: (115-130)/(61-76) 130/76        Physical Exam:  Constitutional: No acute distress, awake, alert, sitting up in bed  HENT: NCAT, mucous membranes moist  Respiratory: mild rhonchi and diminshed at right base,left is mostly clear, respiratory effort normal   Cardiovascular: RRR, no murmurs, rubs, or gallops  Gastrointestinal: Positive bowel sounds, soft, nontender, nondistended.  Ostomy empty.  Musculoskeletal: No bilateral ankle edema  Psychiatric: Appropriate affect, cooperative  Neurologic: Oriented x 3, strength symmetric in all extremities, Cranial Nerves grossly intact to confrontation, speech clear  Skin: No rashes  Exam is overall unchanged    Results Reviewed:  I have personally reviewed current lab, radiology, and data and agree.      Results from last 7 days  Lab Units 18  0507 18  0735 03/15/18  0531  18  1413   WBC 10*3/mm3 14.87* 23.86* 17.50*  < > 33.02*   HEMOGLOBIN g/dL 9.4* 11.0* 10.1*  < > 12.4*   HEMATOCRIT % 30.3* 35.5* 32.0*  < > 38.7*   PLATELETS 10*3/mm3 280 309 266  < > 370   INR   --   --   --   --  1.07   < > = values in this interval not displayed.    Results from last 7 days  Lab Units 18  2219 18  0735 03/15/18  0531 18  0419 18  1413    SODIUM mmol/L  --  137 142 134 130*   POTASSIUM mmol/L 4.0 3.3* 3.8 4.0 4.4   CHLORIDE mmol/L  --  103 113* 106 98*   CO2 mmol/L  --  23.0 23.0 22.0 22.0   BUN mg/dL  --  8* 8* 18 22   CREATININE mg/dL  --  1.10 0.90 1.10 1.40*   GLUCOSE mg/dL  --  113* 111* 113* 221*   CALCIUM mg/dL  --  9.1 8.2* 8.5* 9.9   ALT (SGPT) U/L  --   --   --   --  18   AST (SGOT) U/L  --   --   --   --  16     Estimated Creatinine Clearance: 72.5 mL/min (by C-G formula based on SCr of 1.1 mg/dL).  No results found for: BNP  No results found for: PHART    Microbiology Results Abnormal     Procedure Component Value - Date/Time    Respiratory Culture - Sputum, Cough [[id number]] Collected:  03/16/18 1328    Lab Status:  Preliminary result Specimen:  Sputum from Cough Updated:  03/17/18 0735     Respiratory Culture --      Scant growth (1+) Normal Respiratory Preeti     Gram Stain Result Many (4+) WBCs seen      Few (2+) Gram positive cocci in groups      Less than 10 Epithelial cells seen    Blood Culture - Blood, [[id number]]  (Normal) Collected:  03/15/18 2010    Lab Status:  Preliminary result Specimen:  Blood from Hand, Right Updated:  03/16/18 2046     Blood Culture No growth at 24 hours    Narrative:       Aerobic bottle only    Blood Culture - Blood, [[id number]]  (Normal) Collected:  03/15/18 2015    Lab Status:  Preliminary result Specimen:  Blood from Arm, Right Updated:  03/16/18 2046     Blood Culture No growth at 24 hours    Narrative:       Aerobic bottle only    Legionella Antigen, Urine - Urine, Urine, Clean Catch [[id number]] Collected:  03/13/18 2205    Lab Status:  Final result Specimen:  Urine from Urine, Clean Catch Updated:  03/16/18 1716     L. pneumophila Serogp 1 Ur Ag Negative     Comment: Presumptive negative for L. pneumophila serogroup 1 antigen in urine,  suggesting no recent or current infection. Legionnaires' disease  cannot be ruled out since other serogroups and species may also cause  disease.        Narrative:       Performed at:  [facility]  [id number]  : Niranjan Austin MD, Phone:  [phone]    Blood Culture - Blood, [[id number]]  (Normal) Collected:  03/13/18 1515    Lab Status:  Preliminary result Specimen:  Blood from Arm, Left Updated:  03/16/18 1546     Blood Culture No growth at 3 days    Blood Culture - Blood, [[id number]]  (Normal) Collected:  03/13/18 1510    Lab Status:  Preliminary result Specimen:  Blood from Arm, Right Updated:  03/16/18 1546     Blood Culture No growth at 3 days    S. Pneumo Ag Urine or CSF - Urine, Urine, Clean Catch [[id number]] Collected:  03/13/18 2205    Lab Status:  Final result Specimen:  Urine from Urine, Clean Catch Updated:  03/16/18 1516     Specimen Source Urine     STREP PNEUMONIAE ANTIGEN Negative     Body Fluid Culture, Sterile Not Indicated     Organism ID Not indicated.     Please note Comment     Comment: College of [employer] Pathologists standards require a culture to be  performed on CSF specimens submitted for bacterial antigen testing.  (CAP [id number]) Urine specimens will not be cultured.       Narrative:       Performed at:  [address] [place] [address]  [id number]  : Niranjan Austin MD, Phone:  [phone]          Imaging Results (last 24 hours)     ** No results found for the last 24 hours. **             I have reviewed the medications.    Assessment/Plan   Assessment / Plan     Hospital Problem List     * (Principal)Sepsis    Pneumonia of right lower lobe due to infectious organism    History of DVT (deep vein thrombosis)    Overview Signed 11/5/2016  5:17 AM by HARMAN Quintanilla; x 3; chronic coumadin therapy         HTN (hypertension)    Acute kidney injury    COPD (chronic obstructive pulmonary disease)    Asthma    Prediabetes    Chronic anemia    Ulcerative colitis    S/P total abdominal colectomy    HO of IVC filter    Hyponatremia     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)           Brief Hospital Course to date:  Zackary Noonan II is a 62 y.o. male with hx of UC on humira, s/p total colectomy, prior c diff presents with fevers, cough, malaise.  Found to have right sided PNA.      Assessment & Plan:  - abx adjust to zyvox and zosyn on 3/16 per Dr. Roach.  Today WBC is normalizing.  Pro-calcitonin was previously better as well.  - is immunosuppressed on humira  - CXR on 3/17 is stable with infiltrates  - prior hx of c diff, but given colectomy this seems unlikely cause of fever.  Has chronic diarrhea from ostomy post-colectomy without recent changes.  - on lovenox for anticoagulation (managed by PCP, recent changed from coumadin due to stoma bleeding from ulcer)  - ARF has resolved  - Na+ improved  - possible home in 1-2 days    DVT Prophylaxis:  Therapeutic lovenox    CODE STATUS: Full Code    Disposition: I expect the patient to be discharged home in 1-2 days.    Electronically signed by Jason Abdalla MD, 03/17/18, 2:16 PM.       Pt. in bed; +monitor, O2 nasal canula; alert and cooperative

## 2018-09-06 PROCEDURE — 99285 EMERGENCY DEPT VISIT HI MDM: CPT

## 2018-09-06 RX ORDER — SODIUM CHLORIDE 0.9 % (FLUSH) 0.9 %
10 SYRINGE (ML) INJECTION AS NEEDED
Status: DISCONTINUED | OUTPATIENT
Start: 2018-09-06 | End: 2018-09-11 | Stop reason: HOSPADM

## 2018-09-07 ENCOUNTER — APPOINTMENT (OUTPATIENT)
Dept: GENERAL RADIOLOGY | Facility: HOSPITAL | Age: 63
End: 2018-09-07

## 2018-09-07 ENCOUNTER — HOSPITAL ENCOUNTER (INPATIENT)
Facility: HOSPITAL | Age: 63
LOS: 4 days | Discharge: HOME | End: 2018-09-11
Attending: EMERGENCY MEDICINE | Admitting: INTERNAL MEDICINE

## 2018-09-07 DIAGNOSIS — J18.9 PNEUMONIA OF RIGHT LOWER LOBE DUE TO INFECTIOUS ORGANISM: ICD-10-CM

## 2018-09-07 DIAGNOSIS — J44.9 CHRONIC OBSTRUCTIVE PULMONARY DISEASE, UNSPECIFIED COPD TYPE (HCC): ICD-10-CM

## 2018-09-07 DIAGNOSIS — R91.1 PULMONARY NODULE: ICD-10-CM

## 2018-09-07 DIAGNOSIS — D72.829 LEUKOCYTOSIS, UNSPECIFIED TYPE: ICD-10-CM

## 2018-09-07 DIAGNOSIS — J18.9 PNEUMONIA OF LEFT LOWER LOBE DUE TO INFECTIOUS ORGANISM: Primary | ICD-10-CM

## 2018-09-07 PROBLEM — J45.909 ASTHMA: Status: ACTIVE | Noted: 2018-09-07

## 2018-09-07 PROBLEM — A41.9 SEPSIS (HCC): Status: ACTIVE | Noted: 2018-09-07

## 2018-09-07 PROBLEM — K50.90 CROHN'S DISEASE (HCC): Status: ACTIVE | Noted: 2018-09-07

## 2018-09-07 LAB
ALBUMIN SERPL-MCNC: 4.43 G/DL (ref 3.2–4.8)
ALBUMIN/GLOB SERPL: 1.5 G/DL (ref 1.5–2.5)
ALP SERPL-CCNC: 77 U/L (ref 25–100)
ALT SERPL W P-5'-P-CCNC: 26 U/L (ref 7–40)
ANION GAP SERPL CALCULATED.3IONS-SCNC: 7 MMOL/L (ref 3–11)
AST SERPL-CCNC: 23 U/L (ref 0–33)
BASOPHILS # BLD AUTO: 0.04 10*3/MM3 (ref 0–0.2)
BASOPHILS NFR BLD AUTO: 0.2 % (ref 0–1)
BILIRUB SERPL-MCNC: 0.3 MG/DL (ref 0.3–1.2)
BNP SERPL-MCNC: 29 PG/ML (ref 0–100)
BUN BLD-MCNC: 9 MG/DL (ref 9–23)
BUN/CREAT SERPL: 9.9 (ref 7–25)
CALCIUM SPEC-SCNC: 9.2 MG/DL (ref 8.7–10.4)
CHLORIDE SERPL-SCNC: 107 MMOL/L (ref 99–109)
CO2 SERPL-SCNC: 23 MMOL/L (ref 20–31)
CREAT BLD-MCNC: 0.91 MG/DL (ref 0.6–1.3)
D-LACTATE SERPL-SCNC: 0.9 MMOL/L (ref 0.5–2)
DEPRECATED RDW RBC AUTO: 53.2 FL (ref 37–54)
EOSINOPHIL # BLD AUTO: 0.09 10*3/MM3 (ref 0–0.3)
EOSINOPHIL NFR BLD AUTO: 0.5 % (ref 0–3)
ERYTHROCYTE [DISTWIDTH] IN BLOOD BY AUTOMATED COUNT: 16.4 % (ref 11.3–14.5)
FLUAV AG NPH QL: NEGATIVE
FLUBV AG NPH QL IA: NEGATIVE
GFR SERPL CREATININE-BSD FRML MDRD: 84 ML/MIN/1.73
GLOBULIN UR ELPH-MCNC: 2.9 GM/DL
GLUCOSE BLD-MCNC: 134 MG/DL (ref 70–100)
HCT VFR BLD AUTO: 41.1 % (ref 38.9–50.9)
HGB BLD-MCNC: 13.4 G/DL (ref 13.1–17.5)
HOLD SPECIMEN: NORMAL
HOLD SPECIMEN: NORMAL
IMM GRANULOCYTES # BLD: 0.07 10*3/MM3 (ref 0–0.03)
IMM GRANULOCYTES NFR BLD: 0.4 % (ref 0–0.6)
LYMPHOCYTES # BLD AUTO: 1.13 10*3/MM3 (ref 0.6–4.8)
LYMPHOCYTES NFR BLD AUTO: 6.2 % (ref 24–44)
MCH RBC QN AUTO: 29.4 PG (ref 27–31)
MCHC RBC AUTO-ENTMCNC: 32.6 G/DL (ref 32–36)
MCV RBC AUTO: 90.1 FL (ref 80–99)
MONOCYTES # BLD AUTO: 0.94 10*3/MM3 (ref 0–1)
MONOCYTES NFR BLD AUTO: 5.1 % (ref 0–12)
NEUTROPHILS # BLD AUTO: 16.17 10*3/MM3 (ref 1.5–8.3)
NEUTROPHILS NFR BLD AUTO: 88 % (ref 41–71)
PLATELET # BLD AUTO: 236 10*3/MM3 (ref 150–450)
PMV BLD AUTO: 8.8 FL (ref 6–12)
POTASSIUM BLD-SCNC: 3.8 MMOL/L (ref 3.5–5.5)
PROT SERPL-MCNC: 7.3 G/DL (ref 5.7–8.2)
RBC # BLD AUTO: 4.56 10*6/MM3 (ref 4.2–5.76)
SODIUM BLD-SCNC: 137 MMOL/L (ref 132–146)
TROPONIN I SERPL-MCNC: <0.006 NG/ML
WBC NRBC COR # BLD: 18.37 10*3/MM3 (ref 3.5–10.8)
WHOLE BLOOD HOLD SPECIMEN: NORMAL
WHOLE BLOOD HOLD SPECIMEN: NORMAL

## 2018-09-07 PROCEDURE — 87449 NOS EACH ORGANISM AG IA: CPT | Performed by: NURSE PRACTITIONER

## 2018-09-07 PROCEDURE — 83880 ASSAY OF NATRIURETIC PEPTIDE: CPT | Performed by: INTERNAL MEDICINE

## 2018-09-07 PROCEDURE — 99223 1ST HOSP IP/OBS HIGH 75: CPT | Performed by: INTERNAL MEDICINE

## 2018-09-07 PROCEDURE — 94640 AIRWAY INHALATION TREATMENT: CPT

## 2018-09-07 PROCEDURE — 87899 AGENT NOS ASSAY W/OPTIC: CPT | Performed by: NURSE PRACTITIONER

## 2018-09-07 PROCEDURE — 25010000002 ENOXAPARIN PER 10 MG: Performed by: INTERNAL MEDICINE

## 2018-09-07 PROCEDURE — 71046 X-RAY EXAM CHEST 2 VIEWS: CPT

## 2018-09-07 PROCEDURE — 87040 BLOOD CULTURE FOR BACTERIA: CPT | Performed by: EMERGENCY MEDICINE

## 2018-09-07 PROCEDURE — 83605 ASSAY OF LACTIC ACID: CPT | Performed by: EMERGENCY MEDICINE

## 2018-09-07 PROCEDURE — 25010000003 CEFTRIAXONE PER 250 MG: Performed by: NURSE PRACTITIONER

## 2018-09-07 PROCEDURE — 85025 COMPLETE CBC W/AUTO DIFF WBC: CPT | Performed by: EMERGENCY MEDICINE

## 2018-09-07 PROCEDURE — 25010000002 ONDANSETRON PER 1 MG: Performed by: NURSE PRACTITIONER

## 2018-09-07 PROCEDURE — 94799 UNLISTED PULMONARY SVC/PX: CPT

## 2018-09-07 PROCEDURE — 87804 INFLUENZA ASSAY W/OPTIC: CPT | Performed by: NURSE PRACTITIONER

## 2018-09-07 PROCEDURE — 25010000002 LINEZOLID 600 MG/300ML SOLUTION: Performed by: NURSE PRACTITIONER

## 2018-09-07 PROCEDURE — 80053 COMPREHEN METABOLIC PANEL: CPT | Performed by: EMERGENCY MEDICINE

## 2018-09-07 PROCEDURE — 93005 ELECTROCARDIOGRAM TRACING: CPT | Performed by: NURSE PRACTITIONER

## 2018-09-07 PROCEDURE — 94760 N-INVAS EAR/PLS OXIMETRY 1: CPT

## 2018-09-07 PROCEDURE — 25010000002 AZITHROMYCIN: Performed by: NURSE PRACTITIONER

## 2018-09-07 PROCEDURE — 84484 ASSAY OF TROPONIN QUANT: CPT | Performed by: NURSE PRACTITIONER

## 2018-09-07 PROCEDURE — 25010000002 PIPERACILLIN SOD-TAZOBACTAM PER 1 G: Performed by: NURSE PRACTITIONER

## 2018-09-07 RX ORDER — FLUTICASONE PROPIONATE 50 MCG
2 SPRAY, SUSPENSION (ML) NASAL DAILY
Status: DISCONTINUED | OUTPATIENT
Start: 2018-09-07 | End: 2018-09-11 | Stop reason: HOSPADM

## 2018-09-07 RX ORDER — AMLODIPINE BESYLATE 5 MG/1
5 TABLET ORAL NIGHTLY
Status: DISCONTINUED | OUTPATIENT
Start: 2018-09-07 | End: 2018-09-11 | Stop reason: HOSPADM

## 2018-09-07 RX ORDER — ACETAMINOPHEN 325 MG/1
650 TABLET ORAL EVERY 6 HOURS PRN
Status: DISCONTINUED | OUTPATIENT
Start: 2018-09-07 | End: 2018-09-11 | Stop reason: HOSPADM

## 2018-09-07 RX ORDER — LOSARTAN POTASSIUM 100 MG/1
100 TABLET ORAL DAILY
COMMUNITY
End: 2019-04-15 | Stop reason: HOSPADM

## 2018-09-07 RX ORDER — CLOBETASOL PROPIONATE 0.5 MG/G
CREAM TOPICAL EVERY 12 HOURS SCHEDULED
Status: DISCONTINUED | OUTPATIENT
Start: 2018-09-07 | End: 2018-09-11 | Stop reason: HOSPADM

## 2018-09-07 RX ORDER — IPRATROPIUM BROMIDE AND ALBUTEROL SULFATE 2.5; .5 MG/3ML; MG/3ML
3 SOLUTION RESPIRATORY (INHALATION)
Status: DISCONTINUED | OUTPATIENT
Start: 2018-09-07 | End: 2018-09-11 | Stop reason: HOSPADM

## 2018-09-07 RX ORDER — MONTELUKAST SODIUM 10 MG/1
10 TABLET ORAL NIGHTLY
Status: DISCONTINUED | OUTPATIENT
Start: 2018-09-07 | End: 2018-09-11 | Stop reason: HOSPADM

## 2018-09-07 RX ORDER — IPRATROPIUM BROMIDE AND ALBUTEROL SULFATE 2.5; .5 MG/3ML; MG/3ML
3 SOLUTION RESPIRATORY (INHALATION) ONCE
Status: COMPLETED | OUTPATIENT
Start: 2018-09-07 | End: 2018-09-07

## 2018-09-07 RX ORDER — LINEZOLID 2 MG/ML
600 INJECTION, SOLUTION INTRAVENOUS EVERY 12 HOURS
Status: DISCONTINUED | OUTPATIENT
Start: 2018-09-07 | End: 2018-09-11

## 2018-09-07 RX ORDER — ALBUTEROL SULFATE 2.5 MG/3ML
2.5 SOLUTION RESPIRATORY (INHALATION) EVERY 4 HOURS PRN
Status: DISCONTINUED | OUTPATIENT
Start: 2018-09-07 | End: 2018-09-11 | Stop reason: HOSPADM

## 2018-09-07 RX ORDER — SACCHAROMYCES BOULARDII 250 MG
250 CAPSULE ORAL DAILY
Status: DISCONTINUED | OUTPATIENT
Start: 2018-09-07 | End: 2018-09-11 | Stop reason: HOSPADM

## 2018-09-07 RX ORDER — BUDESONIDE 0.5 MG/2ML
0.5 INHALANT ORAL
Status: DISCONTINUED | OUTPATIENT
Start: 2018-09-07 | End: 2018-09-11 | Stop reason: HOSPADM

## 2018-09-07 RX ORDER — FOLIC ACID 1 MG/1
1 TABLET ORAL DAILY
Status: DISCONTINUED | OUTPATIENT
Start: 2018-09-07 | End: 2018-09-11 | Stop reason: HOSPADM

## 2018-09-07 RX ORDER — ACETAMINOPHEN 500 MG
1000 TABLET ORAL ONCE
Status: COMPLETED | OUTPATIENT
Start: 2018-09-07 | End: 2018-09-07

## 2018-09-07 RX ORDER — SULFAMETHOXAZOLE AND TRIMETHOPRIM 800; 160 MG/1; MG/1
1 TABLET ORAL 3 TIMES WEEKLY
COMMUNITY
End: 2019-04-09

## 2018-09-07 RX ORDER — IPRATROPIUM BROMIDE 42 UG/1
2 SPRAY, METERED NASAL 4 TIMES DAILY
Status: DISCONTINUED | OUTPATIENT
Start: 2018-09-07 | End: 2018-09-07

## 2018-09-07 RX ORDER — BUSPIRONE HYDROCHLORIDE 10 MG/1
15 TABLET ORAL 3 TIMES DAILY
Status: DISCONTINUED | OUTPATIENT
Start: 2018-09-07 | End: 2018-09-07

## 2018-09-07 RX ORDER — BUSPIRONE HYDROCHLORIDE 10 MG/1
15 TABLET ORAL EVERY 12 HOURS SCHEDULED
Status: DISCONTINUED | OUTPATIENT
Start: 2018-09-07 | End: 2018-09-11

## 2018-09-07 RX ORDER — MULTIPLE VITAMINS W/ MINERALS TAB 9MG-400MCG
1 TAB ORAL DAILY
Status: DISCONTINUED | OUTPATIENT
Start: 2018-09-07 | End: 2018-09-11 | Stop reason: HOSPADM

## 2018-09-07 RX ORDER — CEFTRIAXONE SODIUM 2 G/50ML
2 INJECTION, SOLUTION INTRAVENOUS ONCE
Status: COMPLETED | OUTPATIENT
Start: 2018-09-07 | End: 2018-09-07

## 2018-09-07 RX ORDER — SODIUM CHLORIDE 0.9 % (FLUSH) 0.9 %
10 SYRINGE (ML) INJECTION AS NEEDED
Status: DISCONTINUED | OUTPATIENT
Start: 2018-09-07 | End: 2018-09-11 | Stop reason: HOSPADM

## 2018-09-07 RX ORDER — ONDANSETRON 2 MG/ML
4 INJECTION INTRAMUSCULAR; INTRAVENOUS EVERY 6 HOURS PRN
Status: DISCONTINUED | OUTPATIENT
Start: 2018-09-07 | End: 2018-09-11 | Stop reason: HOSPADM

## 2018-09-07 RX ORDER — VITAMIN E 268 MG
400 CAPSULE ORAL DAILY
Status: DISCONTINUED | OUTPATIENT
Start: 2018-09-07 | End: 2018-09-11 | Stop reason: HOSPADM

## 2018-09-07 RX ORDER — VALSARTAN 160 MG/1
160 TABLET ORAL DAILY
Status: DISCONTINUED | OUTPATIENT
Start: 2018-09-07 | End: 2018-09-08

## 2018-09-07 RX ADMIN — TAZOBACTAM SODIUM AND PIPERACILLIN SODIUM 3.38 G: 375; 3 INJECTION, SOLUTION INTRAVENOUS at 06:07

## 2018-09-07 RX ADMIN — MULTIPLE VITAMINS W/ MINERALS TAB 1 TABLET: TAB ORAL at 09:08

## 2018-09-07 RX ADMIN — AMLODIPINE BESYLATE 5 MG: 5 TABLET ORAL at 20:56

## 2018-09-07 RX ADMIN — CLOBETASOL PROPIONATE: 0.5 CREAM TOPICAL at 09:07

## 2018-09-07 RX ADMIN — BUDESONIDE 0.5 MG: 0.5 INHALANT RESPIRATORY (INHALATION) at 09:00

## 2018-09-07 RX ADMIN — BUSPIRONE HYDROCHLORIDE 15 MG: 10 TABLET ORAL at 20:56

## 2018-09-07 RX ADMIN — TAZOBACTAM SODIUM AND PIPERACILLIN SODIUM 3.38 G: 375; 3 INJECTION, SOLUTION INTRAVENOUS at 20:55

## 2018-09-07 RX ADMIN — VITAMIN E CAP 400 UNIT 400 UNITS: 400 CAP at 09:07

## 2018-09-07 RX ADMIN — CEFTRIAXONE SODIUM 2 G: 2 INJECTION, SOLUTION INTRAVENOUS at 04:05

## 2018-09-07 RX ADMIN — SODIUM CHLORIDE 1000 ML: 9 INJECTION, SOLUTION INTRAVENOUS at 02:13

## 2018-09-07 RX ADMIN — ACETAMINOPHEN 1000 MG: 500 TABLET, FILM COATED ORAL at 01:57

## 2018-09-07 RX ADMIN — FLUTICASONE PROPIONATE 2 SPRAY: 50 SPRAY, METERED NASAL at 09:08

## 2018-09-07 RX ADMIN — LINEZOLID 600 MG: 600 INJECTION, SOLUTION INTRAVENOUS at 06:39

## 2018-09-07 RX ADMIN — ACETAMINOPHEN 650 MG: 325 TABLET ORAL at 20:56

## 2018-09-07 RX ADMIN — IPRATROPIUM BROMIDE 2 SPRAY: 42 SPRAY NASAL at 11:20

## 2018-09-07 RX ADMIN — MONTELUKAST SODIUM 10 MG: 10 TABLET, COATED ORAL at 20:56

## 2018-09-07 RX ADMIN — ENOXAPARIN SODIUM 80 MG: 80 INJECTION SUBCUTANEOUS at 17:20

## 2018-09-07 RX ADMIN — BUDESONIDE 0.5 MG: 0.5 INHALANT RESPIRATORY (INHALATION) at 20:43

## 2018-09-07 RX ADMIN — IPRATROPIUM BROMIDE AND ALBUTEROL SULFATE 3 ML: 2.5; .5 SOLUTION RESPIRATORY (INHALATION) at 02:01

## 2018-09-07 RX ADMIN — LINEZOLID 600 MG: 600 INJECTION, SOLUTION INTRAVENOUS at 17:20

## 2018-09-07 RX ADMIN — AZITHROMYCIN MONOHYDRATE 500 MG: 500 INJECTION, POWDER, LYOPHILIZED, FOR SOLUTION INTRAVENOUS at 04:52

## 2018-09-07 RX ADMIN — BUSPIRONE HYDROCHLORIDE 15 MG: 10 TABLET ORAL at 09:07

## 2018-09-07 RX ADMIN — TAZOBACTAM SODIUM AND PIPERACILLIN SODIUM 3.38 G: 375; 3 INJECTION, SOLUTION INTRAVENOUS at 11:20

## 2018-09-07 RX ADMIN — ONDANSETRON 4 MG: 2 INJECTION INTRAMUSCULAR; INTRAVENOUS at 06:39

## 2018-09-07 RX ADMIN — FOLIC ACID 1 MG: 1 TABLET ORAL at 09:07

## 2018-09-07 RX ADMIN — IPRATROPIUM BROMIDE 2 SPRAY: 42 SPRAY NASAL at 09:08

## 2018-09-07 RX ADMIN — Medication 250 MG: at 09:07

## 2018-09-07 RX ADMIN — CLOBETASOL PROPIONATE: 0.5 CREAM TOPICAL at 20:56

## 2018-09-07 NOTE — PLAN OF CARE
Problem: Patient Care Overview  Goal: Plan of Care Review  Outcome: Ongoing (interventions implemented as appropriate)   09/07/18 0450   Coping/Psychosocial   Plan of Care Reviewed With patient   Plan of Care Review   Progress no change   OTHER   Outcome Summary Pt admitted from ED, sepsis positive, flu swab negative per ED report, abx given per MD order.

## 2018-09-07 NOTE — H&P
"[facility] Medicine Services  HISTORY AND PHYSICAL    Patient Name: Zackary Noonan II  : 1955  MRN: [id number]  Primary Care Physician: Jillian Layne MD    Subjective   Subjective     Chief Complaint:  Chest congestion, chills, nausea     HPI:  Zackary Noonan II is a 63 y.o. male with a past medical history significant for asthma, essential hypertension, Cdiff (s/p fecal transplant 2017), crohn's disease, DVT (s/p IVC filter) and recurrent pneumonia presents to the ED with complaints of chest congestion, chills and nausea that initially began yesterday.  Patient states that yesterday is when first noticed intermittent chills. This morning as he was getting ready for work he overall felt \"bad.\"  Patient states he has had several admissions for pneumonia and felt that he needed to come to the ED for further evaluation.  Wife states last night she could heart a \"rattle\" in his chest however patient denies any shortness of air, chest pain, vomiting, abdominal pain or diarrhea.  He states that he has had a non-productive cough.  He denies any recent steroid use, antibiotic use of recent hospitalizations.  He was diagnosed with what was thought to be ulcerative colitis a year ago and had a colectomy performed.  Wife now states that he had crohn's disease but they decided no immunosuppressant therapy due to recurrent pneumonia.  Of note patient is followed by Dr. Roach for recurrent pneumonia, history of Cdiff.  With last admission in March he was discharged on bactrim to take every other day which he currently is on.  CXR obtained tonight shows left basal opacities concerning for pneumonia.  Patient will be admitted to [facility] under the care of the Hospitalist for further evaluation and treatment.     Review of Systems   Constitutional: Positive for activity change, appetite change, chills and fatigue. Negative for diaphoresis, fever and unexpected weight change.   HENT: Positive for " congestion. Negative for postnasal drip, sinus pain, sinus pressure and sore throat.    Eyes: Negative for photophobia and visual disturbance.   Respiratory: Positive for cough. Negative for shortness of breath.    Cardiovascular: Negative for chest pain, palpitations and leg swelling.   Gastrointestinal: Positive for nausea. Negative for abdominal distention, abdominal pain, blood in stool, constipation, diarrhea and vomiting.   Genitourinary: Negative.    Musculoskeletal: Negative.    Skin: Negative.    Neurological: Negative.    Psychiatric/Behavioral: Negative.         Otherwise 10-system ROS reviewed and is negative except as mentioned in the HPI.    Personal History     Past Medical History:   Diagnosis Date   • Anxiety    • Arthritis    • Asthma    • Clostridium difficile infection 01/2017    treated per dr carter with fecal transplant 8-2017    • H/O recurrent pneumonia    • Hypertension    • MRSA infection 2016    right lower extremity wound   • Pulmonary nodule     Followed by Dr. Galaviz    • Recurrent deep vein thrombosis (DVT) (CMS/HCC)     x 3 LLE; chronic anticoagulation therapy    • Ulcerative colitis (CMS/HCC)    • Wears glasses    • Wears hearing aid        Past Surgical History:   Procedure Laterality Date   • BRONCHOSCOPY      by Dr. Galaviz for pulmonary nodule   • BRONCHOSCOPY N/A 11/7/2016    Procedure: BRONCHOSCOPY;  Surgeon: Eben Roberts MD;  Location:  [facility] ENDOSCOPY;  Service:    • COLON RESECTION N/A 9/19/2017    Procedure: TOTAL ABDOMINAL COLECTOMY WITH CREATION OF ILEOSTOMY;  Surgeon: David Nielsen MD;  Location:  [facility] OR;  Service:    • COLONOSCOPY  08/2017   • UMBILICAL HERNIA REPAIR     • VASECTOMY     • VASECTOMY REVERSAL         Family History: family history includes Hypertension in his mother.     Social History:  reports that he has never smoked. He has never used smokeless tobacco. He reports that he drinks alcohol. He reports that he does not use  "drugs.    Medications:    (Not in a hospital admission)    Allergies   Allergen Reactions   • Beta Adrenergic Blockers Other (See Comments)     Vocal changes   • Levaquin [Levofloxacin In D5w] Other (See Comments)     Heaviness in legs, \"felt like lead\"       Objective   Objective     Vital Signs:   Temp:  [103 °F (39.4 °C)] 103 °F (39.4 °C)  Heart Rate:  [110-128] 118  Resp:  [20] 20  BP: (137-173)/(66-79) 138/69        Physical Exam   Constitutional: He is oriented to person, place, and time. He appears well-developed and well-nourished. No distress.   Alert and oriented x4   HENT:   Head: Normocephalic and atraumatic.   Eyes: Pupils are equal, round, and reactive to light. Conjunctivae and EOM are normal. Right eye exhibits no discharge. Left eye exhibits no discharge. No scleral icterus.   Neck: Normal range of motion. Neck supple. No JVD present. No tracheal deviation present. No thyromegaly present.   Cardiovascular: Normal rate, regular rhythm, normal heart sounds and intact distal pulses.  Exam reveals no gallop and no friction rub.    No murmur heard.  Pulmonary/Chest: Effort normal. No stridor. No respiratory distress. He has no wheezes. He exhibits no tenderness.   Audible congestion, coarse rhonchi throughout.    Abdominal: Soft. Bowel sounds are normal. He exhibits no distension and no mass. There is no tenderness. There is no rebound and no guarding. No hernia.   Musculoskeletal: Normal range of motion. He exhibits no edema, tenderness or deformity.   Lymphadenopathy:     He has no cervical adenopathy.   Neurological: He is alert and oriented to person, place, and time.   Skin: Skin is warm and dry. No rash noted. He is not diaphoretic. No erythema. No pallor.   Psychiatric: He has a normal mood and affect. His behavior is normal. Judgment and thought content normal.   Nursing note and vitals reviewed.       Results Reviewed:  I have personally reviewed current lab, radiology, and data and " agree.      Results from last 7 days  Lab Units 09/07/18  0210   WBC 10*3/mm3 18.37*   HEMOGLOBIN g/dL 13.4   HEMATOCRIT % 41.1   PLATELETS 10*3/mm3 236       Results from last 7 days  Lab Units 09/07/18  0210   SODIUM mmol/L 137   POTASSIUM mmol/L 3.8   CHLORIDE mmol/L 107   CO2 mmol/L 23.0   BUN mg/dL 9   CREATININE mg/dL 0.91   GLUCOSE mg/dL 134*   CALCIUM mg/dL 9.2   ALT (SGPT) U/L 26   AST (SGOT) U/L 23     No results found for: BNP  No results found for: PHART  Imaging Results (last 24 hours)     Procedure Component Value Units Date/Time    XR Chest 2 View [[id number]] Collected:  09/07/18 0132     Updated:  09/07/18 0225    Narrative:       EXAM:    XR Chest, 2 Views    CLINICAL HISTORY:    63 years old, male; Signs and symptoms; Cough and shortness of breath;   Additional info: Cough, SOA    TECHNIQUE:    Frontal and lateral views of the chest.    COMPARISON:    CR - XR CHEST PA AND LATERAL 3/16/2018 11:10 AM    FINDINGS:    Lungs:  Patchy opacities in the left lung base and somewhat streaky opacities   in the inferior right lung base.  Compared to previous exam, the left basilar   opacities appear more dense, although opacity posteriorly on the lateral view   is less extensive.    Pleural space:  Unremarkable.  No pneumothorax.    Heart:  Unremarkable.    Mediastinum:  Unremarkable.    Bones/joints:  Unremarkable.      Impression:         Left basal opacities suggestive of pneumonia with atelectasis and/or   infiltrate inferiorly in the right lung base.    THIS DOCUMENT HAS BEEN ELECTRONICALLY SIGNED BY ANTHONY WILLIAM MD             Assessment/Plan   Assessment / Plan     Hospital Problem List     * (Principal)Sepsis (CMS/HCC)    History of DVT (deep vein thrombosis)    Overview Signed 11/5/2016  5:17 AM by Elizabeth Parikh APRN     LLE; x 3; chronic coumadin therapy         HTN (hypertension)    HO of IVC filter    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)    Left lower lobe pneumonia  (CMS/[facility])    Crohn's disease (CMS/[facility])    Asthma            Assessment & Plan:  63 year old male presents to the ED with fatigue, chills and fever.      1) Sepsis  -temp 103, hr: 121, WBC 18.37  -secondary to underlying left basal pneumonia  -received dose of azithromycin and rocephin in the ED, will change to zyvox and zosyn as recommended by ID with last admission of pneumonia in March 2018.  -blood cultures pending  -IVF  -prn anti-pyretics  -rapid flu negative, will send PCR  -sputum culture  -check urine antigens  - consult Dr. Roach    2) Asthma  -continue scheduled duo-nebs  -continuous pulse ox  -keep o2 sat >90%    3) History of DVT  -s.o IVC filter. Continue anticoagulation    4) Essential hypertension  -continue medications with holding parameters    5) Crohn's disease  -s.p colectomy a year ago with Dr. Nielsen  -colostomy present          DVT prophylaxis:teds/scds lovenox    CODE STATUS:  Full code   There are no questions and answers to display.       Admission Status:  I believe this patient meets INPATIENT status due to the need for care which can only be reasonably provided in an hospital setting such as aggressive/expedited ancillary services and/or consultation services, the necessity for IV medications, close physician monitoring and/or the possible need for procedures.  In such, I feel patient’s risk for adverse outcomes and need for care warrant INPATIENT evaluation and predict the patient’s care encounter to likely last beyond 2 midnights.    HARMAN Andrea   09/07/18   3:49 AM        Brief Attending Admission Attestation     I have seen and examined the patient, performing an independent face-to-face diagnostic evaluation with plan of care reviewed and developed with the advanced practice clinician (APC).      Brief Summary Statement/HPI:   Zackary Noonan II is a 63 y.o. male With a history of recurrent pneumonias followed by Dr. Roach, history of C. Difficile, History of Crohn's  disease status post colectomy not currently on immunosuppressives who presents to the emergency room after waking up in feeling poorly.  He had a rattle in his chest and a positive cough but not very productive.  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  However continued to feel more poorly throughout the day inside come to the emergency room.  Was noted to have a temperature of 103.  White blood cell count is 18,000 and chest x-ray shows evidence of a left lower lobe infiltrate.  In the emergency room was given azithromycin and Rocephin and is being admitted to the hospitalist service for further management.  Of note was admitted here in March of this year and was seen by Dr. Roach at that time and discharged on Zyvox and Augmentin.  Patient tells me he has been on every other day Bactrim since that time.    Attending Physical Exam:  Constitutional: No acute distress, awake, alert  Eyes: PERRLA, sclerae anicteric, no conjunctival injection  HENT: NCAT, mucous membranes moist  Neck: Supple, no thyromegaly, no lymphadenopathy, trachea midline  Respiratory: + audible rattle across the room, no distress, but diffuse bilateral rhonchi,   Cardiovascular: RRR, no murmurs, rubs, or gallops, palpable pedal pulses bilaterally  Gastrointestinal: Positive bowel sounds, soft, nontender, nondistended  Musculoskeletal: No bilateral ankle edema, no clubbing or cyanosis to extremities  Psychiatric: Appropriate affect, cooperative  Neurologic: Oriented x 3, no focal deficits  Skin: No rashes      Brief Assessment/Plan :  See above for further detailed assessment and plan developed with APC which I have reviewed and/or edited.    63-year-old with the above history presenting with fever, leukocytosis, evidence of pneumonia on chest x-ray.  Multiple prior episodes of pneumonia followed by Dr. Roach and is on prophylactic every other day Bactrim.  Cultures have been drawn.  We'll  broaden coverage considering prior history to Zyvox and Zosyn at this time.  Will consult Dr. Roach to get his recommendations in the morning.  Plan otherwise as above.    Electronically signed by Jason Abdalla MD, 09/07/18, 5:06 AM.

## 2018-09-07 NOTE — PROGRESS NOTES
[facility] Medicine Services  PROGRESS NOTE    Patient Name: Zackary Noonan II  : 1955  MRN: [id number]    Date of Admission: 2018  Length of Stay: 0  Primary Care Physician: Jillian Layne MD    Subjective   Subjective     CC:  pneumonia    HPI:  Some cough, feels tired    Review of Systems  Gen- fevers, no chills, + fatigue  CV- No chest pain, palpitations  Resp- + cough and dyspnea  GI- No N/V/D, abd pain        Otherwise ROS is negative except as mentioned in the HPI.    Objective   Objective     Vital Signs:   Temp:  [99 °F (37.2 °C)-103 °F (39.4 °C)] 100.2 °F (37.9 °C)  Heart Rate:  [107-128] 123  Resp:  [15-20] 15  BP: (105-173)/(53-79) 149/63        Physical Exam:    Constitutional -in bed, appears fatigued  HEENT-NCAT, mucous membranes moist  CV-mild tachycardia, regular, no murmur  Resp-scattered rhonchi, most prominent left base  Abd-soft, non-tender, non-distended, normo active bowel sounds  Ext-No lower extremity cyanosis, clubbing or edema bilaterally  Neuro-alert and oriented, speech clear, moves all extremities   Psych-normal affect   Skin- No rash on exposed UE or LE bilaterally      Results Reviewed:  I have personally reviewed current lab, radiology, and data and agree.      Results from last 7 days  Lab Units 18  0210   WBC 10*3/mm3 18.37*   HEMOGLOBIN g/dL 13.4   HEMATOCRIT % 41.1   PLATELETS 10*3/mm3 236       Results from last 7 days  Lab Units 18  0210   SODIUM mmol/L 137   POTASSIUM mmol/L 3.8   CHLORIDE mmol/L 107   CO2 mmol/L 23.0   BUN mg/dL 9   CREATININE mg/dL 0.91   GLUCOSE mg/dL 134*   CALCIUM mg/dL 9.2   ALT (SGPT) U/L 26   AST (SGOT) U/L 23   TROPONIN I ng/mL <0.006     Estimated Creatinine Clearance: 80.8 mL/min (by C-G formula based on SCr of 0.91 mg/dL).  BNP   Date Value Ref Range Status   2018 29.0 0.0 - 100.0 pg/mL Final     Comment:     Results may be falsely decreased if patient taking Biotin.       Microbiology  Results Abnormal     Procedure Component Value - Date/Time    Blood Culture - Blood, [[id number]]  (Normal) Collected:  09/07/18 0215    Lab Status:  Preliminary result Specimen:  Blood from Arm, Right Updated:  09/07/18 1431     Blood Culture No growth at less than 24 hours    Blood Culture - Blood, [[id number]]  (Normal) Collected:  09/07/18 0215    Lab Status:  Preliminary result Specimen:  Blood from Arm, Left Updated:  09/07/18 1431     Blood Culture No growth at less than 24 hours    Influenza Antigen, Rapid - Swab, Nasopharynx [[id number]]  (Normal) Collected:  09/07/18 0212    Lab Status:  Final result Specimen:  Swab from Nasopharynx Updated:  09/07/18 0237     Influenza A Ag, EIA Negative     Influenza B Ag, EIA Negative          Imaging Results (last 24 hours)     Procedure Component Value Units Date/Time    XR Chest 2 View [[id number]] Collected:  09/07/18 0132     Updated:  09/07/18 0225    Narrative:       EXAM:    XR Chest, 2 Views    CLINICAL HISTORY:    63 years old, male; Signs and symptoms; Cough and shortness of breath;   Additional info: Cough, SOA    TECHNIQUE:    Frontal and lateral views of the chest.    COMPARISON:    CR - XR CHEST PA AND LATERAL 3/16/2018 11:10 AM    FINDINGS:    Lungs:  Patchy opacities in the left lung base and somewhat streaky opacities   in the inferior right lung base.  Compared to previous exam, the left basilar   opacities appear more dense, although opacity posteriorly on the lateral view   is less extensive.    Pleural space:  Unremarkable.  No pneumothorax.    Heart:  Unremarkable.    Mediastinum:  Unremarkable.    Bones/joints:  Unremarkable.      Impression:         Left basal opacities suggestive of pneumonia with atelectasis and/or   infiltrate inferiorly in the right lung base.    THIS DOCUMENT HAS BEEN ELECTRONICALLY SIGNED BY ANTHONY WILLIAM MD             I have reviewed the medications.      amLODIPine 5 mg Oral Nightly   budesonide 0.5 mg Nebulization BID - RT    busPIRone 15 mg Oral Q12H   clobetasol  Topical Q12H   enoxaparin 80 mg Subcutaneous BID   fluticasone 2 spray Nasal Daily   folic acid 1 mg Oral Daily   ipratropium-albuterol 3 mL Nebulization 4x Daily - RT   Linezolid 600 mg Intravenous Q12H   montelukast 10 mg Oral Nightly   multivitamin with minerals 1 tablet Oral Daily   piperacillin-tazobactam 3.375 g Intravenous Q8H   saccharomyces boulardii 250 mg Oral Daily   valsartan 160 mg Oral Daily   vitamin E 400 Units Oral Daily         Assessment/Plan   Assessment / Plan     Hospital Problem List     * (Principal)Sepsis (CMS/[facility])    Pneumonia    History of DVT (deep vein thrombosis)    Overview Signed 11/5/2016  5:17 AM by Elizabeth Parikh APRN LLE; x 3; chronic coumadin therapy         HTN (hypertension)    HO of IVC filter    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)    Left lower lobe pneumonia (CMS/[facility])    Crohn's disease (CMS/[facility])    Asthma             Brief Hospital Course to date:  Zackary Noonan II is a 63 y.o. male with history of crohn's disease, c diff colitis and recurrent pnuemonia presents with cough, fever.      Assessment & Plan:    Pneumonia  - LLL  - continue zosyn and zyvox  - nebs scheduled and PRN  - check CXR in am  - probiotics    Asthma  - nebs scheduled and prn  - consider steroids if any worsening of wheezing.    Crohn's disease    H/o C diff colitis  - fecal transplant 2017    H/o DVT  - continues on therapeutic lovenox BID (has been on these injections for over a year after difficulty with stomal bleeding while on coumadin - but at this point it is unclear why he has not been switched over to a NOAC).  - will refer back to his primary Hematologist at discharge to explore alternatives to lovenox for DVT prevention        DVT Prophylaxis:  lovenox    CODE STATUS:   Code Status and Medical Interventions:   Ordered at: 09/07/18 1775     Level Of Support Discussed With:    Patient     Code Status:    CPR     Medical  Interventions (Level of Support Prior to Arrest):    Full       Disposition: I expect the patient to be discharged home in ? days.      Electronically signed by Justin Avendaño MD, 09/07/18, 7:29 PM.

## 2018-09-07 NOTE — PROGRESS NOTES
Discharge Planning Assessment  [facility]     Patient Name: Zackary Noonan II  MRN: [id number]  Today's Date: 9/7/2018    Admit Date: 9/7/2018          Discharge Needs Assessment     Row Name 09/07/18 0907       Living Environment    Lives With child(mariajose), dependent;spouse    Current Living Arrangements home/apartment/condo    Primary Care Provided by self    Provides Primary Care For no one    Family Caregiver if Needed spouse    Quality of Family Relationships involved;supportive    Able to Return to Prior Arrangements yes       Resource/Environmental Concerns    Resource/Environmental Concerns none       Transition Planning    Patient/Family Anticipates Transition to home    Patient/Family Anticipated Services at Transition none    Transportation Anticipated family or friend will provide       Discharge Needs Assessment    Readmission Within the Last 30 Days no previous admission in last 30 days    Concerns to be Addressed no discharge needs identified    Equipment Currently Used at Home none    Anticipated Changes Related to Illness none    Equipment Needed After Discharge none            Discharge Plan     Row Name 09/07/18 0908       Plan    Plan Home    Patient/Family in Agreement with Plan yes    Plan Comments Spoke with patient in room to initiate discharge planning.  He lives with his wife and dependent children in [facility].  He is independent with ADL's.  He has no DME at home and has never had home health services.  Mr. Noonan has RX coverage and has his scripts filled at [facility].  His plan is to return home with wife at discharge.  He denies any needs at this time.  CM will continue to follow.  Lynn Little RN [phone]    Final Discharge Disposition Code 01 - home or self-care        Destination     No service coordination in this encounter.      Durable Medical Equipment     No service coordination in this encounter.      Dialysis/Infusion     No service coordination in this encounter.      Home  Medical Care     No service coordination in this encounter.      Social Care     No service coordination in this encounter.        Expected Discharge Date and Time     Expected Discharge Date Expected Discharge Time    Sep 11, 2018               Demographic Summary     Row Name 09/07/18 0906       General Information    Admission Type observation    Arrived From home    Referral Source admission list    Reason for Consult discharge planning    Preferred Language English     Used During This Interaction no    General Information Comments PCP- Jillian Layne            Functional Status     Row Name 09/07/18 0907       Functional Status    Usual Activity Tolerance good    Current Activity Tolerance good       Functional Status, IADL    Medications independent    Meal Preparation independent    Housekeeping independent    Laundry independent    Shopping independent            Psychosocial    No documentation.           Abuse/Neglect    No documentation.           Legal     Row Name 09/07/18 0907       Financial/Legal    Finance Comments Verified with patient that he has [employer].  No issues obtaining medications.            Substance Abuse    No documentation.           Patient Forms    No documentation.         Lynn Little RN

## 2018-09-07 NOTE — ED PROVIDER NOTES
Subjective   Zackary Noonan is a 63 yr old white male that presents emergency Department complaints of fever.  Patient advises this morning he woke up getting ready for work noticed that he did not feel well.  Patient reported body aches, chest congestion and fever.  Patient is concerned that he possibly has the flu or pneumonia.  Patient reports nausea but denies any vomiting.  Patient denies any abdominal pain.  Patient reports a good appetite.  Patient has congestion and audible rattle in his chest however denies any shortness of breath, productive cough.  Patient says that he possibly can be dehydrated at this time as well.  Pt has a history of colostomy.  He reports a colectomy 1 yr ago as a result of Ulcerative colitis.          History provided by:  Patient  Fever   Max temp prior to arrival:  99  Temp source:  Oral  Severity:  Moderate  Duration:  1 day  Timing:  Constant  Progression:  Worsening  Relieved by:  Nothing  Worsened by:  Nothing  Associated symptoms: chills, congestion, myalgias and nausea    Associated symptoms: no chest pain, no cough, no diarrhea, no dysuria, no rhinorrhea, no sore throat and no vomiting        Review of Systems   Constitutional: Positive for chills, fatigue and fever.   HENT: Positive for congestion. Negative for rhinorrhea and sore throat.    Respiratory: Negative for cough and shortness of breath.    Cardiovascular: Negative for chest pain.   Gastrointestinal: Positive for nausea. Negative for abdominal pain, diarrhea and vomiting.   Genitourinary: Negative for difficulty urinating and dysuria.   Musculoskeletal: Positive for myalgias.   Neurological: Negative for dizziness and weakness.   All other systems reviewed and are negative.      Past Medical History:   Diagnosis Date   • Anxiety    • Arthritis    • Asthma    • Clostridium difficile infection 01/2017    treated per dr carter with fecal transplant 8-2017    • H/O recurrent pneumonia    • Hypertension    • MRSA  "infection 2016    right lower extremity wound   • Pulmonary nodule     Followed by Dr. Galaviz    • Recurrent deep vein thrombosis (DVT) (CMS/HCC)     x 3 LLE; chronic anticoagulation therapy    • Ulcerative colitis (CMS/HCC)    • Wears glasses    • Wears hearing aid        Allergies   Allergen Reactions   • Beta Adrenergic Blockers Other (See Comments)     Vocal changes   • Levaquin [Levofloxacin In D5w] Other (See Comments)     Heaviness in legs, \"felt like lead\"       Past Surgical History:   Procedure Laterality Date   • BRONCHOSCOPY      by Dr. Galaviz for pulmonary nodule   • BRONCHOSCOPY N/A 11/7/2016    Procedure: BRONCHOSCOPY;  Surgeon: Eben Roberts MD;  Location:  [facility];  Service:    • COLON RESECTION N/A 9/19/2017    Procedure: TOTAL ABDOMINAL COLECTOMY WITH CREATION OF ILEOSTOMY;  Surgeon: David Nielsen MD;  Location:  [facility];  Service:    • COLONOSCOPY  08/2017   • UMBILICAL HERNIA REPAIR     • VASECTOMY     • VASECTOMY REVERSAL         Family History   Problem Relation Age of Onset   • Hypertension Mother        Social History     Social History   • Marital status:      Social History Main Topics   • Smoking status: Never Smoker   • Smokeless tobacco: Never Used   • Alcohol use Yes      Comment: 4 drinks/week   • Drug use: No   • Sexual activity: Defer     Other Topics Concern   • Not on file           Objective   Physical Exam   Constitutional: He is oriented to person, place, and time. He appears well-developed and well-nourished. He appears distressed (ill appearing, nontoxic.  Cooperative with exam.).   HENT:   Head: Normocephalic and atraumatic.   Right Ear: External ear normal.   Left Ear: External ear normal.   Mouth/Throat: Oropharynx is clear and moist. No oropharyngeal exudate.   Eyes: Pupils are equal, round, and reactive to light. EOM are normal.   Neck: Normal range of motion.   Cardiovascular: Tachycardia present.    Pulmonary/Chest: He has rhonchi (all lobes). "   Abdominal: Soft. Bowel sounds are normal. He exhibits no distension. There is no tenderness.   Rt mid abd ( ostomy)   Musculoskeletal: Normal range of motion.   Neurological: He is alert and oriented to person, place, and time. No cranial nerve deficit. Coordination normal.   Skin: Skin is warm and dry.   Nursing note and vitals reviewed.      Procedures           ED Course  ED Course as of Sep 07 0330   Fri Sep 07, 2018   0246 WBC: (!) 18.37 [KG]   0328 0300  Results were discussed with patient at this time. Pt will be admitted to the hospital, pt will be started on IV abx.  Pt agrees and verb understanding.   [KG]   0329 Dr. Abdalla, hospitalist is contacted and he agrees to admit the patient.  [KG]      ED Course User Index  [KG] Anni Lowe, HARMAN          Recent Results (from the past 24 hour(s))   Comprehensive Metabolic Panel    Collection Time: 09/07/18  2:10 AM   Result Value Ref Range    Glucose 134 (H) 70 - 100 mg/dL    BUN 9 9 - 23 mg/dL    Creatinine 0.91 0.60 - 1.30 mg/dL    Sodium 137 132 - 146 mmol/L    Potassium 3.8 3.5 - 5.5 mmol/L    Chloride 107 99 - 109 mmol/L    CO2 23.0 20.0 - 31.0 mmol/L    Calcium 9.2 8.7 - 10.4 mg/dL    Total Protein 7.3 5.7 - 8.2 g/dL    Albumin 4.43 3.20 - 4.80 g/dL    ALT (SGPT) 26 7 - 40 U/L    AST (SGOT) 23 0 - 33 U/L    Alkaline Phosphatase 77 25 - 100 U/L    Total Bilirubin 0.3 0.3 - 1.2 mg/dL    eGFR Non African Amer 84 >60 mL/min/1.73    Globulin 2.9 gm/dL    A/G Ratio 1.5 1.5 - 2.5 g/dL    BUN/Creatinine Ratio 9.9 7.0 - 25.0    Anion Gap 7.0 3.0 - 11.0 mmol/L   Lactic Acid, Plasma    Collection Time: 09/07/18  2:10 AM   Result Value Ref Range    Lactate 0.9 0.5 - 2.0 mmol/L   CBC Auto Differential    Collection Time: 09/07/18  2:10 AM   Result Value Ref Range    WBC 18.37 (H) 3.50 - 10.80 10*3/mm3    RBC 4.56 4.20 - 5.76 10*6/mm3    Hemoglobin 13.4 13.1 - 17.5 g/dL    Hematocrit 41.1 38.9 - 50.9 %    MCV 90.1 80.0 - 99.0 fL    MCH 29.4 27.0 - 31.0 pg     MCHC 32.6 32.0 - 36.0 g/dL    RDW 16.4 (H) 11.3 - 14.5 %    RDW-SD 53.2 37.0 - 54.0 fl    MPV 8.8 6.0 - 12.0 fL    Platelets 236 150 - 450 10*3/mm3    Neutrophil % 88.0 (H) 41.0 - 71.0 %    Lymphocyte % 6.2 (L) 24.0 - 44.0 %    Monocyte % 5.1 0.0 - 12.0 %    Eosinophil % 0.5 0.0 - 3.0 %    Basophil % 0.2 0.0 - 1.0 %    Immature Grans % 0.4 0.0 - 0.6 %    Neutrophils, Absolute 16.17 (H) 1.50 - 8.30 10*3/mm3    Lymphocytes, Absolute 1.13 0.60 - 4.80 10*3/mm3    Monocytes, Absolute 0.94 0.00 - 1.00 10*3/mm3    Eosinophils, Absolute 0.09 0.00 - 0.30 10*3/mm3    Basophils, Absolute 0.04 0.00 - 0.20 10*3/mm3    Immature Grans, Absolute 0.07 (H) 0.00 - 0.03 10*3/mm3   Light Blue Top    Collection Time: 09/07/18  2:10 AM   Result Value Ref Range    Extra Tube hold for add-on    Green Top (Gel)    Collection Time: 09/07/18  2:10 AM   Result Value Ref Range    Extra Tube Hold for add-ons.    Lavender Top    Collection Time: 09/07/18  2:10 AM   Result Value Ref Range    Extra Tube hold for add-on    Gold Top - SST    Collection Time: 09/07/18  2:10 AM   Result Value Ref Range    Extra Tube Hold for add-ons.    Influenza Antigen, Rapid - Swab, Nasopharynx    Collection Time: 09/07/18  2:12 AM   Result Value Ref Range    Influenza A Ag, EIA Negative Negative    Influenza B Ag, EIA Negative Negative     Note: In addition to lab results from this visit, the labs listed above may include labs taken at another facility or during a different encounter within the last 24 hours. Please correlate lab times with ED admission and discharge times for further clarification of the services performed during this visit.    XR Chest 2 View   Final Result     Left basal opacities suggestive of pneumonia with atelectasis and/or    infiltrate inferiorly in the right lung base.      THIS DOCUMENT HAS BEEN ELECTRONICALLY SIGNED BY ANTHONY WILLIAM MD        Vitals:    09/06/18 2345 09/07/18 0202 09/07/18 0220 09/07/18 0314   BP: 173/79 147/70    "  Pulse: (!) 121 110 (!) 128 118   Resp: 20 20     Temp: (!) 103 °F (39.4 °C)      SpO2: 90%  91% (!) 89%   Weight: 77.1 kg (170 lb)      Height: 170.2 cm (67\")        Medications   sodium chloride 0.9 % flush 10 mL (not administered)   sodium chloride 0.9 % flush 10 mL (not administered)   cefTRIAXone (ROCEPHIN) IVPB 2 g (not administered)   AZITHROMYCIN 500 MG/250 ML 0.9% NS IVPB (MBP) (not administered)   sodium chloride 0.9 % bolus 1,000 mL (1,000 mL Intravenous New Bag 9/7/18 0213)   acetaminophen (TYLENOL) tablet 1,000 mg (1,000 mg Oral Given 9/7/18 0157)   ipratropium-albuterol (DUO-NEB) nebulizer solution 3 mL (3 mL Nebulization Given 9/7/18 0201)     ECG/EMG Results (last 24 hours)     ** No results found for the last 24 hours. **                [facility]      Final diagnoses:   Pneumonia of left lower lobe due to infectious organism (CMS/[facility])   Leukocytosis, unspecified type            Anni Lowe, APRN  09/07/18 0330    "

## 2018-09-07 NOTE — CONSULTS
Zackary Noonan II  1955  [id number]  9/7/2018      Referring Provider: Jason Abdalla M.D. [facility]  Reason for Consultation: Sepsis syndrome with left lower lobe pneumonia      Subjective   History of present illness:  63-year-old  from [facility].  Originally thought to have ulcerative colitis.  More recently thought to have extensive Crohn's disease.  History of colectomy and colostomy for refractory lower gastrointestinal hemorrhage.  Previous diverticular disease with Clostridium difficile colitis complicating multiple recurrences.  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  The patient's been hospitalized on a number of occasions for community-acquired pneumonia in different anatomic distributions.  Previous quantitative immunoglobulins and C3/C4 complement levels have been within normal limits.  Prior chest CTs do not reveal bronchiectasis.  The patient presented to the emergency department with a 48 hour history of chest congestion, chills, nausea, and a nonproductive cough.  He was febrile in the ER to 103°.  Hemodynamically stable.  His chest x-ray revealed a left lower lobe pulmonary infiltrate in association w/ plate atelectasis.  A peripheral leukocyte count was 18,400 with a normal serum lactate level.  Sputum Gram stain and blood cultures ×2 are pending.  Therapy initiated with ceftriaxone and azithromycin in the emergency department.  Subsequent administration of Zyvox and pip- tazobactam.  No recent corticosteroids, Imuran, methotrexate, or biologic therapy.  He had remotely received Humira but developed a community-acquired pneumonia within a few days of administration.  The patient has been on thrice weekly suppressive trimethoprim sulfa over the last number of months.  Asked to follow.    Past Medical History:   Diagnosis Date   • Anxiety    • Arthritis    • Asthma    • Clostridium difficile infection 01/2017    treated per dr carter with  "fecal transplant 8-2017    • H/O recurrent pneumonia    • Hypertension    • MRSA infection 2016    right lower extremity wound   • Pulmonary nodule     Followed by Dr. Galaviz    • Recurrent deep vein thrombosis (DVT) (CMS/HCC)     x 3 LLE; chronic anticoagulation therapy    • Ulcerative colitis (CMS/HCC)    • Wears glasses    • Wears hearing aid        Past Surgical History:   Procedure Laterality Date   • BRONCHOSCOPY      by Dr. Galaviz for pulmonary nodule   • BRONCHOSCOPY N/A 11/7/2016    Procedure: BRONCHOSCOPY;  Surgeon: Eben Roberts MD;  Location:  [facility];  Service:    • COLON RESECTION N/A 9/19/2017    Procedure: TOTAL ABDOMINAL COLECTOMY WITH CREATION OF ILEOSTOMY;  Surgeon: David Nielsen MD;  Location:  [facility];  Service:    • COLONOSCOPY  08/2017   • UMBILICAL HERNIA REPAIR     • VASECTOMY     • VASECTOMY REVERSAL         Pediatric History   Patient Guardian Status   • Not on file.     Other Topics Concern   • Not on file     Social History Narrative   • No narrative on file       family history includes Hypertension in his mother.    Allergies   Allergen Reactions   • Beta Adrenergic Blockers Other (See Comments)     Vocal changes   • Levaquin [Levofloxacin In D5w] Other (See Comments)     Heaviness in legs, \"felt like lead\"       Medication: MAR reviewed.  Antibiotics: See below.  Anti-Infectives     Ordered     Dose/Rate Route Frequency Start Stop    09/07/18 0448  piperacillin-tazobactam (ZOSYN) 3.375 g in iso-osmotic dextrose 50 ml (premix)     Ordering Provider:  Verena Roberts APRN    3.375 g  over 4 Hours Intravenous Every 8 Hours 09/07/18 1200 09/13/18 1159    09/07/18 0447  Linezolid (ZYVOX) 600 mg 300 mL     Ordering Provider:  Verena Roberts APRN    600 mg  300 mL/hr over 60 Minutes Intravenous Every 12 Hours 09/07/18 0600 09/13/18 0559    09/07/18 0448  piperacillin-tazobactam (ZOSYN) 3.375 g in iso-osmotic dextrose 50 ml (premix)     Ordering Provider:  Verena Roberts, " "APRN    3.375 g  over 30 Minutes Intravenous Once 09/07/18 0545 09/07/18 0637    09/07/18 0312  cefTRIAXone (ROCEPHIN) IVPB 2 g     Ordering Provider:  Anni Lowe APRN    2 g  100 mL/hr over 30 Minutes Intravenous Once 09/07/18 0314 09/07/18 0435    09/07/18 0312  AZITHROMYCIN 500 MG/250 ML 0.9% NS IVPB (MBP)     Ordering Provider:  Anni Lowe APRN    500 mg  over 60 Minutes Intravenous Once 09/07/18 0314 09/07/18 0552          Please refer to the medical record for a full medication list    Review of Systems  Pertinent items are noted in HPI, all other systems reviewed and negative    Objective     Physical Exam: See below.  Vital Signs   Temp:  [99 °F (37.2 °C)-103 °F (39.4 °C)] 99.1 °F (37.3 °C)  Heart Rate:  [107-128] 109  Resp:  [18-20] 18  BP: (105-173)/(53-79) 105/75    Blood pressure 105/75, pulse 109, temperature 99.1 °F (37.3 °C), temperature source Oral, resp. rate 18, height 168.9 cm (66.5\"), weight 79.1 kg (174 lb 6.4 oz), SpO2 91 %.  GENERAL: Awake and alert.  ill-appearing.  No significant tachypnea.  Able to speak in full sentences.  Audible rhonchi.  Resting tachycardia at 121 bpm.  Oxygen by nasal cannula.  HEENT: Oropharynx without thrush. Sinuses nontender. Dentition in good repair. No cervical adenopathy. No carotid bruits/ jugular venous distention.   EYES: PERRL. No conjunctival injection. No icterus. EOMI.  LYMPHATICS: No lymphadenopathy of the neck or axillary or inguinal regions.   HEART: No murmur, gallop, or pericardial friction rub.  Resting tachycardia.  LUNGS: Clear to auscultation anteriorly. No percussion dullness.  Left posterior lung bases with inspiratory rales and expiratory rhonchi.    ABDOMEN: Soft, nontender, nondistended. No appreciable HSM.  Right lower quadrant colostomy stoma pink and viable.  No peritoneal findings of rebound or guarding.  SKIN: Warm and dry without cutaneous eruptions. No embolic stigmata.   PSYCHIATRIC: Mental status lucid. Cranial " nerve function intact.       Results Review:   I reviewed the patient's new clinical results.  I reviewed the patient's new imaging results and agree with the interpretation.    Lab Results   Component Value Date    WBC 18.37 (H) 09/07/2018    HGB 13.4 09/07/2018    HCT 41.1 09/07/2018    MCV 90.1 09/07/2018     09/07/2018       Lab Results   Component Value Date    GLUCOSE 134 (H) 09/07/2018    BUN 9 09/07/2018    CREATININE 0.91 09/07/2018    EGFRIFNONA 84 09/07/2018    BCR 9.9 09/07/2018    CO2 23.0 09/07/2018    CALCIUM 9.2 09/07/2018    ALBUMIN 4.43 09/07/2018    AST 23 09/07/2018    ALT 26 09/07/2018       Estimated Creatinine Clearance: 80.8 mL/min (by C-G formula based on SCr of 0.91 mg/dL).      Microbiology: Blood cultures ×2 pending.  Urine C&S outstanding.      Radiology:  Imaging Results (last 72 hours)     Procedure Component Value Units Date/Time    XR Chest 2 View [[id number]] Collected:  09/07/18 0132     Updated:  09/07/18 0225    Narrative:       EXAM:    XR Chest, 2 Views    CLINICAL HISTORY:    63 years old, male; Signs and symptoms; Cough and shortness of breath;   Additional info: Cough, SOA    TECHNIQUE:    Frontal and lateral views of the chest.    COMPARISON:    CR - XR CHEST PA AND LATERAL 3/16/2018 11:10 AM    FINDINGS:    Lungs:  Patchy opacities in the left lung base and somewhat streaky opacities   in the inferior right lung base.  Compared to previous exam, the left basilar   opacities appear more dense, although opacity posteriorly on the lateral view   is less extensive.    Pleural space:  Unremarkable.  No pneumothorax.    Heart:  Unremarkable.    Mediastinum:  Unremarkable.    Bones/joints:  Unremarkable.      Impression:         Left basal opacities suggestive of pneumonia with atelectasis and/or   infiltrate inferiorly in the right lung base.    THIS DOCUMENT HAS BEEN ELECTRONICALLY SIGNED BY ANTHONY WILLIAM MD          ASSESSMENT AND PLAN: Sepsis syndrome.  Severe Crohn's  ileocolitis.  Status post colectomy.  History of diverticular disease with extensive antibiotic modification.  Recurrent Clostridium difficile colitis/status post fecal microbiota transplantation.  Recurrent community-acquired pneumonia/different anatomic distribution/quantitative immunoglobulins and complement levels normal/chest CT without bronchiectasis/previous bronchoscopy for microbiologic diagnosis.  Leukocytosis to 18,400.  Maximum temperature over 24 hours 103°.  Currently 99.1.  Hemodynamically stable.  No significant arterial desaturation.  Ceftriaxone and azithromycin given in the emergency department.  Currently on Zyvox and penicillin tazobactam.  Agree with bladder regimen given previous recovery of MRSA in sputum.  The latter antimicrobial provide broad gram-negative coverage, antipseudomonal activity, and clinical efficacy against upper airway anaerobes.  Anticipate IV antimicrobial therapy through the weekend.  I will see the patient on Monday.  L IDC available for weekend problems.       I discussed the patients findings and my recommendations with patient    Thank you for this consult.  Our group would be pleased to follow this patient over the course of their hospitalization and assist with outpatient antimicrobial therapy, as indicated.     Zackary Roach MD  9/7/2018

## 2018-09-08 ENCOUNTER — APPOINTMENT (OUTPATIENT)
Dept: GENERAL RADIOLOGY | Facility: HOSPITAL | Age: 63
End: 2018-09-08

## 2018-09-08 LAB
ANION GAP SERPL CALCULATED.3IONS-SCNC: 8 MMOL/L (ref 3–11)
BUN BLD-MCNC: 8 MG/DL (ref 9–23)
BUN/CREAT SERPL: 8.1 (ref 7–25)
CALCIUM SPEC-SCNC: 8.1 MG/DL (ref 8.7–10.4)
CHLORIDE SERPL-SCNC: 105 MMOL/L (ref 99–109)
CO2 SERPL-SCNC: 24 MMOL/L (ref 20–31)
CREAT BLD-MCNC: 0.99 MG/DL (ref 0.6–1.3)
DEPRECATED RDW RBC AUTO: 54.1 FL (ref 37–54)
ERYTHROCYTE [DISTWIDTH] IN BLOOD BY AUTOMATED COUNT: 16.3 % (ref 11.3–14.5)
GFR SERPL CREATININE-BSD FRML MDRD: 76 ML/MIN/1.73
GLUCOSE BLD-MCNC: 96 MG/DL (ref 70–100)
HCT VFR BLD AUTO: 36.7 % (ref 38.9–50.9)
HGB BLD-MCNC: 11.9 G/DL (ref 13.1–17.5)
MCH RBC QN AUTO: 29.3 PG (ref 27–31)
MCHC RBC AUTO-ENTMCNC: 32.4 G/DL (ref 32–36)
MCV RBC AUTO: 90.4 FL (ref 80–99)
PLATELET # BLD AUTO: 225 10*3/MM3 (ref 150–450)
PMV BLD AUTO: 8.9 FL (ref 6–12)
POTASSIUM BLD-SCNC: 3.7 MMOL/L (ref 3.5–5.5)
RBC # BLD AUTO: 4.06 10*6/MM3 (ref 4.2–5.76)
SODIUM BLD-SCNC: 137 MMOL/L (ref 132–146)
WBC NRBC COR # BLD: 19.74 10*3/MM3 (ref 3.5–10.8)

## 2018-09-08 PROCEDURE — 80048 BASIC METABOLIC PNL TOTAL CA: CPT | Performed by: INTERNAL MEDICINE

## 2018-09-08 PROCEDURE — 85027 COMPLETE CBC AUTOMATED: CPT | Performed by: INTERNAL MEDICINE

## 2018-09-08 PROCEDURE — 94760 N-INVAS EAR/PLS OXIMETRY 1: CPT

## 2018-09-08 PROCEDURE — 25010000002 PIPERACILLIN SOD-TAZOBACTAM PER 1 G: Performed by: NURSE PRACTITIONER

## 2018-09-08 PROCEDURE — 94640 AIRWAY INHALATION TREATMENT: CPT

## 2018-09-08 PROCEDURE — 94799 UNLISTED PULMONARY SVC/PX: CPT

## 2018-09-08 PROCEDURE — 99233 SBSQ HOSP IP/OBS HIGH 50: CPT | Performed by: INTERNAL MEDICINE

## 2018-09-08 PROCEDURE — 71045 X-RAY EXAM CHEST 1 VIEW: CPT

## 2018-09-08 PROCEDURE — 87205 SMEAR GRAM STAIN: CPT | Performed by: NURSE PRACTITIONER

## 2018-09-08 PROCEDURE — 87070 CULTURE OTHR SPECIMN AEROBIC: CPT | Performed by: NURSE PRACTITIONER

## 2018-09-08 PROCEDURE — 25010000002 ENOXAPARIN PER 10 MG: Performed by: INTERNAL MEDICINE

## 2018-09-08 PROCEDURE — 25010000002 LINEZOLID 600 MG/300ML SOLUTION: Performed by: NURSE PRACTITIONER

## 2018-09-08 RX ORDER — DIPHENOXYLATE HYDROCHLORIDE AND ATROPINE SULFATE 2.5; .025 MG/1; MG/1
1 TABLET ORAL 4 TIMES DAILY PRN
Status: DISCONTINUED | OUTPATIENT
Start: 2018-09-08 | End: 2018-09-09

## 2018-09-08 RX ORDER — LOSARTAN POTASSIUM 50 MG/1
50 TABLET ORAL DAILY
Status: DISCONTINUED | OUTPATIENT
Start: 2018-09-09 | End: 2018-09-11 | Stop reason: HOSPADM

## 2018-09-08 RX ORDER — LOPERAMIDE HYDROCHLORIDE 2 MG/1
2 CAPSULE ORAL 4 TIMES DAILY PRN
Status: DISCONTINUED | OUTPATIENT
Start: 2018-09-08 | End: 2018-09-11 | Stop reason: HOSPADM

## 2018-09-08 RX ADMIN — MULTIPLE VITAMINS W/ MINERALS TAB 1 TABLET: TAB ORAL at 08:53

## 2018-09-08 RX ADMIN — ENOXAPARIN SODIUM 80 MG: 80 INJECTION SUBCUTANEOUS at 05:26

## 2018-09-08 RX ADMIN — MONTELUKAST SODIUM 10 MG: 10 TABLET, COATED ORAL at 20:40

## 2018-09-08 RX ADMIN — IPRATROPIUM BROMIDE AND ALBUTEROL SULFATE 3 ML: 2.5; .5 SOLUTION RESPIRATORY (INHALATION) at 16:18

## 2018-09-08 RX ADMIN — ENOXAPARIN SODIUM 80 MG: 80 INJECTION SUBCUTANEOUS at 17:21

## 2018-09-08 RX ADMIN — DIPHENOXYLATE HYDROCHLORIDE AND ATROPINE SULFATE 1 TABLET: 2.5; .025 TABLET ORAL at 20:44

## 2018-09-08 RX ADMIN — Medication 250 MG: at 08:57

## 2018-09-08 RX ADMIN — ACETAMINOPHEN 650 MG: 325 TABLET ORAL at 15:31

## 2018-09-08 RX ADMIN — BUDESONIDE 0.5 MG: 0.5 INHALANT RESPIRATORY (INHALATION) at 20:56

## 2018-09-08 RX ADMIN — IPRATROPIUM BROMIDE AND ALBUTEROL SULFATE 3 ML: 2.5; .5 SOLUTION RESPIRATORY (INHALATION) at 09:10

## 2018-09-08 RX ADMIN — CLOBETASOL PROPIONATE: 0.5 CREAM TOPICAL at 08:51

## 2018-09-08 RX ADMIN — IPRATROPIUM BROMIDE AND ALBUTEROL SULFATE 3 ML: 2.5; .5 SOLUTION RESPIRATORY (INHALATION) at 12:44

## 2018-09-08 RX ADMIN — BUSPIRONE HYDROCHLORIDE 15 MG: 10 TABLET ORAL at 08:58

## 2018-09-08 RX ADMIN — ACETAMINOPHEN 650 MG: 325 TABLET ORAL at 23:51

## 2018-09-08 RX ADMIN — VITAMIN E CAP 400 UNIT 400 UNITS: 400 CAP at 08:54

## 2018-09-08 RX ADMIN — BUSPIRONE HYDROCHLORIDE 15 MG: 10 TABLET ORAL at 20:40

## 2018-09-08 RX ADMIN — LINEZOLID 600 MG: 600 INJECTION, SOLUTION INTRAVENOUS at 05:01

## 2018-09-08 RX ADMIN — TAZOBACTAM SODIUM AND PIPERACILLIN SODIUM 3.38 G: 375; 3 INJECTION, SOLUTION INTRAVENOUS at 05:01

## 2018-09-08 RX ADMIN — CLOBETASOL PROPIONATE: 0.5 CREAM TOPICAL at 20:41

## 2018-09-08 RX ADMIN — LOPERAMIDE HYDROCHLORIDE 2 MG: 2 CAPSULE ORAL at 17:18

## 2018-09-08 RX ADMIN — AMLODIPINE BESYLATE 5 MG: 5 TABLET ORAL at 20:40

## 2018-09-08 RX ADMIN — TAZOBACTAM SODIUM AND PIPERACILLIN SODIUM 3.38 G: 375; 3 INJECTION, SOLUTION INTRAVENOUS at 12:37

## 2018-09-08 RX ADMIN — BUDESONIDE 0.5 MG: 0.5 INHALANT RESPIRATORY (INHALATION) at 09:10

## 2018-09-08 RX ADMIN — TAZOBACTAM SODIUM AND PIPERACILLIN SODIUM 3.38 G: 375; 3 INJECTION, SOLUTION INTRAVENOUS at 20:41

## 2018-09-08 RX ADMIN — IPRATROPIUM BROMIDE AND ALBUTEROL SULFATE 3 ML: 2.5; .5 SOLUTION RESPIRATORY (INHALATION) at 20:56

## 2018-09-08 RX ADMIN — LINEZOLID 600 MG: 600 INJECTION, SOLUTION INTRAVENOUS at 18:36

## 2018-09-08 RX ADMIN — FLUTICASONE PROPIONATE 2 SPRAY: 50 SPRAY, METERED NASAL at 08:56

## 2018-09-08 NOTE — PLAN OF CARE
Problem: Patient Care Overview  Goal: Plan of Care Review  Outcome: Ongoing (interventions implemented as appropriate)   09/08/18 2680   Coping/Psychosocial   Plan of Care Reviewed With patient   Plan of Care Review   Progress no change   OTHER   Outcome Summary Pt cont to have fevers, remains tachycardic, denies SOA, lungs remain coarse and sats maintained on 4LNC.

## 2018-09-08 NOTE — NURSING NOTE
Patient well known to WOC nurse. Patient has difficult stoma to pouch r/t pyoderma and peristomal skin breakdown. Appliance change performed with patient. Placed Hydrofera blue ring, Rufina barrier ring, Rufina paste, all after crusting with Adept stoma powder and barrier spray. Placed Coloplast 1-piece Morgan convexity. Will follow PRN while patient is in hospital. Please contact [facility]OC nurse if needs arise. Thanks

## 2018-09-09 ENCOUNTER — APPOINTMENT (OUTPATIENT)
Dept: GENERAL RADIOLOGY | Facility: HOSPITAL | Age: 63
End: 2018-09-09

## 2018-09-09 LAB
ANION GAP SERPL CALCULATED.3IONS-SCNC: 3 MMOL/L (ref 3–11)
BASOPHILS # BLD AUTO: 0.02 10*3/MM3 (ref 0–0.2)
BASOPHILS NFR BLD AUTO: 0.1 % (ref 0–1)
BUN BLD-MCNC: 6 MG/DL (ref 9–23)
BUN/CREAT SERPL: 6.7 (ref 7–25)
CALCIUM SPEC-SCNC: 9 MG/DL (ref 8.7–10.4)
CHLORIDE SERPL-SCNC: 105 MMOL/L (ref 99–109)
CO2 SERPL-SCNC: 29 MMOL/L (ref 20–31)
CREAT BLD-MCNC: 0.9 MG/DL (ref 0.6–1.3)
DEPRECATED RDW RBC AUTO: 52.7 FL (ref 37–54)
EOSINOPHIL # BLD AUTO: 0.08 10*3/MM3 (ref 0–0.3)
EOSINOPHIL NFR BLD AUTO: 0.6 % (ref 0–3)
ERYTHROCYTE [DISTWIDTH] IN BLOOD BY AUTOMATED COUNT: 16 % (ref 11.3–14.5)
GFR SERPL CREATININE-BSD FRML MDRD: 85 ML/MIN/1.73
GLUCOSE BLD-MCNC: 98 MG/DL (ref 70–100)
HCT VFR BLD AUTO: 35.6 % (ref 38.9–50.9)
HGB BLD-MCNC: 11.6 G/DL (ref 13.1–17.5)
IMM GRANULOCYTES # BLD: 0.05 10*3/MM3 (ref 0–0.03)
IMM GRANULOCYTES NFR BLD: 0.4 % (ref 0–0.6)
LYMPHOCYTES # BLD AUTO: 1.23 10*3/MM3 (ref 0.6–4.8)
LYMPHOCYTES NFR BLD AUTO: 9.1 % (ref 24–44)
MCH RBC QN AUTO: 29.4 PG (ref 27–31)
MCHC RBC AUTO-ENTMCNC: 32.6 G/DL (ref 32–36)
MCV RBC AUTO: 90.1 FL (ref 80–99)
MONOCYTES # BLD AUTO: 0.97 10*3/MM3 (ref 0–1)
MONOCYTES NFR BLD AUTO: 7.2 % (ref 0–12)
NEUTROPHILS # BLD AUTO: 11.23 10*3/MM3 (ref 1.5–8.3)
NEUTROPHILS NFR BLD AUTO: 83 % (ref 41–71)
PLATELET # BLD AUTO: 252 10*3/MM3 (ref 150–450)
PMV BLD AUTO: 8.8 FL (ref 6–12)
POTASSIUM BLD-SCNC: 3.6 MMOL/L (ref 3.5–5.5)
RBC # BLD AUTO: 3.95 10*6/MM3 (ref 4.2–5.76)
SODIUM BLD-SCNC: 137 MMOL/L (ref 132–146)
WBC NRBC COR # BLD: 13.53 10*3/MM3 (ref 3.5–10.8)

## 2018-09-09 PROCEDURE — 94799 UNLISTED PULMONARY SVC/PX: CPT

## 2018-09-09 PROCEDURE — 25010000002 LINEZOLID 600 MG/300ML SOLUTION: Performed by: NURSE PRACTITIONER

## 2018-09-09 PROCEDURE — 25010000002 ONDANSETRON PER 1 MG: Performed by: NURSE PRACTITIONER

## 2018-09-09 PROCEDURE — 71045 X-RAY EXAM CHEST 1 VIEW: CPT

## 2018-09-09 PROCEDURE — 94640 AIRWAY INHALATION TREATMENT: CPT

## 2018-09-09 PROCEDURE — 94760 N-INVAS EAR/PLS OXIMETRY 1: CPT

## 2018-09-09 PROCEDURE — 99233 SBSQ HOSP IP/OBS HIGH 50: CPT | Performed by: INTERNAL MEDICINE

## 2018-09-09 PROCEDURE — 85025 COMPLETE CBC W/AUTO DIFF WBC: CPT | Performed by: INTERNAL MEDICINE

## 2018-09-09 PROCEDURE — 25010000002 PIPERACILLIN SOD-TAZOBACTAM PER 1 G: Performed by: NURSE PRACTITIONER

## 2018-09-09 PROCEDURE — 80048 BASIC METABOLIC PNL TOTAL CA: CPT | Performed by: INTERNAL MEDICINE

## 2018-09-09 PROCEDURE — 25010000002 ENOXAPARIN PER 10 MG: Performed by: INTERNAL MEDICINE

## 2018-09-09 RX ORDER — DIPHENOXYLATE HYDROCHLORIDE AND ATROPINE SULFATE 2.5; .025 MG/1; MG/1
2 TABLET ORAL 4 TIMES DAILY
Status: DISCONTINUED | OUTPATIENT
Start: 2018-09-09 | End: 2018-09-11 | Stop reason: HOSPADM

## 2018-09-09 RX ADMIN — Medication 250 MG: at 08:23

## 2018-09-09 RX ADMIN — DIPHENOXYLATE HYDROCHLORIDE AND ATROPINE SULFATE 1 TABLET: 2.5; .025 TABLET ORAL at 13:58

## 2018-09-09 RX ADMIN — TAZOBACTAM SODIUM AND PIPERACILLIN SODIUM 3.38 G: 375; 3 INJECTION, SOLUTION INTRAVENOUS at 21:14

## 2018-09-09 RX ADMIN — ONDANSETRON 4 MG: 2 INJECTION INTRAMUSCULAR; INTRAVENOUS at 21:20

## 2018-09-09 RX ADMIN — BUDESONIDE 0.5 MG: 0.5 INHALANT RESPIRATORY (INHALATION) at 06:57

## 2018-09-09 RX ADMIN — LOPERAMIDE HYDROCHLORIDE 2 MG: 2 CAPSULE ORAL at 05:35

## 2018-09-09 RX ADMIN — ENOXAPARIN SODIUM 80 MG: 80 INJECTION SUBCUTANEOUS at 05:36

## 2018-09-09 RX ADMIN — IPRATROPIUM BROMIDE AND ALBUTEROL SULFATE 3 ML: 2.5; .5 SOLUTION RESPIRATORY (INHALATION) at 06:57

## 2018-09-09 RX ADMIN — DIPHENOXYLATE HYDROCHLORIDE AND ATROPINE SULFATE 1 TABLET: 2.5; .025 TABLET ORAL at 08:33

## 2018-09-09 RX ADMIN — VITAMIN E CAP 400 UNIT 400 UNITS: 400 CAP at 11:12

## 2018-09-09 RX ADMIN — LINEZOLID 600 MG: 600 INJECTION, SOLUTION INTRAVENOUS at 18:37

## 2018-09-09 RX ADMIN — AMLODIPINE BESYLATE 5 MG: 5 TABLET ORAL at 21:11

## 2018-09-09 RX ADMIN — LOPERAMIDE HYDROCHLORIDE 2 MG: 2 CAPSULE ORAL at 13:58

## 2018-09-09 RX ADMIN — TAZOBACTAM SODIUM AND PIPERACILLIN SODIUM 3.38 G: 375; 3 INJECTION, SOLUTION INTRAVENOUS at 05:30

## 2018-09-09 RX ADMIN — IPRATROPIUM BROMIDE AND ALBUTEROL SULFATE 3 ML: 2.5; .5 SOLUTION RESPIRATORY (INHALATION) at 16:01

## 2018-09-09 RX ADMIN — BUSPIRONE HYDROCHLORIDE 15 MG: 10 TABLET ORAL at 21:11

## 2018-09-09 RX ADMIN — CLOBETASOL PROPIONATE: 0.5 CREAM TOPICAL at 11:13

## 2018-09-09 RX ADMIN — ACETAMINOPHEN 650 MG: 325 TABLET ORAL at 14:15

## 2018-09-09 RX ADMIN — IPRATROPIUM BROMIDE AND ALBUTEROL SULFATE 3 ML: 2.5; .5 SOLUTION RESPIRATORY (INHALATION) at 20:16

## 2018-09-09 RX ADMIN — ACETAMINOPHEN 650 MG: 325 TABLET ORAL at 21:15

## 2018-09-09 RX ADMIN — ONDANSETRON 4 MG: 2 INJECTION INTRAMUSCULAR; INTRAVENOUS at 06:46

## 2018-09-09 RX ADMIN — CLOBETASOL PROPIONATE: 0.5 CREAM TOPICAL at 21:17

## 2018-09-09 RX ADMIN — MULTIPLE VITAMINS W/ MINERALS TAB 1 TABLET: TAB ORAL at 08:23

## 2018-09-09 RX ADMIN — BUSPIRONE HYDROCHLORIDE 15 MG: 10 TABLET ORAL at 08:22

## 2018-09-09 RX ADMIN — BUDESONIDE 0.5 MG: 0.5 INHALANT RESPIRATORY (INHALATION) at 20:16

## 2018-09-09 RX ADMIN — LOSARTAN POTASSIUM 50 MG: 50 TABLET ORAL at 08:23

## 2018-09-09 RX ADMIN — MONTELUKAST SODIUM 10 MG: 10 TABLET, COATED ORAL at 21:12

## 2018-09-09 RX ADMIN — DIPHENOXYLATE HYDROCHLORIDE AND ATROPINE SULFATE 2 TABLET: 2.5; .025 TABLET ORAL at 18:27

## 2018-09-09 RX ADMIN — IPRATROPIUM BROMIDE AND ALBUTEROL SULFATE 3 ML: 2.5; .5 SOLUTION RESPIRATORY (INHALATION) at 12:38

## 2018-09-09 RX ADMIN — LINEZOLID 600 MG: 600 INJECTION, SOLUTION INTRAVENOUS at 05:30

## 2018-09-09 RX ADMIN — TAZOBACTAM SODIUM AND PIPERACILLIN SODIUM 3.38 G: 375; 3 INJECTION, SOLUTION INTRAVENOUS at 13:58

## 2018-09-09 RX ADMIN — DIPHENOXYLATE HYDROCHLORIDE AND ATROPINE SULFATE 2 TABLET: 2.5; .025 TABLET ORAL at 21:11

## 2018-09-09 RX ADMIN — ENOXAPARIN SODIUM 80 MG: 80 INJECTION SUBCUTANEOUS at 18:28

## 2018-09-09 RX ADMIN — FLUTICASONE PROPIONATE 2 SPRAY: 50 SPRAY, METERED NASAL at 11:12

## 2018-09-09 NOTE — NURSING NOTE
No issues at this time. Appliance is intact. Patient feeling better. Please contact WO nurse if needs arise. Thanks

## 2018-09-09 NOTE — PLAN OF CARE
Problem: Sepsis/Septic Shock (Adult)  Goal: Signs and Symptoms of Listed Potential Problems Will be Absent, Minimized or Managed (Sepsis/Septic Shock)  Outcome: Ongoing (interventions implemented as appropriate)   09/09/18 0610   Goal/Outcome Evaluation   Problems Assessed (Sepsis) all   Problems Present (Sepsis) none       Problem: Patient Care Overview  Goal: Plan of Care Review  Outcome: Ongoing (interventions implemented as appropriate)   09/09/18 0610   Coping/Psychosocial   Plan of Care Reviewed With patient   Plan of Care Review   Progress improving   OTHER   Outcome Summary pt vss this shift, using both incentive spirometer and flutter valve hourly while awake. coughing up thick yellow sputum, requests prn breathing treatment at 0100. resting well at this time

## 2018-09-09 NOTE — PROGRESS NOTES
[facility] Medicine Services  PROGRESS NOTE    Patient Name: Zackary Noonan II  : 1955  MRN: [id number]    Date of Admission: 2018  Length of Stay: 1  Primary Care Physician: Jillian Layne MD    Subjective   Subjective     CC:  pneumonia    HPI:  Still coughing, moderately productive, fatigued    Review of Systems  Gen- fevers, no chills, + fatigue  CV- No chest pain, palpitations  Resp- + cough and dyspnea  GI- No N/V/D, abd pain      Otherwise ROS is negative except as mentioned in the HPI.    Objective   Objective     Vital Signs:   Temp:  [98.1 °F (36.7 °C)-99.1 °F (37.3 °C)] 98.3 °F (36.8 °C)  Heart Rate:  [103-116] 103  Resp:  [16-20] 16  BP: (119-144)/(67-82) 129/76        Physical Exam:    Constitutional -in bed, appears fatigued  HEENT-NCAT, mucous membranes moist  CV-mild tachycardia, regular, no murmur  Resp-scattered rhonchi  Abd-soft, non-tender, non-distended, normo active bowel sounds  Ext-No lower extremity cyanosis, clubbing or edema bilaterally  Neuro-alert and oriented, speech clear, moves all extremities   Psych-normal affect   Skin- No rash on exposed UE or LE bilaterally      Results Reviewed:  I have personally reviewed current lab, radiology, and data and agree.      Results from last 7 days  Lab Units 18  0519 18  0210   WBC 10*3/mm3 19.74* 18.37*   HEMOGLOBIN g/dL 11.9* 13.4   HEMATOCRIT % 36.7* 41.1   PLATELETS 10*3/mm3 225 236       Results from last 7 days  Lab Units 18  0519 18  0210   SODIUM mmol/L 137 137   POTASSIUM mmol/L 3.7 3.8   CHLORIDE mmol/L 105 107   CO2 mmol/L 24.0 23.0   BUN mg/dL 8* 9   CREATININE mg/dL 0.99 0.91   GLUCOSE mg/dL 96 134*   CALCIUM mg/dL 8.1* 9.2   ALT (SGPT) U/L  --  26   AST (SGOT) U/L  --  23   TROPONIN I ng/mL  --  <0.006     Estimated Creatinine Clearance: 74.3 mL/min (by C-G formula based on SCr of 0.99 mg/dL).  BNP   Date Value Ref Range Status   2018 29.0 0.0 - 100.0 pg/mL  Final     Comment:     Results may be falsely decreased if patient taking Biotin.       Microbiology Results Abnormal     Procedure Component Value - Date/Time    Respiratory Culture - Sputum, Cough [[id number]] Collected:  09/08/18 0934    Lab Status:  Preliminary result Specimen:  Sputum from Cough Updated:  09/08/18 1200     Gram Stain Result Moderate (3+) WBCs per low power field      Moderate (3+) Normal respiratory skyler    Blood Culture - Blood, [[id number]]  (Normal) Collected:  09/07/18 0215    Lab Status:  Preliminary result Specimen:  Blood from Arm, Right Updated:  09/08/18 0231     Blood Culture No growth at 24 hours    Blood Culture - Blood, [[id number]]  (Normal) Collected:  09/07/18 0215    Lab Status:  Preliminary result Specimen:  Blood from Arm, Left Updated:  09/08/18 0231     Blood Culture No growth at 24 hours    Influenza Antigen, Rapid - Swab, Nasopharynx [[id number]]  (Normal) Collected:  09/07/18 0212    Lab Status:  Final result Specimen:  Swab from Nasopharynx Updated:  09/07/18 0237     Influenza A Ag, EIA Negative     Influenza B Ag, EIA Negative          Imaging Results (last 24 hours)     Procedure Component Value Units Date/Time    XR Chest 1 View [[id number]] Collected:  09/08/18 0908     Updated:  09/08/18 1241    Narrative:          EXAMINATION: XR CHEST 1 VW - 9/8/2018     INDICATION: J18.1-Lobar pneumonia, unspecified organism;  D72.829-Elevated white blood cell count, unspecified.     COMPARISON: 9/7/2018.     FINDINGS: Patchy left basilar disease is stable or slightly increased.  There is now mild right basilar pneumonia or atelectasis. The upper  lungs remain clear. No effusion is seen. The heart is upper normal size.  The vasculature appears normal.       Impression:       Stable to slightly increased left basilar pneumonia, and  mild new right basilar atelectasis or pneumonia.     DICTATED:   9/8/2018  EDITED/ls :   9/8/2018                    I have reviewed the  medications.      amLODIPine 5 mg Oral Nightly   budesonide 0.5 mg Nebulization BID - RT   busPIRone 15 mg Oral Q12H   clobetasol  Topical Q12H   enoxaparin 80 mg Subcutaneous BID   fluticasone 2 spray Nasal Daily   folic acid 1 mg Oral Daily   ipratropium-albuterol 3 mL Nebulization 4x Daily - RT   Linezolid 600 mg Intravenous Q12H   [START ON 9/9/2018] losartan 50 mg Oral Daily   montelukast 10 mg Oral Nightly   multivitamin with minerals 1 tablet Oral Daily   piperacillin-tazobactam 3.375 g Intravenous Q8H   saccharomyces boulardii 250 mg Oral Daily   vitamin E 400 Units Oral Daily         Assessment/Plan   Assessment / Plan     Hospital Problem List     * (Principal)Sepsis (CMS/HCC)    Pneumonia    History of DVT (deep vein thrombosis)    Overview Signed 11/5/2016  5:17 AM by Elizabeth Parikh APRN LLE; x 3; chronic coumadin therapy         HTN (hypertension)    HO of IVC filter    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)    Left lower lobe pneumonia (CMS/HCC)    Crohn's disease (CMS/[facility])    Asthma        CXR personally reviewed from this am: increasing bibasilar infiltrates.     Brief Hospital Course to date:  Zackary Noonan II is a 63 y.o. male with history of crohn's disease, c diff colitis and recurrent pnuemonia presents with cough, fever.      Assessment & Plan:    Pneumonia  - LLL  - continue zosyn and zyvox, consider adding doxy for atypical coverage if continued fevers.   - nebs scheduled and PRN  - check CXR in am  - probiotics    Asthma  - nebs scheduled and prn  - consider steroids if any worsening of wheezing.    Crohn's disease    H/o C diff colitis  - fecal transplant 2017    H/o DVT  - continues on therapeutic lovenox BID (has been on these injections for over a year after difficulty with stomal bleeding while on coumadin - but at this point it is unclear why he has not been switched over to a NOAC).  - will refer back to his primary Hematologist at discharge to explore  alternatives to lovenox for DVT prevention        DVT Prophylaxis:  lovenox    CODE STATUS:   Code Status and Medical Interventions:   Ordered at: 09/07/18 1636     Level Of Support Discussed With:    Patient     Code Status:    CPR     Medical Interventions (Level of Support Prior to Arrest):    Full       Disposition: I expect the patient to be discharged home in ? days.      Electronically signed by Justin Avendaño MD, 09/08/18, 10:17 PM.

## 2018-09-09 NOTE — PROGRESS NOTES
[facility] Medicine Services  PROGRESS NOTE    Patient Name: Zackary Noonan II  : 1955  MRN: [id number]    Date of Admission: 2018  Length of Stay: 2  Primary Care Physician: Jillian Layne MD    Subjective   Subjective     CC:  pneumonia    HPI:  Still coughing, moderately productive  Cant get NC ox below 5L - desatted to 80s on 2L earlier today.   Has been getting to [place] with oxygen.     Review of Systems  Gen- fevers, no chills, + fatigue  CV- No chest pain, palpitations  Resp- + cough and dyspnea  GI- No N/V/, abd pain.  Uses lomotil and imodium at home for ileostomy.   Skin - PG lesions near stoma - has upcoming appt with dr doty for 'superglue'      Otherwise ROS is negative except as mentioned in the HPI.    Objective   Objective     Vital Signs:   Temp:  [98.3 °F (36.8 °C)-98.7 °F (37.1 °C)] 98.4 °F (36.9 °C)  Heart Rate:  [] 99  Resp:  [16-20] 16  BP: (116-144)/(63-76) 116/66        Physical Exam:    Constitutional -in bed, appears a bit tired but very pleasant, alert.  HEENT-NCAT, mucous membranes moist  CV-mild tachycardia, regular, no murmur  Resp-scattered rhonchi and wheeze both insp and exp.   Abd-soft, non-tender, non-distended, normo active bowel sounds.  Stoma with liq stool.   PG lesions not seen.   Ext-No lower extremity cyanosis, clubbing or edema bilaterally  Neuro-alert and oriented, speech clear, moves all extremities   Psych-normal affect   Skin- No rash on exposed UE or LE bilaterally      Results Reviewed:  I have personally reviewed current lab, radiology, and data and agree.      Results from last 7 days  Lab Units 18  0539 18  0519 18  0210   WBC 10*3/mm3 13.53* 19.74* 18.37*   HEMOGLOBIN g/dL 11.6* 11.9* 13.4   HEMATOCRIT % 35.6* 36.7* 41.1   PLATELETS 10*3/mm3 252 225 236       Results from last 7 days  Lab Units 18  0539 18  0519 18  0210   SODIUM mmol/L 137 137 137   POTASSIUM mmol/L 3.6 3.7 3.8    CHLORIDE mmol/L 105 105 107   CO2 mmol/L 29.0 24.0 23.0   BUN mg/dL 6* 8* 9   CREATININE mg/dL 0.90 0.99 0.91   GLUCOSE mg/dL 98 96 134*   CALCIUM mg/dL 9.0 8.1* 9.2   ALT (SGPT) U/L  --   --  26   AST (SGOT) U/L  --   --  23   TROPONIN I ng/mL  --   --  <0.006     Estimated Creatinine Clearance: 81.7 mL/min (by C-G formula based on SCr of 0.9 mg/dL).  BNP   Date Value Ref Range Status   09/07/2018 29.0 0.0 - 100.0 pg/mL Final     Comment:     Results may be falsely decreased if patient taking Biotin.       Microbiology Results Abnormal     Procedure Component Value - Date/Time    Respiratory Culture - Sputum, Cough [[id number]] Collected:  09/08/18 0934    Lab Status:  Preliminary result Specimen:  Sputum from Cough Updated:  09/09/18 0641     Respiratory Culture Light growth (2+) Normal Respiratory Skyler     Gram Stain Result Moderate (3+) WBCs per low power field      Moderate (3+) Normal respiratory skyler    Blood Culture - Blood, [[id number]]  (Normal) Collected:  09/07/18 0215    Lab Status:  Preliminary result Specimen:  Blood from Arm, Right Updated:  09/09/18 0231     Blood Culture No growth at 2 days    Blood Culture - Blood, [[id number]]  (Normal) Collected:  09/07/18 0215    Lab Status:  Preliminary result Specimen:  Blood from Arm, Left Updated:  09/09/18 0231     Blood Culture No growth at 2 days    Influenza Antigen, Rapid - Swab, Nasopharynx [[id number]]  (Normal) Collected:  09/07/18 0212    Lab Status:  Final result Specimen:  Swab from Nasopharynx Updated:  09/07/18 0237     Influenza A Ag, EIA Negative     Influenza B Ag, EIA Negative          Imaging Results (last 24 hours)     Procedure Component Value Units Date/Time    XR Chest 1 View [[id number]] Collected:  09/09/18 1206     Updated:  09/09/18 1206    Narrative:          EXAMINATION: XR CHEST 1 VW - 9/9/2018     INDICATION: J18.1-Lobar pneumonia, unspecified organism;  D72.829-Elevated white blood cell count, unspecified.      COMPARISON:  9/8/2018.     FINDINGS: Patchy bibasilar disease appears stable. Upper lungs remain  clear. No effusion or pneumothorax is seen.           Impression:       Stable bibasilar pneumonia/atelectasis.     DICTATED:   9/9/2018  EDITED/ls :   9/9/2018           XR Chest 1 View [[id number]] Collected:  09/08/18 0908     Updated:  09/08/18 2245    Narrative:          EXAMINATION: XR CHEST 1 VW - 9/8/2018     INDICATION: J18.1-Lobar pneumonia, unspecified organism;  D72.829-Elevated white blood cell count, unspecified.     COMPARISON: 9/7/2018.     FINDINGS: Patchy left basilar disease is stable or slightly increased.  There is now mild right basilar pneumonia or atelectasis. The upper  lungs remain clear. No effusion is seen. The heart is upper normal size.  The vasculature appears normal.       Impression:       Stable to slightly increased left basilar pneumonia, and  mild new right basilar atelectasis or pneumonia.     DICTATED:   9/8/2018  EDITED/ls :   9/8/2018      This report was finalized on 9/8/2018 10:43 PM by DR. Davi Lemons MD.                I have reviewed the medications.      amLODIPine 5 mg Oral Nightly   budesonide 0.5 mg Nebulization BID - RT   busPIRone 15 mg Oral Q12H   clobetasol  Topical Q12H   enoxaparin 80 mg Subcutaneous BID   fluticasone 2 spray Nasal Daily   folic acid 1 mg Oral Daily   ipratropium-albuterol 3 mL Nebulization 4x Daily - RT   Linezolid 600 mg Intravenous Q12H   losartan 50 mg Oral Daily   montelukast 10 mg Oral Nightly   multivitamin with minerals 1 tablet Oral Daily   piperacillin-tazobactam 3.375 g Intravenous Q8H   saccharomyces boulardii 250 mg Oral Daily   vitamin E 400 Units Oral Daily         Assessment/Plan   Assessment / Plan     Hospital Problem List     * (Principal)Sepsis (CMS/HCC)    Pneumonia    History of DVT (deep vein thrombosis)    Overview Signed 11/5/2016  5:17 AM by Elizabeth Parikh APRN LLE; x 3; chronic coumadin therapy         HTN (hypertension)    HO of  IVC filter    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     Brief Hospital Course to date:  Zackary Noonan II is a 63 y.o. male with history of crohn's disease, c diff colitis and recurrent pnuemonia presents with cough, fever.      Assessment & Plan:    Pneumonia, recurrent  - wbc improved.  - sputum cx nl, legionella and strep pna pending  - LLL  - continue zosyn and zyvox,   - ID following  - CXR stable    Asthma  - nebs scheduled and prn  - consider steroids if any worsening of wheezing.    Pyoderma gangrenosum   - will cs dr doty to see re 'superglue' procedure since lesions are worsening.  (Patient's request)    Crohn's disease, high output ileostomy  - inccrease antimotility agents to home dose    H/o C diff colitis  - fecal transplant 2017  - watch on abx.  Note colectomy     H/o DVT  - continues on therapeutic lovenox BID (has been on these injections for over a year after difficulty with stomal bleeding while on coumadin - but at this point it is unclear why he has not been switched over to a NOAC).  - will refer back to his primary Hematologist at discharge to explore alternatives to lovenox for DVT prevention        DVT Prophylaxis:  lovenox    CODE STATUS:   Code Status and Medical Interventions:   Ordered at: 09/07/18 7097     Level Of Support Discussed With:    Patient     Code Status:    CPR     Medical Interventions (Level of Support Prior to Arrest):    Full       Disposition: I expect the patient to be discharged home in ? days.      Electronically signed by Julianna Matthews MD, 09/09/18, 3:12 PM.

## 2018-09-10 LAB
ANION GAP SERPL CALCULATED.3IONS-SCNC: 7 MMOL/L (ref 3–11)
BACTERIA FLD CULT: ABNORMAL
BACTERIA SPEC RESP CULT: NORMAL
BUN BLD-MCNC: 7 MG/DL (ref 9–23)
BUN/CREAT SERPL: 8 (ref 7–25)
CALCIUM SPEC-SCNC: 8.1 MG/DL (ref 8.7–10.4)
CHLORIDE SERPL-SCNC: 101 MMOL/L (ref 99–109)
CO2 SERPL-SCNC: 30 MMOL/L (ref 20–31)
CREAT BLD-MCNC: 0.88 MG/DL (ref 0.6–1.3)
DEPRECATED RDW RBC AUTO: 51.4 FL (ref 37–54)
ERYTHROCYTE [DISTWIDTH] IN BLOOD BY AUTOMATED COUNT: 15.6 % (ref 11.3–14.5)
GFR SERPL CREATININE-BSD FRML MDRD: 87 ML/MIN/1.73
GLUCOSE BLD-MCNC: 87 MG/DL (ref 70–100)
GRAM STN SPEC: NORMAL
GRAM STN SPEC: NORMAL
HCT VFR BLD AUTO: 32.9 % (ref 38.9–50.9)
HGB BLD-MCNC: 10.7 G/DL (ref 13.1–17.5)
L PNEUMO1 AG UR QL IA: NEGATIVE
Lab: ABNORMAL
MCH RBC QN AUTO: 29.1 PG (ref 27–31)
MCHC RBC AUTO-ENTMCNC: 32.5 G/DL (ref 32–36)
MCV RBC AUTO: 89.4 FL (ref 80–99)
ORGANISM ID: ABNORMAL
PLATELET # BLD AUTO: 285 10*3/MM3 (ref 150–450)
PMV BLD AUTO: 9 FL (ref 6–12)
POTASSIUM BLD-SCNC: 3.9 MMOL/L (ref 3.5–5.5)
RBC # BLD AUTO: 3.68 10*6/MM3 (ref 4.2–5.76)
S PNEUM AG SPEC QL LA: POSITIVE
SODIUM BLD-SCNC: 138 MMOL/L (ref 132–146)
SPECIMEN SOURCE: ABNORMAL
WBC NRBC COR # BLD: 13.17 10*3/MM3 (ref 3.5–10.8)

## 2018-09-10 PROCEDURE — 25010000002 LINEZOLID 600 MG/300ML SOLUTION: Performed by: NURSE PRACTITIONER

## 2018-09-10 PROCEDURE — 25010000002 PIPERACILLIN SOD-TAZOBACTAM PER 1 G: Performed by: NURSE PRACTITIONER

## 2018-09-10 PROCEDURE — 94760 N-INVAS EAR/PLS OXIMETRY 1: CPT

## 2018-09-10 PROCEDURE — 80048 BASIC METABOLIC PNL TOTAL CA: CPT | Performed by: INTERNAL MEDICINE

## 2018-09-10 PROCEDURE — 99232 SBSQ HOSP IP/OBS MODERATE 35: CPT | Performed by: INTERNAL MEDICINE

## 2018-09-10 PROCEDURE — 94640 AIRWAY INHALATION TREATMENT: CPT

## 2018-09-10 PROCEDURE — 94799 UNLISTED PULMONARY SVC/PX: CPT

## 2018-09-10 PROCEDURE — 25010000002 ENOXAPARIN PER 10 MG: Performed by: INTERNAL MEDICINE

## 2018-09-10 PROCEDURE — 85027 COMPLETE CBC AUTOMATED: CPT | Performed by: INTERNAL MEDICINE

## 2018-09-10 RX ORDER — BENZONATATE 100 MG/1
100 CAPSULE ORAL 3 TIMES DAILY PRN
Status: DISCONTINUED | OUTPATIENT
Start: 2018-09-10 | End: 2018-09-11 | Stop reason: HOSPADM

## 2018-09-10 RX ADMIN — IPRATROPIUM BROMIDE AND ALBUTEROL SULFATE 3 ML: 2.5; .5 SOLUTION RESPIRATORY (INHALATION) at 16:27

## 2018-09-10 RX ADMIN — LOSARTAN POTASSIUM 50 MG: 50 TABLET ORAL at 09:14

## 2018-09-10 RX ADMIN — IPRATROPIUM BROMIDE AND ALBUTEROL SULFATE 3 ML: 2.5; .5 SOLUTION RESPIRATORY (INHALATION) at 07:38

## 2018-09-10 RX ADMIN — ACETAMINOPHEN 650 MG: 325 TABLET ORAL at 11:18

## 2018-09-10 RX ADMIN — DIPHENOXYLATE HYDROCHLORIDE AND ATROPINE SULFATE 2 TABLET: 2.5; .025 TABLET ORAL at 17:50

## 2018-09-10 RX ADMIN — TAZOBACTAM SODIUM AND PIPERACILLIN SODIUM 3.38 G: 375; 3 INJECTION, SOLUTION INTRAVENOUS at 20:29

## 2018-09-10 RX ADMIN — FLUTICASONE PROPIONATE 2 SPRAY: 50 SPRAY, METERED NASAL at 09:36

## 2018-09-10 RX ADMIN — LINEZOLID 600 MG: 600 INJECTION, SOLUTION INTRAVENOUS at 17:50

## 2018-09-10 RX ADMIN — VITAMIN E CAP 400 UNIT 400 UNITS: 400 CAP at 09:14

## 2018-09-10 RX ADMIN — DIPHENOXYLATE HYDROCHLORIDE AND ATROPINE SULFATE 2 TABLET: 2.5; .025 TABLET ORAL at 09:14

## 2018-09-10 RX ADMIN — IPRATROPIUM BROMIDE AND ALBUTEROL SULFATE 3 ML: 2.5; .5 SOLUTION RESPIRATORY (INHALATION) at 12:49

## 2018-09-10 RX ADMIN — CLOBETASOL PROPIONATE: 0.5 CREAM TOPICAL at 21:17

## 2018-09-10 RX ADMIN — AMLODIPINE BESYLATE 5 MG: 5 TABLET ORAL at 20:29

## 2018-09-10 RX ADMIN — LINEZOLID 600 MG: 600 INJECTION, SOLUTION INTRAVENOUS at 06:46

## 2018-09-10 RX ADMIN — BUSPIRONE HYDROCHLORIDE 15 MG: 10 TABLET ORAL at 20:28

## 2018-09-10 RX ADMIN — ACETAMINOPHEN 650 MG: 325 TABLET ORAL at 20:41

## 2018-09-10 RX ADMIN — MONTELUKAST SODIUM 10 MG: 10 TABLET, COATED ORAL at 20:29

## 2018-09-10 RX ADMIN — DIPHENOXYLATE HYDROCHLORIDE AND ATROPINE SULFATE 2 TABLET: 2.5; .025 TABLET ORAL at 11:18

## 2018-09-10 RX ADMIN — TAZOBACTAM SODIUM AND PIPERACILLIN SODIUM 3.38 G: 375; 3 INJECTION, SOLUTION INTRAVENOUS at 11:21

## 2018-09-10 RX ADMIN — IPRATROPIUM BROMIDE AND ALBUTEROL SULFATE 3 ML: 2.5; .5 SOLUTION RESPIRATORY (INHALATION) at 19:39

## 2018-09-10 RX ADMIN — BUDESONIDE 0.5 MG: 0.5 INHALANT RESPIRATORY (INHALATION) at 07:39

## 2018-09-10 RX ADMIN — BUSPIRONE HYDROCHLORIDE 15 MG: 10 TABLET ORAL at 09:14

## 2018-09-10 RX ADMIN — CLOBETASOL PROPIONATE: 0.5 CREAM TOPICAL at 09:13

## 2018-09-10 RX ADMIN — TAZOBACTAM SODIUM AND PIPERACILLIN SODIUM 3.38 G: 375; 3 INJECTION, SOLUTION INTRAVENOUS at 04:28

## 2018-09-10 RX ADMIN — ENOXAPARIN SODIUM 80 MG: 80 INJECTION SUBCUTANEOUS at 06:46

## 2018-09-10 RX ADMIN — BENZONATATE 100 MG: 100 CAPSULE ORAL at 09:13

## 2018-09-10 RX ADMIN — BUDESONIDE 0.5 MG: 0.5 INHALANT RESPIRATORY (INHALATION) at 19:39

## 2018-09-10 RX ADMIN — MULTIPLE VITAMINS W/ MINERALS TAB 1 TABLET: TAB ORAL at 09:13

## 2018-09-10 RX ADMIN — Medication 250 MG: at 09:13

## 2018-09-10 RX ADMIN — DIPHENOXYLATE HYDROCHLORIDE AND ATROPINE SULFATE 2 TABLET: 2.5; .025 TABLET ORAL at 20:29

## 2018-09-10 RX ADMIN — ENOXAPARIN SODIUM 80 MG: 80 INJECTION SUBCUTANEOUS at 17:50

## 2018-09-10 RX ADMIN — BENZONATATE 100 MG: 100 CAPSULE ORAL at 01:48

## 2018-09-10 NOTE — PROGRESS NOTES
Continued Stay Note  [facility]     Patient Name: Zackary Noonan II  MRN: [id number]  Today's Date: 9/10/2018    Admit Date: 9/7/2018          Discharge Plan     Row Name 09/10/18 1403       Plan    Plan Home    Patient/Family in Agreement with Plan yes    Plan Comments Spoke with patient and his wife in room.  Patient's plan remains to return home at discharge.  Patient was not on oxygen prior to admission and is currently on O2 @ 5 L.  CM will continue to follow.  Lynn Little RN [phone]              Discharge Codes    No documentation.       Expected Discharge Date and Time     Expected Discharge Date Expected Discharge Time    Sep 11, 2018             Lynn Little RN

## 2018-09-10 NOTE — PROGRESS NOTES
Zackary Noonan II  1955  [id number]  9/10/2018    CC: Sepsis.    Zackary Noonan II is a 63 y.o. male here for bilateral pneumonia with fever, leukocytosis, and hypoxemia in immunocompromised host with severe Crohn's disease status post colectomy.      Past medical history:  Past Medical History:   Diagnosis Date   • Anxiety    • Arthritis    • Asthma    • Clostridium difficile infection 01/2017    treated per dr carter with fecal transplant 8-2017    • H/O recurrent pneumonia    • Hypertension    • MRSA infection 2016    right lower extremity wound   • Pulmonary nodule     Followed by Dr. Galaviz    • Recurrent deep vein thrombosis (DVT) (CMS/HCC)     x 3 LLE; chronic anticoagulation therapy    • Ulcerative colitis (CMS/HCC)    • Wears glasses    • Wears hearing aid        Medications:   Current Facility-Administered Medications:   •  acetaminophen (TYLENOL) tablet 650 mg, 650 mg, Oral, Q6H PRN, Galen Ann PA-DELON, 650 mg at 09/09/18 2115  •  albuterol (PROVENTIL) nebulizer solution 0.083% 2.5 mg/3mL, 2.5 mg, Nebulization, Q4H PRN, Alejandra, Verena, APRN  •  albuterol (PROVENTIL) nebulizer solution 0.083% 2.5 mg/3mL, 2.5 mg, Nebulization, Q4H PRN, Justin Avendaño MD  •  amLODIPine (NORVASC) tablet 5 mg, 5 mg, Oral, Nightly, Alejandra, Verena, APRN, 5 mg at 09/09/18 2111  •  benzonatate (TESSALON) capsule 100 mg, 100 mg, Oral, TID PRN, Fay Chapman, APRN, 100 mg at 09/10/18 0148  •  budesonide (PULMICORT) nebulizer solution 0.5 mg, 0.5 mg, Nebulization, BID - RT, Alejandra, Verena, APRN, 0.5 mg at 09/10/18 0739  •  busPIRone (BUSPAR) tablet 15 mg, 15 mg, Oral, Q12H, Justin Avendaño MD, 15 mg at 09/09/18 2111  •  clobetasol (TEMOVATE) 0.05 % cream, , Topical, Q12H, Alejandra, Verena, APRN  •  diphenoxylate-atropine (LOMOTIL) 2.5-0.025 MG per tablet 2 tablet, 2 tablet, Oral, 4x Daily, Julianna Matthews MD, 2 tablet at 09/09/18 2111  •  enoxaparin (LOVENOX) syringe 80 mg, 80 mg, Subcutaneous, BID, Jarocho  Justin DEGROOT MD, 80 mg at 09/10/18 0646  •  fluticasone (FLONASE) 50 MCG/ACT nasal spray 2 spray, 2 spray, Nasal, Daily, Alejandra, Verena, APRN, 2 spray at 09/09/18 1112  •  folic acid (FOLVITE) tablet 1 mg, 1 mg, Oral, Daily, Alejandra, Verena, APRN, 1 mg at 09/07/18 0907  •  ipratropium-albuterol (DUO-NEB) nebulizer solution 3 mL, 3 mL, Nebulization, 4x Daily - RT, Justin Avendaño MD, 3 mL at 09/10/18 0738  •  Linezolid (ZYVOX) 600 mg 300 mL, 600 mg, Intravenous, Q12H, Alejandra, Verena, APRN, Last Rate: 300 mL/hr at 09/10/18 0646, 600 mg at 09/10/18 0646  •  loperamide (IMODIUM) capsule 2 mg, 2 mg, Oral, 4x Daily PRN, Justin Avendaño MD, 2 mg at 09/09/18 1358  •  losartan (COZAAR) tablet 50 mg, 50 mg, Oral, Daily, Justin Avendaño MD, 50 mg at 09/09/18 0823  •  montelukast (SINGULAIR) tablet 10 mg, 10 mg, Oral, Nightly, Alejandra, Verena, APRN, 10 mg at 09/09/18 2112  •  multivitamin with minerals 1 tablet, 1 tablet, Oral, Daily, Alejandra, Verena, APRN, 1 tablet at 09/09/18 0823  •  ondansetron (ZOFRAN) injection 4 mg, 4 mg, Intravenous, Q6H PRN, Alejandra, Verena, APRN, 4 mg at 09/09/18 2120  •  piperacillin-tazobactam (ZOSYN) 3.375 g in iso-osmotic dextrose 50 ml (premix), 3.375 g, Intravenous, Q8H, Alejandra, Verena, APRN, 3.375 g at 09/10/18 0428  •  saccharomyces boulardii (FLORASTOR) capsule 250 mg, 250 mg, Oral, Daily, Alejandra, Verena, APRN, 250 mg at 09/09/18 0823  •  sodium chloride 0.9 % flush 10 mL, 10 mL, Intravenous, PRN, Leonardo Urbina MD  •  Insert peripheral IV, , , Once **AND** sodium chloride 0.9 % flush 10 mL, 10 mL, Intravenous, PRN, Anni Lowe APRN  •  vitamin E capsule 400 Units, 400 Units, Oral, Daily, Verena Roberts APRN, 400 Units at 09/09/18 1112  Antibiotics:  Anti-Infectives     Ordered     Dose/Rate Route Frequency Start Stop    09/07/18 9228  piperacillin-tazobactam (ZOSYN) 3.375 g in iso-osmotic dextrose 50 ml (premix)     Ordering Provider:  Verena Roberts APRN     "3.375 g  over 4 Hours Intravenous Every 8 Hours 09/07/18 1200 09/13/18 1159    09/07/18 0447  Linezolid (ZYVOX) 600 mg 300 mL     Ordering Provider:  Verena Roberts APRN    600 mg  300 mL/hr over 60 Minutes Intravenous Every 12 Hours 09/07/18 0600 09/13/18 0559    09/07/18 0448  piperacillin-tazobactam (ZOSYN) 3.375 g in iso-osmotic dextrose 50 ml (premix)     Ordering Provider:  Verena Roberts APRN    3.375 g  over 30 Minutes Intravenous Once 09/07/18 0545 09/07/18 0700    09/07/18 0312  cefTRIAXone (ROCEPHIN) IVPB 2 g     Ordering Provider:  Anni Lowe APRN    2 g  100 mL/hr over 30 Minutes Intravenous Once 09/07/18 0314 09/07/18 0700    09/07/18 0312  AZITHROMYCIN 500 MG/250 ML 0.9% NS IVPB (MBP)     Ordering Provider:  Anni Lowe APRN    500 mg  over 60 Minutes Intravenous Once 09/07/18 0314 09/07/18 0700          Allergies:  is allergic to beta adrenergic blockers and levaquin [levofloxacin in d5w].    Review of Systems: All other reviewed and negative except as per HPI    Blood pressure 131/69, pulse 88, temperature 99.3 °F (37.4 °C), temperature source Oral, resp. rate 22, height 168.9 cm (66.5\"), weight 79.1 kg (174 lb 6.4 oz), SpO2 97 %.  GENERAL: Awake and alert, in no acute distress.  Sitting up in a bedside recliner.  Denies fever chills or night sweats.  Positive sputum production which is described as \"gray with blood tinging\".  No pleuritic chest discomfort.  HEENT: Oropharynx without thrush. Sinuses nontender. Dentition in good repair. No cervical adenopathy. No carotid bruits/ jugular venous distention.   EYES: PERRL. No conjunctival injection. No icterus. EOMI.  LYMPHATICS: No lymphadenopathy of the neck or axillary or inguinal regions.   HEART: No murmur, gallop, or pericardial friction rub.   LUNGS: Clear to auscultation anteriorly. No percussion dullness.  Expiratory wheezes and rhonchi in bilateral lung bases right greater than left.  ABDOMEN: Soft, nontender, " nondistended. No appreciable HSM.  No peritoneal findings.  Colostomy stoma pink and viable.  SKIN: Warm and dry without cutaneous eruptions. No embolic stigmata.   PSYCHIATRIC: Mental status lucid. Cranial nerve function intact.       DIAGNOSTICS:  Lab Results   Component Value Date    WBC 13.17 (H) 09/10/2018    HGB 10.7 (L) 09/10/2018    HCT 32.9 (L) 09/10/2018     09/10/2018     Lab Results   Component Value Date    CRP 25.95 (H) 03/16/2018     No results found for: SEDRATE  Lab Results   Component Value Date    GLUCOSE 87 09/10/2018    BUN 7 (L) 09/10/2018    CREATININE 0.88 09/10/2018    EGFRIFNONA 87 09/10/2018    BCR 8.0 09/10/2018    CO2 30.0 09/10/2018    CALCIUM 8.1 (L) 09/10/2018    ALBUMIN 4.43 09/07/2018    AST 23 09/07/2018    ALT 26 09/07/2018       Microbiology: Blood cultures ×2 negative.  Influenza A and B-.  Sputum Gram stain with 3+ WBCs but normal skyler on culture.    RADIOLOGY:  Imaging Results (last 72 hours)     Procedure Component Value Units Date/Time    XR Chest 1 View [[id number]] Collected:  09/09/18 1206     Updated:  09/09/18 2356    Narrative:          EXAMINATION: XR CHEST 1 VW - 9/9/2018     INDICATION: J18.1-Lobar pneumonia, unspecified organism;  D72.829-Elevated white blood cell count, unspecified.      COMPARISON: 9/8/2018.     FINDINGS: Patchy bibasilar disease appears stable. Upper lungs remain  clear. No effusion or pneumothorax is seen.           Impression:       Stable bibasilar pneumonia/atelectasis.     DICTATED:   9/9/2018  EDITED/ls :   9/9/2018      This report was finalized on 9/9/2018 11:54 PM by DR. Davi Lemons MD.       XR Chest 1 View [[id number]] Collected:  09/08/18 0908     Updated:  09/08/18 2245    Narrative:          EXAMINATION: XR CHEST 1 VW - 9/8/2018     INDICATION: J18.1-Lobar pneumonia, unspecified organism;  D72.829-Elevated white blood cell count, unspecified.     COMPARISON: 9/7/2018.     FINDINGS: Patchy left basilar disease is stable or  slightly increased.  There is now mild right basilar pneumonia or atelectasis. The upper  lungs remain clear. No effusion is seen. The heart is upper normal size.  The vasculature appears normal.       Impression:       Stable to slightly increased left basilar pneumonia, and  mild new right basilar atelectasis or pneumonia.     DICTATED:   9/8/2018  EDITED/ls :   9/8/2018      This report was finalized on 9/8/2018 10:43 PM by DR. Davi Lemons MD.             Assessment and Plan: Sepsis syndrome.  Bilateral pneumonia.  Immunocompromised host with Crohn's disease.  Leukocytosis to 18,000+.  Status post colectomy and colostomy.  History of MRSA pneumonia.  Maximum temperature over 24 hours 99.3°.  Currently same.  Blood cultures ×2 remain negative.  Sputum with normal skyler.  3+ WBCs on Gram stain.  Utilization management: Anticipate 24 hours more in the hospital on IV antimicrobials.  Given failure to isolate a bacterial pathogen from sputum cultures or blood, suggest levofloxacin 750 mg by mouth daily ×10 days.  Follow-up with me in 2 weeks..      Zackary Roach MD  9/10/2018

## 2018-09-10 NOTE — PLAN OF CARE
Problem: Patient Care Overview  Goal: Plan of Care Review  Outcome: Ongoing (interventions implemented as appropriate)   09/10/18 8791   Coping/Psychosocial   Plan of Care Reviewed With patient   Plan of Care Review   Progress improving   OTHER   Outcome Summary Alert and oriented. Denies SOA but intermittent wheezing. Titrated down to 3L cannula this afternoon. ST monitor. Urine culture positive. Tylenol given once for neck pain. Ambulated in day and to bathroom with standby assist. Wife bedside this afternoon.

## 2018-09-10 NOTE — PROGRESS NOTES
[facility] Medicine Services  PROGRESS NOTE    Patient Name: Zackary Noonan II  : 1955  MRN: [id number]    Date of Admission: 2018  Length of Stay: 3  Primary Care Physician: Jillian Layne MD    Subjective   Subjective     CC:  pneumonia    HPI:  Clinically better:  Walked in day with oxygen.  At rest off oxygen, sat was 90.  Now on 3L and satting 97.   Still coughing, moderately productive.  Tessalon perles help paroxysmal cough  S    Review of Systems  Gen- fevers, no chills, + fatigue  CV- No chest pain, palpitations  Resp- + cough and dyspnea  GI- No N/V/, abd pain.  Uses lomotil and imodium at home for ileostomy.   Skin - PG lesions near stoma - has upcoming appt with dr doty for 'superglue' - consulted.       Otherwise ROS is negative except as mentioned in the HPI.    Objective   Objective     Vital Signs:   Temp:  [97.8 °F (36.6 °C)-99.3 °F (37.4 °C)] 98 °F (36.7 °C)  Heart Rate:  [] 94  Resp:  [16-22] 18  BP: (104-134)/(59-69) 104/59        Physical Exam:    Constitutional -in chair, family present, looks less tired than yest.   HEENT-NCAT, mucous membranes moist  CV-mild tachycardia, regular, no murmur  Resp-scattered rhonchi and wheeze both insp and exp; clearer than yest.   Abd-soft, non-tender, non-distended, normo active bowel sounds.  Stoma with liq stool.   PG lesions not seen.   Ext-No lower extremity cyanosis, clubbing or edema bilaterally  Neuro-alert and oriented, speech clear, moves all extremities   Psych-normal affect   Skin- No rash on exposed UE or LE bilaterally      Results Reviewed:  I have personally reviewed current lab, radiology, and data and agree.      Results from last 7 days  Lab Units 09/10/18  0453 18  0539 18  0519   WBC 10*3/mm3 13.17* 13.53* 19.74*   HEMOGLOBIN g/dL 10.7* 11.6* 11.9*   HEMATOCRIT % 32.9* 35.6* 36.7*   PLATELETS 10*3/mm3 285 527 433       Results from last 7 days  Lab Units 09/10/18  4994  09/09/18  0539 09/08/18 0519 09/07/18 0210   SODIUM mmol/L 138 137 137 137   POTASSIUM mmol/L 3.9 3.6 3.7 3.8   CHLORIDE mmol/L 101 105 105 107   CO2 mmol/L 30.0 29.0 24.0 23.0   BUN mg/dL 7* 6* 8* 9   CREATININE mg/dL 0.88 0.90 0.99 0.91   GLUCOSE mg/dL 87 98 96 134*   CALCIUM mg/dL 8.1* 9.0 8.1* 9.2   ALT (SGPT) U/L  --   --   --  26   AST (SGOT) U/L  --   --   --  23   TROPONIN I ng/mL  --   --   --  <0.006     Estimated Creatinine Clearance: 83.6 mL/min (by C-G formula based on SCr of 0.88 mg/dL).  No results found for: BNP    Microbiology Results Abnormal     Procedure Component Value - Date/Time    Respiratory Culture - Sputum, Cough [[id number]] Collected:  09/08/18 0934    Lab Status:  Final result Specimen:  Sputum from Cough Updated:  09/10/18 0748     Respiratory Culture Light growth (2+) Normal Respiratory Skyler     Gram Stain Result Moderate (3+) WBCs per low power field      Moderate (3+) Normal respiratory skyler    Blood Culture - Blood, [[id number]]  (Normal) Collected:  09/07/18 0215    Lab Status:  Preliminary result Specimen:  Blood from Arm, Right Updated:  09/10/18 0231     Blood Culture No growth at 3 days    Blood Culture - Blood, [[id number]]  (Normal) Collected:  09/07/18 0215    Lab Status:  Preliminary result Specimen:  Blood from Arm, Left Updated:  09/10/18 0231     Blood Culture No growth at 3 days    Influenza Antigen, Rapid - Swab, Nasopharynx [[id number]]  (Normal) Collected:  09/07/18 0212    Lab Status:  Final result Specimen:  Swab from Nasopharynx Updated:  09/07/18 0237     Influenza A Ag, EIA Negative     Influenza B Ag, EIA Negative          Imaging Results (last 24 hours)     Procedure Component Value Units Date/Time    XR Chest 1 View [[id number]] Collected:  09/09/18 1206     Updated:  09/09/18 8045    Narrative:          EXAMINATION: XR CHEST 1 VW - 9/9/2018     INDICATION: J18.1-Lobar pneumonia, unspecified organism;  D72.829-Elevated white blood cell count, unspecified.       COMPARISON: 9/8/2018.     FINDINGS: Patchy bibasilar disease appears stable. Upper lungs remain  clear. No effusion or pneumothorax is seen.           Impression:       Stable bibasilar pneumonia/atelectasis.     DICTATED:   9/9/2018  EDITED/ls :   9/9/2018      This report was finalized on 9/9/2018 11:54 PM by DR. Davi Lemons MD.                I have reviewed the medications.      amLODIPine 5 mg Oral Nightly   budesonide 0.5 mg Nebulization BID - RT   busPIRone 15 mg Oral Q12H   clobetasol  Topical Q12H   diphenoxylate-atropine 2 tablet Oral 4x Daily   enoxaparin 80 mg Subcutaneous BID   fluticasone 2 spray Nasal Daily   folic acid 1 mg Oral Daily   ipratropium-albuterol 3 mL Nebulization 4x Daily - RT   Linezolid 600 mg Intravenous Q12H   losartan 50 mg Oral Daily   montelukast 10 mg Oral Nightly   multivitamin with minerals 1 tablet Oral Daily   piperacillin-tazobactam 3.375 g Intravenous Q8H   saccharomyces boulardii 250 mg Oral Daily   vitamin E 400 Units Oral Daily         Assessment/Plan   Assessment / Plan     Hospital Problem List     * (Principal)Sepsis (CMS/HCC)    Pneumonia    History of DVT (deep vein thrombosis)    Overview Signed 11/5/2016  5:17 AM by Elizabeth Parikh APRN LLE; x 3; chronic coumadin therapy         HTN (hypertension)    HO of IVC filter    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     Brief Hospital Course to date:  Zackary Noonan II is a 63 y.o. male with history of crohn's disease, c diff colitis and recurrent pnuemonia presents with cough, fever.      Assessment & Plan:    Pneumonia, recurrent  - wbc stable  - sputum cx nl, legionella and strep pna pending  - LLL  - continue zosyn and zyvox,   - ID following  - CXR stable  - consider home soon on quinoline for 10day cousre    Asthma  - nebs scheduled  and prn  - consider steroids if any worsening of wheezing.    Pyoderma gangrenosum   - cs to dr doty to see re 'superglue' procedure since lesions are worsening.  (Patient's request)    Crohn's disease, high output ileostomy  - inccrease antimotility agents to home dose    H/o C diff colitis  - fecal transplant 2017  - watch on abx.  Note colectomy     H/o DVT  - continues on therapeutic lovenox BID (has been on these injections for over a year after difficulty with stomal bleeding while on coumadin - but at this point it is unclear why he has not been switched over to a NOAC).  - will refer back to his primary Hematologist at discharge to explore alternatives to lovenox for DVT prevention        DVT Prophylaxis:  lovenox    CODE STATUS:   Code Status and Medical Interventions:   Ordered at: 09/07/18 0789     Level Of Support Discussed With:    Patient     Code Status:    CPR     Medical Interventions (Level of Support Prior to Arrest):    Full       Disposition: I expect the patient to be discharged home in 1-2 days, but oxygen need is still well above baseline.      Electronically signed by Julianna Matthews MD, 09/10/18, 4:03 PM.

## 2018-09-10 NOTE — PLAN OF CARE
"Problem: Sepsis/Septic Shock (Adult)  Intervention: Support Psychosocial Response to Life-changing Event/Hospitalization  Pt. states, \"I was feeling depressed and sorry for myself this morning.  I'm glad to see you nurses!\"  Therapeutic communication used to encourage pt. to voices multiple stressors in his life.  Pt. seems relieved afterwards.  Will continue to monitor pt. Mood.         "

## 2018-09-11 VITALS
RESPIRATION RATE: 18 BRPM | HEIGHT: 67 IN | OXYGEN SATURATION: 92 % | TEMPERATURE: 98.9 F | SYSTOLIC BLOOD PRESSURE: 115 MMHG | BODY MASS INDEX: 27.37 KG/M2 | WEIGHT: 174.4 LBS | DIASTOLIC BLOOD PRESSURE: 56 MMHG | HEART RATE: 103 BPM

## 2018-09-11 PROBLEM — E87.1 HYPONATREMIA: Status: RESOLVED | Noted: 2018-03-13 | Resolved: 2018-09-11

## 2018-09-11 PROBLEM — D62 ACUTE BLOOD LOSS ANEMIA: Status: RESOLVED | Noted: 2017-09-20 | Resolved: 2018-09-11

## 2018-09-11 PROCEDURE — 25010000002 ONDANSETRON PER 1 MG: Performed by: NURSE PRACTITIONER

## 2018-09-11 PROCEDURE — 94799 UNLISTED PULMONARY SVC/PX: CPT

## 2018-09-11 PROCEDURE — 25010000002 ENOXAPARIN PER 10 MG: Performed by: INTERNAL MEDICINE

## 2018-09-11 PROCEDURE — 25010000002 LINEZOLID 600 MG/300ML SOLUTION: Performed by: NURSE PRACTITIONER

## 2018-09-11 PROCEDURE — 25010000002 PIPERACILLIN SOD-TAZOBACTAM PER 1 G: Performed by: NURSE PRACTITIONER

## 2018-09-11 PROCEDURE — 99239 HOSP IP/OBS DSCHRG MGMT >30: CPT | Performed by: PHYSICIAN ASSISTANT

## 2018-09-11 PROCEDURE — 94640 AIRWAY INHALATION TREATMENT: CPT

## 2018-09-11 PROCEDURE — 25010000003 CEFTRIAXONE PER 250 MG: Performed by: PHYSICIAN ASSISTANT

## 2018-09-11 RX ORDER — ACETAMINOPHEN 325 MG/1
650 TABLET ORAL EVERY 6 HOURS PRN
Start: 2018-09-11

## 2018-09-11 RX ORDER — LEVOFLOXACIN 750 MG/1
750 TABLET ORAL EVERY 24 HOURS
Status: DISCONTINUED | OUTPATIENT
Start: 2018-09-12 | End: 2018-09-11 | Stop reason: HOSPADM

## 2018-09-11 RX ORDER — CEFTRIAXONE SODIUM 2 G/50ML
2 INJECTION, SOLUTION INTRAVENOUS EVERY 24 HOURS
Status: COMPLETED | OUTPATIENT
Start: 2018-09-11 | End: 2018-09-11

## 2018-09-11 RX ORDER — AMOXICILLIN AND CLAVULANATE POTASSIUM 875; 125 MG/1; MG/1
1 TABLET, FILM COATED ORAL EVERY 12 HOURS SCHEDULED
Qty: 20 TABLET | Refills: 0 | Status: SHIPPED | OUTPATIENT
Start: 2018-09-11 | End: 2018-09-21

## 2018-09-11 RX ORDER — BUSPIRONE HYDROCHLORIDE 10 MG/1
15 TABLET ORAL EVERY 8 HOURS SCHEDULED
Status: DISCONTINUED | OUTPATIENT
Start: 2018-09-11 | End: 2018-09-11 | Stop reason: HOSPADM

## 2018-09-11 RX ADMIN — VITAMIN E CAP 400 UNIT 400 UNITS: 400 CAP at 08:19

## 2018-09-11 RX ADMIN — BENZONATATE 100 MG: 100 CAPSULE ORAL at 08:25

## 2018-09-11 RX ADMIN — CEFTRIAXONE SODIUM 2 G: 2 INJECTION, SOLUTION INTRAVENOUS at 12:00

## 2018-09-11 RX ADMIN — ACETAMINOPHEN 650 MG: 325 TABLET ORAL at 13:05

## 2018-09-11 RX ADMIN — ONDANSETRON 4 MG: 2 INJECTION INTRAMUSCULAR; INTRAVENOUS at 05:31

## 2018-09-11 RX ADMIN — IPRATROPIUM BROMIDE AND ALBUTEROL SULFATE 3 ML: 2.5; .5 SOLUTION RESPIRATORY (INHALATION) at 12:24

## 2018-09-11 RX ADMIN — DIPHENOXYLATE HYDROCHLORIDE AND ATROPINE SULFATE 2 TABLET: 2.5; .025 TABLET ORAL at 11:59

## 2018-09-11 RX ADMIN — FLUTICASONE PROPIONATE 2 SPRAY: 50 SPRAY, METERED NASAL at 08:18

## 2018-09-11 RX ADMIN — MULTIPLE VITAMINS W/ MINERALS TAB 1 TABLET: TAB ORAL at 08:19

## 2018-09-11 RX ADMIN — Medication 250 MG: at 08:19

## 2018-09-11 RX ADMIN — BUSPIRONE HYDROCHLORIDE 15 MG: 10 TABLET ORAL at 13:48

## 2018-09-11 RX ADMIN — TAZOBACTAM SODIUM AND PIPERACILLIN SODIUM 3.38 G: 375; 3 INJECTION, SOLUTION INTRAVENOUS at 05:00

## 2018-09-11 RX ADMIN — ENOXAPARIN SODIUM 80 MG: 80 INJECTION SUBCUTANEOUS at 05:00

## 2018-09-11 RX ADMIN — LOSARTAN POTASSIUM 50 MG: 50 TABLET ORAL at 08:19

## 2018-09-11 RX ADMIN — IPRATROPIUM BROMIDE AND ALBUTEROL SULFATE 3 ML: 2.5; .5 SOLUTION RESPIRATORY (INHALATION) at 08:06

## 2018-09-11 RX ADMIN — CLOBETASOL PROPIONATE: 0.5 CREAM TOPICAL at 08:18

## 2018-09-11 RX ADMIN — BUSPIRONE HYDROCHLORIDE 15 MG: 10 TABLET ORAL at 08:19

## 2018-09-11 RX ADMIN — DIPHENOXYLATE HYDROCHLORIDE AND ATROPINE SULFATE 2 TABLET: 2.5; .025 TABLET ORAL at 08:19

## 2018-09-11 RX ADMIN — BUDESONIDE 0.5 MG: 0.5 INHALANT RESPIRATORY (INHALATION) at 08:06

## 2018-09-11 RX ADMIN — LINEZOLID 600 MG: 600 INJECTION, SOLUTION INTRAVENOUS at 05:00

## 2018-09-11 RX ADMIN — FOLIC ACID 1 MG: 1 TABLET ORAL at 08:19

## 2018-09-11 NOTE — DISCHARGE PLACEMENT REQUEST
"Kaur Guerrero (63 y.o. Male)     Lynn Little RN  [phone]    Date of Birth Social Security Number Address Home Phone MRN    1955  [address] [place], [place] [id number] [phone] [id number]    Tenriism Marital Status          Other        Admission Date Admission Type Admitting Provider Attending Provider Department, Room/Bed    18 Emergency Julianna Matthews MD Mini, Jocelyn, MD [facility] 3F, S317/1    Discharge Date Discharge Disposition Discharge Destination         Home or Self Care              Attending Provider:  Julianna Matthews MD    Allergies:  Beta Adrenergic Blockers, Levaquin [Levofloxacin In D5w]    Isolation:  None   Infection:  None   Code Status:  CPR    Ht:  168.9 cm (66.5\")   Wt:  79.1 kg (174 lb 6.4 oz)    Admission Cmt:  None   Principal Problem:  Sepsis (CMS/HCC) [A41.9]                 Active Insurance as of 2018     Primary Coverage     Payor Plan Insurance Group Employer/Plan Group    [facility] [employer] [id number]     Payor Plan Address Payor Plan Phone Number Effective From Effective To    [address] [phone] 2015     [facility] [id number]       Subscriber Name Subscriber Birth Date Member ID       KAUR GUERRERO II 1955 [id number]                 Emergency Contacts      (Rel.) Home Phone Work Phone Mobile Phone    Ronda Guerrero (Spouse) -- -- [phone]          [address] [address]  Dept. Phone:  [phone]  Dept. Fax:   Date Ordered: Sep 11, 2018         Patient:  Kaur Guerrero II MRN:  [id number]   [address] [place] [id number] :  1955  SSN:    Phone: [phone] Sex:  M     Weight: 79.1 kg (174 lb 6.4 oz)         Ht Readings from Last 1 Encounters:   18 168.9 cm (66.5\")         Oxygen Therapy         (Order ID: [id number])    Diagnosis:  Pneumonia of left lower lobe due to " "infectious organism (CMS/HCC) (J18.1 [ICD-10-CM] 486 [ICD-9-CM])  Chronic obstructive pulmonary disease, unspecified COPD type (CMS/HCC) (J44.9 [ICD-10-CM] 496 [ICD-9-CM])  Pulmonary nodule (R91.1 [ICD-10-CM] 793.11 [ICD-9-CM])  Pneumonia of right lower lobe due to infectious organism (CMS/HCC) (J18.1 [ICD-10-CM] 486 [ICD-9-CM])   Quantity:  1     Delivery Modality: Nasal Cannula  Liters Per Minute: 3  Duration: Continuous  Equipment:  Oxygen Concentrator &  &  Portable Gaseous Oxygen System & Portable Oxygen Contents Gaseous &  Conserving Regulator  The face to face evaluation was performed on: 2018  Length of Need (99 Months = Lifetime): 99 Months = Lifetime        Authorizing Provider's Phone: [phone]         Verbal Order Mode: Per protocol: cosign required  Authorizing Provider: Julianna Matthews MD  Authorizing Provider's NPI: [id number]     Order Entered By: Lynn Little RN 2018 12:10 PM                   History & Physical      DossetJason medellin MD at 2018  3:48 AM          [facility] Medicine Services  HISTORY AND PHYSICAL    Patient Name: Zackary Noonan II  : 1955  MRN: [id number]  Primary Care Physician: Jillian Layne MD    Subjective   Subjective     Chief Complaint:  Chest congestion, chills, nausea     HPI:  Zackary Noonan II is a 63 y.o. male with a past medical history significant for asthma, essential hypertension, Cdiff (s/p fecal transplant 2017), crohn's disease, DVT (s/p IVC filter) and recurrent pneumonia presents to the ED with complaints of chest congestion, chills and nausea that initially began yesterday.  Patient states that yesterday is when first noticed intermittent chills. This morning as he was getting ready for work he overall felt \"bad.\"  Patient states he has had several admissions for pneumonia and felt that he needed to come to the ED for further evaluation.  Wife states last night she could heart a " "\"rattle\" in his chest however patient denies any shortness of air, chest pain, vomiting, abdominal pain or diarrhea.  He states that he has had a non-productive cough.  He denies any recent steroid use, antibiotic use of recent hospitalizations.  He was diagnosed with what was thought to be ulcerative colitis a year ago and had a colectomy performed.  Wife now states that he had crohn's disease but they decided no immunosuppressant therapy due to recurrent pneumonia.  Of note patient is followed by Dr. Carter for recurrent pneumonia, history of Cdiff.  With last admission in March he was discharged on bactrim to take every other day which he currently is on.  CXR obtained tonight shows left basal opacities concerning for pneumonia.  Patient will be admitted to [facility] under the care of the Hospitalist for further evaluation and treatment.     Review of Systems   Constitutional: Positive for activity change, appetite change, chills and fatigue. Negative for diaphoresis, fever and unexpected weight change.   HENT: Positive for congestion. Negative for postnasal drip, sinus pain, sinus pressure and sore throat.    Eyes: Negative for photophobia and visual disturbance.   Respiratory: Positive for cough. Negative for shortness of breath.    Cardiovascular: Negative for chest pain, palpitations and leg swelling.   Gastrointestinal: Positive for nausea. Negative for abdominal distention, abdominal pain, blood in stool, constipation, diarrhea and vomiting.   Genitourinary: Negative.    Musculoskeletal: Negative.    Skin: Negative.    Neurological: Negative.    Psychiatric/Behavioral: Negative.         Otherwise 10-system ROS reviewed and is negative except as mentioned in the HPI.    Personal History     Past Medical History:   Diagnosis Date   • Anxiety    • Arthritis    • Asthma    • Clostridium difficile infection 01/2017    treated per dr carter with fecal transplant 8-2017    • H/O recurrent pneumonia    • Hypertension  " "  • MRSA infection 2016    right lower extremity wound   • Pulmonary nodule     Followed by Dr. Galaviz    • Recurrent deep vein thrombosis (DVT) (CMS/HCC)     x 3 LLE; chronic anticoagulation therapy    • Ulcerative colitis (CMS/HCC)    • Wears glasses    • Wears hearing aid        Past Surgical History:   Procedure Laterality Date   • BRONCHOSCOPY      by Dr. Galaviz for pulmonary nodule   • BRONCHOSCOPY N/A 11/7/2016    Procedure: BRONCHOSCOPY;  Surgeon: Eben Roberts MD;  Location:  [facility];  Service:    • COLON RESECTION N/A 9/19/2017    Procedure: TOTAL ABDOMINAL COLECTOMY WITH CREATION OF ILEOSTOMY;  Surgeon: David Nielsen MD;  Location:  [facility];  Service:    • COLONOSCOPY  08/2017   • UMBILICAL HERNIA REPAIR     • VASECTOMY     • VASECTOMY REVERSAL         Family History: family history includes Hypertension in his mother.     Social History:  reports that he has never smoked. He has never used smokeless tobacco. He reports that he drinks alcohol. He reports that he does not use drugs.    Medications:    (Not in a hospital admission)    Allergies   Allergen Reactions   • Beta Adrenergic Blockers Other (See Comments)     Vocal changes   • Levaquin [Levofloxacin In D5w] Other (See Comments)     Heaviness in legs, \"felt like lead\"       Objective   Objective     Vital Signs:   Temp:  [103 °F (39.4 °C)] 103 °F (39.4 °C)  Heart Rate:  [110-128] 118  Resp:  [20] 20  BP: (137-173)/(66-79) 138/69        Physical Exam   Constitutional: He is oriented to person, place, and time. He appears well-developed and well-nourished. No distress.   Alert and oriented x4   HENT:   Head: Normocephalic and atraumatic.   Eyes: Pupils are equal, round, and reactive to light. Conjunctivae and EOM are normal. Right eye exhibits no discharge. Left eye exhibits no discharge. No scleral icterus.   Neck: Normal range of motion. Neck supple. No JVD present. No tracheal deviation present. No thyromegaly present. "   Cardiovascular: Normal rate, regular rhythm, normal heart sounds and intact distal pulses.  Exam reveals no gallop and no friction rub.    No murmur heard.  Pulmonary/Chest: Effort normal. No stridor. No respiratory distress. He has no wheezes. He exhibits no tenderness.   Audible congestion, coarse rhonchi throughout.    Abdominal: Soft. Bowel sounds are normal. He exhibits no distension and no mass. There is no tenderness. There is no rebound and no guarding. No hernia.   Musculoskeletal: Normal range of motion. He exhibits no edema, tenderness or deformity.   Lymphadenopathy:     He has no cervical adenopathy.   Neurological: He is alert and oriented to person, place, and time.   Skin: Skin is warm and dry. No rash noted. He is not diaphoretic. No erythema. No pallor.   Psychiatric: He has a normal mood and affect. His behavior is normal. Judgment and thought content normal.   Nursing note and vitals reviewed.       Results Reviewed:  I have personally reviewed current lab, radiology, and data and agree.      Results from last 7 days  Lab Units 09/07/18  0210   WBC 10*3/mm3 18.37*   HEMOGLOBIN g/dL 13.4   HEMATOCRIT % 41.1   PLATELETS 10*3/mm3 236       Results from last 7 days  Lab Units 09/07/18  0210   SODIUM mmol/L 137   POTASSIUM mmol/L 3.8   CHLORIDE mmol/L 107   CO2 mmol/L 23.0   BUN mg/dL 9   CREATININE mg/dL 0.91   GLUCOSE mg/dL 134*   CALCIUM mg/dL 9.2   ALT (SGPT) U/L 26   AST (SGOT) U/L 23     No results found for: BNP  No results found for: PHART  Imaging Results (last 24 hours)     Procedure Component Value Units Date/Time    XR Chest 2 View [[id number]] Collected:  09/07/18 0132     Updated:  09/07/18 0225    Narrative:       EXAM:    XR Chest, 2 Views    CLINICAL HISTORY:    63 years old, male; Signs and symptoms; Cough and shortness of breath;   Additional info: Cough, SOA    TECHNIQUE:    Frontal and lateral views of the chest.    COMPARISON:    CR - XR CHEST PA AND LATERAL 3/16/2018 11:10  AM    FINDINGS:    Lungs:  Patchy opacities in the left lung base and somewhat streaky opacities   in the inferior right lung base.  Compared to previous exam, the left basilar   opacities appear more dense, although opacity posteriorly on the lateral view   is less extensive.    Pleural space:  Unremarkable.  No pneumothorax.    Heart:  Unremarkable.    Mediastinum:  Unremarkable.    Bones/joints:  Unremarkable.      Impression:         Left basal opacities suggestive of pneumonia with atelectasis and/or   infiltrate inferiorly in the right lung base.    THIS DOCUMENT HAS BEEN ELECTRONICALLY SIGNED BY ANTHONY WILLIAM MD             Assessment/Plan   Assessment / Plan     Hospital Problem List     * (Principal)Sepsis (CMS/[facility])    History of DVT (deep vein thrombosis)    Overview Signed 11/5/2016  5:17 AM by Elizabeth Parikh APRN LLE; x 3; chronic coumadin therapy         HTN (hypertension)    HO of IVC filter    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)    Left lower lobe pneumonia (CMS/[facility])    Crohn's disease (CMS/[facility])    Asthma            Assessment & Plan:  63 year old male presents to the ED with fatigue, chills and fever.      1) Sepsis  -temp 103, hr: 121, WBC 18.37  -secondary to underlying left basal pneumonia  -received dose of azithromycin and rocephin in the ED, will change to zyvox and zosyn as recommended by ID with last admission of pneumonia in March 2018.  -blood cultures pending  -IVF  -prn anti-pyretics  -rapid flu negative, will send PCR  -sputum culture  -check urine antigens  - consult Dr. Roach    2) Asthma  -continue scheduled duo-nebs  -continuous pulse ox  -keep o2 sat >90%    3) History of DVT  -s.o IVC filter. Continue anticoagulation    4) Essential hypertension  -continue medications with holding parameters    5) Crohn's disease  -s.p colectomy a year ago with Dr. Nielsen  -colostomy present          DVT prophylaxis:teds/scds lovenox    CODE STATUS:  Full code    There are no questions and answers to display.       Admission Status:  I believe this patient meets INPATIENT status due to the need for care which can only be reasonably provided in an hospital setting such as aggressive/expedited ancillary services and/or consultation services, the necessity for IV medications, close physician monitoring and/or the possible need for procedures.  In such, I feel patient’s risk for adverse outcomes and need for care warrant INPATIENT evaluation and predict the patient’s care encounter to likely last beyond 2 midnights.    HARMAN Andrea   09/07/18   3:49 AM        Brief Attending Admission Attestation     I have seen and examined the patient, performing an independent face-to-face diagnostic evaluation with plan of care reviewed and developed with the advanced practice clinician (APC).      Brief Summary Statement/HPI:   Zackary Noonan II is a 63 y.o. male With a history of recurrent pneumonias followed by Dr. Roach, history of C. Difficile, History of Crohn's disease status post colectomy not currently on immunosuppressives who presents to the emergency room after waking up in feeling poorly.  He had a rattle in his chest and a positive cough but not very productive.  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  However continued to feel more poorly throughout the day inside come to the emergency room.  Was noted to have a temperature of 103.  White blood cell count is 18,000 and chest x-ray shows evidence of a left lower lobe infiltrate.  In the emergency room was given azithromycin and Rocephin and is being admitted to the hospitalist service for further management.  Of note was admitted here in March of this year and was seen by Dr. Roach at that time and discharged on Zyvox and Augmentin.  Patient tells me he has been on every other day Bactrim since that time.    Attending Physical Exam:  Constitutional: No acute  distress, awake, alert  Eyes: PERRLA, sclerae anicteric, no conjunctival injection  HENT: NCAT, mucous membranes moist  Neck: Supple, no thyromegaly, no lymphadenopathy, trachea midline  Respiratory: + audible rattle across the room, no distress, but diffuse bilateral rhonchi,   Cardiovascular: RRR, no murmurs, rubs, or gallops, palpable pedal pulses bilaterally  Gastrointestinal: Positive bowel sounds, soft, nontender, nondistended  Musculoskeletal: No bilateral ankle edema, no clubbing or cyanosis to extremities  Psychiatric: Appropriate affect, cooperative  Neurologic: Oriented x 3, no focal deficits  Skin: No rashes      Brief Assessment/Plan :  See above for further detailed assessment and plan developed with APC which I have reviewed and/or edited.    63-year-old with the above history presenting with fever, leukocytosis, evidence of pneumonia on chest x-ray.  Multiple prior episodes of pneumonia followed by Dr. Roach and is on prophylactic every other day Bactrim.  Cultures have been drawn.  We'll broaden coverage considering prior history to Zyvox and Zosyn at this time.  Will consult Dr. Roach to get his recommendations in the morning.  Plan otherwise as above.    Electronically signed by Jason Abdalla MD, 09/07/18, 5:06 AM.           Electronically signed by Jason Abdalla MD at 9/7/2018  5:12 AM          Consult Notes (most recent note)      Zackary Roach MD at 9/7/2018  7:51 AM      Consult Orders:    1. Inpatient Infectious Diseases Consult [[id number]] ordered by Verena Roberts APRN at 09/07/18 0416                Zackary Noonan II  1955  [id number]  9/7/2018      Referring Provider: Jason Abdalla M.D. Hospital medicine  Reason for Consultation: Sepsis syndrome with left lower lobe pneumonia      Subjective   History of present illness:  63-year-old  from [facility].  Originally thought to have ulcerative colitis.  More recently thought to have extensive  Crohn's disease.  History of colectomy and colostomy for refractory lower gastrointestinal hemorrhage.  Previous diverticular disease with Clostridium difficile colitis complicating multiple recurrences.  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  The patient's been hospitalized on a number of occasions for community-acquired pneumonia in different anatomic distributions.  Previous quantitative immunoglobulins and C3/C4 complement levels have been within normal limits.  Prior chest CTs do not reveal bronchiectasis.  The patient presented to the emergency department with a 48 hour history of chest congestion, chills, nausea, and a nonproductive cough.  He was febrile in the ER to 103°.  Hemodynamically stable.  His chest x-ray revealed a left lower lobe pulmonary infiltrate in association w/ plate atelectasis.  A peripheral leukocyte count was 18,400 with a normal serum lactate level.  Sputum Gram stain and blood cultures ×2 are pending.  Therapy initiated with ceftriaxone and azithromycin in the emergency department.  Subsequent administration of Zyvox and pip- tazobactam.  No recent corticosteroids, Imuran, methotrexate, or biologic therapy.  He had remotely received Humira but developed a community-acquired pneumonia within a few days of administration.  The patient has been on thrice weekly suppressive trimethoprim sulfa over the last number of months.  Asked to follow.    Past Medical History:   Diagnosis Date   • Anxiety    • Arthritis    • Asthma    • Clostridium difficile infection 01/2017    treated per dr carter with fecal transplant 8-2017    • H/O recurrent pneumonia    • Hypertension    • MRSA infection 2016    right lower extremity wound   • Pulmonary nodule     Followed by Dr. Galaviz    • Recurrent deep vein thrombosis (DVT) (CMS/HCC)     x 3 LLE; chronic anticoagulation therapy    • Ulcerative colitis (CMS/HCC)    • Wears glasses    • Wears hearing aid        Past Surgical History:  "  Procedure Laterality Date   • BRONCHOSCOPY      by Dr. Galaviz for pulmonary nodule   • BRONCHOSCOPY N/A 11/7/2016    Procedure: BRONCHOSCOPY;  Surgeon: Eben Roberts MD;  Location:  [facility];  Service:    • COLON RESECTION N/A 9/19/2017    Procedure: TOTAL ABDOMINAL COLECTOMY WITH CREATION OF ILEOSTOMY;  Surgeon: David Nielsen MD;  Location:  [facility];  Service:    • COLONOSCOPY  08/2017   • UMBILICAL HERNIA REPAIR     • VASECTOMY     • VASECTOMY REVERSAL         Pediatric History   Patient Guardian Status   • Not on file.     Other Topics Concern   • Not on file     Social History Narrative   • No narrative on file       family history includes Hypertension in his mother.    Allergies   Allergen Reactions   • Beta Adrenergic Blockers Other (See Comments)     Vocal changes   • Levaquin [Levofloxacin In D5w] Other (See Comments)     Heaviness in legs, \"felt like lead\"       Medication: MAR reviewed.  Antibiotics: See below.  Anti-Infectives     Ordered     Dose/Rate Route Frequency Start Stop    09/07/18 0448  piperacillin-tazobactam (ZOSYN) 3.375 g in iso-osmotic dextrose 50 ml (premix)     Ordering Provider:  Verena Roberts APRN    3.375 g  over 4 Hours Intravenous Every 8 Hours 09/07/18 1200 09/13/18 1159    09/07/18 0447  Linezolid (ZYVOX) 600 mg 300 mL     Ordering Provider:  Verena Roberts APRN    600 mg  300 mL/hr over 60 Minutes Intravenous Every 12 Hours 09/07/18 0600 09/13/18 0559    09/07/18 0448  piperacillin-tazobactam (ZOSYN) 3.375 g in iso-osmotic dextrose 50 ml (premix)     Ordering Provider:  Verena Roberts APRN    3.375 g  over 30 Minutes Intravenous Once 09/07/18 0545 09/07/18 0637    09/07/18 0312  cefTRIAXone (ROCEPHIN) IVPB 2 g     Ordering Provider:  Anni Lowe APRN    2 g  100 mL/hr over 30 Minutes Intravenous Once 09/07/18 0314 09/07/18 0435    09/07/18 0312  AZITHROMYCIN 500 MG/250 ML 0.9% NS IVPB (MBP)     Ordering Provider:  Anni Lowe APRN    500 " "mg  over 60 Minutes Intravenous Once 09/07/18 0314 09/07/18 0552          Please refer to the medical record for a full medication list    Review of Systems  Pertinent items are noted in HPI, all other systems reviewed and negative    Objective     Physical Exam: See below.  Vital Signs   Temp:  [99 °F (37.2 °C)-103 °F (39.4 °C)] 99.1 °F (37.3 °C)  Heart Rate:  [107-128] 109  Resp:  [18-20] 18  BP: (105-173)/(53-79) 105/75    Blood pressure 105/75, pulse 109, temperature 99.1 °F (37.3 °C), temperature source Oral, resp. rate 18, height 168.9 cm (66.5\"), weight 79.1 kg (174 lb 6.4 oz), SpO2 91 %.  GENERAL: Awake and alert.  ill-appearing.  No significant tachypnea.  Able to speak in full sentences.  Audible rhonchi.  Resting tachycardia at 121 bpm.  Oxygen by nasal cannula.  HEENT: Oropharynx without thrush. Sinuses nontender. Dentition in good repair. No cervical adenopathy. No carotid bruits/ jugular venous distention.   EYES: PERRL. No conjunctival injection. No icterus. EOMI.  LYMPHATICS: No lymphadenopathy of the neck or axillary or inguinal regions.   HEART: No murmur, gallop, or pericardial friction rub.  Resting tachycardia.  LUNGS: Clear to auscultation anteriorly. No percussion dullness.  Left posterior lung bases with inspiratory rales and expiratory rhonchi.    ABDOMEN: Soft, nontender, nondistended. No appreciable HSM.  Right lower quadrant colostomy stoma pink and viable.  No peritoneal findings of rebound or guarding.  SKIN: Warm and dry without cutaneous eruptions. No embolic stigmata.   PSYCHIATRIC: Mental status lucid. Cranial nerve function intact.       Results Review:   I reviewed the patient's new clinical results.  I reviewed the patient's new imaging results and agree with the interpretation.    Lab Results   Component Value Date    WBC 18.37 (H) 09/07/2018    HGB 13.4 09/07/2018    HCT 41.1 09/07/2018    MCV 90.1 09/07/2018     09/07/2018       Lab Results   Component Value Date    " GLUCOSE 134 (H) 09/07/2018    BUN 9 09/07/2018    CREATININE 0.91 09/07/2018    EGFRIFNONA 84 09/07/2018    BCR 9.9 09/07/2018    CO2 23.0 09/07/2018    CALCIUM 9.2 09/07/2018    ALBUMIN 4.43 09/07/2018    AST 23 09/07/2018    ALT 26 09/07/2018       Estimated Creatinine Clearance: 80.8 mL/min (by C-G formula based on SCr of 0.91 mg/dL).      Microbiology: Blood cultures ×2 pending.  Urine C&S outstanding.      Radiology:  Imaging Results (last 72 hours)     Procedure Component Value Units Date/Time    XR Chest 2 View [[id number]] Collected:  09/07/18 0132     Updated:  09/07/18 0225    Narrative:       EXAM:    XR Chest, 2 Views    CLINICAL HISTORY:    63 years old, male; Signs and symptoms; Cough and shortness of breath;   Additional info: Cough, SOA    TECHNIQUE:    Frontal and lateral views of the chest.    COMPARISON:    CR - XR CHEST PA AND LATERAL 3/16/2018 11:10 AM    FINDINGS:    Lungs:  Patchy opacities in the left lung base and somewhat streaky opacities   in the inferior right lung base.  Compared to previous exam, the left basilar   opacities appear more dense, although opacity posteriorly on the lateral view   is less extensive.    Pleural space:  Unremarkable.  No pneumothorax.    Heart:  Unremarkable.    Mediastinum:  Unremarkable.    Bones/joints:  Unremarkable.      Impression:         Left basal opacities suggestive of pneumonia with atelectasis and/or   infiltrate inferiorly in the right lung base.    THIS DOCUMENT HAS BEEN ELECTRONICALLY SIGNED BY ANTHONY WILLIAM MD          ASSESSMENT AND PLAN: Sepsis syndrome.  Severe Crohn's ileocolitis.  Status post colectomy.  History of diverticular disease with extensive antibiotic modification.  Recurrent Clostridium difficile colitis/status post fecal microbiota transplantation.  Recurrent community-acquired pneumonia/different anatomic distribution/quantitative immunoglobulins and complement levels normal/chest CT without bronchiectasis/previous  bronchoscopy for microbiologic diagnosis.  Leukocytosis to 18,400.  Maximum temperature over 24 hours 103°.  Currently 99.1.  Hemodynamically stable.  No significant arterial desaturation.  Ceftriaxone and azithromycin given in the emergency department.  Currently on Zyvox and penicillin tazobactam.  Agree with bladder regimen given previous recovery of MRSA in sputum.  The latter antimicrobial provide broad gram-negative coverage, antipseudomonal activity, and clinical efficacy against upper airway anaerobes.  Anticipate IV antimicrobial therapy through the weekend.  I will see the patient on Monday.  L IDC available for weekend problems.       I discussed the patients findings and my recommendations with patient    Thank you for this consult.  Our group would be pleased to follow this patient over the course of their hospitalization and assist with outpatient antimicrobial therapy, as indicated.     Zackary Roach MD  2018            Electronically signed by Zackary Roach MD at 2018  8:41 AM          Discharge Summary      Liseth Pereira PA-C at 2018 11:30 AM              [facility] Medicine Services  DISCHARGE SUMMARY    Patient Name: Zackary Noonan II  : 1955  MRN: [id number]    Date of Admission: 2018  Date of Discharge:    Primary Care Physician: Jillian Layne MD    Consults     Date and Time Order Name Status Description    9/10/2018 0030 Inpatient Colorectal Surgery Consult      2018 0448 Inpatient Infectious Diseases Consult Completed         Hospital Course     Presenting Problem:   Pneumonia of left lower lobe due to infectious organism (CMS/Formerly [facility]) [J18.1]    Active Hospital Problems    Diagnosis Date Noted   • **Sepsis (CMS/HCC) [A41.9] 2018   • Left lower lobe pneumonia (CMS/HCC) [J18.1] 2018   • Crohn's disease (CMS/Formerly [facility]) [K50.90] 2018   • Asthma [J45.909] 2018   • 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      Hospital Course:  Mr. Zackary Noonan is a 62yo male with PMH significant for asthma, COPD, HTN, Crohn's disease (s/p colectomy with ileostomy by Dr. Nielsen in 9/17), C. 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 He has been on suppressive Bactrim 3x/week due to recurrent pneumonia.     He presented to [facility] ED on 9/7/2018 for evaluation of chest congestion, chills and nausea. Patient noted to be septic with , fever 103 F and WBCs 18.37. CXR consistent with LLL pneumonia. He was given Azithromycin/Rocephin in the ED and transitioned to Zyvox and Zosyn. Infectious disease was consulted. He required O2 to maintain his saturations. Sputum and blood cultures are negative.     Mr. Noonan is improved, he will discharge home in stable condition on 9/11/18.   At discharge, Dr. Roach recommends Augmentin PO BID x 10 days. Follow up with Dr. Roach in the office on Thursday 9/13/18.   Case management has assisted with arranging home O2. He was encouraged to purchase a pulse-oximeter and given instructions on how to wean off of O2.     He has an upcoming appointment with Dr. Mayo on 9/21/18 regarding his ostomy. Patient to keep this appointment.     Day of Discharge     HPI:   Doing well today. Nervous to go home, doesn't want to leave too early and come right back. We discussed his plan of care and close monitoring with [facility]. No other issues. Asking  about how long it will take to wean off of O2, anxious to get back to work.     Review of Systems  Gen- No fevers, chills  CV- No chest pain, palpitations  Resp- (+) cough, dyspnea  GI- No N/V/D, abd pain    Otherwise ROS is negative except as mentioned in the HPI.    Vital Signs:   Temp:  [98.9 °F (37.2 °C)-101 °F (38.3 °C)] 98.9 °F (37.2 °C)  Heart Rate:  [] 103  Resp:  [18] 18  BP: (115-134)/(56-75) 115/56     Physical Exam:  Constitutional: No acute distress, awake, alert and conversant. Chronically-ill appearing   HENT: NCAT, mucous membranes moist  Respiratory: Scattered rhonchi and wheezing, both inspiratory and expiratory. Sats 91% on 3L NC   Cardiovascular: RRR, no murmurs, rubs, or gallops, palpable pedal pulses bilaterally  Gastrointestinal: Positive bowel sounds, soft, nontender, nondistended. Ostomy intact with stool in bag.   Musculoskeletal: No bilateral ankle edema  Psychiatric: Appropriate affect, cooperative  Neurologic: Oriented x 3, strength symmetric in all extremities, Cranial Nerves grossly intact to confrontation, speech clear  Skin: No rashes    Pertinent  and/or Most Recent Results       Results from last 7 days  Lab Units 09/10/18  0453 09/09/18  0539 09/08/18  0519 09/07/18  0210   WBC 10*3/mm3 13.17* 13.53* 19.74* 18.37*   HEMOGLOBIN g/dL 10.7* 11.6* 11.9* 13.4   HEMATOCRIT % 32.9* 35.6* 36.7* 41.1   PLATELETS 10*3/mm3 285 252 225 236   SODIUM mmol/L 138 137 137 137   POTASSIUM mmol/L 3.9 3.6 3.7 3.8   CHLORIDE mmol/L 101 105 105 107   CO2 mmol/L 30.0 29.0 24.0 23.0   BUN mg/dL 7* 6* 8* 9   CREATININE mg/dL 0.88 0.90 0.99 0.91   GLUCOSE mg/dL 87 98 96 134*   CALCIUM mg/dL 8.1* 9.0 8.1* 9.2       Results from last 7 days  Lab Units 09/07/18  0210   BILIRUBIN mg/dL 0.3   ALK PHOS U/L 77   ALT (SGPT) U/L 26   AST (SGOT) U/L 23       Results from last 7 days  Lab Units 09/07/18  0210   BNP pg/mL 29.0   TROPONIN I ng/mL <0.006     Brief Urine Lab Results  (Last result in the past 365  days)      Color   Clarity   Blood   Leuk Est   Nitrite   Protein   CREAT   Urine HCG        09/20/17 0053 Yellow Clear Negative Negative Negative Negative             Microbiology Results Abnormal     Procedure Component Value - Date/Time    Blood Culture - Blood, [[id number]]  (Normal) Collected:  09/07/18 0215    Lab Status:  Preliminary result Specimen:  Blood from Arm, Right Updated:  09/11/18 0230     Blood Culture No growth at 4 days    Blood Culture - Blood, [[id number]]  (Normal) Collected:  09/07/18 0215    Lab Status:  Preliminary result Specimen:  Blood from Arm, Left Updated:  09/11/18 0230     Blood Culture No growth at 4 days    Legionella Antigen, Urine - Urine, Urine, Clean Catch [[id number]] Collected:  09/07/18 0952    Lab Status:  Final result Specimen:  Urine from Urine, Clean Catch Updated:  09/10/18 1711     L. pneumophila Serogp 1 Ur Ag Negative     Comment: Presumptive negative for L. pneumophila serogroup 1 antigen in urine,  suggesting no recent or current infection. Legionnaires' disease  cannot be ruled out since other serogroups and species may also cause  disease.       Narrative:       Performed at:  [address] [place] [address]  [id number]  : Niranjan Austin MD, Phone:  [phone]    S. Pneumo Ag Urine or CSF - Urine, Urine, Clean Catch [[id number]]  (Abnormal) Collected:  09/07/18 0952    Lab Status:  Final result Specimen:  Urine from Urine, Clean Catch Updated:  09/10/18 1711     Specimen Source Urine     STREP PNEUMONIAE ANTIGEN Positive (A)     Comment: POS Strep. P reported to [facility] at 4:16 on 9/10/18.  Fax sent to [phone]. AV.        Body Fluid Culture, Sterile Not Indicated     Organism ID Not indicated.     Please note Comment     Comment: College [employer]ologists standards require a culture to be  performed on CSF specimens submitted for bacterial antigen testing.  (CAP [id number]) Urine specimens will not be cultured.        Narrative:       Performed at:  [address] [facility] [id number] [facility], [place], [place]  [id number]  : Niranjan Austin MD, Phone:  [phone]    Respiratory Culture - Sputum, Cough [[id number]] Collected:  09/08/18 0934    Lab Status:  Final result Specimen:  Sputum from Cough Updated:  09/10/18 0748     Respiratory Culture Light growth (2+) Normal Respiratory Preeti     Gram Stain Result Moderate (3+) WBCs per low power field      Moderate (3+) Normal respiratory preeti    Influenza Antigen, Rapid - Swab, Nasopharynx [[id number]]  (Normal) Collected:  09/07/18 0212    Lab Status:  Final result Specimen:  Swab from Nasopharynx Updated:  09/07/18 0237     Influenza A Ag, EIA Negative     Influenza B Ag, EIA Negative        Imaging Results (all)     Procedure Component Value Units Date/Time    XR Chest 1 View [[id number]] Collected:  09/09/18 1206     Updated:  09/09/18 2356    Narrative:          EXAMINATION: XR CHEST 1 VW - 9/9/2018     INDICATION: J18.1-Lobar pneumonia, unspecified organism;  D72.829-Elevated white blood cell count, unspecified.      COMPARISON: 9/8/2018.     FINDINGS: Patchy bibasilar disease appears stable. Upper lungs remain  clear. No effusion or pneumothorax is seen.           Impression:       Stable bibasilar pneumonia/atelectasis.     DICTATED:   9/9/2018  EDITED/ls :   9/9/2018      This report was finalized on 9/9/2018 11:54 PM by DR. Davi Lemons MD.       XR Chest 1 View [[id number]] Collected:  09/08/18 0908     Updated:  09/08/18 2245    Narrative:          EXAMINATION: XR CHEST 1 VW - 9/8/2018     INDICATION: J18.1-Lobar pneumonia, unspecified organism;  D72.829-Elevated white blood cell count, unspecified.     COMPARISON: 9/7/2018.     FINDINGS: Patchy left basilar disease is stable or slightly increased.  There is now mild right basilar pneumonia or atelectasis. The upper  lungs remain clear. No effusion is seen. The heart is upper normal size.  The vasculature appears  normal.       Impression:       Stable to slightly increased left basilar pneumonia, and  mild new right basilar atelectasis or pneumonia.     DICTATED:   9/8/2018  EDITED/ls :   9/8/2018      This report was finalized on 9/8/2018 10:43 PM by DR. Davi Lemons MD.       XR Chest 2 View [[id number]] Collected:  09/07/18 0132     Updated:  09/07/18 0225    Narrative:       EXAM:    XR Chest, 2 Views    CLINICAL HISTORY:    63 years old, male; Signs and symptoms; Cough and shortness of breath;   Additional info: Cough, SOA    TECHNIQUE:    Frontal and lateral views of the chest.    COMPARISON:    CR - XR CHEST PA AND LATERAL 3/16/2018 11:10 AM    FINDINGS:    Lungs:  Patchy opacities in the left lung base and somewhat streaky opacities   in the inferior right lung base.  Compared to previous exam, the left basilar   opacities appear more dense, although opacity posteriorly on the lateral view   is less extensive.    Pleural space:  Unremarkable.  No pneumothorax.    Heart:  Unremarkable.    Mediastinum:  Unremarkable.    Bones/joints:  Unremarkable.      Impression:         Left basal opacities suggestive of pneumonia with atelectasis and/or   infiltrate inferiorly in the right lung base.    THIS DOCUMENT HAS BEEN ELECTRONICALLY SIGNED BY ANTHONY WILLIAM MD         Order Current Status    Blood Culture - Blood, Preliminary result    Blood Culture - Blood, Preliminary result        Discharge Details        Discharge Medications      New Medications      Instructions Start Date   acetaminophen 325 MG tablet  Commonly known as:  TYLENOL   650 mg, Oral, Every 6 Hours PRN      amoxicillin-clavulanate 875-125 MG per tablet  Commonly known as:  AUGMENTIN   1 tablet, Oral, Every 12 Hours Scheduled         Continue These Medications      Instructions Start Date   albuterol 108 (90 Base) MCG/ACT inhaler  Commonly known as:  PROVENTIL HFA;VENTOLIN HFA   2 puffs, Inhalation, Every 4 Hours PRN      albuterol (2.5 MG/3ML) 0.083% nebulizer  solution  Commonly known as:  PROVENTIL   2.5 mg, Nebulization, Every 6 Hours PRN      amLODIPine 5 MG tablet  Commonly known as:  NORVASC   5 mg, Oral, Nightly      beclomethasone 80 MCG/ACT inhaler  Commonly known as:  QVAR   1 puff, Inhalation, 2 Times Daily - RT      busPIRone 15 MG tablet  Commonly known as:  BUSPAR   15 mg, Oral, 3 Times Daily      diphenoxylate-atropine 2.5-0.025 MG per tablet  Commonly known as:  LOMOTIL   2 tablets, Oral, 4 Times Daily PRN      enoxaparin 80 MG/0.8ML solution syringe  Commonly known as:  LOVENOX   75 mg, Subcutaneous, Every 12 Hours Scheduled      famotidine-calcium carb-mag hydroxide -165 MG chewable tablet  Commonly known as:  PEPCID COMPLETE   1 tablet, Oral, Daily PRN      FLORASTOR 250 MG capsule  Generic drug:  saccharomyces boulardii   250 mg, Oral, Daily      fluticasone 50 MCG/ACT nasal spray  Commonly known as:  FLONASE   2 sprays, Nasal, 2 Times Daily      folic acid 1 MG tablet  Commonly known as:  FOLVITE   1 mg, Oral, Daily      ipratropium 0.06 % nasal spray  Commonly known as:  ATROVENT   2 sprays, Nasal, 4 Times Daily      levocetirizine 5 MG tablet  Commonly known as:  XYZAL   5 mg, Oral, Every Evening      loperamide 2 MG capsule  Commonly known as:  IMODIUM   2 mg, Oral, 4 Times Daily PRN      losartan 50 MG tablet  Commonly known as:  COZAAR   50 mg, Oral, Daily      montelukast 10 MG tablet  Commonly known as:  SINGULAIR   10 mg, Oral, Nightly      SENIOR MULTIVITAMIN PLUS PO   1 tablet/day, Oral      sulfamethoxazole-trimethoprim 800-160 MG per tablet  Commonly known as:  BACTRIM DS,SEPTRA DS   1 tablet, Oral, 3 Times Weekly      valACYclovir 500 MG tablet  Commonly known as:  VALTREX   500 mg, Oral, Daily PRN      VITAMIN B-6 PO   100 mcg, Oral      vitamin E 400 UNIT capsule   400 Units, Oral, Daily         Stop These Medications    mesalamine 1000 MG suppository  Commonly known as:  CANASA     nystatin [id number] UNIT/ML suspension  Commonly  known as:  MYCOSTATIN          Discharge Disposition:  Home or Self Care    Discharge Diet:  Diet Instructions     Diet: Regular, Cardiac, Consistent Carbohydrate; Thin       Discharge Diet:   Regular  Cardiac  Consistent Carbohydrate       Fluid Consistency:  Thin        Discharge Activity:   Activity Instructions     Activity as Tolerated           Code Status/Level of Support:  Code Status and Medical Interventions:   Ordered at: 09/07/18 1636     Level Of Support Discussed With:    Patient     Code Status:    CPR     Medical Interventions (Level of Support Prior to Arrest):    Full     No future appointments.    Additional Instructions for the Follow-ups that You Need to Schedule     Discharge Follow-up with PCP    As directed      Currently Documented PCP:  Jillian Layne MD  PCP Phone Number:  [phone]    Follow Up Details:  Follow up with PCP in one week         Discharge Follow-up with Specified Provider: Please arrange follow up with Dr. Roach for Thursday 9/13    As directed      To:  Please arrange follow up with Dr. Roach for Thursday 9/13             Time Spent on Discharge: 50 minutes    Electronically signed by Liseth Pereira PA-C, 09/11/18, 11:56 AM.        Electronically signed by Liseth Pereira PA-C at 9/11/2018 12:00 PM

## 2018-09-11 NOTE — PLAN OF CARE
Problem: Sepsis/Septic Shock (Adult)  Goal: Signs and Symptoms of Listed Potential Problems Will be Absent, Minimized or Managed (Sepsis/Septic Shock)  Outcome: Ongoing (interventions implemented as appropriate)   09/09/18 0610   Goal/Outcome Evaluation   Problems Assessed (Sepsis) all   Problems Present (Sepsis) none       Problem: Patient Care Overview  Goal: Plan of Care Review  Outcome: Ongoing (interventions implemented as appropriate)   09/10/18 1736 09/10/18 2000   Coping/Psychosocial   Plan of Care Reviewed With --  patient;family   Plan of Care Review   Progress improving --    OTHER   Outcome Summary Alert and oriented. Denies SOA but intermittent wheezing. Titrated down to 3L cannula this afternoon. ST monitor. Urine culture positive. Tylenol given once for neck pain. Ambulated in day and to bathroom with standby assist. Wife bedside this afternoon.  --      Goal: Individualization and Mutuality  Outcome: Ongoing (interventions implemented as appropriate)    Goal: Discharge Needs Assessment  Outcome: Ongoing (interventions implemented as appropriate)   09/07/18 0458 09/07/18 0907   Discharge Needs Assessment   Readmission Within the Last 30 Days --  no previous admission in last 30 days   Concerns to be Addressed --  no discharge needs identified   Patient/Family Anticipates Transition to --  home   Patient/Family Anticipated Services at Transition --  none   Transportation Concerns car, none --    Transportation Anticipated --  family or friend will provide   Anticipated Changes Related to Illness --  none   Equipment Needed After Discharge --  none   Disability   Equipment Currently Used at Home --  none     Goal: Interprofessional Rounds/Family Conf  Outcome: Ongoing (interventions implemented as appropriate)   09/11/18 0232   Interdisciplinary Rounds/Family Conf   Participants ;pharmacy;social work/services;physical therapy;family

## 2018-09-11 NOTE — PROGRESS NOTES
Zackary Noonan II  1955  [id number]  9/11/2018    CC: Cough, sputum production, and fever    Zackary Noonan II is a 63 y.o. male here for bilateral pneumonia in immunocompromised host.      Past medical history:  Past Medical History:   Diagnosis Date   • Anxiety    • Arthritis    • Asthma    • Clostridium difficile infection 01/2017    treated per dr carter with fecal transplant 8-2017    • H/O recurrent pneumonia    • Hypertension    • MRSA infection 2016    right lower extremity wound   • Pulmonary nodule     Followed by Dr. Galaviz    • Recurrent deep vein thrombosis (DVT) (CMS/HCC)     x 3 LLE; chronic anticoagulation therapy    • Ulcerative colitis (CMS/HCC)    • Wears glasses    • Wears hearing aid        Medications:   Current Facility-Administered Medications:   •  acetaminophen (TYLENOL) tablet 650 mg, 650 mg, Oral, Q6H PRN, Galen Ann PA-C, 650 mg at 09/10/18 2041  •  albuterol (PROVENTIL) nebulizer solution 0.083% 2.5 mg/3mL, 2.5 mg, Nebulization, Q4H PRN, Alejandra, Verena, APRN  •  albuterol (PROVENTIL) nebulizer solution 0.083% 2.5 mg/3mL, 2.5 mg, Nebulization, Q4H PRN, Justin Avendaño MD  •  amLODIPine (NORVASC) tablet 5 mg, 5 mg, Oral, Nightly, Alejandra, Verena, APRN, 5 mg at 09/10/18 2029  •  benzonatate (TESSALON) capsule 100 mg, 100 mg, Oral, TID PRN, Fay Chapman, APRN, 100 mg at 09/10/18 0913  •  budesonide (PULMICORT) nebulizer solution 0.5 mg, 0.5 mg, Nebulization, BID - RT, Alejandra, Verena, APRN, 0.5 mg at 09/11/18 0806  •  busPIRone (BUSPAR) tablet 15 mg, 15 mg, Oral, Q12H, Justin Avendaño MD, 15 mg at 09/10/18 2028  •  clobetasol (TEMOVATE) 0.05 % cream, , Topical, Q12H, Alejandra, Verena, APRN  •  diphenoxylate-atropine (LOMOTIL) 2.5-0.025 MG per tablet 2 tablet, 2 tablet, Oral, 4x Daily, Julianna Matthews MD, 2 tablet at 09/10/18 2029  •  enoxaparin (LOVENOX) syringe 80 mg, 80 mg, Subcutaneous, BID, Justin Avendaño MD, 80 mg at 09/11/18 0500  •  fluticasone (FLONASE)  50 MCG/ACT nasal spray 2 spray, 2 spray, Nasal, Daily, Alejandra, Verena, APRN, 2 spray at 09/10/18 0936  •  folic acid (FOLVITE) tablet 1 mg, 1 mg, Oral, Daily, Alejandra, Verena, APRN, 1 mg at 09/07/18 0907  •  ipratropium-albuterol (DUO-NEB) nebulizer solution 3 mL, 3 mL, Nebulization, 4x Daily - RT, Justin Avendaño MD, 3 mL at 09/11/18 0806  •  Linezolid (ZYVOX) 600 mg 300 mL, 600 mg, Intravenous, Q12H, Alejandra, Verena, APRN, Last Rate: 300 mL/hr at 09/11/18 0500, 600 mg at 09/11/18 0500  •  loperamide (IMODIUM) capsule 2 mg, 2 mg, Oral, 4x Daily PRN, Justin Avendaño MD, 2 mg at 09/09/18 1358  •  losartan (COZAAR) tablet 50 mg, 50 mg, Oral, Daily, Justin Avendaño MD, 50 mg at 09/10/18 0914  •  montelukast (SINGULAIR) tablet 10 mg, 10 mg, Oral, Nightly, Alejandra, Verena, APRN, 10 mg at 09/10/18 2029  •  multivitamin with minerals 1 tablet, 1 tablet, Oral, Daily, Alejandra, Verena, APRN, 1 tablet at 09/10/18 0913  •  ondansetron (ZOFRAN) injection 4 mg, 4 mg, Intravenous, Q6H PRN, Alejandra, Verena, APRN, 4 mg at 09/11/18 0531  •  piperacillin-tazobactam (ZOSYN) 3.375 g in iso-osmotic dextrose 50 ml (premix), 3.375 g, Intravenous, Q8H, Alejandra, Verena, APRN, 3.375 g at 09/11/18 0500  •  saccharomyces boulardii (FLORASTOR) capsule 250 mg, 250 mg, Oral, Daily, Alejandra, Verena, APRN, 250 mg at 09/10/18 0913  •  sodium chloride 0.9 % flush 10 mL, 10 mL, Intravenous, PRN, Urbina, Nashley Fernando, MD  •  Insert peripheral IV, , , Once **AND** sodium chloride 0.9 % flush 10 mL, 10 mL, Intravenous, PRN, Anni Lowe APRN  •  vitamin E capsule 400 Units, 400 Units, Oral, Daily, Verena Roberts APRN, 400 Units at 09/10/18 0914  Antibiotics:  Anti-Infectives     Ordered     Dose/Rate Route Frequency Start Stop    09/07/18 0448  piperacillin-tazobactam (ZOSYN) 3.375 g in iso-osmotic dextrose 50 ml (premix)     Ordering Provider:  Verena Roberts APRN    3.375 g  over 4 Hours Intravenous Every 8 Hours 09/07/18 1200  "09/13/18 1159    09/07/18 0447  Linezolid (ZYVOX) 600 mg 300 mL     Ordering Provider:  Verena Roberts APRN    600 mg  300 mL/hr over 60 Minutes Intravenous Every 12 Hours 09/07/18 0600 09/13/18 0559    09/07/18 0448  piperacillin-tazobactam (ZOSYN) 3.375 g in iso-osmotic dextrose 50 ml (premix)     Ordering Provider:  Verena Roberts APRN    3.375 g  over 30 Minutes Intravenous Once 09/07/18 0545 09/07/18 0700    09/07/18 0312  cefTRIAXone (ROCEPHIN) IVPB 2 g     Ordering Provider:  Anni Lowe APRN    2 g  100 mL/hr over 30 Minutes Intravenous Once 09/07/18 0314 09/07/18 0700    09/07/18 0312  AZITHROMYCIN 500 MG/250 ML 0.9% NS IVPB (MBP)     Ordering Provider:  Anni Lowe APRN    500 mg  over 60 Minutes Intravenous Once 09/07/18 0314 09/07/18 0700          Allergies:  is allergic to beta adrenergic blockers and levaquin [levofloxacin in d5w].    Review of Systems: All other reviewed and negative except as per HPI    Blood pressure 119/64, pulse 107, temperature 98.9 °F (37.2 °C), temperature source Oral, resp. rate 18, height 168.9 cm (66.5\"), weight 79.1 kg (174 lb 6.4 oz), SpO2 92 %.  GENERAL: Awake and alert, in no acute distress.  Aware of temperature last evening.  Gray-colored sputum with occasional blood tinging.  HEENT: Oropharynx without thrush. Sinuses nontender. Dentition in good repair. No cervical adenopathy. No carotid bruits/ jugular venous distention.   EYES: PERRL. No conjunctival injection. No icterus. EOMI.  LYMPHATICS: No lymphadenopathy of the neck or axillary or inguinal regions.   HEART: No murmur, gallop, or pericardial friction rub.   LUNGS:  No percussion dullness.  Anterior rhonchi noted.  Expiratory wheezes, right greater than left.  ABDOMEN: Soft, nontender, nondistended. No appreciable HSM.  Colostomy stoma pink and viable.  SKIN: Warm and dry without cutaneous eruptions. No embolic stigmata.   PSYCHIATRIC: Mental status lucid. Cranial nerve function intact. "       DIAGNOSTICS:  Lab Results   Component Value Date    WBC 13.17 (H) 09/10/2018    HGB 10.7 (L) 09/10/2018    HCT 32.9 (L) 09/10/2018     09/10/2018     Lab Results   Component Value Date    CRP 25.95 (H) 03/16/2018     No results found for: SEDRATE  Lab Results   Component Value Date    GLUCOSE 87 09/10/2018    BUN 7 (L) 09/10/2018    CREATININE 0.88 09/10/2018    EGFRIFNONA 87 09/10/2018    BCR 8.0 09/10/2018    CO2 30.0 09/10/2018    CALCIUM 8.1 (L) 09/10/2018    ALBUMIN 4.43 09/07/2018    AST 23 09/07/2018    ALT 26 09/07/2018       Microbiology: Sputum with normal oropharyngeal skyler.  Influenza A and B-.  Legionella urinary antigen unremarkable.  Pneumococcal urinary antigen positive.  Blood cultures ×2 negative.    RADIOLOGY:  Imaging Results (last 72 hours)     Procedure Component Value Units Date/Time    XR Chest 1 View [[id number]] Collected:  09/09/18 1206     Updated:  09/09/18 8906    Narrative:          EXAMINATION: XR CHEST 1 VW - 9/9/2018     INDICATION: J18.1-Lobar pneumonia, unspecified organism;  D72.829-Elevated white blood cell count, unspecified.      COMPARISON: 9/8/2018.     FINDINGS: Patchy bibasilar disease appears stable. Upper lungs remain  clear. No effusion or pneumothorax is seen.           Impression:       Stable bibasilar pneumonia/atelectasis.     DICTATED:   9/9/2018  EDITED/ls :   9/9/2018      This report was finalized on 9/9/2018 11:54 PM by DR. Davi Lemons MD.       XR Chest 1 View [[id number]] Collected:  09/08/18 0908     Updated:  09/08/18 2245    Narrative:          EXAMINATION: XR CHEST 1 VW - 9/8/2018     INDICATION: J18.1-Lobar pneumonia, unspecified organism;  D72.829-Elevated white blood cell count, unspecified.     COMPARISON: 9/7/2018.     FINDINGS: Patchy left basilar disease is stable or slightly increased.  There is now mild right basilar pneumonia or atelectasis. The upper  lungs remain clear. No effusion is seen. The heart is upper normal size.  The  vasculature appears normal.       Impression:       Stable to slightly increased left basilar pneumonia, and  mild new right basilar atelectasis or pneumonia.     DICTATED:   9/8/2018  EDITED/ls :   9/8/2018      This report was finalized on 9/8/2018 10:43 PM by DR. Davi Lemons MD.             Assessment and Plan: Fever.  Leukocytosis to 18,000.  Bilateral lower lobe pneumonia.  Positive pneumococcal urinary antigen.  Immunocompromised host with severe Crohn's disease/status post colectomy and colostomy.  Temperature spike last evening to 101.  Currently 98.9.  Hemodynamically stable without a resting tachycardia.  O2 saturation 92% on 3 L by nasal cannula.  Most recent chest x-ray reviewed and without evidence of worsening pulmonary infiltrates or pleural effusion.  Pneumococcal urinary antigen positive.  Unfortunately, no growth of Streptococcus pneumoniae from either sputum or blood cultures.  Ergo, no JAMEEL data.  Clinical concerns for penicillin intolerance or assistance.  Utilization management: I would proceed with intravenous ceftriaxone today prior to discharge.  Favor discharge home on Levaquin 750 mg by mouth daily #10 and follow-up with me on Thursday of this week.      Zackary Roach MD  9/11/2018

## 2018-09-11 NOTE — PROGRESS NOTES
Case Management Discharge Note    Final Note: CM consulted to arrange home oxygen for patient.  Spoke with patient in room.  He still plans to return home with his wife at discharge.  He does not have a preference for DME provider.  Orders faxed and called to Fay with [employer].  Portable oxygen tank to be delivered to patient's bedside prior to discharge.  Lynn Little RN [phone]    Destination     No service has been selected for the patient.      Durable Medical Equipment     Service Request Status Selected Specialties Address Phone Number Fax Number    [facility] Selected DME Services [address] [facility] [phone] [phone]      Dialysis/Infusion     No service has been selected for the patient.      Home Medical Care     No service has been selected for the patient.      Social Care     No service has been selected for the patient.             Final Discharge Disposition Code: 01 - home or self-care

## 2018-09-11 NOTE — DISCHARGE SUMMARY
[facility] Medicine Services  DISCHARGE SUMMARY    Patient Name: Zackary Noonan II  : 1955  MRN: [id number]    Date of Admission: 2018  Date of Discharge:    Primary Care Physician: Jillian Layne MD    Consults     Date and Time Order Name Status Description    9/10/2018 0030 Inpatient Colorectal Surgery Consult      2018 0448 Inpatient Infectious Diseases Consult Completed         Hospital Course     Presenting Problem:   Pneumonia of left lower lobe due to infectious organism (CMS/[facility]) [J18.1]    Active Hospital Problems    Diagnosis Date Noted   • **Sepsis (CMS/[facility]) [A41.9] 2018   • Left lower lobe pneumonia (CMS/HCC) [J18.1] 2018   • Crohn's disease (CMS/[facility]) [K50.90] 2018   • Asthma [J45.909] 2018   • History of C. difficile colitis s/p fecal transplant (2017) [Z86.19] 2018   • HO of IVC filter [Z95.828] 2017   • Prediabetes [R73.03] 2017   • History of DVT (deep vein thrombosis) [Z86.718] 2016   • HTN (hypertension) [I10] 2016   • Pneumonia [J18.9] 2016   • Asthma [J45.909] 2016   • COPD (chronic obstructive pulmonary disease) (CMS/[facility]) [J44.9] 2016      Resolved Hospital Problems    Diagnosis Date Noted Date Resolved   No resolved problems to display.      Hospital Course:  Mr. Zackary Noonan is a 62yo male with PMH significant for asthma, COPD, HTN, Crohn's disease (s/p colectomy with ileostomy by Dr. Nielsen in ), C. 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 He has been on suppressive Bactrim 3x/week due to recurrent pneumonia.     He presented to [facility] ED on 2018 for evaluation of chest congestion, chills and nausea. Patient noted to be septic with , fever 103 F and WBCs 18.37. CXR consistent with LLL pneumonia. He was given Azithromycin/Rocephin in the ED and transitioned  to Zyvox and Zosyn. Infectious disease was consulted. He required O2 to maintain his saturations. Sputum and blood cultures are negative.     Mr. Noonan is improved, he will discharge home in stable condition on 9/11/18.   At discharge, Dr. Roach recommends Augmentin PO BID x 10 days. Follow up with Dr. Roach in the office on Thursday 9/13/18.   Case management has assisted with arranging home O2. He was encouraged to purchase a pulse-oximeter and given instructions on how to wean off of O2.     He has an upcoming appointment with Dr. Mayo on 9/21/18 regarding his ostomy. Patient to keep this appointment.     Day of Discharge     HPI:   Doing well today. Nervous to go home, doesn't want to leave too early and come right back. We discussed his plan of care and close monitoring with [facility]. No other issues. Asking about how long it will take to wean off of O2, anxious to get back to work.     Review of Systems  Gen- No fevers, chills  CV- No chest pain, palpitations  Resp- (+) cough, dyspnea  GI- No N/V/D, abd pain    Otherwise ROS is negative except as mentioned in the HPI.    Vital Signs:   Temp:  [98.9 °F (37.2 °C)-101 °F (38.3 °C)] 98.9 °F (37.2 °C)  Heart Rate:  [] 103  Resp:  [18] 18  BP: (115-134)/(56-75) 115/56     Physical Exam:  Constitutional: No acute distress, awake, alert and conversant. Chronically-ill appearing   HENT: NCAT, mucous membranes moist  Respiratory: Scattered rhonchi and wheezing, both inspiratory and expiratory. Sats 91% on 3L NC   Cardiovascular: RRR, no murmurs, rubs, or gallops, palpable pedal pulses bilaterally  Gastrointestinal: Positive bowel sounds, soft, nontender, nondistended. Ostomy intact with stool in bag.   Musculoskeletal: No bilateral ankle edema  Psychiatric: Appropriate affect, cooperative  Neurologic: Oriented x 3, strength symmetric in all extremities, Cranial Nerves grossly intact to confrontation, speech clear  Skin: No rashes    Pertinent  and/or Most Recent  Results       Results from last 7 days  Lab Units 09/10/18  0453 09/09/18  0539 09/08/18  0519 09/07/18  0210   WBC 10*3/mm3 13.17* 13.53* 19.74* 18.37*   HEMOGLOBIN g/dL 10.7* 11.6* 11.9* 13.4   HEMATOCRIT % 32.9* 35.6* 36.7* 41.1   PLATELETS 10*3/mm3 285 252 225 236   SODIUM mmol/L 138 137 137 137   POTASSIUM mmol/L 3.9 3.6 3.7 3.8   CHLORIDE mmol/L 101 105 105 107   CO2 mmol/L 30.0 29.0 24.0 23.0   BUN mg/dL 7* 6* 8* 9   CREATININE mg/dL 0.88 0.90 0.99 0.91   GLUCOSE mg/dL 87 98 96 134*   CALCIUM mg/dL 8.1* 9.0 8.1* 9.2       Results from last 7 days  Lab Units 09/07/18  0210   BILIRUBIN mg/dL 0.3   ALK PHOS U/L 77   ALT (SGPT) U/L 26   AST (SGOT) U/L 23       Results from last 7 days  Lab Units 09/07/18  0210   BNP pg/mL 29.0   TROPONIN I ng/mL <0.006     Brief Urine Lab Results  (Last result in the past 365 days)      Color   Clarity   Blood   Leuk Est   Nitrite   Protein   CREAT   Urine HCG        09/20/17 0053 Yellow Clear Negative Negative Negative Negative             Microbiology Results Abnormal     Procedure Component Value - Date/Time    Blood Culture - Blood, [[id number]]  (Normal) Collected:  09/07/18 0215    Lab Status:  Preliminary result Specimen:  Blood from Arm, Right Updated:  09/11/18 0230     Blood Culture No growth at 4 days    Blood Culture - Blood, [[id number]]  (Normal) Collected:  09/07/18 0215    Lab Status:  Preliminary result Specimen:  Blood from Arm, Left Updated:  09/11/18 0230     Blood Culture No growth at 4 days    Legionella Antigen, Urine - Urine, Urine, Clean Catch [[id number]] Collected:  09/07/18 0952    Lab Status:  Final result Specimen:  Urine from Urine, Clean Catch Updated:  09/10/18 1711     L. pneumophila Serogp 1 Ur Ag Negative     Comment: Presumptive negative for L. pneumophila serogroup 1 antigen in urine,  suggesting no recent or current infection. Legionnaires' disease  cannot be ruled out since other serogroups and species may also cause  disease.       Narrative:        Performed at:  [facility]  [id number]  : Niranjan Austin MD, Phone:  [phone]    S. Pneumo Ag Urine or CSF - Urine, Urine, Clean Catch [[id number]]  (Abnormal) Collected:  09/07/18 0952    Lab Status:  Final result Specimen:  Urine from Urine, Clean Catch Updated:  09/10/18 1711     Specimen Source Urine     STREP PNEUMONIAE ANTIGEN Positive (A)     Comment: POS Strep. P reported to [place] at 4:16 on 9/10/18.  Fax sent to [phone]. AV.        Body Fluid Culture, Sterile Not Indicated     Organism ID Not indicated.     Please note Comment     Comment: [employer] standards require a culture to be  performed on CSF specimens submitted for bacterial antigen testing.  (CAP [id number]) Urine specimens will not be cultured.       Narrative:       Performed at:  [address] [place] [address]  [id number]  : Niranjan Austin MD, Phone:  [phone]    Respiratory Culture - Sputum, Cough [[id number]] Collected:  09/08/18 0934    Lab Status:  Final result Specimen:  Sputum from Cough Updated:  09/10/18 0748     Respiratory Culture Light growth (2+) Normal Respiratory Preeti     Gram Stain Result Moderate (3+) WBCs per low power field      Moderate (3+) Normal respiratory preeti    Influenza Antigen, Rapid - Swab, Nasopharynx [[id number]]  (Normal) Collected:  09/07/18 0212    Lab Status:  Final result Specimen:  Swab from Nasopharynx Updated:  09/07/18 0237     Influenza A Ag, EIA Negative     Influenza B Ag, EIA Negative        Imaging Results (all)     Procedure Component Value Units Date/Time    XR Chest 1 View [[id number]] Collected:  09/09/18 1206     Updated:  09/09/18 2356    Narrative:          EXAMINATION: XR CHEST 1 VW - 9/9/2018     INDICATION: J18.1-Lobar pneumonia, unspecified organism;  D72.829-Elevated white blood cell count, unspecified.      COMPARISON: 9/8/2018.     FINDINGS: Patchy  bibasilar disease appears stable. Upper lungs remain  clear. No effusion or pneumothorax is seen.           Impression:       Stable bibasilar pneumonia/atelectasis.     DICTATED:   9/9/2018  EDITED/ls :   9/9/2018      This report was finalized on 9/9/2018 11:54 PM by DR. Davi Lemons MD.       XR Chest 1 View [[id number]] Collected:  09/08/18 0908     Updated:  09/08/18 2245    Narrative:          EXAMINATION: XR CHEST 1 VW - 9/8/2018     INDICATION: J18.1-Lobar pneumonia, unspecified organism;  D72.829-Elevated white blood cell count, unspecified.     COMPARISON: 9/7/2018.     FINDINGS: Patchy left basilar disease is stable or slightly increased.  There is now mild right basilar pneumonia or atelectasis. The upper  lungs remain clear. No effusion is seen. The heart is upper normal size.  The vasculature appears normal.       Impression:       Stable to slightly increased left basilar pneumonia, and  mild new right basilar atelectasis or pneumonia.     DICTATED:   9/8/2018  EDITED/ls :   9/8/2018      This report was finalized on 9/8/2018 10:43 PM by DR. Davi Lemons MD.       XR Chest 2 View [[id number]] Collected:  09/07/18 0132     Updated:  09/07/18 0225    Narrative:       EXAM:    XR Chest, 2 Views    CLINICAL HISTORY:    63 years old, male; Signs and symptoms; Cough and shortness of breath;   Additional info: Cough, SOA    TECHNIQUE:    Frontal and lateral views of the chest.    COMPARISON:    CR - XR CHEST PA AND LATERAL 3/16/2018 11:10 AM    FINDINGS:    Lungs:  Patchy opacities in the left lung base and somewhat streaky opacities   in the inferior right lung base.  Compared to previous exam, the left basilar   opacities appear more dense, although opacity posteriorly on the lateral view   is less extensive.    Pleural space:  Unremarkable.  No pneumothorax.    Heart:  Unremarkable.    Mediastinum:  Unremarkable.    Bones/joints:  Unremarkable.      Impression:         Left basal opacities suggestive of  pneumonia with atelectasis and/or   infiltrate inferiorly in the right lung base.    THIS DOCUMENT HAS BEEN ELECTRONICALLY SIGNED BY ANTHONY WILLIAM MD         Order Current Status    Blood Culture - Blood, Preliminary result    Blood Culture - Blood, Preliminary result        Discharge Details        Discharge Medications      New Medications      Instructions Start Date   acetaminophen 325 MG tablet  Commonly known as:  TYLENOL   650 mg, Oral, Every 6 Hours PRN      amoxicillin-clavulanate 875-125 MG per tablet  Commonly known as:  AUGMENTIN   1 tablet, Oral, Every 12 Hours Scheduled         Continue These Medications      Instructions Start Date   albuterol 108 (90 Base) MCG/ACT inhaler  Commonly known as:  PROVENTIL HFA;VENTOLIN HFA   2 puffs, Inhalation, Every 4 Hours PRN      albuterol (2.5 MG/3ML) 0.083% nebulizer solution  Commonly known as:  PROVENTIL   2.5 mg, Nebulization, Every 6 Hours PRN      amLODIPine 5 MG tablet  Commonly known as:  NORVASC   5 mg, Oral, Nightly      beclomethasone 80 MCG/ACT inhaler  Commonly known as:  QVAR   1 puff, Inhalation, 2 Times Daily - RT      busPIRone 15 MG tablet  Commonly known as:  BUSPAR   15 mg, Oral, 3 Times Daily      diphenoxylate-atropine 2.5-0.025 MG per tablet  Commonly known as:  LOMOTIL   2 tablets, Oral, 4 Times Daily PRN      enoxaparin 80 MG/0.8ML solution syringe  Commonly known as:  LOVENOX   75 mg, Subcutaneous, Every 12 Hours Scheduled      famotidine-calcium carb-mag hydroxide -165 MG chewable tablet  Commonly known as:  PEPCID COMPLETE   1 tablet, Oral, Daily PRN      FLORASTOR 250 MG capsule  Generic drug:  saccharomyces boulardii   250 mg, Oral, Daily      fluticasone 50 MCG/ACT nasal spray  Commonly known as:  FLONASE   2 sprays, Nasal, 2 Times Daily      folic acid 1 MG tablet  Commonly known as:  FOLVITE   1 mg, Oral, Daily      ipratropium 0.06 % nasal spray  Commonly known as:  ATROVENT   2 sprays, Nasal, 4 Times Daily       levocetirizine 5 MG tablet  Commonly known as:  XYZAL   5 mg, Oral, Every Evening      loperamide 2 MG capsule  Commonly known as:  IMODIUM   2 mg, Oral, 4 Times Daily PRN      losartan 50 MG tablet  Commonly known as:  COZAAR   50 mg, Oral, Daily      montelukast 10 MG tablet  Commonly known as:  SINGULAIR   10 mg, Oral, Nightly      SENIOR MULTIVITAMIN PLUS PO   1 tablet/day, Oral      sulfamethoxazole-trimethoprim 800-160 MG per tablet  Commonly known as:  BACTRIM DS,SEPTRA DS   1 tablet, Oral, 3 Times Weekly      valACYclovir 500 MG tablet  Commonly known as:  VALTREX   500 mg, Oral, Daily PRN      VITAMIN B-6 PO   100 mcg, Oral      vitamin E 400 UNIT capsule   400 Units, Oral, Daily         Stop These Medications    mesalamine 1000 MG suppository  Commonly known as:  CANASA     nystatin 211001 UNIT/ML suspension  Commonly known as:  MYCOSTATIN          Discharge Disposition:  Home or Self Care    Discharge Diet:  Diet Instructions     Diet: Regular, Cardiac, Consistent Carbohydrate; Thin       Discharge Diet:   Regular  Cardiac  Consistent Carbohydrate       Fluid Consistency:  Thin        Discharge Activity:   Activity Instructions     Activity as Tolerated           Code Status/Level of Support:  Code Status and Medical Interventions:   Ordered at: 09/07/18 1636     Level Of Support Discussed With:    Patient     Code Status:    CPR     Medical Interventions (Level of Support Prior to Arrest):    Full     No future appointments.    Additional Instructions for the Follow-ups that You Need to Schedule     Discharge Follow-up with PCP    As directed      Currently Documented PCP:  Jillian Layne MD  PCP Phone Number:  [phone]    Follow Up Details:  Follow up with PCP in one week         Discharge Follow-up with Specified Provider: Please arrange follow up with Dr. Roach for Thursday 9/13    As directed      To:  Please arrange follow up with Dr. Roach for Thursday 9/13             Time Spent on  Discharge: 50 minutes    Electronically signed by Liseth Pereira PA-C, 09/11/18, 11:56 AM.

## 2018-09-11 NOTE — NURSING NOTE
Patient seen for ostomy education and care. Patient's stoma is red, moist and in a valley of the skin which requires the patient to wear convexity. Pt wearing a Coloplast convex one piece appliance. Peristomal skin has pyoderma gangrenosum in three patches around posterior stoma. Utilizing hydrofera blue, paste, and an chris ring around stoma. Silver nitrate utilized around areas of pyoderma where hypergranulation is occurring. Education done with patient on stoma care, d/c information reviewed, and questions answered.

## 2018-09-12 ENCOUNTER — READMISSION MANAGEMENT (OUTPATIENT)
Dept: CALL CENTER | Facility: HOSPITAL | Age: 63
End: 2018-09-12

## 2018-09-12 LAB
BACTERIA SPEC AEROBE CULT: NORMAL
BACTERIA SPEC AEROBE CULT: NORMAL

## 2018-09-13 NOTE — OUTREACH NOTE
Prep Survey      Responses   Facility patient discharged from?  [facility]   Is patient eligible?  Yes   Discharge diagnosis  Sepsis   Does the patient have one of the following disease processes/diagnoses(primary or secondary)?  Sepsis   Does the patient have Home health ordered?  No   Is there a DME ordered?  Yes   What DME was ordered?  [employer] for portable oxygen   Prep survey completed?  Yes          Lorena Pereira RN

## 2018-09-14 ENCOUNTER — READMISSION MANAGEMENT (OUTPATIENT)
Dept: CALL CENTER | Facility: HOSPITAL | Age: 63
End: 2018-09-14

## 2018-09-14 NOTE — OUTREACH NOTE
Sepsis Week 1 Survey      Responses   Facility patient discharged from?  [facility]   Does the patient have one of the following disease processes/diagnoses(primary or secondary)?  Sepsis   Is there a successful TCM telephone encounter documented?  No   Week 1 attempt successful?  Yes   Call start time  1623   Call end time  1628   Discharge diagnosis  Sepsis   Meds reviewed with patient/caregiver?  Yes   Is the patient having any side effects they believe may be caused by any medication additions or changes?  No   Does the patient have all medications related to this admission filled (includes all antibiotics, inhalers, nebulizers,steroids,etc.)  Yes   Is the patient taking all medications as directed (includes completed medication regime)?  Yes   Does the patient have a primary care provider?   Yes   Does the patient have an appointment with their PCP within 7 days of discharge?  Yes   Has the patient kept scheduled appointments due by today?  N/A   What DME was ordered?  [employer] for portable oxygen   Has all DME been delivered?  Yes   Psychosocial issues?  No   Did the patient receive a copy of their discharge instructions?  Yes   Nursing interventions  Reviewed instructions with patient   What is the patient's perception of their health status since discharge?  Improving   Nursing interventions  Nurse provided patient education   Nursing interventions  Nurse provided patient education   Is patient/caregiver able to teach back steps to recovery at home?  Learn about sepsis(sepsis.org)   Is the patient/caregiver able to teach back signs and symptoms of worsening condition:  Fever, Shortness of breath/rapid respiratory rate, Edema, Rapid heart rate (>90)   Is the patient/caregiver able to teach back the hierarchy of who to call/visit for symptoms/problems? PCP, Specialist, Home health nurse, Urgent Care, ED, 911  Yes   Week 1 call completed?  Yes          Colette Ramsey RN

## 2018-09-21 ENCOUNTER — READMISSION MANAGEMENT (OUTPATIENT)
Dept: CALL CENTER | Facility: HOSPITAL | Age: 63
End: 2018-09-21

## 2018-09-21 NOTE — OUTREACH NOTE
Sepsis Week 2 Survey      Responses   Facility patient discharged from?  [facility]   Does the patient have one of the following disease processes/diagnoses(primary or secondary)?  Sepsis   Week 2 attempt successful?  Yes   Call start time  1525   Call end time  1530   Discharge diagnosis  Sepsis   Meds reviewed with patient/caregiver?  Yes   Is the patient having any side effects they believe may be caused by any medication additions or changes?  No   Does the patient have all medications related to this admission filled (includes all antibiotics, inhalers, nebulizers,steroids,etc.)  Yes   Is the patient taking all medications as directed (includes completed medication regime)?  Yes   Medication comments  Pulmonary adjusted meds yesterday.   Does the patient have a primary care provider?   Yes   Does the patient have an appointment with their PCP within 7 days of discharge?  No   Has the patient kept scheduled appointments due by today?  Yes   Comments  He has seen ID and Pulmonary. PCP was out of town.   Has home health visited the patient within 72 hours of discharge?  N/A   What DME was ordered?  [employer] for portable oxygen   Has all DME been delivered?  Yes   DME comments  States he uses O2 PRN. Reports Spo2 levels are staying up.   Psychosocial issues?  No   Comments  Uses IS and fluttter valve. 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   Did the patient receive a copy of their discharge instructions?  Yes   Nursing interventions  Reviewed instructions with patient   What is the patient's perception of their health status since discharge?  Improving   Nursing interventions  Nurse provided patient education   Is the patient/caregiver able to teach back Sepsis?  S - Shivering,fever or very cold, P - Pale or discolored skin, S - Sleepy, difficult to arouse,confused, S - Short of breath   Nursing interventions  Nurse provided patient education, Nurse provided reassurance to patient   Is patient/caregiver able to teach back  steps to recovery at home?  Learn about sepsis(sepsis.org), Rest and regain strength, Exercise as tolerated   Is the patient/caregiver able to teach back signs and symptoms of worsening condition:  Fever, Shortness of breath/rapid respiratory rate, Edema, Rapid heart rate (>90), Altered mental status(confusion/coma)   Is the patient/caregiver able to teach back the hierarchy of who to call/visit for symptoms/problems? PCP, Specialist, Home health nurse, Urgent Care, ED, 911  Yes   Week 2 call completed?  Yes          Yogesh Santoyo RN

## 2018-09-26 ENCOUNTER — HOSPITAL ENCOUNTER (OUTPATIENT)
Dept: WOUND CARE | Facility: HOSPITAL | Age: 63
Discharge: HOME | End: 2018-09-26
Admitting: FAMILY MEDICINE

## 2018-09-26 PROCEDURE — G0463 HOSPITAL OUTPT CLINIC VISIT: HCPCS

## 2018-09-26 NOTE — NURSING NOTE
Pt seen as ostomy out patient.  Pt saw Dr Mayo on Friday.  Had steroid injection and super glue to pyroderma to peristomal skin.  Today pt is here for an appliance change.  Peristomal wounds are granulating well, with edges sarath.  Super glue applied to wounds, let dry, used silicone barrier ring, Rufina paste - wearing Coloplast 1 piece drainable light convexity.  Pt to follow up as needed.

## 2018-09-28 ENCOUNTER — READMISSION MANAGEMENT (OUTPATIENT)
Dept: CALL CENTER | Facility: HOSPITAL | Age: 63
End: 2018-09-28

## 2018-10-07 ENCOUNTER — READMISSION MANAGEMENT (OUTPATIENT)
Dept: CALL CENTER | Facility: HOSPITAL | Age: 63
End: 2018-10-07

## 2018-10-07 NOTE — OUTREACH NOTE
Sepsis Week 4 Survey      Responses   Facility patient discharged from?  [facility]   Does the patient have one of the following disease processes/diagnoses(primary or secondary)?  Sepsis   Week 4 attempt successful?  Yes   Call start time  1631   Call end time  1633   Discharge diagnosis  Sepsis   Is patient permission given to speak with other caregiver?  No   Meds reviewed with patient/caregiver?  Yes   Is the patient taking all medications as directed (includes completed medication regime)?  Yes   Has the patient kept scheduled appointments due by today?  Yes   Is the patient still receiving Home Health Services?  N/A   DME comments  No longer using oxygen.    Psychosocial issues?  No   What is the patient's perception of their health status since discharge?  Improving   Nursing interventions  Nurse provided patient education   Is the patient/caregiver able to teach back the hierarchy of who to call/visit for symptoms/problems? PCP, Specialist, Home health nurse, Urgent Care, ED, 911  Yes   Week 4 call completed?  Yes   Would the patient like one additional call?  No   Graduated  Yes   Did the patient feel the follow up calls were helpful during their recovery period?  Yes   Was the number of calls appropriate?  Yes          Stefanie Roach RN

## 2018-11-20 ENCOUNTER — HOSPITAL ENCOUNTER (OUTPATIENT)
Dept: GENERAL RADIOLOGY | Facility: HOSPITAL | Age: 63
Discharge: HOME | End: 2018-11-20
Attending: INTERNAL MEDICINE | Admitting: INTERNAL MEDICINE

## 2018-11-20 ENCOUNTER — TRANSCRIBE ORDERS (OUTPATIENT)
Dept: ADMINISTRATIVE | Facility: HOSPITAL | Age: 63
End: 2018-11-20

## 2018-11-20 DIAGNOSIS — J98.11 PULMONARY ATELECTASIS: Primary | ICD-10-CM

## 2018-11-20 DIAGNOSIS — J98.11 PULMONARY ATELECTASIS: ICD-10-CM

## 2018-11-20 PROCEDURE — 71046 X-RAY EXAM CHEST 2 VIEWS: CPT

## 2019-01-03 ENCOUNTER — TRANSCRIBE ORDERS (OUTPATIENT)
Dept: LAB | Facility: HOSPITAL | Age: 64
End: 2019-01-03

## 2019-01-03 ENCOUNTER — LAB (OUTPATIENT)
Dept: LAB | Facility: HOSPITAL | Age: 64
End: 2019-01-03

## 2019-01-03 DIAGNOSIS — J98.11 DISCOID ATELECTASIS: Primary | ICD-10-CM

## 2019-01-03 DIAGNOSIS — J98.11 DISCOID ATELECTASIS: ICD-10-CM

## 2019-01-03 LAB
INR PPP: 2.19 (ref 0.91–1.09)
PROTHROMBIN TIME: 23 SECONDS (ref 9.6–11.5)

## 2019-01-03 PROCEDURE — 85610 PROTHROMBIN TIME: CPT

## 2019-01-03 PROCEDURE — 36415 COLL VENOUS BLD VENIPUNCTURE: CPT

## 2019-03-31 ENCOUNTER — HOSPITAL ENCOUNTER (OUTPATIENT)
Facility: HOSPITAL | Age: 64
Setting detail: OBSERVATION
Discharge: HOME | End: 2019-04-02
Attending: EMERGENCY MEDICINE | Admitting: EMERGENCY MEDICINE

## 2019-03-31 ENCOUNTER — APPOINTMENT (OUTPATIENT)
Dept: GENERAL RADIOLOGY | Facility: HOSPITAL | Age: 64
End: 2019-03-31

## 2019-03-31 DIAGNOSIS — R65.10 SIRS (SYSTEMIC INFLAMMATORY RESPONSE SYNDROME) (HCC): Primary | ICD-10-CM

## 2019-03-31 LAB
ALBUMIN SERPL-MCNC: 4.3 G/DL (ref 3.2–4.8)
ALBUMIN/GLOB SERPL: 1.6 G/DL (ref 1.5–2.5)
ALP SERPL-CCNC: 89 U/L (ref 25–100)
ALT SERPL W P-5'-P-CCNC: 31 U/L (ref 7–40)
ANION GAP SERPL CALCULATED.3IONS-SCNC: 9 MMOL/L (ref 3–11)
AST SERPL-CCNC: 25 U/L (ref 0–33)
BASOPHILS # BLD AUTO: 0.05 10*3/MM3 (ref 0–0.2)
BASOPHILS NFR BLD AUTO: 0.4 % (ref 0–1)
BILIRUB SERPL-MCNC: 0.3 MG/DL (ref 0.3–1.2)
BILIRUB UR QL STRIP: NEGATIVE
BNP SERPL-MCNC: 24 PG/ML (ref 0–100)
BUN BLD-MCNC: 9 MG/DL (ref 9–23)
BUN/CREAT SERPL: 8.1 (ref 7–25)
CALCIUM SPEC-SCNC: 9.2 MG/DL (ref 8.7–10.4)
CHLORIDE SERPL-SCNC: 104 MMOL/L (ref 99–109)
CLARITY UR: CLEAR
CO2 SERPL-SCNC: 26 MMOL/L (ref 20–31)
COLOR UR: YELLOW
CREAT BLD-MCNC: 1.11 MG/DL (ref 0.6–1.3)
DEPRECATED RDW RBC AUTO: 47.8 FL (ref 37–54)
EOSINOPHIL # BLD AUTO: 0.17 10*3/MM3 (ref 0–0.3)
EOSINOPHIL NFR BLD AUTO: 1.5 % (ref 0–3)
ERYTHROCYTE [DISTWIDTH] IN BLOOD BY AUTOMATED COUNT: 14 % (ref 11.3–14.5)
FLUAV AG NPH QL: NEGATIVE
FLUBV AG NPH QL IA: NEGATIVE
GFR SERPL CREATININE-BSD FRML MDRD: 66 ML/MIN/1.73
GLOBULIN UR ELPH-MCNC: 2.7 GM/DL
GLUCOSE BLD-MCNC: 128 MG/DL (ref 70–100)
GLUCOSE UR STRIP-MCNC: NEGATIVE MG/DL
HCT VFR BLD AUTO: 41.6 % (ref 38.9–50.9)
HGB BLD-MCNC: 13.4 G/DL (ref 13.1–17.5)
HGB UR QL STRIP.AUTO: NEGATIVE
HOLD SPECIMEN: NORMAL
HOLD SPECIMEN: NORMAL
IMM GRANULOCYTES # BLD AUTO: 0.06 10*3/MM3 (ref 0–0.05)
IMM GRANULOCYTES NFR BLD AUTO: 0.5 % (ref 0–0.6)
INR PPP: 1.86 (ref 0.85–1.16)
KETONES UR QL STRIP: NEGATIVE
LEUKOCYTE ESTERASE UR QL STRIP.AUTO: NEGATIVE
LYMPHOCYTES # BLD AUTO: 0.36 10*3/MM3 (ref 0.6–4.8)
LYMPHOCYTES NFR BLD AUTO: 3.2 % (ref 24–44)
MCH RBC QN AUTO: 30 PG (ref 27–31)
MCHC RBC AUTO-ENTMCNC: 32.2 G/DL (ref 32–36)
MCV RBC AUTO: 93.1 FL (ref 80–99)
MONOCYTES # BLD AUTO: 0.7 10*3/MM3 (ref 0–1)
MONOCYTES NFR BLD AUTO: 6.2 % (ref 0–12)
NEUTROPHILS # BLD AUTO: 10.07 10*3/MM3 (ref 1.5–8.3)
NEUTROPHILS NFR BLD AUTO: 88.7 % (ref 41–71)
NITRITE UR QL STRIP: NEGATIVE
PH UR STRIP.AUTO: <=5 [PH] (ref 5–8)
PLATELET # BLD AUTO: 264 10*3/MM3 (ref 150–450)
PMV BLD AUTO: 8.9 FL (ref 6–12)
POTASSIUM BLD-SCNC: 3.8 MMOL/L (ref 3.5–5.5)
PROT SERPL-MCNC: 7 G/DL (ref 5.7–8.2)
PROT UR QL STRIP: NEGATIVE
PROTHROMBIN TIME: 20.7 SECONDS (ref 11.2–14.3)
RBC # BLD AUTO: 4.47 10*6/MM3 (ref 4.2–5.76)
SODIUM BLD-SCNC: 139 MMOL/L (ref 132–146)
SP GR UR STRIP: 1.01 (ref 1–1.03)
TROPONIN I SERPL-MCNC: 0 NG/ML (ref 0–0.07)
UROBILINOGEN UR QL STRIP: NORMAL
WBC NRBC COR # BLD: 11.35 10*3/MM3 (ref 3.5–10.8)
WHOLE BLOOD HOLD SPECIMEN: NORMAL
WHOLE BLOOD HOLD SPECIMEN: NORMAL

## 2019-03-31 PROCEDURE — 93005 ELECTROCARDIOGRAM TRACING: CPT | Performed by: EMERGENCY MEDICINE

## 2019-03-31 PROCEDURE — 84484 ASSAY OF TROPONIN QUANT: CPT

## 2019-03-31 PROCEDURE — 85610 PROTHROMBIN TIME: CPT | Performed by: EMERGENCY MEDICINE

## 2019-03-31 PROCEDURE — 85025 COMPLETE CBC W/AUTO DIFF WBC: CPT | Performed by: EMERGENCY MEDICINE

## 2019-03-31 PROCEDURE — 71045 X-RAY EXAM CHEST 1 VIEW: CPT

## 2019-03-31 PROCEDURE — 84145 PROCALCITONIN (PCT): CPT | Performed by: HOSPITALIST

## 2019-03-31 PROCEDURE — 87040 BLOOD CULTURE FOR BACTERIA: CPT | Performed by: EMERGENCY MEDICINE

## 2019-03-31 PROCEDURE — 83880 ASSAY OF NATRIURETIC PEPTIDE: CPT | Performed by: EMERGENCY MEDICINE

## 2019-03-31 PROCEDURE — 81003 URINALYSIS AUTO W/O SCOPE: CPT | Performed by: EMERGENCY MEDICINE

## 2019-03-31 PROCEDURE — 99285 EMERGENCY DEPT VISIT HI MDM: CPT

## 2019-03-31 PROCEDURE — 80053 COMPREHEN METABOLIC PANEL: CPT | Performed by: EMERGENCY MEDICINE

## 2019-03-31 PROCEDURE — 87804 INFLUENZA ASSAY W/OPTIC: CPT | Performed by: EMERGENCY MEDICINE

## 2019-03-31 RX ORDER — SODIUM CHLORIDE 0.9 % (FLUSH) 0.9 %
10 SYRINGE (ML) INJECTION AS NEEDED
Status: DISCONTINUED | OUTPATIENT
Start: 2019-03-31 | End: 2019-04-02 | Stop reason: HOSPADM

## 2019-03-31 RX ORDER — WARFARIN SODIUM 5 MG/1
5 TABLET ORAL
COMMUNITY

## 2019-03-31 RX ORDER — ESCITALOPRAM OXALATE 10 MG/1
10 TABLET ORAL DAILY
COMMUNITY

## 2019-03-31 RX ADMIN — SODIUM CHLORIDE 1000 ML: 9 INJECTION, SOLUTION INTRAVENOUS at 23:10

## 2019-04-01 ENCOUNTER — APPOINTMENT (OUTPATIENT)
Dept: CT IMAGING | Facility: HOSPITAL | Age: 64
End: 2019-04-01

## 2019-04-01 PROBLEM — D72.829 LEUKOCYTOSIS: Status: ACTIVE | Noted: 2019-04-01

## 2019-04-01 LAB
ANION GAP SERPL CALCULATED.3IONS-SCNC: 6 MMOL/L (ref 3–11)
BASOPHILS # BLD AUTO: 0.05 10*3/MM3 (ref 0–0.2)
BASOPHILS NFR BLD AUTO: 0.4 % (ref 0–1)
BUN BLD-MCNC: 6 MG/DL (ref 9–23)
BUN/CREAT SERPL: 5.5 (ref 7–25)
CALCIUM SPEC-SCNC: 8.1 MG/DL (ref 8.7–10.4)
CHLORIDE SERPL-SCNC: 110 MMOL/L (ref 99–109)
CO2 SERPL-SCNC: 23 MMOL/L (ref 20–31)
CREAT BLD-MCNC: 1.1 MG/DL (ref 0.6–1.3)
D-LACTATE SERPL-SCNC: 0.9 MMOL/L (ref 0.5–2)
DEPRECATED RDW RBC AUTO: 49.1 FL (ref 37–54)
EOSINOPHIL # BLD AUTO: 0.08 10*3/MM3 (ref 0–0.3)
EOSINOPHIL NFR BLD AUTO: 0.7 % (ref 0–3)
ERYTHROCYTE [DISTWIDTH] IN BLOOD BY AUTOMATED COUNT: 14.2 % (ref 11.3–14.5)
FLUAV SUBTYP SPEC NAA+PROBE: DETECTED
FLUBV RNA ISLT QL NAA+PROBE: NOT DETECTED
GFR SERPL CREATININE-BSD FRML MDRD: 67 ML/MIN/1.73
GLUCOSE BLD-MCNC: 109 MG/DL (ref 70–100)
HCT VFR BLD AUTO: 38.3 % (ref 38.9–50.9)
HGB BLD-MCNC: 12.2 G/DL (ref 13.1–17.5)
IMM GRANULOCYTES # BLD AUTO: 0.08 10*3/MM3 (ref 0–0.05)
IMM GRANULOCYTES NFR BLD AUTO: 0.7 % (ref 0–0.6)
INR PPP: 1.93 (ref 0.85–1.16)
LYMPHOCYTES # BLD AUTO: 0.48 10*3/MM3 (ref 0.6–4.8)
LYMPHOCYTES NFR BLD AUTO: 4.2 % (ref 24–44)
MAGNESIUM SERPL-MCNC: 1.6 MG/DL (ref 1.3–2.7)
MCH RBC QN AUTO: 30.1 PG (ref 27–31)
MCHC RBC AUTO-ENTMCNC: 31.9 G/DL (ref 32–36)
MCV RBC AUTO: 94.6 FL (ref 80–99)
MONOCYTES # BLD AUTO: 0.89 10*3/MM3 (ref 0–1)
MONOCYTES NFR BLD AUTO: 7.8 % (ref 0–12)
NEUTROPHILS # BLD AUTO: 9.89 10*3/MM3 (ref 1.5–8.3)
NEUTROPHILS NFR BLD AUTO: 86.9 % (ref 41–71)
PHOSPHATE SERPL-MCNC: 2.2 MG/DL (ref 2.4–5.1)
PLATELET # BLD AUTO: 228 10*3/MM3 (ref 150–450)
PMV BLD AUTO: 8.9 FL (ref 6–12)
POTASSIUM BLD-SCNC: 3.8 MMOL/L (ref 3.5–5.5)
PROCALCITONIN SERPL-MCNC: 0.13 NG/ML
PROTHROMBIN TIME: 21.2 SECONDS (ref 11.2–14.3)
RBC # BLD AUTO: 4.05 10*6/MM3 (ref 4.2–5.76)
SODIUM BLD-SCNC: 139 MMOL/L (ref 132–146)
WBC NRBC COR # BLD: 11.39 10*3/MM3 (ref 3.5–10.8)

## 2019-04-01 PROCEDURE — 96361 HYDRATE IV INFUSION ADD-ON: CPT

## 2019-04-01 PROCEDURE — 96365 THER/PROPH/DIAG IV INF INIT: CPT

## 2019-04-01 PROCEDURE — 83735 ASSAY OF MAGNESIUM: CPT | Performed by: HOSPITALIST

## 2019-04-01 PROCEDURE — 85610 PROTHROMBIN TIME: CPT

## 2019-04-01 PROCEDURE — 74176 CT ABD & PELVIS W/O CONTRAST: CPT

## 2019-04-01 PROCEDURE — 25010000002 VANCOMYCIN 10 G RECONSTITUTED SOLUTION: Performed by: EMERGENCY MEDICINE

## 2019-04-01 PROCEDURE — 96367 TX/PROPH/DG ADDL SEQ IV INF: CPT

## 2019-04-01 PROCEDURE — 96366 THER/PROPH/DIAG IV INF ADDON: CPT

## 2019-04-01 PROCEDURE — 83605 ASSAY OF LACTIC ACID: CPT | Performed by: HOSPITALIST

## 2019-04-01 PROCEDURE — 84100 ASSAY OF PHOSPHORUS: CPT | Performed by: HOSPITALIST

## 2019-04-01 PROCEDURE — 25010000002 ONDANSETRON PER 1 MG: Performed by: NURSE PRACTITIONER

## 2019-04-01 PROCEDURE — 25010000002 PIPERACILLIN SOD-TAZOBACTAM PER 1 G: Performed by: NURSE PRACTITIONER

## 2019-04-01 PROCEDURE — 94640 AIRWAY INHALATION TREATMENT: CPT

## 2019-04-01 PROCEDURE — 96375 TX/PRO/DX INJ NEW DRUG ADDON: CPT

## 2019-04-01 PROCEDURE — 99220 PR INITIAL OBSERVATION CARE/DAY 70 MINUTES: CPT | Performed by: HOSPITALIST

## 2019-04-01 PROCEDURE — 80048 BASIC METABOLIC PNL TOTAL CA: CPT | Performed by: HOSPITALIST

## 2019-04-01 PROCEDURE — G0378 HOSPITAL OBSERVATION PER HR: HCPCS

## 2019-04-01 PROCEDURE — 87502 INFLUENZA DNA AMP PROBE: CPT | Performed by: INTERNAL MEDICINE

## 2019-04-01 PROCEDURE — 94799 UNLISTED PULMONARY SVC/PX: CPT

## 2019-04-01 PROCEDURE — 25010000002 PIPERACILLIN SOD-TAZOBACTAM PER 1 G: Performed by: EMERGENCY MEDICINE

## 2019-04-01 PROCEDURE — 71250 CT THORAX DX C-: CPT

## 2019-04-01 PROCEDURE — 85025 COMPLETE CBC W/AUTO DIFF WBC: CPT | Performed by: HOSPITALIST

## 2019-04-01 RX ORDER — GUAIFENESIN 600 MG/1
800 TABLET, EXTENDED RELEASE ORAL 2 TIMES DAILY
COMMUNITY
End: 2019-04-09

## 2019-04-01 RX ORDER — ACETAMINOPHEN 325 MG/1
325 TABLET ORAL ONCE
Status: COMPLETED | OUTPATIENT
Start: 2019-04-01 | End: 2019-04-01

## 2019-04-01 RX ORDER — CETIRIZINE HYDROCHLORIDE 10 MG/1
10 TABLET ORAL DAILY
Status: DISCONTINUED | OUTPATIENT
Start: 2019-04-01 | End: 2019-04-02 | Stop reason: HOSPADM

## 2019-04-01 RX ORDER — SODIUM CHLORIDE 9 MG/ML
75 INJECTION, SOLUTION INTRAVENOUS CONTINUOUS
Status: DISCONTINUED | OUTPATIENT
Start: 2019-04-01 | End: 2019-04-02 | Stop reason: HOSPADM

## 2019-04-01 RX ORDER — ACETAMINOPHEN 325 MG/1
650 TABLET ORAL EVERY 6 HOURS PRN
Status: DISCONTINUED | OUTPATIENT
Start: 2019-04-01 | End: 2019-04-02 | Stop reason: HOSPADM

## 2019-04-01 RX ORDER — VANCOMYCIN HYDROCHLORIDE 1 G/200ML
15 INJECTION, SOLUTION INTRAVENOUS EVERY 12 HOURS SCHEDULED
Status: DISCONTINUED | OUTPATIENT
Start: 2019-04-01 | End: 2019-04-01

## 2019-04-01 RX ORDER — BUDESONIDE 0.5 MG/2ML
0.5 INHALANT ORAL
Status: DISCONTINUED | OUTPATIENT
Start: 2019-04-01 | End: 2019-04-02 | Stop reason: HOSPADM

## 2019-04-01 RX ORDER — MONTELUKAST SODIUM 10 MG/1
10 TABLET ORAL NIGHTLY
Status: DISCONTINUED | OUTPATIENT
Start: 2019-04-01 | End: 2019-04-02 | Stop reason: HOSPADM

## 2019-04-01 RX ORDER — SODIUM CHLORIDE 9 MG/ML
125 INJECTION, SOLUTION INTRAVENOUS CONTINUOUS
Status: DISCONTINUED | OUTPATIENT
Start: 2019-04-01 | End: 2019-04-01

## 2019-04-01 RX ORDER — ESCITALOPRAM OXALATE 10 MG/1
10 TABLET ORAL DAILY
Status: DISCONTINUED | OUTPATIENT
Start: 2019-04-01 | End: 2019-04-02 | Stop reason: HOSPADM

## 2019-04-01 RX ORDER — LOSARTAN POTASSIUM 50 MG/1
100 TABLET ORAL DAILY
Status: DISCONTINUED | OUTPATIENT
Start: 2019-04-01 | End: 2019-04-02 | Stop reason: HOSPADM

## 2019-04-01 RX ORDER — IPRATROPIUM BROMIDE 42 UG/1
2 SPRAY, METERED NASAL 4 TIMES DAILY
Status: DISCONTINUED | OUTPATIENT
Start: 2019-04-01 | End: 2019-04-02 | Stop reason: HOSPADM

## 2019-04-01 RX ORDER — LOPERAMIDE HYDROCHLORIDE 2 MG/1
2 CAPSULE ORAL ONCE
Status: COMPLETED | OUTPATIENT
Start: 2019-04-01 | End: 2019-04-01

## 2019-04-01 RX ORDER — WARFARIN SODIUM 5 MG/1
5 TABLET ORAL
Status: DISCONTINUED | OUTPATIENT
Start: 2019-04-01 | End: 2019-04-02 | Stop reason: HOSPADM

## 2019-04-01 RX ORDER — ONDANSETRON 2 MG/ML
4 INJECTION INTRAMUSCULAR; INTRAVENOUS EVERY 8 HOURS PRN
Status: DISCONTINUED | OUTPATIENT
Start: 2019-04-01 | End: 2019-04-02 | Stop reason: HOSPADM

## 2019-04-01 RX ORDER — BUSPIRONE HYDROCHLORIDE 10 MG/1
15 TABLET ORAL
Status: DISCONTINUED | OUTPATIENT
Start: 2019-04-01 | End: 2019-04-02 | Stop reason: HOSPADM

## 2019-04-01 RX ORDER — VITAMIN E 268 MG
400 CAPSULE ORAL DAILY
Status: DISCONTINUED | OUTPATIENT
Start: 2019-04-01 | End: 2019-04-02 | Stop reason: HOSPADM

## 2019-04-01 RX ORDER — IBUPROFEN 400 MG/1
200 TABLET ORAL EVERY 6 HOURS SCHEDULED
Status: DISCONTINUED | OUTPATIENT
Start: 2019-04-01 | End: 2019-04-01

## 2019-04-01 RX ORDER — AMLODIPINE BESYLATE 5 MG/1
5 TABLET ORAL NIGHTLY
Status: DISCONTINUED | OUTPATIENT
Start: 2019-04-01 | End: 2019-04-02 | Stop reason: HOSPADM

## 2019-04-01 RX ORDER — IBUPROFEN 400 MG/1
200 TABLET ORAL EVERY 6 HOURS PRN
Status: DISCONTINUED | OUTPATIENT
Start: 2019-04-01 | End: 2019-04-02 | Stop reason: HOSPADM

## 2019-04-01 RX ORDER — SODIUM CHLORIDE 0.9 % (FLUSH) 0.9 %
3 SYRINGE (ML) INJECTION EVERY 12 HOURS SCHEDULED
Status: DISCONTINUED | OUTPATIENT
Start: 2019-04-01 | End: 2019-04-02 | Stop reason: HOSPADM

## 2019-04-01 RX ORDER — SACCHAROMYCES BOULARDII 250 MG
250 CAPSULE ORAL DAILY
Status: DISCONTINUED | OUTPATIENT
Start: 2019-04-01 | End: 2019-04-02 | Stop reason: HOSPADM

## 2019-04-01 RX ORDER — IPRATROPIUM BROMIDE AND ALBUTEROL SULFATE 2.5; .5 MG/3ML; MG/3ML
3 SOLUTION RESPIRATORY (INHALATION) EVERY 4 HOURS PRN
Status: DISCONTINUED | OUTPATIENT
Start: 2019-04-01 | End: 2019-04-02 | Stop reason: HOSPADM

## 2019-04-01 RX ORDER — FLUTICASONE PROPIONATE 50 MCG
2 SPRAY, SUSPENSION (ML) NASAL DAILY
Status: DISCONTINUED | OUTPATIENT
Start: 2019-04-01 | End: 2019-04-02 | Stop reason: HOSPADM

## 2019-04-01 RX ORDER — SODIUM CHLORIDE 0.9 % (FLUSH) 0.9 %
3-10 SYRINGE (ML) INJECTION AS NEEDED
Status: DISCONTINUED | OUTPATIENT
Start: 2019-04-01 | End: 2019-04-02 | Stop reason: HOSPADM

## 2019-04-01 RX ORDER — MULTIPLE VITAMINS W/ MINERALS TAB 9MG-400MCG
1 TAB ORAL DAILY
Status: DISCONTINUED | OUTPATIENT
Start: 2019-04-01 | End: 2019-04-02 | Stop reason: HOSPADM

## 2019-04-01 RX ORDER — OSELTAMIVIR PHOSPHATE 75 MG/1
75 CAPSULE ORAL EVERY 12 HOURS SCHEDULED
Status: DISCONTINUED | OUTPATIENT
Start: 2019-04-01 | End: 2019-04-02 | Stop reason: HOSPADM

## 2019-04-01 RX ORDER — SODIUM CHLORIDE 0.9 % (FLUSH) 0.9 %
10 SYRINGE (ML) INJECTION AS NEEDED
Status: CANCELLED | OUTPATIENT
Start: 2019-04-01

## 2019-04-01 RX ADMIN — AMLODIPINE BESYLATE 5 MG: 5 TABLET ORAL at 21:21

## 2019-04-01 RX ADMIN — WARFARIN SODIUM 5 MG: 5 TABLET ORAL at 17:34

## 2019-04-01 RX ADMIN — VANCOMYCIN HYDROCHLORIDE 1750 MG: 10 INJECTION, POWDER, LYOPHILIZED, FOR SOLUTION INTRAVENOUS at 02:09

## 2019-04-01 RX ADMIN — BUDESONIDE 0.5 MG: 0.5 INHALANT RESPIRATORY (INHALATION) at 09:12

## 2019-04-01 RX ADMIN — BUSPIRONE HYDROCHLORIDE 15 MG: 10 TABLET ORAL at 09:49

## 2019-04-01 RX ADMIN — ONDANSETRON 4 MG: 2 INJECTION INTRAMUSCULAR; INTRAVENOUS at 20:02

## 2019-04-01 RX ADMIN — BUDESONIDE 0.5 MG: 0.5 INHALANT RESPIRATORY (INHALATION) at 22:16

## 2019-04-01 RX ADMIN — ACETAMINOPHEN 650 MG: 325 TABLET, FILM COATED ORAL at 08:52

## 2019-04-01 RX ADMIN — FLUTICASONE PROPIONATE 2 SPRAY: 50 SPRAY, METERED NASAL at 09:50

## 2019-04-01 RX ADMIN — ACETAMINOPHEN 325 MG: 325 TABLET, FILM COATED ORAL at 04:17

## 2019-04-01 RX ADMIN — Medication 400 UNITS: at 09:49

## 2019-04-01 RX ADMIN — IPRATROPIUM BROMIDE 2 SPRAY: 42 SPRAY NASAL at 09:50

## 2019-04-01 RX ADMIN — LOPERAMIDE HYDROCHLORIDE 2 MG: 2 CAPSULE ORAL at 21:21

## 2019-04-01 RX ADMIN — OSELTAMIVIR PHOSPHATE 75 MG: 75 CAPSULE ORAL at 14:05

## 2019-04-01 RX ADMIN — SODIUM CHLORIDE 1000 ML: 9 INJECTION, SOLUTION INTRAVENOUS at 02:07

## 2019-04-01 RX ADMIN — LOSARTAN POTASSIUM 100 MG: 50 TABLET ORAL at 09:49

## 2019-04-01 RX ADMIN — BUSPIRONE HYDROCHLORIDE 15 MG: 10 TABLET ORAL at 17:34

## 2019-04-01 RX ADMIN — IPRATROPIUM BROMIDE 2 SPRAY: 42 SPRAY NASAL at 13:03

## 2019-04-01 RX ADMIN — SODIUM CHLORIDE 200 ML/HR: 9 INJECTION, SOLUTION INTRAVENOUS at 09:47

## 2019-04-01 RX ADMIN — ACETAMINOPHEN 650 MG: 325 TABLET, FILM COATED ORAL at 19:38

## 2019-04-01 RX ADMIN — SODIUM CHLORIDE 200 ML/HR: 9 INJECTION, SOLUTION INTRAVENOUS at 04:14

## 2019-04-01 RX ADMIN — BUSPIRONE HYDROCHLORIDE 15 MG: 10 TABLET ORAL at 13:03

## 2019-04-01 RX ADMIN — ESCITALOPRAM OXALATE 10 MG: 10 TABLET ORAL at 09:49

## 2019-04-01 RX ADMIN — MONTELUKAST SODIUM 10 MG: 10 TABLET, COATED ORAL at 21:21

## 2019-04-01 RX ADMIN — TAZOBACTAM SODIUM AND PIPERACILLIN SODIUM 3.38 G: 375; 3 INJECTION, SOLUTION INTRAVENOUS at 08:37

## 2019-04-01 RX ADMIN — SODIUM CHLORIDE 200 ML/HR: 9 INJECTION, SOLUTION INTRAVENOUS at 14:05

## 2019-04-01 RX ADMIN — IPRATROPIUM BROMIDE 2 SPRAY: 42 SPRAY NASAL at 17:36

## 2019-04-01 RX ADMIN — SODIUM CHLORIDE, PRESERVATIVE FREE 3 ML: 5 INJECTION INTRAVENOUS at 09:50

## 2019-04-01 RX ADMIN — MULTIPLE VITAMINS W/ MINERALS TAB 1 TABLET: TAB ORAL at 09:49

## 2019-04-01 RX ADMIN — OSELTAMIVIR PHOSPHATE 75 MG: 75 CAPSULE ORAL at 21:21

## 2019-04-01 RX ADMIN — ACETAMINOPHEN 650 MG: 325 TABLET, FILM COATED ORAL at 01:12

## 2019-04-01 RX ADMIN — ACETAMINOPHEN 650 MG: 325 TABLET, FILM COATED ORAL at 14:09

## 2019-04-01 RX ADMIN — TAZOBACTAM SODIUM AND PIPERACILLIN SODIUM 3.38 G: 375; 3 INJECTION, SOLUTION INTRAVENOUS at 01:11

## 2019-04-01 RX ADMIN — Medication 250 MG: at 09:50

## 2019-04-01 NOTE — PROGRESS NOTES
"Pharmacy Consult  -  Warfarin    Zackary Noonan II is a  63 y.o. male   Height - 170.2 cm (67\")  Weight - 72.6 kg (160 lb)    Consulting Provider: Dr. Jc  Indication: History of DVT  Goal INR: 1.7 - 2.5  Home Regimen: Warfarin 5 mg PO Daily       Bridge Therapy:        Drug-Drug Interactions with current regimen:      Warfarin Dosing During Admission:    Date             INR             Dose                  Education Provided:    Discharge Follow up:   Following Provider -   Follow up time range or appointment -      Labs:    Results from last 7 days   Lab Units 03/31/19  2211   INR  1.86*   HEMOGLOBIN g/dL 13.4   HEMATOCRIT % 41.6   PLATELETS 10*3/mm3 264     Results from last 7 days   Lab Units 03/31/19  2211   SODIUM mmol/L 139   POTASSIUM mmol/L 3.8   CHLORIDE mmol/L 104   CO2 mmol/L 26.0   BUN mg/dL 9   CREATININE mg/dL 1.11   CALCIUM mg/dL 9.2   BILIRUBIN mg/dL 0.3   ALK PHOS U/L 89   ALT (SGPT) U/L 31   AST (SGOT) U/L 25   GLUCOSE mg/dL 128*       Current dietary intake:       Assessment/Plan:   Home dose of Warfarin 5 mg PO Daily continued by APRN  Daily PT/INR   Pharmacy to follow     Thank you  Tyler Willis [facility]  4/1/2019  4:17 AM       "

## 2019-04-01 NOTE — CONSULTS
INFECTIOUS DISEASE CONSULT/INITIAL HOSPITAL VISIT    Zackary Noonan II  1955  [id number]    Date of Consult: 4/1/2019    Admission Date: 3/31/2019      Requesting Provider: No ref. provider found  Evaluating Physician: Akash Cullen MD    Reason for Consultation: Sepsis     History of present illness:    Patient is a 63 y.o. male seen today for sepsis. Yesterday afternoon he began experiencing chills, and temperatures up to 101 degrees. Admitting labs with a leukocytosis of 11,000,. Normal PCT, and lactate. Urinalysis negative, Chest xray with bibasilar atelectasis. His tmax has been 102.6 degrees.  He is currently on Vancomycin and Zosyn and we were consulted for evaluation and treatment.  He denies increasing sputum production, abdominal pain, increased ostomy output, dysuria.  He has a history of recurrent pneumonia on prophylactic Bactrim.  He has a mild nonproductive cough.    Past Medical History:   Diagnosis Date   • Anxiety    • Arthritis    • Asthma    • Clostridium difficile infection 01/2017    treated per dr carter with fecal transplant 8-2017    • H/O recurrent pneumonia    • Hypertension    • MRSA infection 2016    right lower extremity wound   • Pulmonary nodule     Followed by Dr. Galaviz    • Recurrent deep vein thrombosis (DVT) (CMS/HCC)     x 3 LLE; chronic anticoagulation therapy    • Ulcerative colitis (CMS/HCC)    • Wears glasses    • Wears hearing aid        Past Surgical History:   Procedure Laterality Date   • BRONCHOSCOPY      by Dr. Galaviz for pulmonary nodule   • BRONCHOSCOPY N/A 11/7/2016    Procedure: BRONCHOSCOPY;  Surgeon: Eben Roberts MD;  Location:  [facility];  Service:    • COLON RESECTION N/A 9/19/2017    Procedure: TOTAL ABDOMINAL COLECTOMY WITH CREATION OF ILEOSTOMY;  Surgeon: David Nielsen MD;  Location:  [facility];  Service:    • COLONOSCOPY  08/2017   • UMBILICAL HERNIA REPAIR     • VASECTOMY     • VASECTOMY REVERSAL         Family History   Problem  "Relation Age of Onset   • Hypertension Mother        Social History     Socioeconomic History   • Marital status:      Spouse name: Not on file   • Number of children: Not on file   • Years of education: Not on file   • Highest education level: Not on file   Tobacco Use   • Smoking status: Never Smoker   • Smokeless tobacco: Never Used   Substance and Sexual Activity   • Alcohol use: Yes     Comment: 4 drinks/week   • Drug use: No   • Sexual activity: Defer       Allergies   Allergen Reactions   • Beta Adrenergic Blockers Other (See Comments)     Vocal changes   • Levaquin [Levofloxacin In D5w] Other (See Comments)     Heaviness in legs, \"felt like lead\"         Medication:    Current Facility-Administered Medications:   •  acetaminophen (TYLENOL) tablet 650 mg, 650 mg, Oral, Q6H PRN, Eduardo Garcia, DO, 650 mg at 04/01/19 1409  •  amLODIPine (NORVASC) tablet 5 mg, 5 mg, Oral, Nightly, Pilar Head APRN  •  budesonide (PULMICORT) nebulizer solution 0.5 mg, 0.5 mg, Nebulization, BID - RT, Pilar Head APRN, 0.5 mg at 04/01/19 0912  •  busPIRone (BUSPAR) tablet 15 mg, 15 mg, Oral, TID With Meals, Pilar Head APRN, 15 mg at 04/01/19 1303  •  cetirizine (zyrTEC) tablet 10 mg, 10 mg, Oral, Daily, Pilar Head APRN  •  escitalopram (LEXAPRO) tablet 10 mg, 10 mg, Oral, Daily, Pilar Head APRN, 10 mg at 04/01/19 0949  •  fluticasone (FLONASE) 50 MCG/ACT nasal spray 2 spray, 2 spray, Each Nare, Daily, Pilar Head APRN, 2 spray at 04/01/19 0950  •  ipratropium (ATROVENT) nasal spray 0.06%, 2 spray, Each Nare, 4x Daily, Pilar Head APRN, 2 spray at 04/01/19 1303  •  ipratropium-albuterol (DUO-NEB) nebulizer solution 3 mL, 3 mL, Nebulization, Q4H PRN, Pilar Head APRN  •  losartan (COZAAR) tablet 100 mg, 100 mg, Oral, Daily, Pilar Head, HARMAN, 100 mg at 04/01/19 0949  •  montelukast (SINGULAIR) tablet 10 mg, 10 mg, Oral, Nightly, Pilar Head, HARMAN  •  multivitamin with " minerals 1 tablet, 1 tablet, Oral, Daily, Pilar Head APRN, 1 tablet at 04/01/19 0949  •  oseltamivir (TAMIFLU) capsule 75 mg, 75 mg, Oral, Q12H, Tyrell Alcala MD, 75 mg at 04/01/19 1405  •  Pharmacy to dose warfarin, , Does not apply, Continuous PRN, Lazaro, Tyler PLATT, [facility]  •  saccharomyces boulardii (FLORASTOR) capsule 250 mg, 250 mg, Oral, Daily, Pilar Head APRN, 250 mg at 04/01/19 0950  •  sodium chloride 0.9 % flush 10 mL, 10 mL, Intravenous, PRN, Eduardo Garcia DO  •  sodium chloride 0.9 % flush 3 mL, 3 mL, Intravenous, Q12H, Pilar Head APRN, 3 mL at 04/01/19 0950  •  sodium chloride 0.9 % flush 3-10 mL, 3-10 mL, Intravenous, PRN, Pilar Head, APRN  •  sodium chloride 0.9 % infusion, 200 mL/hr, Intravenous, Continuous, Chirag Jc MD, Last Rate: 200 mL/hr at 04/01/19 1405, 200 mL/hr at 04/01/19 1405  •  vitamin E capsule 400 Units, 400 Units, Oral, Daily, Pilar Head APRN, 400 Units at 04/01/19 0949  •  warfarin (COUMADIN) tablet 5 mg, 5 mg, Oral, Daily, Pilar Head APRJUAN    Antibiotics:  Anti-Infectives (From admission, onward)    Ordered     Dose/Rate Route Frequency Start Stop    04/01/19 1339  oseltamivir (TAMIFLU) capsule 75 mg     Ordering Provider:  Tyrell Alcala MD    75 mg Oral Every 12 Hours Scheduled 04/01/19 1430 04/06/19 0859    04/01/19 0043  piperacillin-tazobactam (ZOSYN) 3.375 g in iso-osmotic dextrose 50 ml (premix)     Ordering Provider:  Eduardo Garcia DO    3.375 g  over 30 Minutes Intravenous Once 04/01/19 0045 04/01/19 0141    04/01/19 0043  vancomycin 1750 mg/500 mL 0.9% NS IVPB ([facility])     Ordering Provider:  Eduardo Garcia DO    25 mg/kg × 72.6 kg  over 105 Minutes Intravenous Once 04/01/19 0045 04/01/19 0628            Review of Systems:  Constitutional-- +  Fever, + chills or sweats.  Appetite good, and no malaise.+ weakness   HEENT-- No new vision, hearing or throat complaints.  No epistaxis or oral sores.  Denies odynophagia or  dysphagia. No headache, photophobia or neck stiffness.  CV-- No chest pain, palpitation or syncope  Resp--he has a mild cough but no sputum production.  He denies dyspnea.  GI- No nausea, vomiting, or diarrhea.  No hematochezia, melena, or hematemesis. Denies jaundice or chronic liver disease. + small ulceration under stoma   -- No dysuria, hematuria, or flank pain.  Denies hesitancy, urgency or flank pain.  Lymph- no swollen lymph nodes in neck/axilla or groin.   Heme- No active bruising or bleeding; no Hx of DVT or PE.  MS-- no swelling or pain in the bones or joints of arms/legs.  No new back pain.  Neuro-- No acute focal weakness or numbness in the arms or legs.  No seizures.  Skin--No rashes or lesions      Physical Exam:   Vital Signs  Temp (24hrs), Av.3 °F (38.5 °C), Min:100.1 °F (37.8 °C), Max:102.6 °F (39.2 °C)    Temp  Min: 100.1 °F (37.8 °C)  Max: 102.6 °F (39.2 °C)  BP  Min: 95/59  Max: 145/72  Pulse  Min: 106  Max: 128  Resp  Min: 20  Max: 28  SpO2  Min: 90 %  Max: 98 %    GENERAL: Awake and alert, in no acute distress.   HEENT: Normocephalic, atraumatic.  PERRL. EOMI. No conjunctival injection. No icterus. Oropharynx clear without evidence of thrush or exudate. No evidence of peridontal disease.    NECK: Supple without nuchal rigidity. No mass.  LYMPH: No cervical, axillary or inguinal lymphadenopathy.  HEART: RRR; No murmur, rubs, gallops.   LUNGS: moderate rhonchi   ABDOMEN: Soft, nontender, nondistended. Positive bowel sounds. No rebound or guarding. NO mass or HSM. Ostomy in place   EXT:  No cyanosis, clubbing or edema. No cord.  : Genitalia generally unremarkable.  Without Morley catheter.  MSK: FROM without joint effusions noted arms/legs.    SKIN: Warm and dry without cutaneous eruptions on Inspection/palpation.    NEURO: Oriented to PPT. No focal deficits on motor/sensory exam at arms/legs.  PSYCHIATRIC: Normal insight and judgement. Cooperative with PE    Laboratory Data    Results  from last 7 days   Lab Units 04/01/19  0518 03/31/19  2211   WBC 10*3/mm3 11.39* 11.35*   HEMOGLOBIN g/dL 12.2* 13.4   HEMATOCRIT % 38.3* 41.6   PLATELETS 10*3/mm3 228 264     Results from last 7 days   Lab Units 04/01/19  0518   SODIUM mmol/L 139   POTASSIUM mmol/L 3.8   CHLORIDE mmol/L 110*   CO2 mmol/L 23.0   BUN mg/dL 6*   CREATININE mg/dL 1.10   GLUCOSE mg/dL 109*   CALCIUM mg/dL 8.1*     Results from last 7 days   Lab Units 03/31/19  2211   ALK PHOS U/L 89   BILIRUBIN mg/dL 0.3   ALT (SGPT) U/L 31   AST (SGOT) U/L 25             Results from last 7 days   Lab Units 04/01/19  0111   LACTATE mmol/L 0.9             Estimated Creatinine Clearance: 68.8 mL/min (by C-G formula based on SCr of 1.1 mg/dL).      Microbiology:      Influenza screen negative                           Radiology:  Imaging Results (last 72 hours)     Procedure Component Value Units Date/Time    XR Chest 1 View [[id number]] Collected:  03/31/19 2247     Updated:  03/31/19 2345    Narrative:       EXAM:    XR Chest, 1 View     EXAM DATE/TIME:    3/31/2019 10:47 PM     CLINICAL HISTORY:    63 years old, male; Signs and symptoms; Shortness of breath; Additional info:   SOA triage protocol     TECHNIQUE:    Imaging protocol: XR of the chest, 1 view.     COMPARISON:    CR XR CHEST PA AND LATERAL 11/20/2018 10:48 AM     FINDINGS:    Lungs:  Bibasilar linear atelectasis. No evidence of pneumonia, pulmonary   vascular congestion, or pulmonary edema.    Pleural space:  No pneumothorax. No sizable pleural effusion.    Heart/Mediastinum:  No cardiomegaly.    Bones/joints: Unremarkable.       Impression:       Bibasilar linear atelectasis. No evidence of pneumonia, pulmonary vascular   congestion, or pulmonary edema.     THIS DOCUMENT HAS BEEN ELECTRONICALLY SIGNED BY SARA KNUTSON MD      I read his radiographic studies      Impression:   1.  Sepsis- with leukocytosis, fever, tachycardia.  He has a presentation of sepsis without an overt focus.  We will  obtain an influenza PCR study to look for influenza although the screening study was negative.  We will also look for an intra-abdominal infection.  2.  Leukocytosis/neutrophilia- secondary to sepsis  3.  Recurrent pneumonia-she does not have pneumonia on radiographic studies at present  4.  History of Cdiff, s/p fecal transplant   5.  Crohns, s/p colectomy, no medications   6.  History of DVT, on Coumadin     PLAN/RECOMMENDATIONS:   Thank you for asking us to see Zackary Noonan EDGAR, I recommend the followin.  Blood cultures-pending  2.  Influenza PCR  3.  Abdominal/pelvic CT scan  4.  Continue intravenous antibiotic therapy  Dr. Cullen has obtained the history, performed the physical exam and formulated the above treatment plan.       Addendum: His influenza PCR returned positive for influenza A.  He has been started on Tamiflu.  We will discontinue intravenous antibiotic therapy.  I discussed his situation with Dr. Alcala.       Akash Cullen MD  2019  2:28 PM

## 2019-04-01 NOTE — PROGRESS NOTES
"Pharmacy Consult  -  Warfarin    Zackary Noonan II is a  63 y.o. male   Height - 170.2 cm (67\")  Weight - 72.6 kg (160 lb)    Consulting Provider: Dr. Jc  Indication: History of DVT  Goal INR: 1.7 - 2.5  Home Regimen: Warfarin 5 mg PO Daily       Bridge Therapy: no       Drug-Drug Interactions with current regimen:         Escitalopram - increased risk of bleeding    Warfarin Dosing During Admission:    Date  3/31 4/1          INR  1.86 1.93          Dose  5mg PTA 5mg               Education Provided:Patient is on warfarin prior to admission.  Education provided 4/1 verbally and in witing.  Discussed effects of warfarin, importance of checking INR, drug-drug and drug-food interactions, and signs/symptoms of bleeding and clotting.  Patient verbalized understanding through teach back.  All pertinent questions were answered.        Discharge Follow up:   Following Provider - Dr. Layne   Follow up time range or appointment - 2-3 days following discharge      Labs:    Results from last 7 days   Lab Units 04/01/19  0518 03/31/19  2211   INR  1.93* 1.86*   HEMOGLOBIN g/dL 12.2* 13.4   HEMATOCRIT % 38.3* 41.6   PLATELETS 10*3/mm3 228 264     Results from last 7 days   Lab Units 04/01/19  0518 03/31/19  2211   SODIUM mmol/L 139 139   POTASSIUM mmol/L 3.8 3.8   CHLORIDE mmol/L 110* 104   CO2 mmol/L 23.0 26.0   BUN mg/dL 6* 9   CREATININE mg/dL 1.10 1.11   CALCIUM mg/dL 8.1* 9.2   BILIRUBIN mg/dL  --  0.3   ALK PHOS U/L  --  89   ALT (SGPT) U/L  --  31   AST (SGOT) U/L  --  25   GLUCOSE mg/dL 109* 128*       Current dietary intake:   Diet Order   Procedures   • Diet Regular; Cardiac     Assessment/Plan:     Warfarin therapy for history of DVT  Goal INR: 1.7-2.5 per MD order  4/1 INR - 1.93  H/H - 12.2/38.3    Will continue warfarin 5mg daily  Monitor s/s bleeding/clotting, clinical status, dietary intake and drug-drug interactions  Follow daily PT/INR and adjust dose accordingly     Thank you  Cornelio Reaves, " MARLENE  4/1/2019  8:48 AM

## 2019-04-01 NOTE — H&P
[facility] Medicine Services  HISTORY AND PHYSICAL    Patient Name: Zackary Noonan II  : 1955  MRN: [id number]  Primary Care Physician: Jillian Layne MD  Date of admission: 3/31/2019      Subjective   Subjective     Chief Complaint:  Fever     HPI:  Zackary Noonan II is a 63 y.o. male with PMH significant for asthma, COPD, HTN, Crohn's disease (s/p colectomy with ileostomy by Dr. Nielsen in ), C. Diff (s/p fecal transplant, followed by Dr. Roach), DVT (s/p IVC, on Lovenox), pre-diabetes and recurrent pneumonia (on suppressive Bactrim 3x/week due to recurrent pneumonia)  that present to the ED with complaint of fever, chills.   He states that earlier today he had acute onset of fever with chills. He notes that he took Tylenol with only mild relief. He denies cough, SOA, N/V/D. He does note a small wound peristomal that has been present for the past week, and notes that he has had wounds similar to this in recent past. He also notes that these symptoms are similar to all of his previous admissions for PNA.   Upon arrival to the ED, he remains febrile and tachycardic. He does have mild leukocytosis. CXR and UA are negative for acute findings at this time.   He will be admitted to Hospital Medicine for further evaluation.     Review of Systems   Constitutional: Positive for chills and fever. Negative for activity change, appetite change, diaphoresis and fatigue.   HENT: Positive for postnasal drip.    Eyes: Negative for visual disturbance.   Respiratory: Positive for wheezing. Negative for cough, chest tightness and shortness of breath.    Cardiovascular: Negative for chest pain, palpitations and leg swelling.   Gastrointestinal: Negative for abdominal distention, abdominal pain, constipation, diarrhea, nausea and vomiting.   Genitourinary: Negative for difficulty urinating, dysuria, frequency and urgency.   Musculoskeletal: Negative for arthralgias and myalgias.   Skin:  Positive for wound (peristomal ). Negative for color change and pallor.   Neurological: Negative for dizziness, weakness and headaches.   Psychiatric/Behavioral: Negative for confusion. The patient is not nervous/anxious.         Otherwise complete ROS reviewed and is negative except as mentioned in the HPI.    Personal History     Past Medical History:   Diagnosis Date   • Anxiety    • Arthritis    • Asthma    • Clostridium difficile infection 01/2017    treated per dr carter with fecal transplant 8-2017    • H/O recurrent pneumonia    • Hypertension    • MRSA infection 2016    right lower extremity wound   • Pulmonary nodule     Followed by Dr. Galaviz    • Recurrent deep vein thrombosis (DVT) (CMS/HCC)     x 3 LLE; chronic anticoagulation therapy    • Ulcerative colitis (CMS/HCC)    • Wears glasses    • Wears hearing aid        Past Surgical History:   Procedure Laterality Date   • BRONCHOSCOPY      by Dr. Galaviz for pulmonary nodule   • BRONCHOSCOPY N/A 11/7/2016    Procedure: BRONCHOSCOPY;  Surgeon: Eben Roberts MD;  Location: [facility] ENDOSCOPY;  Service:    • COLON RESECTION N/A 9/19/2017    Procedure: TOTAL ABDOMINAL COLECTOMY WITH CREATION OF ILEOSTOMY;  Surgeon: David Nielsen MD;  Location: [facility] OR;  Service:    • COLONOSCOPY  08/2017   • UMBILICAL HERNIA REPAIR     • VASECTOMY     • VASECTOMY REVERSAL         Family History: family history includes Hypertension in his mother. Otherwise pertinent FHx was reviewed and unremarkable.     Social History:  reports that he has never smoked. He has never used smokeless tobacco. He reports that he drinks alcohol. He reports that he does not use drugs.  Social History     Social History Narrative   • Not on file       Medications:    Available home medication information reviewed.    (Not in a hospital admission)    Allergies   Allergen Reactions   • Beta Adrenergic Blockers Other (See Comments)     Vocal changes   • Levaquin [Levofloxacin In D5w] Other (See  "Comments)     Heaviness in legs, \"felt like lead\"       Objective   Objective     Vital Signs:   Temp:  [100.7 °F (38.2 °C)-102.4 °F (39.1 °C)] 102.4 °F (39.1 °C)  Heart Rate:  [128] 128  Resp:  [20] 20  BP: (133)/(60) 133/60        Physical Exam   Constitutional: He is oriented to person, place, and time. He appears well-developed and well-nourished. He has a sickly appearance. No distress.   HENT:   Head: Normocephalic and atraumatic.   Eyes: Pupils are equal, round, and reactive to light.   Neck: Normal range of motion. Neck supple. No JVD present.   Cardiovascular: Regular rhythm, normal heart sounds and intact distal pulses. Tachycardia present. Exam reveals no gallop and no friction rub.   No murmur heard.  Pulmonary/Chest: Effort normal. No stridor. No respiratory distress. He has wheezes. He has no rales.   Abdominal: Soft. Bowel sounds are normal. He exhibits no distension. There is no tenderness. There is no guarding.   Ostomy RUQ    Musculoskeletal: Normal range of motion. He exhibits no edema or tenderness.   Neurological: He is alert and oriented to person, place, and time.   Skin: Skin is warm and dry.   Psychiatric: He has a normal mood and affect. His behavior is normal.   Vitals reviewed.       Results Reviewed:  I have personally reviewed current lab, radiology, and data and agree.    Results from last 7 days   Lab Units 03/31/19  2211   WBC 10*3/mm3 11.35*   HEMOGLOBIN g/dL 13.4   HEMATOCRIT % 41.6   PLATELETS 10*3/mm3 264   INR  1.86*     Results from last 7 days   Lab Units 03/31/19  2222 03/31/19  2211   SODIUM mmol/L  --  139   POTASSIUM mmol/L  --  3.8   CHLORIDE mmol/L  --  104   CO2 mmol/L  --  26.0   BUN mg/dL  --  9   CREATININE mg/dL  --  1.11   GLUCOSE mg/dL  --  128*   CALCIUM mg/dL  --  9.2   ALT (SGPT) U/L  --  31   AST (SGOT) U/L  --  25   TROPONIN I ng/mL 0.00  --      Estimated Creatinine Clearance: 68.1 mL/min (by C-G formula based on SCr of 1.11 mg/dL).  Brief Urine Lab Results  " (Last result in the past 365 days)      Color   Clarity   Blood   Leuk Est   Nitrite   Protein   CREAT   Urine HCG        03/31/19 2311 Yellow Clear Negative Negative Negative Negative             BNP   Date Value Ref Range Status   03/31/2019 24.0 0.0 - 100.0 pg/mL Final     Comment:     Results may be falsely decreased if patient taking Biotin.     Imaging Results (last 24 hours)     Procedure Component Value Units Date/Time    XR Chest 1 View [[id number]] Collected:  03/31/19 2247     Updated:  03/31/19 2345    Narrative:       EXAM:    XR Chest, 1 View     EXAM DATE/TIME:    3/31/2019 10:47 PM     CLINICAL HISTORY:    63 years old, male; Signs and symptoms; Shortness of breath; Additional info:   SOA triage protocol     TECHNIQUE:    Imaging protocol: XR of the chest, 1 view.     COMPARISON:    CR XR CHEST PA AND LATERAL 11/20/2018 10:48 AM     FINDINGS:    Lungs:  Bibasilar linear atelectasis. No evidence of pneumonia, pulmonary   vascular congestion, or pulmonary edema.    Pleural space:  No pneumothorax. No sizable pleural effusion.    Heart/Mediastinum:  No cardiomegaly.    Bones/joints: Unremarkable.       Impression:       Bibasilar linear atelectasis. No evidence of pneumonia, pulmonary vascular   congestion, or pulmonary edema.     THIS DOCUMENT HAS BEEN ELECTRONICALLY SIGNED BY SARA KNUTSON MD             Assessment/Plan   Assessment / Plan     Active Hospital Problems    Diagnosis POA   • **Sepsis (CMS/[facility]) [A41.9] Yes   • Leukocytosis [D72.829] Yes   • Crohn's disease (CMS/HCC) [K50.90] Yes   • 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) [Z86.19] Not Applicable   • S/P total abdominal colectomy [Z90.49] Not Applicable   • COPD (chronic obstructive pulmonary disease) (CMS/HCC) [J44.9] Yes   • History of DVT (deep vein thrombosis) [Z86.718] Not Applicable     LLE; x 3; chronic coumadin therapy     • HTN (hypertension) [I10] Yes     63 year old male presenting to the ED with complaint of  fever/chills who is found to have fever of unknown origin     SIRS/ Sepsis leukocytosis, fever, tachycardia Source: unclear, ? Lung   -Vancomycin and Zosyn started in ED, will continue for now   -consider repeat CXR in am   -IVF   -Tylenol for fever control   -Lactic acid pending   -Procal pending   -CBC, BMP in am   -BC x 2   -repeat CXR in am     COPD  -PRN duonebs     Crohn's Disease  -s/p colectomy   -notes peristomal wound, will consult WOC in am   -History of c-diff (prior to colectomy)     History of DVT  -on chronic coumadin, pharmacy to dose     Hypertension   -continue Norvasc and losartan       DVT prophylaxis:  On coumadin     CODE STATUS:    Code Status and Medical Interventions:   Ordered at: 04/01/19 0141     Code Status:    CPR     Medical Interventions (Level of Support Prior to Arrest):    Full       Admission Status:  I believe this patient meets INPATIENT status due to the need for care which can only be reasonably provided in an hospital setting such as possible need for aggressive/expedited ancillary services and/or consultation services, IV medications, close physician monitoring, and/or procedures.  In such, I feel patient’s risk for adverse outcomes and need for care warrant INPATIENT evaluation and predict the patient’s care encounter to likely last beyond 2 midnights.    Electronically signed by HARMAN Bruce, 04/01/19, 1:08 AM.      Brief Attending Admission Attestation     I have seen and examined the patient, performing an independent face-to-face diagnostic evaluation with plan of care reviewed and developed with the advanced practice clinician (APC).      Brief Summary Statement:   Zackary Noonan II is a 63 y.o. male who presents with fevers and chills of unclear etiology.  He is clinically dehydrated and denies any source of infection or severe abdominal pain or changes in bladder or bowel habits.  He has a complex medical history including immunosuppressed state, but does  not appear to be on any immunosuppressing medications currently.    He meets SIRS criteria with no clear source of infection.    Attending Physical Exam:    Constitutional: Awake, alert  Eyes: PERRLA, sclerae anicteric, no conjunctival injection  HENT: NCAT, dry tongue  Neck: Supple, no JVD, trachea midline  Respiratory: Clear to auscultation bilaterally, nonlabored respirations   Cardiovascular: tachycardic, s1 and s2  Gastrointestinal: Positive bowel sounds, soft, nontender, + ileostomy with little output  Musculoskeletal: No bilateral ankle edema, no clubbing or cyanosis to extremities  Psychiatric: Appropriate affect, cooperative  Neurologic: Oriented x 3, strength symmetric in all extremities, Cranial Nerves grossly intact to confrontation, speech clear  Skin: dry, + skin tenting    Electronically signed by Chirag Jc MD, 04/01/19, 2:03 AM.

## 2019-04-01 NOTE — PROGRESS NOTES
"Pharmacokinetic Consult - Vancomycin Dosing  Zackary Noonan II is a 63 y.o. male who has been consulted for vancomycin dosing for sepsis  (goal trough 15-20 mcg/mL).  Ht - 170.2 cm (67\")  Wt - 72.6 kg (160 lb)    Current Antimicrobial Therapy  Zosyn 3.375 gm IV q8h (extended infusion)  Vancomycin 1000 mg IV q12h    Allergies  Beta adrenergic blockers and Levaquin [levofloxacin in d5w]    Microbiology and Radiology  Microbiology Results (last 10 days)       Procedure Component Value - Date/Time    Influenza Antigen, Rapid - Swab, Nasopharynx [[id number]]  (Normal) Collected:  03/31/19 2251    Lab Status:  Final result Specimen:  Swab from Nasopharynx Updated:  03/31/19 2303     Influenza A Ag, EIA Negative     Influenza B Ag, EIA Negative          Relevant clinical data and objective history reviewed:  Results from last 7 days   Lab Units 03/31/19  2211   BUN mg/dL 9   CREATININE mg/dL 1.11    Estimated Creatinine Clearance: 68.1 mL/min (by C-G formula based on SCr of 1.11 mg/dL).   Intake & Output (last 3 days)         03/29 0701 - 03/30 0700 03/30 0701 - 03/31 0700 03/31 0701 - 04/01 0700    IV Piggyback   1050    Total Intake(mL/kg)   1050 (14.5)    Net   +1050                 Asessment/Plan  1. Will give vancomycin 1750 mg IV once (given in ED 4/1 at 0209)                                                followed by      Vancomycin 1000 mg IV q 12 hours    2. Vancomycin trough is scheduled 4/3 at 0530 prior to the 5th dose    Eric Galvez, PharmD, BCPS  4/1/2019  3:07 AM   "

## 2019-04-01 NOTE — PROGRESS NOTES
63-year-old male with past medical history reportedly of ulcerative colitis with previous ostomy presents to the hospital with high fevers and increased respiratory symptoms including cough and general weakness and was found to have sepsis present on admission.  Concern is for respiratory infection.  Chest x-ray was not definitive.  Plan for CT scan of the chest to evaluate further.  Follow blood cultures.  Infectious disease consult has been placed due to patient's complicated history and immunosuppression history.  Patient currently still with high fever on very broad-spectrum antibiotics and receiving IV fluid.  No current hypotension or tachycardia.  Continue to monitor for signs of clinical worsening.    Constitutional:Awake, alert  HENT: NCAT, mucous membranes moist, neck supple  Respiratory: Rales and rhonchi's bilaterally, occasional cough  Cardiovascular: RRR, S1, S2, normal radial pulses  Gastrointestinal: Positive bowel sounds, soft, nontender, nondistended  Musculoskeletal: BMI 25, no lower extremity edema  Psychiatric: Appropriate affect, cooperative, conversational  Neurologic: No slurred speech or facial droop, follows commands, not confused   Skin: No rashes or jaundice, warm

## 2019-04-01 NOTE — PLAN OF CARE
Problem: Health Knowledge, Opportunity to Enhance (Adult,Obstetrics,Pediatric)  Goal: Identify Related Risk Factors and Signs and Symptoms  Outcome: Ongoing (interventions implemented as appropriate)

## 2019-04-01 NOTE — ED PROVIDER NOTES
Subjective   Zackary Noonan II is a 63 y.o.male, immunocompromised due to his history of crohn's disease status post colostomy, who presents to the ED with complaints of a fever and chills. The patient reports his symptoms have been constant since they onset earlier today. He has taken Tylenol, which has provided mild relief. He also complains of a spot on the skin of his abdomen that causes him discomfort, but he denies any abdominal pain, myalgias, shortness of breath, vomiting, sore throat, or cough. Additionally, he decided to come to the emergency department today because he has a history of pneumonia after having symptoms that develop similar to the way his symptoms onset today. He states the last six times he has felt his presenting symptoms, he has required admission to the hospital for sepsis. When he has been discharged in the past, he returns within twelve hours by ambulance because his symptoms worsen.  There are no other complaints at this time.         History provided by:  Patient  Fever   Temp source:  Subjective  Severity:  Mild  Onset quality:  Sudden  Duration:  1 day  Timing:  Constant  Progression:  Unchanged  Chronicity:  New  Relieved by: Tylenol.  Worsened by:  Nothing  Associated symptoms: chills    Associated symptoms: no cough, no myalgias, no sore throat and no vomiting        Review of Systems   Constitutional: Positive for chills and fever.   HENT: Negative for sore throat.    Respiratory: Negative for cough and shortness of breath.    Gastrointestinal: Negative for abdominal pain and vomiting.   Musculoskeletal: Negative for myalgias.   Skin:        Spot on abdomen causing discomfort.    All other systems reviewed and are negative.      Past Medical History:   Diagnosis Date   • Anxiety    • Arthritis    • Asthma    • Clostridium difficile infection 01/2017    treated per dr carter with fecal transplant 8-2017    • H/O recurrent pneumonia    • Hypertension    • MRSA infection 2016     "right lower extremity wound   • Pulmonary nodule     Followed by Dr. Galaviz    • Recurrent deep vein thrombosis (DVT) (CMS/HCC)     x 3 LLE; chronic anticoagulation therapy    • Ulcerative colitis (CMS/HCC)    • Wears glasses    • Wears hearing aid        Allergies   Allergen Reactions   • Beta Adrenergic Blockers Other (See Comments)     Vocal changes   • Levaquin [Levofloxacin In D5w] Other (See Comments)     Heaviness in legs, \"felt like lead\"       Past Surgical History:   Procedure Laterality Date   • BRONCHOSCOPY      by Dr. Galaviz for pulmonary nodule   • BRONCHOSCOPY N/A 11/7/2016    Procedure: BRONCHOSCOPY;  Surgeon: Eben Roberts MD;  Location:  [facility]OSCOPY;  Service:    • COLON RESECTION N/A 9/19/2017    Procedure: TOTAL ABDOMINAL COLECTOMY WITH CREATION OF ILEOSTOMY;  Surgeon: David Nielsen MD;  Location:  [facility] OR;  Service:    • COLONOSCOPY  08/2017   • UMBILICAL HERNIA REPAIR     • VASECTOMY     • VASECTOMY REVERSAL         Family History   Problem Relation Age of Onset   • Hypertension Mother        Social History     Socioeconomic History   • Marital status:      Spouse name: Not on file   • Number of children: Not on file   • Years of education: Not on file   • Highest education level: Not on file   Tobacco Use   • Smoking status: Never Smoker   • Smokeless tobacco: Never Used   Substance and Sexual Activity   • Alcohol use: Yes     Comment: 4 drinks/week   • Drug use: No   • Sexual activity: Defer         Objective   Physical Exam   Constitutional: He is oriented to person, place, and time. He appears well-developed and well-nourished. No distress.   HENT:   Head: Normocephalic and atraumatic.   Nose: Nose normal.   Eyes: Conjunctivae are normal. No scleral icterus.   Neck: Normal range of motion. Neck supple.   Cardiovascular: Regular rhythm, normal heart sounds and intact distal pulses. Tachycardia present.   No murmur heard.  Pulmonary/Chest: Effort normal. No respiratory " distress.   Coarse breath sounds to bilateral lower lobes.    Abdominal: Soft. Bowel sounds are normal. There is no tenderness.   Ostomy bag in place with no erythema appreciated.    Musculoskeletal: Normal range of motion. He exhibits no edema.   Neurological: He is alert and oriented to person, place, and time.   Skin: Skin is warm and dry. No erythema.   Patient states we are unable to visualize the area he is referring to because he is unable to remove the ostomy bag at this time and the site is under the bag adhesive.    Psychiatric: He has a normal mood and affect. His behavior is normal.   Nursing note and vitals reviewed.      Procedures         ED Course     Recent Results (from the past 24 hour(s))   Comprehensive Metabolic Panel    Collection Time: 03/31/19 10:11 PM   Result Value Ref Range    Glucose 128 (H) 70 - 100 mg/dL    BUN 9 9 - 23 mg/dL    Creatinine 1.11 0.60 - 1.30 mg/dL    Sodium 139 132 - 146 mmol/L    Potassium 3.8 3.5 - 5.5 mmol/L    Chloride 104 99 - 109 mmol/L    CO2 26.0 20.0 - 31.0 mmol/L    Calcium 9.2 8.7 - 10.4 mg/dL    Total Protein 7.0 5.7 - 8.2 g/dL    Albumin 4.30 3.20 - 4.80 g/dL    ALT (SGPT) 31 7 - 40 U/L    AST (SGOT) 25 0 - 33 U/L    Alkaline Phosphatase 89 25 - 100 U/L    Total Bilirubin 0.3 0.3 - 1.2 mg/dL    eGFR Non African Amer 66 >60 mL/min/1.73    Globulin 2.7 gm/dL    A/G Ratio 1.6 1.5 - 2.5 g/dL    BUN/Creatinine Ratio 8.1 7.0 - 25.0    Anion Gap 9.0 3.0 - 11.0 mmol/L   BNP    Collection Time: 03/31/19 10:11 PM   Result Value Ref Range    BNP 24.0 0.0 - 100.0 pg/mL   Light Blue Top    Collection Time: 03/31/19 10:11 PM   Result Value Ref Range    Extra Tube hold for add-on    Green Top (Gel)    Collection Time: 03/31/19 10:11 PM   Result Value Ref Range    Extra Tube Hold for add-ons.    Lavender Top    Collection Time: 03/31/19 10:11 PM   Result Value Ref Range    Extra Tube hold for add-on    Gold Top - SST    Collection Time: 03/31/19 10:11 PM   Result Value Ref  Range    Extra Tube Hold for add-ons.    CBC Auto Differential    Collection Time: 03/31/19 10:11 PM   Result Value Ref Range    WBC 11.35 (H) 3.50 - 10.80 10*3/mm3    RBC 4.47 4.20 - 5.76 10*6/mm3    Hemoglobin 13.4 13.1 - 17.5 g/dL    Hematocrit 41.6 38.9 - 50.9 %    MCV 93.1 80.0 - 99.0 fL    MCH 30.0 27.0 - 31.0 pg    MCHC 32.2 32.0 - 36.0 g/dL    RDW 14.0 11.3 - 14.5 %    RDW-SD 47.8 37.0 - 54.0 fl    MPV 8.9 6.0 - 12.0 fL    Platelets 264 150 - 450 10*3/mm3    Neutrophil % 88.7 (H) 41.0 - 71.0 %    Lymphocyte % 3.2 (L) 24.0 - 44.0 %    Monocyte % 6.2 0.0 - 12.0 %    Eosinophil % 1.5 0.0 - 3.0 %    Basophil % 0.4 0.0 - 1.0 %    Immature Grans % 0.5 0.0 - 0.6 %    Neutrophils, Absolute 10.07 (H) 1.50 - 8.30 10*3/mm3    Lymphocytes, Absolute 0.36 (L) 0.60 - 4.80 10*3/mm3    Monocytes, Absolute 0.70 0.00 - 1.00 10*3/mm3    Eosinophils, Absolute 0.17 0.00 - 0.30 10*3/mm3    Basophils, Absolute 0.05 0.00 - 0.20 10*3/mm3    Immature Grans, Absolute 0.06 (H) 0.00 - 0.05 10*3/mm3   Protime-INR    Collection Time: 03/31/19 10:11 PM   Result Value Ref Range    Protime 20.7 (H) 11.2 - 14.3 Seconds    INR 1.86 (H) 0.85 - 1.16   POC Troponin, Rapid    Collection Time: 03/31/19 10:22 PM   Result Value Ref Range    Troponin I 0.00 0.00 - 0.07 ng/mL   Influenza Antigen, Rapid - Swab, Nasopharynx    Collection Time: 03/31/19 10:51 PM   Result Value Ref Range    Influenza A Ag, EIA Negative Negative    Influenza B Ag, EIA Negative Negative   Urinalysis With Culture If Indicated - Urine, Clean Catch    Collection Time: 03/31/19 11:11 PM   Result Value Ref Range    Color, UA Yellow Yellow, Straw    Appearance, UA Clear Clear    pH, UA <=5.0 5.0 - 8.0    Specific Gravity, UA 1.015 1.001 - 1.030    Glucose, UA Negative Negative    Ketones, UA Negative Negative    Bilirubin, UA Negative Negative    Blood, UA Negative Negative    Protein, UA Negative Negative    Leuk Esterase, UA Negative Negative    Nitrite, UA Negative Negative     "Urobilinogen, UA 0.2 E.U./dL 0.2 - 1.0 E.U./dL     Note: In addition to lab results from this visit, the labs listed above may include labs taken at another facility or during a different encounter within the last 24 hours. Please correlate lab times with ED admission and discharge times for further clarification of the services performed during this visit.    XR Chest 1 View   Final Result   Bibasilar linear atelectasis. No evidence of pneumonia, pulmonary vascular    congestion, or pulmonary edema.       THIS DOCUMENT HAS BEEN ELECTRONICALLY SIGNED BY SARA KNUTSON MD        Vitals:    03/31/19 2123 04/01/19 0043   BP: 133/60    BP Location: Left arm    Patient Position: Sitting    Pulse: (!) 128    Resp: 20    Temp: (!) 100.7 °F (38.2 °C) (!) 102.4 °F (39.1 °C)   TempSrc: Oral    SpO2: 94%    Weight: 72.6 kg (160 lb)    Height: 170.2 cm (67\")      Medications   sodium chloride 0.9 % flush 10 mL (not administered)   piperacillin-tazobactam (ZOSYN) 3.375 g in iso-osmotic dextrose 50 ml (premix) (not administered)   vancomycin 1750 mg/500 mL 0.9% NS IVPB ([facility]) (not administered)   sodium chloride 0.9 % bolus 1,000 mL (not administered)   acetaminophen (TYLENOL) tablet 650 mg (not administered)   sodium chloride 0.9 % bolus 1,000 mL (1,000 mL Intravenous New Bag 3/31/19 2310)     ECG/EMG Results (last 24 hours)     Procedure Component Value Units Date/Time    ECG 12 Lead [[id number]] Collected:  03/31/19 2203     Updated:  03/31/19 2202        ECG 12 Lead                             MDM    Final diagnoses:   SIRS (systemic inflammatory response syndrome) (CMS/[facility])       Documentation assistance provided by yaritza Camarillo.  Information recorded by the yaritza was done at my direction and has been verified and validated by me.     Hector Camarillo  03/31/19 2230       Hector Camarillo  04/01/19 0054       Eduardo Garcia DO  04/02/19 1203    "

## 2019-04-01 NOTE — PLAN OF CARE
Problem: Patient Care Overview  Goal: Plan of Care Review  Outcome: Ongoing (interventions implemented as appropriate)   04/01/19 8326   Coping/Psychosocial   Plan of Care Reviewed With patient;spouse   Plan of Care Review   Progress no change   OTHER   Outcome Summary Patient very well know to [facility] nurse. 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 Has seen Dr. Mayo for treatment recommendations. At this time appliance is intact and was changed on Saturday. Will follow-up tomorrow for appliance change and peristomal skin assessment. Please contact if needs arise. Thanks

## 2019-04-01 NOTE — CONSULTS
INFECTIOUS DISEASE CONSULT/INITIAL HOSPITAL VISIT    Zackary Noonan II  1955  [id number]    Date of Consult: 4/1/2019    Admission Date: 3/31/2019      Requesting Provider: No ref. provider found  Evaluating Physician: HARMAN Nielsen    Reason for Consultation:     History of present illness:    Patient is a 63 y.o. male    Past Medical History:   Diagnosis Date   • Anxiety    • Arthritis    • Asthma    • Clostridium difficile infection 01/2017    treated per dr carter with fecal transplant 8-2017    • H/O recurrent pneumonia    • Hypertension    • MRSA infection 2016    right lower extremity wound   • Pulmonary nodule     Followed by Dr. Galaviz    • Recurrent deep vein thrombosis (DVT) (CMS/HCC)     x 3 LLE; chronic anticoagulation therapy    • Ulcerative colitis (CMS/HCC)    • Wears glasses    • Wears hearing aid        Past Surgical History:   Procedure Laterality Date   • BRONCHOSCOPY      by Dr. Galaviz for pulmonary nodule   • BRONCHOSCOPY N/A 11/7/2016    Procedure: BRONCHOSCOPY;  Surgeon: Eben Roberts MD;  Location:  [facility];  Service:    • COLON RESECTION N/A 9/19/2017    Procedure: TOTAL ABDOMINAL COLECTOMY WITH CREATION OF ILEOSTOMY;  Surgeon: David Nielsen MD;  Location:  [facility];  Service:    • COLONOSCOPY  08/2017   • UMBILICAL HERNIA REPAIR     • VASECTOMY     • VASECTOMY REVERSAL         Family History   Problem Relation Age of Onset   • Hypertension Mother        Social History     Socioeconomic History   • Marital status:      Spouse name: Not on file   • Number of children: Not on file   • Years of education: Not on file   • Highest education level: Not on file   Tobacco Use   • Smoking status: Never Smoker   • Smokeless tobacco: Never Used   Substance and Sexual Activity   • Alcohol use: Yes     Comment: 4 drinks/week   • Drug use: No   • Sexual activity: Defer       Allergies   Allergen Reactions   • Beta Adrenergic Blockers Other (See Comments)     Vocal  "changes   • Levaquin [Levofloxacin In D5w] Other (See Comments)     Heaviness in legs, \"felt like lead\"         Medication:    Current Facility-Administered Medications:   •  [START ON 4/3/2019] ! Vancomycin trough 4/3 at 0530.  Please hold vancomycin dose 4/3 at 0600 until pharmacy can evaluate level, , Does not apply, Once, Eric Galvez, [facility]  •  acetaminophen (TYLENOL) tablet 650 mg, 650 mg, Oral, Q6H PRN, Eduardo Garcia DO, 650 mg at 04/01/19 0852  •  amLODIPine (NORVASC) tablet 5 mg, 5 mg, Oral, Nightly, Pilar Head APRN  •  budesonide (PULMICORT) nebulizer solution 0.5 mg, 0.5 mg, Nebulization, BID - RT, Pilar Head APRN, 0.5 mg at 04/01/19 0912  •  busPIRone (BUSPAR) tablet 15 mg, 15 mg, Oral, TID With Meals, Pilar Head APRN  •  cetirizine (zyrTEC) tablet 10 mg, 10 mg, Oral, Daily, Pilar Head APRN  •  escitalopram (LEXAPRO) tablet 10 mg, 10 mg, Oral, Daily, Pilar Head APRN  •  fluticasone (FLONASE) 50 MCG/ACT nasal spray 2 spray, 2 spray, Each Nare, Daily, Pilar Head APRN  •  ipratropium (ATROVENT) nasal spray 0.06%, 2 spray, Each Nare, 4x Daily, Pilar Head APRN  •  ipratropium-albuterol (DUO-NEB) nebulizer solution 3 mL, 3 mL, Nebulization, Q4H PRN, Pilar Head APRN  •  losartan (COZAAR) tablet 100 mg, 100 mg, Oral, Daily, Pilar Head APRN  •  montelukast (SINGULAIR) tablet 10 mg, 10 mg, Oral, Nightly, Pilar Head APRN  •  multivitamin with minerals 1 tablet, 1 tablet, Oral, Daily, Pilar Head APRN  •  Pharmacy to dose vancomycin, , Does not apply, Continuous PRN, Pilar Head APRN  •  Pharmacy to dose warfarin, , Does not apply, Continuous PRN, Tyler Willis, [facility]  •  piperacillin-tazobactam (ZOSYN) 3.375 g in iso-osmotic dextrose 50 ml (premix), 3.375 g, Intravenous, Q8H, Pilar Head, HARMAN, 3.375 g at 04/01/19 0837  •  saccharomyces boulardii (FLORASTOR) capsule 250 mg, 250 mg, Oral, Daily, Pilar Head, APRN  •  " sodium chloride 0.9 % flush 10 mL, 10 mL, Intravenous, PRN, Eduardo Garcia DO  •  sodium chloride 0.9 % flush 3 mL, 3 mL, Intravenous, Q12H, Pilar Head APRN  •  sodium chloride 0.9 % flush 3-10 mL, 3-10 mL, Intravenous, PRN, Pilar Head APRN  •  sodium chloride 0.9 % infusion, 200 mL/hr, Intravenous, Continuous, Chirag Jc MD, Last Rate: 200 mL/hr at 04/01/19 0414, 200 mL/hr at 04/01/19 0414  •  vancomycin (VANCOCIN) in iso-osmotic dextrose IVPB 1 g (premix) 200 mL, 15 mg/kg, Intravenous, Q12H, Eric Galvez RPH  •  vitamin E capsule 400 Units, 400 Units, Oral, Daily, Pilar Head APRN  •  warfarin (COUMADIN) tablet 5 mg, 5 mg, Oral, Daily, Pilar Head APRN    Antibiotics:  Anti-Infectives (From admission, onward)    Ordered     Dose/Rate Route Frequency Start Stop    04/01/19 0310  vancomycin (VANCOCIN) in iso-osmotic dextrose IVPB 1 g (premix) 200 mL     Ordering Provider:  Eric Galvez RPH    15 mg/kg × 72.6 kg  over 60 Minutes Intravenous Every 12 Hours Scheduled 04/01/19 1700 04/08/19 0559    04/01/19 0305  piperacillin-tazobactam (ZOSYN) 3.375 g in iso-osmotic dextrose 50 ml (premix)     Comments:  First dose given in ED, please time from that dose   Ordering Provider:  Pilar Head APRN    3.375 g  over 4 Hours Intravenous Every 8 Hours 04/01/19 0800 04/08/19 0759    04/01/19 0305  Pharmacy to dose vancomycin     Ordering Provider:  Pilar Head APRN     Does not apply Continuous PRN 04/01/19 0304 04/08/19 0303    04/01/19 0043  piperacillin-tazobactam (ZOSYN) 3.375 g in iso-osmotic dextrose 50 ml (premix)     Ordering Provider:  Eduardo Garcia DO    3.375 g  over 30 Minutes Intravenous Once 04/01/19 0045 04/01/19 0141    04/01/19 0043  vancomycin 1750 mg/500 mL 0.9% NS IVPB ([facility])     Ordering Provider:  Eduardo Garcia DO    25 mg/kg × 72.6 kg  over 105 Minutes Intravenous Once 04/01/19 0045 04/01/19 0628            Review of Systems:  Constitutional-- No Fever,  chills or sweats.  Appetite good, and no malaise. No fatigue.  HEENT-- No new vision, hearing or throat complaints.  No epistaxis or oral sores.  Denies odynophagia or dysphagia. No headache, photophobia or neck stiffness.  CV-- No chest pain, palpitation or syncope  Resp-- No SOB/cough/Hemoptysis  GI- No nausea, vomiting, or diarrhea.  No hematochezia, melena, or hematemesis. Denies jaundice or chronic liver disease.  -- No dysuria, hematuria, or flank pain.  Denies hesitancy, urgency or flank pain.  Lymph- no swollen lymph nodes in neck/axilla or groin.   Heme- No active bruising or bleeding; no Hx of DVT or PE.  MS-- no swelling or pain in the bones or joints of arms/legs.  No new back pain.  Neuro-- No acute focal weakness or numbness in the arms or legs.  No seizures.  Skin--No rashes or lesions      Physical Exam:   Vital Signs  Temp (24hrs), Av.4 °F (38.6 °C), Min:100.1 °F (37.8 °C), Max:102.6 °F (39.2 °C)    Temp  Min: 100.1 °F (37.8 °C)  Max: 102.6 °F (39.2 °C)  BP  Min: 109/80  Max: 145/72  Pulse  Min: 106  Max: 128  Resp  Min: 20  Max: 28  SpO2  Min: 90 %  Max: 98 %    GENERAL: Awake and alert, in no acute distress.   HEENT: Normocephalic, atraumatic.  PERRL. EOMI. No conjunctival injection. No icterus. Oropharynx clear without evidence of thrush or exudate. No evidence of peridontal disease.    NECK: Supple without nuchal rigidity. No mass.  LYMPH: No cervical, axillary or inguinal lymphadenopathy.  HEART: RRR; No murmur, rubs, gallops.   LUNGS: Clear to auscultation bilaterally without wheezing, rales, rhonchi. Normal respiratory effort. Nonlabored. No dullness.  ABDOMEN: Soft, nontender, nondistended. Positive bowel sounds. No rebound or guarding. NO mass or HSM.  EXT:  No cyanosis, clubbing or edema. No cord.  : Genitalia generally unremarkable.  Without Morley catheter.  MSK: FROM without joint effusions noted arms/legs.    SKIN: Warm and dry without cutaneous eruptions on  Inspection/palpation.    NEURO: Oriented to PPT. No focal deficits on motor/sensory exam at arms/legs.  PSYCHIATRIC: Normal insight and judgement. Cooperative with PE    Laboratory Data    Results from last 7 days   Lab Units 04/01/19  0518 03/31/19  2211   WBC 10*3/mm3 11.39* 11.35*   HEMOGLOBIN g/dL 12.2* 13.4   HEMATOCRIT % 38.3* 41.6   PLATELETS 10*3/mm3 228 264     Results from last 7 days   Lab Units 04/01/19  0518   SODIUM mmol/L 139   POTASSIUM mmol/L 3.8   CHLORIDE mmol/L 110*   CO2 mmol/L 23.0   BUN mg/dL 6*   CREATININE mg/dL 1.10   GLUCOSE mg/dL 109*   CALCIUM mg/dL 8.1*     Results from last 7 days   Lab Units 03/31/19  2211   ALK PHOS U/L 89   BILIRUBIN mg/dL 0.3   ALT (SGPT) U/L 31   AST (SGOT) U/L 25             Results from last 7 days   Lab Units 04/01/19  0111   LACTATE mmol/L 0.9             Estimated Creatinine Clearance: 68.8 mL/min (by C-G formula based on SCr of 1.1 mg/dL).      Microbiology:                                Radiology:  Imaging Results (last 72 hours)     Procedure Component Value Units Date/Time    XR Chest 1 View [[id number]] Collected:  03/31/19 2247     Updated:  03/31/19 2345    Narrative:       EXAM:    XR Chest, 1 View     EXAM DATE/TIME:    3/31/2019 10:47 PM     CLINICAL HISTORY:    63 years old, male; Signs and symptoms; Shortness of breath; Additional info:   SOA triage protocol     TECHNIQUE:    Imaging protocol: XR of the chest, 1 view.     COMPARISON:    CR XR CHEST PA AND LATERAL 11/20/2018 10:48 AM     FINDINGS:    Lungs:  Bibasilar linear atelectasis. No evidence of pneumonia, pulmonary   vascular congestion, or pulmonary edema.    Pleural space:  No pneumothorax. No sizable pleural effusion.    Heart/Mediastinum:  No cardiomegaly.    Bones/joints: Unremarkable.       Impression:       Bibasilar linear atelectasis. No evidence of pneumonia, pulmonary vascular   congestion, or pulmonary edema.     THIS DOCUMENT HAS BEEN ELECTRONICALLY SIGNED BY SARA KNUTSON MD             Impression:       PLAN/RECOMMENDATIONS:   Thank you for asking us to see Zackary Noonan II, I recommend the following:         HARMAN Nielsen  4/1/2019  9:30 AM

## 2019-04-02 VITALS
DIASTOLIC BLOOD PRESSURE: 77 MMHG | OXYGEN SATURATION: 93 % | WEIGHT: 160 LBS | TEMPERATURE: 98.5 F | BODY MASS INDEX: 25.11 KG/M2 | HEART RATE: 108 BPM | RESPIRATION RATE: 16 BRPM | SYSTOLIC BLOOD PRESSURE: 145 MMHG | HEIGHT: 67 IN

## 2019-04-02 PROBLEM — IMO0001 INFLUENZA, PNEUMONIA: Status: ACTIVE | Noted: 2019-04-02

## 2019-04-02 PROBLEM — R65.10 SIRS (SYSTEMIC INFLAMMATORY RESPONSE SYNDROME): Status: ACTIVE | Noted: 2019-04-02

## 2019-04-02 LAB
ANION GAP SERPL CALCULATED.3IONS-SCNC: 5 MMOL/L (ref 3–11)
BUN BLD-MCNC: 6 MG/DL (ref 9–23)
BUN/CREAT SERPL: 6.7 (ref 7–25)
CALCIUM SPEC-SCNC: 8.4 MG/DL (ref 8.7–10.4)
CHLORIDE SERPL-SCNC: 110 MMOL/L (ref 99–109)
CO2 SERPL-SCNC: 19 MMOL/L (ref 20–31)
CREAT BLD-MCNC: 0.9 MG/DL (ref 0.6–1.3)
DEPRECATED RDW RBC AUTO: 52.3 FL (ref 37–54)
ERYTHROCYTE [DISTWIDTH] IN BLOOD BY AUTOMATED COUNT: 14.8 % (ref 11.3–14.5)
GFR SERPL CREATININE-BSD FRML MDRD: 85 ML/MIN/1.73
GLUCOSE BLD-MCNC: 119 MG/DL (ref 70–100)
HCT VFR BLD AUTO: 40.5 % (ref 38.9–50.9)
HGB BLD-MCNC: 12.8 G/DL (ref 13.1–17.5)
INR PPP: 1.94 (ref 0.85–1.16)
MCH RBC QN AUTO: 30.3 PG (ref 27–31)
MCHC RBC AUTO-ENTMCNC: 31.6 G/DL (ref 32–36)
MCV RBC AUTO: 95.7 FL (ref 80–99)
PLATELET # BLD AUTO: 191 10*3/MM3 (ref 150–450)
PMV BLD AUTO: 8.9 FL (ref 6–12)
POTASSIUM BLD-SCNC: 3.7 MMOL/L (ref 3.5–5.5)
PROTHROMBIN TIME: 21.3 SECONDS (ref 11.2–14.3)
RBC # BLD AUTO: 4.23 10*6/MM3 (ref 4.2–5.76)
SODIUM BLD-SCNC: 134 MMOL/L (ref 132–146)
WBC NRBC COR # BLD: 9.08 10*3/MM3 (ref 3.5–10.8)

## 2019-04-02 PROCEDURE — G0378 HOSPITAL OBSERVATION PER HR: HCPCS

## 2019-04-02 PROCEDURE — 99217 PR OBSERVATION CARE DISCHARGE MANAGEMENT: CPT | Performed by: INTERNAL MEDICINE

## 2019-04-02 PROCEDURE — 94799 UNLISTED PULMONARY SVC/PX: CPT

## 2019-04-02 PROCEDURE — 85027 COMPLETE CBC AUTOMATED: CPT | Performed by: INTERNAL MEDICINE

## 2019-04-02 PROCEDURE — 96361 HYDRATE IV INFUSION ADD-ON: CPT

## 2019-04-02 PROCEDURE — 85610 PROTHROMBIN TIME: CPT

## 2019-04-02 PROCEDURE — 80048 BASIC METABOLIC PNL TOTAL CA: CPT | Performed by: INTERNAL MEDICINE

## 2019-04-02 RX ORDER — LOPERAMIDE HYDROCHLORIDE 2 MG/1
4 CAPSULE ORAL 4 TIMES DAILY PRN
Status: DISCONTINUED | OUTPATIENT
Start: 2019-04-02 | End: 2019-04-02 | Stop reason: HOSPADM

## 2019-04-02 RX ORDER — OSELTAMIVIR PHOSPHATE 75 MG/1
75 CAPSULE ORAL EVERY 12 HOURS SCHEDULED
Qty: 7 CAPSULE | Refills: 0 | Status: SHIPPED | OUTPATIENT
Start: 2019-04-02 | End: 2019-04-06

## 2019-04-02 RX ORDER — DIPHENOXYLATE HYDROCHLORIDE AND ATROPINE SULFATE 2.5; .025 MG/1; MG/1
2 TABLET ORAL
Status: DISCONTINUED | OUTPATIENT
Start: 2019-04-02 | End: 2019-04-02 | Stop reason: HOSPADM

## 2019-04-02 RX ADMIN — MULTIPLE VITAMINS W/ MINERALS TAB 1 TABLET: TAB ORAL at 08:42

## 2019-04-02 RX ADMIN — FLUTICASONE PROPIONATE 2 SPRAY: 50 SPRAY, METERED NASAL at 08:42

## 2019-04-02 RX ADMIN — Medication 400 UNITS: at 08:44

## 2019-04-02 RX ADMIN — DIPHENOXYLATE HYDROCHLORIDE AND ATROPINE SULFATE 2 TABLET: 2.5; .025 TABLET ORAL at 11:06

## 2019-04-02 RX ADMIN — ACETAMINOPHEN 650 MG: 325 TABLET, FILM COATED ORAL at 10:51

## 2019-04-02 RX ADMIN — OSELTAMIVIR PHOSPHATE 75 MG: 75 CAPSULE ORAL at 08:43

## 2019-04-02 RX ADMIN — IBUPROFEN 200 MG: 400 TABLET ORAL at 00:30

## 2019-04-02 RX ADMIN — BUDESONIDE 0.5 MG: 0.5 INHALANT RESPIRATORY (INHALATION) at 10:44

## 2019-04-02 RX ADMIN — LOPERAMIDE HYDROCHLORIDE 4 MG: 2 CAPSULE ORAL at 11:06

## 2019-04-02 RX ADMIN — LOSARTAN POTASSIUM 100 MG: 50 TABLET ORAL at 08:43

## 2019-04-02 RX ADMIN — ESCITALOPRAM OXALATE 10 MG: 10 TABLET ORAL at 08:41

## 2019-04-02 RX ADMIN — IPRATROPIUM BROMIDE 2 SPRAY: 42 SPRAY NASAL at 08:43

## 2019-04-02 RX ADMIN — SODIUM CHLORIDE, PRESERVATIVE FREE 3 ML: 5 INJECTION INTRAVENOUS at 08:41

## 2019-04-02 RX ADMIN — BUSPIRONE HYDROCHLORIDE 15 MG: 10 TABLET ORAL at 08:41

## 2019-04-02 RX ADMIN — SODIUM CHLORIDE 75 ML/HR: 9 INJECTION, SOLUTION INTRAVENOUS at 01:44

## 2019-04-02 RX ADMIN — Medication 250 MG: at 08:41

## 2019-04-02 NOTE — DISCHARGE SUMMARY
[facility]  DISCHARGE SUMMARY    Patient Name: Zackary Noonan II  : 1955  MRN: [id number]    Date of Admission: 3/31/2019  Date of Discharge:  2019  Primary Care Physician: Jillian Layne MD    Consults     Date and Time Order Name Status Description    2019 0305 Inpatient Infectious Diseases Consult Completed           Hospital Course     Presenting Problem:   Sepsis, due to unspecified organism (CMS/[facility]) [A41.9]    Active Hospital Problems    Diagnosis  POA   • **Sepsis (CMS/[facility]) [A41.9]  Yes   • Leukocytosis [D72.829]  Yes   • Crohn's disease (CMS/[facility]) [K50.90]  Yes   • History of C. difficile colitis s/p fecal transplant (2017) [Z86.19]  Not Applicable   • S/P total abdominal colectomy [Z90.49]  Not Applicable   • COPD (chronic obstructive pulmonary disease) (CMS/[facility]) [J44.9]  Yes   • History of DVT (deep vein thrombosis) [Z86.718]  Not Applicable   • HTN (hypertension) [I10]  Yes      Resolved Hospital Problems   No resolved problems to display.          Hospital Course:  Zackary Noonan II is a 63 y.o. male  with past medical history reportedly of ulcerative colitis with previous ostomy presents to the hospital with high fevers and increased respiratory symptoms including cough and general weakness and was found to have sepsis present on admission with severe respiratory symptoms. Chest x-ray was not definitive.    Initial rapid emergency room flu was negative and CT scan showed pneumonia.  Patient was placed on broad-spectrum antibiotics and influenza PCR ultimately did return positive for influenza A felt to be the etiology of pneumonia.  Infectious disease who follows with patient outpatient due to immunosuppression history evaluated inpatient and recommended stopping all antibiotics and transitioning to Tamiflu only.  Patient has had intermittent fevers but overall has improved quickly on this medication.  Infectious disease has cleared him  for discharge home today and patient will complete his Tamiflu at home.  Patient agrees to return the hospital if he has any worsening respiratory symptoms or other concerning symptoms.  Infectious disease recommended he restart his home prophylactic 3 days a week Bactrim which he takes for prevention of infections.    At the time of discharge patient was told to take all medications as prescribed, keep all follow-up appointments, and call their doctor or return to the hospital with any worsening or concerning symptoms.    Please note that dragon voice recognition software was used to create this note and that transcription errors are possible.      Discharge Follow Up Recommendations for labs/diagnostics:  Patient agrees to follow-up with his primary care Dr. Layne within 1 week with INR check.    Day of Discharge     HPI:   Patient says he is doing very well today.  He is very eager to be discharged home and feels he will rest much better at home.  He denies any concerning respiratory symptoms and notes that his fevers are much improved.    Review of Systems  No current fevers or chills  No current shortness of breath or cough  No current nausea, vomiting, or diarrhea  No current chest pain or palpitations    Vital Signs:   Temp:  [98.3 °F (36.8 °C)-103.1 °F (39.5 °C)] 98.5 °F (36.9 °C)  Heart Rate:  [] 108  Resp:  [16-22] 16  BP: ()/(59-77) 145/77     Physical Exam:  Constitutional:Awake, alert  HENT: NCAT, mucous membranes moist, neck supple  Respiratory: Cough noted bilaterally with diffuse rhonchi, breathing is nonlabored and much improved since yesterday  Cardiovascular: RRR, S1, S2, normal radial pulses  Gastrointestinal: Positive bowel sounds, soft, nontender, nondistended  Musculoskeletal: Normal musculature for age, no lower extremity edema, BMI 25  Psychiatric: Appropriate affect, cooperative, conversational  Neurologic: No slurred speech or facial droop, follows commands, not confused    Skin: No rashes or jaundice, warm      Pertinent  and/or Most Recent Results     Results from last 7 days   Lab Units 04/01/19  0518 03/31/19 2211   WBC 10*3/mm3 11.39* 11.35*   HEMOGLOBIN g/dL 12.2* 13.4   HEMATOCRIT % 38.3* 41.6   PLATELETS 10*3/mm3 228 264   SODIUM mmol/L 139 139   POTASSIUM mmol/L 3.8 3.8   CHLORIDE mmol/L 110* 104   CO2 mmol/L 23.0 26.0   BUN mg/dL 6* 9   CREATININE mg/dL 1.10 1.11   GLUCOSE mg/dL 109* 128*   CALCIUM mg/dL 8.1* 9.2     Results from last 7 days   Lab Units 04/02/19  0634 04/01/19  0518 03/31/19 2211   BILIRUBIN mg/dL  --   --  0.3   ALK PHOS U/L  --   --  89   ALT (SGPT) U/L  --   --  31   AST (SGOT) U/L  --   --  25   PROTIME Seconds 21.3* 21.2* 20.7*   INR  1.94* 1.93* 1.86*           Invalid input(s): TG, LDLCALC, LDLREALC  Results from last 7 days   Lab Units 03/31/19 2222 03/31/19 2211   BNP pg/mL  --  24.0   TROPONIN I ng/mL 0.00  --        Brief Urine Lab Results  (Last result in the past 365 days)      Color   Clarity   Blood   Leuk Est   Nitrite   Protein   CREAT   Urine HCG        03/31/19 2311 Yellow Clear Negative Negative Negative Negative               Microbiology Results Abnormal     Procedure Component Value - Date/Time    Blood Culture - Blood, Arm, Left [[id number]] Collected:  03/31/19 2211    Lab Status:  Preliminary result Specimen:  Blood from Arm, Left Updated:  04/02/19 0120     Blood Culture No growth at 24 hours    Blood Culture - Blood, Arm, Left [[id number]] Collected:  03/31/19 2211    Lab Status:  Preliminary result Specimen:  Blood from Arm, Left Updated:  04/02/19 0120     Blood Culture No growth at 24 hours    Influenza A & B, RT PCR - Swab, Nasopharynx [[id number]]  (Abnormal) Collected:  04/01/19 1056    Lab Status:  Final result Specimen:  Swab from Nasopharynx Updated:  04/01/19 1324     Influenza A PCR Detected     Influenza B PCR Not Detected    Influenza Antigen, Rapid - Swab, Nasopharynx [[id number]]  (Normal) Collected:  03/31/19 9149     Lab Status:  Final result Specimen:  Swab from Nasopharynx Updated:  03/31/19 2303     Influenza A Ag, EIA Negative     Influenza B Ag, EIA Negative          Imaging Results (all)     Procedure Component Value Units Date/Time    CT Chest Without Contrast [[id number]] Collected:  04/01/19 1241     Updated:  04/01/19 1407    Narrative:       EXAMINATION: CT CHEST WO CONTRAST-, CT ABDOMEN PELVIS WO CONTRAST-  04/01/2019      INDICATION: Sepsis     TECHNIQUE:  Axial CT data of the chest, abdomen and pelvis were obtained  helically without IV contrast.  Multiplanar reformatted images were  generated and reviewed. The radiation dose reduction device was turned  on for each scan per the ALARA (As Low as Reasonably Achievable)  protocol     COMPARISONS:  09/15/2017 CT abdomen/pelvis, 11/06/2016 CT chest     FINDINGS:     CHEST: Fibrosis and bronchiectasis is noted in the lower lobes  bilaterally with associated volume loss. Any airspace in the collapsed  portion of the lower lobes would be difficult to exclude. There is no  sizable pleural effusion. There is architectural distortion in the  lingula, long-standing and unchanged. No pneumothorax.     Patent central airways without endobronchial lesion or debris. The heart  is enlarged. There is three-vessel coronary disease. No pericardial  effusion, hiatal hernia or esophageal wall thickening. There is  borderline right paratracheal and subcarinal adenopathy.     Thoracic vertebral body heights are normal. Chest wall soft tissues are  unremarkable.     ABDOMEN/PELVIS: 5 cm hypodense mass in the right lobe of the liver has  been characterized previously as hemangioma. There is cholelithiasis  without evidence of acute cholecystitis. Symmetric bilateral perinephric  stranding. Small cyst in the inferior aspect of the left kidney. No  hydronephrosis or urinary calculus. The pancreas, adrenal glands and  spleen are grossly unremarkable. Prostate is not enlarged and  the  seminal vesicles are symmetrical.     Evidence of prior subtotal colectomy with a long rectosigmoid stump.  There is an end ileostomy in the right lower quadrant. There is no  evidence of bowel obstruction. There is no free fluid, loculated fluid  collection or free air. No enlarged lymph node.     Body wall soft tissues show a small fat-containing incisional ventral  hernia. No aggressive bone lesion is seen. There is a prominent  unchanged fat-containing left inguinal hernia.       Impression:       1. Opacities with bronchiectasis and volume loss in the lower lobes  bilaterally. At least some of this represents fibrosis and/or  atelectasis. Airspace disease in the collapsed/fibrotic lung would be  difficult to exclude.  2. Subtotal colectomy with right lower quadrant end ileostomy. No  abscess.  3. Cholelithiasis. Benign hepatic hemangioma.     D:  04/01/2019  E:  04/01/2019     This report was finalized on 4/1/2019 2:04 PM by Bebo Le.       CT Abdomen Pelvis Without Contrast [[id number]] Collected:  04/01/19 1241     Updated:  04/01/19 1407    Narrative:       EXAMINATION: CT CHEST WO CONTRAST-, CT ABDOMEN PELVIS WO CONTRAST-  04/01/2019      INDICATION: Sepsis     TECHNIQUE:  Axial CT data of the chest, abdomen and pelvis were obtained  helically without IV contrast.  Multiplanar reformatted images were  generated and reviewed. The radiation dose reduction device was turned  on for each scan per the ALARA (As Low as Reasonably Achievable)  protocol     COMPARISONS:  09/15/2017 CT abdomen/pelvis, 11/06/2016 CT chest     FINDINGS:     CHEST: Fibrosis and bronchiectasis is noted in the lower lobes  bilaterally with associated volume loss. Any airspace in the collapsed  portion of the lower lobes would be difficult to exclude. There is no  sizable pleural effusion. There is architectural distortion in the  lingula, long-standing and unchanged. No pneumothorax.     Patent central airways without  endobronchial lesion or debris. The heart  is enlarged. There is three-vessel coronary disease. No pericardial  effusion, hiatal hernia or esophageal wall thickening. There is  borderline right paratracheal and subcarinal adenopathy.     Thoracic vertebral body heights are normal. Chest wall soft tissues are  unremarkable.     ABDOMEN/PELVIS: 5 cm hypodense mass in the right lobe of the liver has  been characterized previously as hemangioma. There is cholelithiasis  without evidence of acute cholecystitis. Symmetric bilateral perinephric  stranding. Small cyst in the inferior aspect of the left kidney. No  hydronephrosis or urinary calculus. The pancreas, adrenal glands and  spleen are grossly unremarkable. Prostate is not enlarged and the  seminal vesicles are symmetrical.     Evidence of prior subtotal colectomy with a long rectosigmoid stump.  There is an end ileostomy in the right lower quadrant. There is no  evidence of bowel obstruction. There is no free fluid, loculated fluid  collection or free air. No enlarged lymph node.     Body wall soft tissues show a small fat-containing incisional ventral  hernia. No aggressive bone lesion is seen. There is a prominent  unchanged fat-containing left inguinal hernia.       Impression:       1. Opacities with bronchiectasis and volume loss in the lower lobes  bilaterally. At least some of this represents fibrosis and/or  atelectasis. Airspace disease in the collapsed/fibrotic lung would be  difficult to exclude.  2. Subtotal colectomy with right lower quadrant end ileostomy. No  abscess.  3. Cholelithiasis. Benign hepatic hemangioma.     D:  04/01/2019  E:  04/01/2019     This report was finalized on 4/1/2019 2:04 PM by Bebo Le.       XR Chest 1 View [[id number]] Collected:  03/31/19 2247     Updated:  03/31/19 2345    Narrative:       EXAM:    XR Chest, 1 View     EXAM DATE/TIME:    3/31/2019 10:47 PM     CLINICAL HISTORY:    63 years old, male; Signs and symptoms;  Shortness of breath; Additional info:   SOA triage protocol     TECHNIQUE:    Imaging protocol: XR of the chest, 1 view.     COMPARISON:    CR XR CHEST PA AND LATERAL 11/20/2018 10:48 AM     FINDINGS:    Lungs:  Bibasilar linear atelectasis. No evidence of pneumonia, pulmonary   vascular congestion, or pulmonary edema.    Pleural space:  No pneumothorax. No sizable pleural effusion.    Heart/Mediastinum:  No cardiomegaly.    Bones/joints: Unremarkable.       Impression:       Bibasilar linear atelectasis. No evidence of pneumonia, pulmonary vascular   congestion, or pulmonary edema.     THIS DOCUMENT HAS BEEN ELECTRONICALLY SIGNED BY SARA KNUTSON MD                          Order Current Status    Blood Culture - Blood, Arm, Left Preliminary result    Blood Culture - Blood, Arm, Left Preliminary result        Discharge Details        Discharge Medications      New Medications      Instructions Start Date   oseltamivir 75 MG capsule  Commonly known as:  TAMIFLU   75 mg, Oral, Every 12 Hours Scheduled         Continue These Medications      Instructions Start Date   acetaminophen 325 MG tablet  Commonly known as:  TYLENOL   650 mg, Oral, Every 6 Hours PRN      albuterol sulfate  (90 Base) MCG/ACT inhaler  Commonly known as:  PROVENTIL HFA;VENTOLIN HFA;PROAIR HFA   2 puffs, Inhalation, Every 4 Hours PRN      albuterol (2.5 MG/3ML) 0.083% nebulizer solution  Commonly known as:  PROVENTIL   2.5 mg, Nebulization, Every 6 Hours PRN      amLODIPine 5 MG tablet  Commonly known as:  NORVASC   5 mg, Oral, Nightly      beclomethasone 80 MCG/ACT inhaler  Commonly known as:  QVAR   1 puff, Inhalation, 2 Times Daily - RT      busPIRone 15 MG tablet  Commonly known as:  BUSPAR   15 mg, Oral, 3 Times Daily      diphenoxylate-atropine 2.5-0.025 MG per tablet  Commonly known as:  LOMOTIL   2 tablets, Oral, Every 6 Hours      enoxaparin 80 MG/0.8ML solution syringe  Commonly known as:  LOVENOX   75 mg, Subcutaneous, Every 12 Hours  "Scheduled      escitalopram 10 MG tablet  Commonly known as:  LEXAPRO   10 mg, Oral, Daily      famotidine-calcium carb-mag hydroxide -165 MG chewable tablet  Commonly known as:  PEPCID COMPLETE   1 tablet, Oral, Daily PRN      FLORASTOR 250 MG capsule  Generic drug:  saccharomyces boulardii   250 mg, Oral, Daily      fluticasone 50 MCG/ACT nasal spray  Commonly known as:  FLONASE   2 sprays, Nasal, 2 Times Daily      folic acid 1 MG tablet  Commonly known as:  FOLVITE   1 mg, Oral, Daily      guaiFENesin 600 MG 12 hr tablet  Commonly known as:  MUCINEX   800 mg, Oral, 2 Times Daily      ipratropium 0.06 % nasal spray  Commonly known as:  ATROVENT   2 sprays, Nasal, 4 Times Daily      levocetirizine 5 MG tablet  Commonly known as:  XYZAL   5 mg, Oral, Every Evening      loperamide 2 MG capsule  Commonly known as:  IMODIUM   2 mg, Oral, Every 4 Hours      losartan 50 MG tablet  Commonly known as:  COZAAR   100 mg, Oral, Daily      montelukast 10 MG tablet  Commonly known as:  SINGULAIR   10 mg, Oral, Nightly      SENIOR MULTIVITAMIN PLUS PO   1 tablet/day, Oral      sulfamethoxazole-trimethoprim 800-160 MG per tablet  Commonly known as:  BACTRIM DS,SEPTRA DS   1 tablet, Oral, 3 Times Weekly      valACYclovir 500 MG tablet  Commonly known as:  VALTREX   500 mg, Oral, Daily PRN      VITAMIN B 12 PO   1 tablet, Oral, Daily      VITAMIN B-6 PO   100 mcg, Oral      vitamin E 400 UNIT capsule   400 Units, Oral, Daily      warfarin 5 MG tablet  Commonly known as:  COUMADIN   5 mg, Oral, Daily Warfarin             Allergies   Allergen Reactions   • Beta Adrenergic Blockers Other (See Comments)     Vocal changes   • Levaquin [Levofloxacin In D5w] Other (See Comments)     Heaviness in legs, \"felt like lead\"         Discharge Disposition:  Home or Self Care    Discharge Diet:  Diet Order   Procedures   • Diet Regular; Cardiac         Discharge Activity:   Activity Instructions     Activity as Tolerated              CODE " STATUS:    Code Status and Medical Interventions:   Ordered at: 04/01/19 0141     Code Status:    CPR     Medical Interventions (Level of Support Prior to Arrest):    Full         Additional Instructions for the Follow-ups that You Need to Schedule     Discharge Follow-up with PCP   As directed       Currently Documented PCP:    Jillian Layne MD    PCP Phone Number:    [phone]     Follow Up Details:  1 week with INR check         Discharge Follow-up with Specified Provider: Please keep any other previously recommended or scheduled physician appointments   As directed      To:  Please keep any other previously recommended or scheduled physician appointments               Time Spent on Discharge:  40 minutes    Electronically signed by Tyrell Alcala MD, 04/02/19, 8:59 AM.

## 2019-04-02 NOTE — PLAN OF CARE
Problem: Health Knowledge, Opportunity to Enhance (Adult,Obstetrics,Pediatric)  Goal: Knowledgeable about Health Subject/Topic  Outcome: Ongoing (interventions implemented as appropriate)      Problem: Patient Care Overview  Goal: Plan of Care Review  Outcome: Ongoing (interventions implemented as appropriate)   04/02/19 0450   Coping/Psychosocial   Plan of Care Reviewed With patient   Plan of Care Review   Progress no change   OTHER   Outcome Summary Tempature fluctuated this shift. High of 103.1, prn tylenol and motrin given. Afibrile at this time with a tempature of 98.8. tachycardic through early shift, now normal sinus rhythm. denies pain. one complaint of nausea releived with prn zofran. Will continue to monitor.        Problem: Infection, Risk/Actual (Adult)  Goal: Infection Prevention/Resolution  Outcome: Ongoing (interventions implemented as appropriate)

## 2019-04-02 NOTE — PROGRESS NOTES
Discharge Planning Assessment  [facility]     Patient Name: Zackary Noonan II  MRN: [id number]  Today's Date: 4/2/2019    Admit Date: 3/31/2019    Discharge Needs Assessment     Row Name 04/02/19 0919       Living Environment    Lives With  spouse    Name(s) of Who Lives With Patient  Ronda Noonan    Current Living Arrangements  home/apartment/condo    Primary Care Provided by  self    Provides Primary Care For  no one    Family Caregiver if Needed  spouse    Living Arrangement Comments  oRnda Noonan       Resource/Environmental Concerns    Resource/Environmental Concerns  none       Transition Planning    Patient/Family Anticipates Transition to  home    Patient/Family Anticipated Services at Transition  none    Transportation Anticipated  family or friend will provide       Discharge Needs Assessment    Readmission Within the Last 30 Days  no previous admission in last 30 days    Concerns to be Addressed  no discharge needs identified;denies needs/concerns at this time    Equipment Currently Used at Home  nebulizer    Anticipated Changes Related to Illness  none    Equipment Needed After Discharge  none    Offered/Gave Vendor List  no    Discharge Coordination/Progress  Home        Discharge Plan     Row Name 04/02/19 0920       Plan    Plan  Home    Patient/Family in Agreement with Plan  yes    Plan Comments  Spoke with patient at bedside regarding discharge planning.  Patient used HH many years ago for biologic infusions at home and has a Nebulizer that he purchased at [employer].  Patient reports that he has prescription coverage.  Patient lives with his wife in a multilevel house with 4 steps to access and denies concenrs regarding home safety.   No discharge needs verbalized.  CM following.  Patient plan is to discharge home via car with family to transport when medically ready.     Final Discharge Disposition Code  01 - home or self-care        Destination      No service coordination in this  encounter.      Durable Medical Equipment      No service coordination in this encounter.      Dialysis/Infusion      No service coordination in this encounter.      Home Medical Care      No service coordination in this encounter.      Therapy      No service coordination in this encounter.      Community Resources      No service coordination in this encounter.        Expected Discharge Date and Time     Expected Discharge Date Expected Discharge Time    Apr 2, 2019         Demographic Summary     Row Name 04/02/19 0918       General Information    Admission Type  inpatient    Arrived From  home    Referral Source  admission list    Reason for Consult  discharge planning    Preferred Language  English     Used During This Interaction  no    General Information Comments  Jillian Layne MD       Contact Information    Permission Granted to Share Info With      Contact Information Obtained for      Contact Information Comments  Ronda Noonan, spouse  [phone]        Functional Status     Row Name 04/02/19 0919       Functional Status    Usual Activity Tolerance  good    Current Activity Tolerance  good       Functional Status, IADL    Medications  independent    Meal Preparation  independent    Housekeeping  independent    Laundry  independent    Shopping  independent       Employment/    Employment/ Comments  [employer]        Psychosocial    No documentation.       Abuse/Neglect    No documentation.       Legal    No documentation.       Substance Abuse    No documentation.       Patient Forms    No documentation.           Karoline Nair RN

## 2019-04-02 NOTE — PROGRESS NOTES
"[facility] Progress Note    Admission Date: 3/31/2019    Zackary Noonan II  1955  [id number]    Date: 2019    Meds:    Anti-Infectives (From admission, onward)    Ordered     Dose/Rate Route Frequency Start Stop    19 0858  oseltamivir (TAMIFLU) 75 MG capsule     Ordering Provider:  Tyrell Alcala MD    75 mg Oral Every 12 Hours Scheduled 19 0000 19 2359    19 0043  piperacillin-tazobactam (ZOSYN) 3.375 g in iso-osmotic dextrose 50 ml (premix)     Ordering Provider:  Eduardo Garcia DO    3.375 g  over 30 Minutes Intravenous Once 19 0045 19 0141    19 0043  vancomycin 1750 mg/500 mL 0.9% NS IVPB ([facility])     Ordering Provider:  Eduardo Garcia DO    25 mg/kg × 72.6 kg  over 105 Minutes Intravenous Once 19 0045 19 0628          CC: Fevers      SUBJECTIVE: 19:  Patient is a 63 y.o. male seen today for sepsis. Yesterday afternoon he began experiencing chills, and temperatures up to 101 degrees. Admitting labs with a leukocytosis of 11,000,. Normal PCT, and lactate. Urinalysis negative, Chest xray with bibasilar atelectasis. His tmax has been 102.6 degrees.  He is currently on Vancomycin and Zosyn and we were consulted for evaluation and treatment.  He denies increasing sputum production, abdominal pain, increased ostomy output, dysuria.  He has a history of recurrent pneumonia on prophylactic Bactrim.  He has a mild nonproductive cough.  19: He is feeling much better this am. \"just a little congested\" his fevers are substantially improved.         PE:   Vital Signs  Temp (24hrs), Av.6 °F (38.1 °C), Min:98.3 °F (36.8 °C), Max:103.1 °F (39.5 °C)    Temp  Min: 98.3 °F (36.8 °C)  Max: 103.1 °F (39.5 °C)  BP  Min: 130/73  Max: 145/77  Pulse  Min: 87  Max: 111  Resp  Min: 16  Max: 20  SpO2  Min: 93 %  Max: 93 %    GENERAL: Awake and alert, in no acute distress.   HEENT:  No conjunctival injection. No icterus. Oropharynx clear without evidence of thrush or " exudate.  NECK: Supple, no JVD     HEART: RRR; No murmur, rubs, gallops.   LUNGS: few anterior rhonchi   ABDOMEN: Soft, nontender, nondistended. Positive bowel sounds. No rebound or guarding. NO mass or HSM.  EXT:  No cyanosis, clubbing or edema. No cord.  : Genitalia generally unremarkable.  Without Morley catheter.  SKIN: Warm and dry without cutaneous eruptions on Inspection/palpation.    NEURO: Alert and oriented x 3, Motor 5/5 bilaterally    Laboratory Data    Results from last 7 days   Lab Units 04/02/19  0918 04/01/19  0518 03/31/19  2211   WBC 10*3/mm3 9.08 11.39* 11.35*   HEMOGLOBIN g/dL 12.8* 12.2* 13.4   HEMATOCRIT % 40.5 38.3* 41.6   PLATELETS 10*3/mm3 191 228 264     Results from last 7 days   Lab Units 04/02/19  0918   SODIUM mmol/L 134   POTASSIUM mmol/L 3.7   CHLORIDE mmol/L 110*   CO2 mmol/L 19.0*   BUN mg/dL 6*   CREATININE mg/dL 0.90   GLUCOSE mg/dL 119*   CALCIUM mg/dL 8.4*     Results from last 7 days   Lab Units 03/31/19  2211   ALK PHOS U/L 89   BILIRUBIN mg/dL 0.3   ALT (SGPT) U/L 31   AST (SGOT) U/L 25             Results from last 7 days   Lab Units 04/01/19  0111   LACTATE mmol/L 0.9             Estimated Creatinine Clearance: 84 mL/min (by C-G formula based on SCr of 0.9 mg/dL).    Microbiology:  Blood Culture   Date Value Ref Range Status   03/31/2019 No growth at 24 hours  Preliminary   03/31/2019 No growth at 24 hours  Preliminary                            Radiology:  Imaging Results (last 72 hours)     Procedure Component Value Units Date/Time    CT Chest Without Contrast [[id number]] Collected:  04/01/19 1241     Updated:  04/01/19 1407    Narrative:       EXAMINATION: CT CHEST WO CONTRAST-, CT ABDOMEN PELVIS WO CONTRAST-  04/01/2019      INDICATION: Sepsis     TECHNIQUE:  Axial CT data of the chest, abdomen and pelvis were obtained  helically without IV contrast.  Multiplanar reformatted images were  generated and reviewed. The radiation dose reduction device was turned  on for  each scan per the ALARA (As Low as Reasonably Achievable)  protocol     COMPARISONS:  09/15/2017 CT abdomen/pelvis, 11/06/2016 CT chest     FINDINGS:     CHEST: Fibrosis and bronchiectasis is noted in the lower lobes  bilaterally with associated volume loss. Any airspace in the collapsed  portion of the lower lobes would be difficult to exclude. There is no  sizable pleural effusion. There is architectural distortion in the  lingula, long-standing and unchanged. No pneumothorax.     Patent central airways without endobronchial lesion or debris. The heart  is enlarged. There is three-vessel coronary disease. No pericardial  effusion, hiatal hernia or esophageal wall thickening. There is  borderline right paratracheal and subcarinal adenopathy.     Thoracic vertebral body heights are normal. Chest wall soft tissues are  unremarkable.     ABDOMEN/PELVIS: 5 cm hypodense mass in the right lobe of the liver has  been characterized previously as hemangioma. There is cholelithiasis  without evidence of acute cholecystitis. Symmetric bilateral perinephric  stranding. Small cyst in the inferior aspect of the left kidney. No  hydronephrosis or urinary calculus. The pancreas, adrenal glands and  spleen are grossly unremarkable. Prostate is not enlarged and the  seminal vesicles are symmetrical.     Evidence of prior subtotal colectomy with a long rectosigmoid stump.  There is an end ileostomy in the right lower quadrant. There is no  evidence of bowel obstruction. There is no free fluid, loculated fluid  collection or free air. No enlarged lymph node.     Body wall soft tissues show a small fat-containing incisional ventral  hernia. No aggressive bone lesion is seen. There is a prominent  unchanged fat-containing left inguinal hernia.       Impression:       1. Opacities with bronchiectasis and volume loss in the lower lobes  bilaterally. At least some of this represents fibrosis and/or  atelectasis. Airspace disease in the  collapsed/fibrotic lung would be  difficult to exclude.  2. Subtotal colectomy with right lower quadrant end ileostomy. No  abscess.  3. Cholelithiasis. Benign hepatic hemangioma.     D:  04/01/2019  E:  04/01/2019     This report was finalized on 4/1/2019 2:04 PM by Bebo Le.       CT Abdomen Pelvis Without Contrast [[id number]] Collected:  04/01/19 1241     Updated:  04/01/19 1407    Narrative:       EXAMINATION: CT CHEST WO CONTRAST-, CT ABDOMEN PELVIS WO CONTRAST-  04/01/2019      INDICATION: Sepsis     TECHNIQUE:  Axial CT data of the chest, abdomen and pelvis were obtained  helically without IV contrast.  Multiplanar reformatted images were  generated and reviewed. The radiation dose reduction device was turned  on for each scan per the ALARA (As Low as Reasonably Achievable)  protocol     COMPARISONS:  09/15/2017 CT abdomen/pelvis, 11/06/2016 CT chest     FINDINGS:     CHEST: Fibrosis and bronchiectasis is noted in the lower lobes  bilaterally with associated volume loss. Any airspace in the collapsed  portion of the lower lobes would be difficult to exclude. There is no  sizable pleural effusion. There is architectural distortion in the  lingula, long-standing and unchanged. No pneumothorax.     Patent central airways without endobronchial lesion or debris. The heart  is enlarged. There is three-vessel coronary disease. No pericardial  effusion, hiatal hernia or esophageal wall thickening. There is  borderline right paratracheal and subcarinal adenopathy.     Thoracic vertebral body heights are normal. Chest wall soft tissues are  unremarkable.     ABDOMEN/PELVIS: 5 cm hypodense mass in the right lobe of the liver has  been characterized previously as hemangioma. There is cholelithiasis  without evidence of acute cholecystitis. Symmetric bilateral perinephric  stranding. Small cyst in the inferior aspect of the left kidney. No  hydronephrosis or urinary calculus. The pancreas, adrenal glands and  spleen  are grossly unremarkable. Prostate is not enlarged and the  seminal vesicles are symmetrical.     Evidence of prior subtotal colectomy with a long rectosigmoid stump.  There is an end ileostomy in the right lower quadrant. There is no  evidence of bowel obstruction. There is no free fluid, loculated fluid  collection or free air. No enlarged lymph node.     Body wall soft tissues show a small fat-containing incisional ventral  hernia. No aggressive bone lesion is seen. There is a prominent  unchanged fat-containing left inguinal hernia.       Impression:       1. Opacities with bronchiectasis and volume loss in the lower lobes  bilaterally. At least some of this represents fibrosis and/or  atelectasis. Airspace disease in the collapsed/fibrotic lung would be  difficult to exclude.  2. Subtotal colectomy with right lower quadrant end ileostomy. No  abscess.  3. Cholelithiasis. Benign hepatic hemangioma.     D:  04/01/2019  E:  04/01/2019     This report was finalized on 4/1/2019 2:04 PM by Bebo Le.       XR Chest 1 View [[id number]] Collected:  03/31/19 2247     Updated:  03/31/19 2345    Narrative:       EXAM:    XR Chest, 1 View     EXAM DATE/TIME:    3/31/2019 10:47 PM     CLINICAL HISTORY:    63 years old, male; Signs and symptoms; Shortness of breath; Additional info:   SOA triage protocol     TECHNIQUE:    Imaging protocol: XR of the chest, 1 view.     COMPARISON:    CR XR CHEST PA AND LATERAL 11/20/2018 10:48 AM     FINDINGS:    Lungs:  Bibasilar linear atelectasis. No evidence of pneumonia, pulmonary   vascular congestion, or pulmonary edema.    Pleural space:  No pneumothorax. No sizable pleural effusion.    Heart/Mediastinum:  No cardiomegaly.    Bones/joints: Unremarkable.       Impression:       Bibasilar linear atelectasis. No evidence of pneumonia, pulmonary vascular   congestion, or pulmonary edema.     THIS DOCUMENT HAS BEEN ELECTRONICALLY SIGNED BY SARA KNUTSON MD      Influenza A PCR positive     I  personally read the radiographic studies     IMPRESSION:  1.  Influenza A- clinically improving on Tamiflu.  He will be able to discharge today on Tamiflu.  2.  Leukocytosis/neutrophilia  3.  History of recurrent pneumonia-on Bactrim suppression  4.  History of C. difficile colitis-status post fecal transplantation, 2017  5.  History of Crohn's disease  6.  History of DVT-on Coumadin therapy    RECOMMENDATIONS:  1.  Continue Tamiflu 75 mg p.o. twice daily times 5 days total  2.  Discharge to home today  3.  Continue Bactrim suppression    I discussed his situation with Dr. Alcala today.    Dr. Cullen has obtained the history, performed the physical exam and formulated the above treatment plan.     Akash Cullen MD  4/2/2019  4:51 PM

## 2019-04-02 NOTE — PLAN OF CARE
Problem: Patient Care Overview  Goal: Plan of Care Review  Outcome: Ongoing (interventions implemented as appropriate)   04/02/19 1000   Coping/Psychosocial   Plan of Care Reviewed With patient;spouse   Plan of Care Review   Progress improving   OTHER   Outcome Summary [facility] nurse f/u for established ileostomy. Appliance changed per pt with assistance from WO. Pt has areas of pyoderma at 3:00 and 9:00; superglue applied to those areas per pt; Coloplast Sensura Hendley convex one-piece appliance used with Rufina paste around stoma. Stoma output watery dark brown/green. Pt to be discharged home today. He has supplies at home; needs no further assistance from [facility] nurse at this time.

## 2019-04-02 NOTE — DISCHARGE INSTR - APPOINTMENTS
You have a follow up appointment to see Dr. Layne (PCP) on Monday April 8, 2019 @ 10:00.  If you have any questions or concerns please call [phone].    Dr. Jillian Layne MD  [address] [place] [address]  [phone]

## 2019-04-02 NOTE — PROGRESS NOTES
Case Management Discharge Note    Final Note: Patient to discharge today.  No discharge needs verbalized.  Patient plan is to discharge home today via car with family to transport.      Destination      No service has been selected for the patient.      Durable Medical Equipment      No service has been selected for the patient.      Dialysis/Infusion      No service has been selected for the patient.      Home Medical Care      No service has been selected for the patient.      Therapy      No service has been selected for the patient.      Community Resources      No service has been selected for the patient.             Final Discharge Disposition Code: 01 - home or self-care

## 2019-04-03 ENCOUNTER — READMISSION MANAGEMENT (OUTPATIENT)
Dept: CALL CENTER | Facility: HOSPITAL | Age: 64
End: 2019-04-03

## 2019-04-03 NOTE — OUTREACH NOTE
Prep Survey      Responses   Facility patient discharged from?  [facility]   Is patient eligible?  Yes   Discharge diagnosis  Sepsis   Does the patient have one of the following disease processes/diagnoses(primary or secondary)?  Sepsis   Does the patient have Home health ordered?  No   Is there a DME ordered?  No   Prep survey completed?  Yes          Vianey Haque RN

## 2019-04-03 NOTE — PLAN OF CARE
Problem: Health Knowledge, Opportunity to Enhance (Adult,Obstetrics,Pediatric)  Goal: Identify Related Risk Factors and Signs and Symptoms  Outcome: Outcome(s) achieved Date Met: 04/02/19    Goal: Knowledgeable about Health Subject/Topic  Outcome: Outcome(s) achieved Date Met: 04/02/19      Problem: Patient Care Overview  Goal: Individualization and Mutuality  Outcome: Outcome(s) achieved Date Met: 04/02/19    Goal: Discharge Needs Assessment  Outcome: Outcome(s) achieved Date Met: 04/02/19    Goal: Interprofessional Rounds/Family Conf  Outcome: Outcome(s) achieved Date Met: 04/02/19      Problem: Infection, Risk/Actual (Adult)  Goal: Identify Related Risk Factors and Signs and Symptoms  Outcome: Outcome(s) achieved Date Met: 04/02/19

## 2019-04-04 ENCOUNTER — READMISSION MANAGEMENT (OUTPATIENT)
Dept: CALL CENTER | Facility: HOSPITAL | Age: 64
End: 2019-04-04

## 2019-04-04 NOTE — OUTREACH NOTE
Sepsis Week 1 Survey      Responses   Facility patient discharged from?  [facility]   Does the patient have one of the following disease processes/diagnoses(primary or secondary)?  Sepsis   Is there a successful TCM telephone encounter documented?  No   Week 1 attempt successful?  No   Unsuccessful attempts  Attempt 1          Lisa Aagrwal RN

## 2019-04-05 ENCOUNTER — READMISSION MANAGEMENT (OUTPATIENT)
Dept: CALL CENTER | Facility: HOSPITAL | Age: 64
End: 2019-04-05

## 2019-04-05 NOTE — OUTREACH NOTE
Sepsis Week 1 Survey      Responses   Facility patient discharged from?  [facility]   Does the patient have one of the following disease processes/diagnoses(primary or secondary)?  Sepsis   Is there a successful TCM telephone encounter documented?  No   Week 1 attempt successful?  Yes   Call start time  1616   Call end time  1620   Discharge diagnosis  Sepsis   Meds reviewed with patient/caregiver?  Yes   Is the patient having any side effects they believe may be caused by any medication additions or changes?  No   Does the patient have all medications related to this admission filled (includes all antibiotics, inhalers, nebulizers,steroids,etc.)  Yes   Is the patient taking all medications as directed (includes completed medication regime)?  Yes   Does the patient have a primary care provider?   Yes   Does the patient have an appointment with their PCP within 7 days of discharge?  Yes   Has the patient kept scheduled appointments due by today?  N/A   Has home health visited the patient within 72 hours of discharge?  N/A   Psychosocial issues?  No   Did the patient receive a copy of their discharge instructions?  Yes   Nursing interventions  Reviewed instructions with patient   What is the patient's perception of their health status since discharge?  Improving   Is the patient/caregiver able to teach back Sepsis?  S - Shivering,fever or very cold, E - Extreme pain or generalized discomfort (worst ever,especially abdomen), P - Pale or discolored skin, S - Sleepy, difficult to arouse,confused, I -   I feel like I might die-a feeling of hopelessness, S - Short of breath   Nursing interventions  Nurse provided reassurance to patient   Is patient/caregiver able to teach back steps to recovery at home?  Rest and regain strength, Eat a balanced diet   Is the patient/caregiver able to teach back signs and symptoms of worsening condition:  Fever, Shortness of breath/rapid respiratory rate   Additional teach back comments  Pt  "says he is doing \"ok\". Says he felt like he started tx early before infection was too bad. He has no cough. no SOB Eating well. No problems with il   Week 1 call completed?  Yes          Lashawn Jean RN  "

## 2019-04-06 LAB
BACTERIA SPEC AEROBE CULT: NORMAL
BACTERIA SPEC AEROBE CULT: NORMAL

## 2019-04-09 ENCOUNTER — APPOINTMENT (OUTPATIENT)
Dept: GENERAL RADIOLOGY | Facility: HOSPITAL | Age: 64
End: 2019-04-09

## 2019-04-09 ENCOUNTER — HOSPITAL ENCOUNTER (INPATIENT)
Facility: HOSPITAL | Age: 64
LOS: 6 days | Discharge: HOME | End: 2019-04-15
Attending: EMERGENCY MEDICINE | Admitting: INTERNAL MEDICINE

## 2019-04-09 DIAGNOSIS — Z74.09 IMPAIRED MOBILITY AND ADLS: ICD-10-CM

## 2019-04-09 DIAGNOSIS — A41.9 SEPSIS, DUE TO UNSPECIFIED ORGANISM: ICD-10-CM

## 2019-04-09 DIAGNOSIS — J18.9 PNEUMONIA OF RIGHT UPPER LOBE DUE TO INFECTIOUS ORGANISM: ICD-10-CM

## 2019-04-09 DIAGNOSIS — D72.829 LEUKOCYTOSIS, UNSPECIFIED TYPE: ICD-10-CM

## 2019-04-09 DIAGNOSIS — Z78.9 IMPAIRED MOBILITY AND ADLS: ICD-10-CM

## 2019-04-09 DIAGNOSIS — Z74.09 IMPAIRED FUNCTIONAL MOBILITY, BALANCE, GAIT, AND ENDURANCE: ICD-10-CM

## 2019-04-09 DIAGNOSIS — J96.01 ACUTE RESPIRATORY FAILURE WITH HYPOXIA (HCC): Primary | ICD-10-CM

## 2019-04-09 LAB
ALBUMIN SERPL-MCNC: 3.9 G/DL (ref 3.5–5.2)
ALBUMIN/GLOB SERPL: 0.9 G/DL
ALP SERPL-CCNC: 98 U/L (ref 39–117)
ALT SERPL W P-5'-P-CCNC: 17 U/L (ref 1–41)
ANION GAP SERPL CALCULATED.3IONS-SCNC: 16 MMOL/L
ARTERIAL PATENCY WRIST A: ABNORMAL
AST SERPL-CCNC: 21 U/L (ref 1–40)
ATMOSPHERIC PRESS: ABNORMAL MMHG
BASE EXCESS BLDA CALC-SCNC: -1.7 MMOL/L (ref 0–2)
BASOPHILS # BLD MANUAL: 0.32 10*3/MM3 (ref 0–0.2)
BASOPHILS NFR BLD AUTO: 1 % (ref 0–1.5)
BDY SITE: ABNORMAL
BILIRUB SERPL-MCNC: 0.7 MG/DL (ref 0.2–1.2)
BODY TEMPERATURE: 37 C
BUN BLD-MCNC: 9 MG/DL (ref 8–23)
BUN/CREAT SERPL: 8.4 (ref 7–25)
CALCIUM SPEC-SCNC: 9.2 MG/DL (ref 8.6–10.5)
CHLORIDE SERPL-SCNC: 95 MMOL/L (ref 98–107)
CO2 BLDA-SCNC: 21.7 MMOL/L (ref 23–27)
CO2 SERPL-SCNC: 23 MMOL/L (ref 22–29)
COHGB MFR BLD: 1.7 % (ref 0–2)
CREAT BLD-MCNC: 1.07 MG/DL (ref 0.76–1.27)
D-LACTATE SERPL-SCNC: 1.9 MMOL/L (ref 0.5–2)
DEPRECATED RDW RBC AUTO: 46.4 FL (ref 37–54)
EOSINOPHIL # BLD MANUAL: 0 10*3/MM3 (ref 0–0.4)
EOSINOPHIL NFR BLD MANUAL: 0 % (ref 0.3–6.2)
ERYTHROCYTE [DISTWIDTH] IN BLOOD BY AUTOMATED COUNT: 14.3 % (ref 12.3–15.4)
GFR SERPL CREATININE-BSD FRML MDRD: 69 ML/MIN/1.73
GLOBULIN UR ELPH-MCNC: 4.5 GM/DL
GLUCOSE BLD-MCNC: 92 MG/DL (ref 65–99)
HCO3 BLDA-SCNC: 20.8 MMOL/L (ref 20–26)
HCT VFR BLD AUTO: 44.8 % (ref 37.5–51)
HCT VFR BLD CALC: 44.2 %
HGB BLD-MCNC: 15.4 G/DL (ref 13–17.7)
HGB BLDA-MCNC: 14.4 G/DL (ref 13.5–17.5)
HOLD SPECIMEN: NORMAL
HOROWITZ INDEX BLD+IHG-RTO: 35 %
INR PPP: 3.79 (ref 0.85–1.16)
INR PPP: 4.04 (ref 0.85–1.16)
LYMPHOCYTES # BLD MANUAL: 0.32 10*3/MM3 (ref 0.7–3.1)
LYMPHOCYTES NFR BLD MANUAL: 1 % (ref 19.6–45.3)
LYMPHOCYTES NFR BLD MANUAL: 8 % (ref 5–12)
MCH RBC QN AUTO: 31.1 PG (ref 26.6–33)
MCHC RBC AUTO-ENTMCNC: 34.4 G/DL (ref 31.5–35.7)
MCV RBC AUTO: 90.5 FL (ref 79–97)
METHGB BLD QL: 0.7 % (ref 0–1.5)
MODALITY: ABNORMAL
MONOCYTES # BLD AUTO: 2.59 10*3/MM3 (ref 0.1–0.9)
NEUTROPHILS # BLD AUTO: 29.12 10*3/MM3 (ref 1.4–7)
NEUTROPHILS NFR BLD MANUAL: 74 % (ref 42.7–76)
NEUTS BAND NFR BLD MANUAL: 16 % (ref 0–5)
NOTE: ABNORMAL
NT-PROBNP SERPL-MCNC: 1314 PG/ML (ref 5–900)
OXYHGB MFR BLDV: 84.5 % (ref 94–99)
PCO2 BLDA: 29 MM HG
PCO2 TEMP ADJ BLD: 29 MM HG (ref 35–48)
PH BLDA: 7.46 PH UNITS (ref 7.35–7.45)
PH, TEMP CORRECTED: 7.46 PH UNITS
PLAT MORPH BLD: NORMAL
PLATELET # BLD AUTO: 399 10*3/MM3 (ref 140–450)
PMV BLD AUTO: 8.8 FL (ref 6–12)
PO2 BLDA: 47.2 MM HG (ref 83–108)
PO2 TEMP ADJ BLD: 47.2 MM HG (ref 83–108)
POTASSIUM BLD-SCNC: 5.1 MMOL/L (ref 3.5–5.2)
PROT SERPL-MCNC: 8.4 G/DL (ref 6–8.5)
PROTHROMBIN TIME: 36 SECONDS (ref 11.2–14.3)
PROTHROMBIN TIME: 37.8 SECONDS (ref 11.2–14.3)
RBC # BLD AUTO: 4.95 10*6/MM3 (ref 4.14–5.8)
RBC MORPH BLD: NORMAL
SCAN SLIDE: NORMAL
SODIUM BLD-SCNC: 134 MMOL/L (ref 136–145)
VENTILATOR MODE: ABNORMAL
WBC MORPH BLD: NORMAL
WBC NRBC COR # BLD: 32.35 10*3/MM3 (ref 3.4–10.8)
WHOLE BLOOD HOLD SPECIMEN: NORMAL
WHOLE BLOOD HOLD SPECIMEN: NORMAL

## 2019-04-09 PROCEDURE — 71045 X-RAY EXAM CHEST 1 VIEW: CPT

## 2019-04-09 PROCEDURE — 36600 WITHDRAWAL OF ARTERIAL BLOOD: CPT

## 2019-04-09 PROCEDURE — 25010000002 PIPERACILLIN SOD-TAZOBACTAM PER 1 G: Performed by: INTERNAL MEDICINE

## 2019-04-09 PROCEDURE — 99223 1ST HOSP IP/OBS HIGH 75: CPT | Performed by: INTERNAL MEDICINE

## 2019-04-09 PROCEDURE — 83880 ASSAY OF NATRIURETIC PEPTIDE: CPT | Performed by: EMERGENCY MEDICINE

## 2019-04-09 PROCEDURE — 85025 COMPLETE CBC W/AUTO DIFF WBC: CPT | Performed by: EMERGENCY MEDICINE

## 2019-04-09 PROCEDURE — 85610 PROTHROMBIN TIME: CPT | Performed by: EMERGENCY MEDICINE

## 2019-04-09 PROCEDURE — 87899 AGENT NOS ASSAY W/OPTIC: CPT | Performed by: INTERNAL MEDICINE

## 2019-04-09 PROCEDURE — 99284 EMERGENCY DEPT VISIT MOD MDM: CPT

## 2019-04-09 PROCEDURE — 87040 BLOOD CULTURE FOR BACTERIA: CPT | Performed by: EMERGENCY MEDICINE

## 2019-04-09 PROCEDURE — 94799 UNLISTED PULMONARY SVC/PX: CPT

## 2019-04-09 PROCEDURE — 93005 ELECTROCARDIOGRAM TRACING: CPT | Performed by: EMERGENCY MEDICINE

## 2019-04-09 PROCEDURE — 94640 AIRWAY INHALATION TREATMENT: CPT

## 2019-04-09 PROCEDURE — 25010000002 PIPERACILLIN SOD-TAZOBACTAM PER 1 G: Performed by: EMERGENCY MEDICINE

## 2019-04-09 PROCEDURE — 25010000002 VANCOMYCIN 10 G RECONSTITUTED SOLUTION: Performed by: EMERGENCY MEDICINE

## 2019-04-09 PROCEDURE — 82805 BLOOD GASES W/O2 SATURATION: CPT

## 2019-04-09 PROCEDURE — 83605 ASSAY OF LACTIC ACID: CPT | Performed by: EMERGENCY MEDICINE

## 2019-04-09 PROCEDURE — 85007 BL SMEAR W/DIFF WBC COUNT: CPT | Performed by: EMERGENCY MEDICINE

## 2019-04-09 PROCEDURE — 85610 PROTHROMBIN TIME: CPT | Performed by: INTERNAL MEDICINE

## 2019-04-09 PROCEDURE — 80053 COMPREHEN METABOLIC PANEL: CPT | Performed by: EMERGENCY MEDICINE

## 2019-04-09 RX ORDER — MAGNESIUM SULFATE HEPTAHYDRATE 40 MG/ML
2 INJECTION, SOLUTION INTRAVENOUS AS NEEDED
Status: DISCONTINUED | OUTPATIENT
Start: 2019-04-09 | End: 2019-04-15 | Stop reason: HOSPADM

## 2019-04-09 RX ORDER — POTASSIUM CHLORIDE 1.5 G/1.77G
40 POWDER, FOR SOLUTION ORAL AS NEEDED
Status: DISCONTINUED | OUTPATIENT
Start: 2019-04-09 | End: 2019-04-15 | Stop reason: HOSPADM

## 2019-04-09 RX ORDER — MONTELUKAST SODIUM 10 MG/1
10 TABLET ORAL NIGHTLY
Status: DISCONTINUED | OUTPATIENT
Start: 2019-04-09 | End: 2019-04-15 | Stop reason: HOSPADM

## 2019-04-09 RX ORDER — SODIUM CHLORIDE 9 MG/ML
125 INJECTION, SOLUTION INTRAVENOUS CONTINUOUS
Status: ACTIVE | OUTPATIENT
Start: 2019-04-09 | End: 2019-04-10

## 2019-04-09 RX ORDER — BUDESONIDE 0.5 MG/2ML
0.5 INHALANT ORAL
Status: DISCONTINUED | OUTPATIENT
Start: 2019-04-09 | End: 2019-04-15 | Stop reason: HOSPADM

## 2019-04-09 RX ORDER — ONDANSETRON 4 MG/1
4 TABLET, FILM COATED ORAL EVERY 8 HOURS PRN
COMMUNITY

## 2019-04-09 RX ORDER — CLOTRIMAZOLE 10 MG/1
10 LOZENGE ORAL; TOPICAL 4 TIMES DAILY
COMMUNITY
Start: 2019-04-04 | End: 2019-04-15 | Stop reason: HOSPADM

## 2019-04-09 RX ORDER — SACCHAROMYCES BOULARDII 250 MG
250 CAPSULE ORAL DAILY
Status: DISCONTINUED | OUTPATIENT
Start: 2019-04-09 | End: 2019-04-09 | Stop reason: SDUPTHER

## 2019-04-09 RX ORDER — ZINC OXIDE 13 %
1 CREAM (GRAM) TOPICAL DAILY
COMMUNITY

## 2019-04-09 RX ORDER — SACCHAROMYCES BOULARDII 250 MG
250 CAPSULE ORAL 2 TIMES DAILY
Status: DISCONTINUED | OUTPATIENT
Start: 2019-04-09 | End: 2019-04-15 | Stop reason: HOSPADM

## 2019-04-09 RX ORDER — SODIUM CHLORIDE 0.9 % (FLUSH) 0.9 %
3-10 SYRINGE (ML) INJECTION AS NEEDED
Status: DISCONTINUED | OUTPATIENT
Start: 2019-04-09 | End: 2019-04-15 | Stop reason: HOSPADM

## 2019-04-09 RX ORDER — ACETAMINOPHEN 500 MG
1000 TABLET ORAL ONCE
Status: COMPLETED | OUTPATIENT
Start: 2019-04-09 | End: 2019-04-09

## 2019-04-09 RX ORDER — ESCITALOPRAM OXALATE 10 MG/1
10 TABLET ORAL DAILY
Status: DISCONTINUED | OUTPATIENT
Start: 2019-04-09 | End: 2019-04-15 | Stop reason: HOSPADM

## 2019-04-09 RX ORDER — ONDANSETRON 4 MG/1
4 TABLET, FILM COATED ORAL EVERY 6 HOURS PRN
Status: DISCONTINUED | OUTPATIENT
Start: 2019-04-09 | End: 2019-04-15 | Stop reason: HOSPADM

## 2019-04-09 RX ORDER — POTASSIUM CHLORIDE 750 MG/1
40 CAPSULE, EXTENDED RELEASE ORAL AS NEEDED
Status: DISCONTINUED | OUTPATIENT
Start: 2019-04-09 | End: 2019-04-15 | Stop reason: HOSPADM

## 2019-04-09 RX ORDER — ONDANSETRON 2 MG/ML
4 INJECTION INTRAMUSCULAR; INTRAVENOUS EVERY 6 HOURS PRN
Status: DISCONTINUED | OUTPATIENT
Start: 2019-04-09 | End: 2019-04-15 | Stop reason: HOSPADM

## 2019-04-09 RX ORDER — MAGNESIUM SULFATE HEPTAHYDRATE 40 MG/ML
4 INJECTION, SOLUTION INTRAVENOUS AS NEEDED
Status: DISCONTINUED | OUTPATIENT
Start: 2019-04-09 | End: 2019-04-15 | Stop reason: HOSPADM

## 2019-04-09 RX ORDER — IPRATROPIUM BROMIDE AND ALBUTEROL SULFATE 2.5; .5 MG/3ML; MG/3ML
3 SOLUTION RESPIRATORY (INHALATION) ONCE
Status: COMPLETED | OUTPATIENT
Start: 2019-04-09 | End: 2019-04-09

## 2019-04-09 RX ORDER — GUAIFENESIN 600 MG/1
600 TABLET, EXTENDED RELEASE ORAL EVERY 12 HOURS SCHEDULED
Status: DISCONTINUED | OUTPATIENT
Start: 2019-04-09 | End: 2019-04-15 | Stop reason: HOSPADM

## 2019-04-09 RX ORDER — SODIUM CHLORIDE 0.9 % (FLUSH) 0.9 %
10 SYRINGE (ML) INJECTION AS NEEDED
Status: DISCONTINUED | OUTPATIENT
Start: 2019-04-09 | End: 2019-04-09 | Stop reason: SDUPTHER

## 2019-04-09 RX ORDER — GUAIFENESIN 400 MG/1
800 TABLET ORAL 3 TIMES DAILY
COMMUNITY

## 2019-04-09 RX ORDER — BUSPIRONE HYDROCHLORIDE 10 MG/1
15 TABLET ORAL EVERY 8 HOURS SCHEDULED
Status: DISCONTINUED | OUTPATIENT
Start: 2019-04-09 | End: 2019-04-15 | Stop reason: HOSPADM

## 2019-04-09 RX ORDER — NYSTATIN 100000 U/G
1 CREAM TOPICAL 2 TIMES DAILY PRN
COMMUNITY
End: 2019-04-15 | Stop reason: HOSPADM

## 2019-04-09 RX ORDER — FLUTICASONE PROPIONATE 50 MCG
2 SPRAY, SUSPENSION (ML) NASAL 2 TIMES DAILY
Status: DISCONTINUED | OUTPATIENT
Start: 2019-04-09 | End: 2019-04-15 | Stop reason: HOSPADM

## 2019-04-09 RX ORDER — SENNA AND DOCUSATE SODIUM 50; 8.6 MG/1; MG/1
2 TABLET, FILM COATED ORAL 2 TIMES DAILY PRN
Status: DISCONTINUED | OUTPATIENT
Start: 2019-04-09 | End: 2019-04-15 | Stop reason: HOSPADM

## 2019-04-09 RX ORDER — ACETAMINOPHEN 325 MG/1
650 TABLET ORAL EVERY 4 HOURS PRN
Status: DISCONTINUED | OUTPATIENT
Start: 2019-04-09 | End: 2019-04-15 | Stop reason: HOSPADM

## 2019-04-09 RX ORDER — POTASSIUM CHLORIDE 7.45 MG/ML
10 INJECTION INTRAVENOUS
Status: DISCONTINUED | OUTPATIENT
Start: 2019-04-09 | End: 2019-04-15 | Stop reason: HOSPADM

## 2019-04-09 RX ORDER — ALBUTEROL SULFATE 2.5 MG/3ML
2.5 SOLUTION RESPIRATORY (INHALATION) EVERY 6 HOURS PRN
Status: DISCONTINUED | OUTPATIENT
Start: 2019-04-09 | End: 2019-04-15 | Stop reason: HOSPADM

## 2019-04-09 RX ORDER — SODIUM CHLORIDE 0.9 % (FLUSH) 0.9 %
3 SYRINGE (ML) INJECTION EVERY 12 HOURS SCHEDULED
Status: DISCONTINUED | OUTPATIENT
Start: 2019-04-09 | End: 2019-04-15 | Stop reason: HOSPADM

## 2019-04-09 RX ORDER — CETIRIZINE HYDROCHLORIDE 10 MG/1
10 TABLET ORAL DAILY
Status: DISCONTINUED | OUTPATIENT
Start: 2019-04-09 | End: 2019-04-15 | Stop reason: HOSPADM

## 2019-04-09 RX ORDER — CHOLECALCIFEROL (VITAMIN D3) 125 MCG
5 CAPSULE ORAL NIGHTLY PRN
Status: DISCONTINUED | OUTPATIENT
Start: 2019-04-09 | End: 2019-04-15 | Stop reason: HOSPADM

## 2019-04-09 RX ADMIN — TAZOBACTAM SODIUM AND PIPERACILLIN SODIUM 4.5 G: 500; 4 INJECTION, SOLUTION INTRAVENOUS at 18:34

## 2019-04-09 RX ADMIN — BUSPIRONE HYDROCHLORIDE 15 MG: 10 TABLET ORAL at 20:59

## 2019-04-09 RX ADMIN — MONTELUKAST SODIUM 10 MG: 10 TABLET, COATED ORAL at 20:59

## 2019-04-09 RX ADMIN — FLUTICASONE PROPIONATE 2 SPRAY: 50 SPRAY, METERED NASAL at 21:00

## 2019-04-09 RX ADMIN — SODIUM CHLORIDE 125 ML/HR: 9 INJECTION, SOLUTION INTRAVENOUS at 17:02

## 2019-04-09 RX ADMIN — TAZOBACTAM SODIUM AND PIPERACILLIN SODIUM 3.38 G: 375; 3 INJECTION, SOLUTION INTRAVENOUS at 12:37

## 2019-04-09 RX ADMIN — Medication 250 MG: at 20:59

## 2019-04-09 RX ADMIN — ESCITALOPRAM OXALATE 10 MG: 10 TABLET ORAL at 20:59

## 2019-04-09 RX ADMIN — VANCOMYCIN HYDROCHLORIDE 1500 MG: 10 INJECTION, POWDER, LYOPHILIZED, FOR SOLUTION INTRAVENOUS at 13:08

## 2019-04-09 RX ADMIN — GUAIFENESIN 600 MG: 600 TABLET, EXTENDED RELEASE ORAL at 21:00

## 2019-04-09 RX ADMIN — BUDESONIDE 0.5 MG: 0.5 INHALANT RESPIRATORY (INHALATION) at 21:23

## 2019-04-09 RX ADMIN — IPRATROPIUM BROMIDE AND ALBUTEROL SULFATE 3 ML: 2.5; .5 SOLUTION RESPIRATORY (INHALATION) at 13:21

## 2019-04-09 RX ADMIN — SODIUM CHLORIDE, PRESERVATIVE FREE 3 ML: 5 INJECTION INTRAVENOUS at 21:00

## 2019-04-09 RX ADMIN — CETIRIZINE HYDROCHLORIDE 10 MG: 10 TABLET, FILM COATED ORAL at 20:59

## 2019-04-09 RX ADMIN — ACETAMINOPHEN 1000 MG: 500 TABLET, FILM COATED ORAL at 14:38

## 2019-04-09 RX ADMIN — SODIUM CHLORIDE 1000 ML: 9 INJECTION, SOLUTION INTRAVENOUS at 14:38

## 2019-04-09 NOTE — H&P
[facility] Medicine Services  HISTORY AND PHYSICAL    Patient Name: Zackary Noonan II  : 1955  MRN: [id number]  Primary Care Physician: Jillian Layne MD  Date of admission: 2019      Subjective   Subjective     Chief Complaint: fatigue    HPI:  Zackary Noonan II is a 63 y.o. male presenting from home with weakness. Patient was discharged from our facility about 1 week ago after influenza illness. He reports feeling some better later last week but then suddenly last night developed acute worsening. At that time he experienced weakness, extreme soa, and fever > 101. He has associated non productive cough. Denies chest pain. Doesn't feel much better since coming into ED.    Review of Systems   Gen- No fevers, chills  CV- No chest pain, palpitations  GI- No N/V/D, abd pain    Otherwise complete ROS reviewed and is negative except as mentioned in the HPI.    Personal History     Past Medical History:   Diagnosis Date   • Anxiety    • Arthritis    • Asthma    • Clostridium difficile infection 2017    treated per dr carter with fecal transplant     • H/O recurrent pneumonia    • Hypertension    • MRSA infection     right lower extremity wound   • Pulmonary nodule     Followed by Dr. Galaviz    • Recurrent deep vein thrombosis (DVT) (CMS/HCC)     x 3 LLE; chronic anticoagulation therapy    • Ulcerative colitis (CMS/HCC)    • Wears glasses    • Wears hearing aid        Past Surgical History:   Procedure Laterality Date   • BRONCHOSCOPY      by Dr. Galaviz for pulmonary nodule   • BRONCHOSCOPY N/A 2016    Procedure: BRONCHOSCOPY;  Surgeon: Eben Roberts MD;  Location:  [facility];  Service:    • COLON RESECTION N/A 2017    Procedure: TOTAL ABDOMINAL COLECTOMY WITH CREATION OF ILEOSTOMY;  Surgeon: David Nielsen MD;  Location:  [facility];  Service:    • COLONOSCOPY  2017   • UMBILICAL HERNIA REPAIR     • VASECTOMY     • VASECTOMY REVERSAL    "      Family History: family history includes Hypertension in his mother. Otherwise pertinent FHx was reviewed and unremarkable.     Social History:  reports that he has never smoked. He has never used smokeless tobacco. He reports that he drinks alcohol. He reports that he does not use drugs.  Social History     Social History Narrative   • Not on file       Medications:    Available home medication information reviewed.    (Not in a hospital admission)    Allergies   Allergen Reactions   • Beta Adrenergic Blockers Other (See Comments)     Vocal changes   • Levaquin [Levofloxacin In D5w] Other (See Comments)     Heaviness in legs, \"felt like lead\"       Objective   Objective     Vital Signs:   Temp:  [98.7 °F (37.1 °C)-101 °F (38.3 °C)] 101 °F (38.3 °C)  Heart Rate:  [123-132] 129  Resp:  [26] 26  BP: (123-142)/(67-82) 140/82        Physical Exam   Constitutional: Awake, appears to feel poorly,   Eyes: PERRLA, sclerae anicteric, no conjunctival injection  HENT: NCAT, mucous membranes moist  Neck: Supple, no thyromegaly, no lymphadenopathy, trachea midline  Respiratory: Marked increased WOB, right rhonchi  Cardiovascular: Tachy, regular, no murmur  Gastrointestinal: Positive bowel sounds, soft, nontender, nondistended  Musculoskeletal: No bilateral ankle edema, no clubbing or cyanosis to extremities  Psychiatric: Appropriate affect, cooperative  Neurologic: Oriented x 3, strength symmetric in all extremities, Cranial Nerves grossly intact to confrontation, speech clear  Skin: No rashes    Results Reviewed:  I have personally reviewed current lab, radiology, and data and agree.    Results from last 7 days   Lab Units 04/09/19  1226   WBC 10*3/mm3 32.35*   HEMOGLOBIN g/dL 15.4   HEMATOCRIT % 44.8   PLATELETS 10*3/mm3 399   INR  3.79*     Results from last 7 days   Lab Units 04/09/19  1226   SODIUM mmol/L 134*   POTASSIUM mmol/L 5.1   CHLORIDE mmol/L 95*   CO2 mmol/L 23.0   BUN mg/dL 9   CREATININE mg/dL 1.07   GLUCOSE " mg/dL 92   CALCIUM mg/dL 9.2   ALT (SGPT) U/L 17   AST (SGOT) U/L 21     Estimated Creatinine Clearance: 74.2 mL/min (by C-G formula based on SCr of 1.07 mg/dL).  Brief Urine Lab Results  (Last result in the past 365 days)      Color   Clarity   Blood   Leuk Est   Nitrite   Protein   CREAT   Urine HCG        03/31/19 2311 Yellow Clear Negative Negative Negative Negative             No results found for: BNP     Personally reviewed cxr with right sided lung disease noted. Agree with interpretation.  Imaging Results (last 24 hours)     Procedure Component Value Units Date/Time    XR Chest 1 View [[id number]] Collected:  04/09/19 1221     Updated:  04/09/19 1352    Narrative:       EXAMINATION: XR CHEST 1 VW-      INDICATION: Shortness of air triage protocol.     TECHNIQUE:  Single view frontal chest.     COMPARISONS: CT 04/01/2019.     FINDINGS:  Since the comparison CT from 8 days ago, there is new  airspace disease in the upper lobe on the right, possibly also the  middle lobe. There is also background interstitial prominence in  longstanding basilar opacity. The heart is mildly enlarged. There may be  trace volume effusions. No pneumothorax.       Impression:       New airspace disease in the upper lobe and possibly the  middle lobe on the right.     D:  04/09/2019  E:  04/09/2019     This report was finalized on 4/9/2019 1:49 PM by Bebo Le.                Assessment/Plan   Assessment / Plan     Active Hospital Problems    Diagnosis POA   • **Acute respiratory failure with hypoxia (CMS/HCC) [J96.01] Yes   • Sepsis (CMS/HCC) [A41.9] Yes   • Pneumonia [J18.9] Yes   • History of DVT (deep vein thrombosis) [Z86.718] Not Applicable     LLE; x 3; chronic coumadin therapy       64 y/o male presenting from home with with fatigue and SOA due to post influenza bacterial pneumonia.    A/P:    Acute hypoxic respiratory failure due to pneumonia  Sepsis due to post influenza RLL bacterial pneumonia  --IV vancomycin and zosyn.  Blood cx, sputum cx, urinary antigen pending.  --Has hx of bronchiectasis and was previously followed by Dr. Watkins at [facility]. If doesn't improve as expected may need pulmonary eval here for potential bronch.  --Continue home nebs/inhalers.   --Probiotic. Monitor closely given hx of C diff requiring fecal transplant.    Hx of DVT  --On coumadin. Dosing per pharmacy.    DVT prophylaxis: Coumadin    CODE STATUS:    Code Status and Medical Interventions:   Ordered at: 04/09/19 1422     Code Status:    CPR     Medical Interventions (Level of Support Prior to Arrest):    Full       Admission Status:  I believe this patient meets INPATIENT status due to the need for care which can only be reasonably provided in an hospital setting such as possible need for aggressive/expedited ancillary services and/or consultation services, IV medications, close physician monitoring, and/or procedures.  In such, I feel patient’s risk for adverse outcomes and need for care warrant INPATIENT evaluation and predict the patient’s care encounter to likely last beyond 2 midnights.    Electronically signed by Anny Camilo II, DO, 04/09/19, 2:45 PM.

## 2019-04-09 NOTE — PLAN OF CARE
Problem: Patient Care Overview  Goal: Plan of Care Review  Outcome: Ongoing (interventions implemented as appropriate)   04/09/19 2071   Coping/Psychosocial   Plan of Care Reviewed With patient   OTHER   Outcome Summary Patient arrived to the floor alert and oriented from the ER. Denies having any pain. On 5 liter humidied nasal canula. Sweaty skin appearance. Anxious upon arrival and then has become calm and relaxed once settled. Patients wife was at bedside upon arrival Lung sounds diminished whit rhonchi sounds bilaterally Bowel sounds present and active

## 2019-04-09 NOTE — ED PROVIDER NOTES
Subjective   Zackary Noonan II is a 63 y.o.male who presents to the ED with his wife with c/o dyspnea with onset yesterday that has progressively worsened. He states that he was seen here at [facility] last week and was admitted for sepsis from Influenza. He was discharged home on 4/2/19 and states he has been doing well. Per his wife, he may have been over exerting himself helping her around the house the 4 days after he was discharged home. He went to his PCP yesterday for a follow up and was still doing well. Later that afternoon, he was climbing a set of stairs when he noticed himself becoming dyspneic. Since then his dyspnea has been progressively worsening. Last night, his wife noting that he had a rattling cough. He has an intermittent gray sputum production with his cough. He typically uses an albuterol nebulizer three times a day but today was only able to use it once and is not on supplemental oxygen at night. He states that his pulse is typically in the 110s-120s but he is not sure why. He denies any chest pain, back pain, abdominal pain or any complaints of pain. He has a h/o DVT in his lower extremity and faithfully wears compression stockings. He takes warfarin daily but had his INR checked yesterday and was told to skip his dose today. His wife states that he has had PNA 6-8 times in the past and has become septic for nearly all of those. He is on Bactrim 3 times a week and takes this as prescribed. He reports a h/o C. Difficile and had a colectomy and ileostomy last year. He has chronic diarrhea and takes Imodium daily. He states that his mother passed away a couple days ago. He denies a h/o CHF or diuretic use.         History provided by:  Patient and spouse  Shortness of Breath   Severity:  Moderate  Onset quality:  Gradual  Duration:  1 day  Timing:  Constant  Progression:  Worsening  Chronicity:  Recurrent  Relieved by:  Oxygen  Worsened by:  Exertion, movement and coughing  Ineffective treatments:   "Rest (nebulizer)  Associated symptoms: cough, fever, sputum production and wheezing    Associated symptoms: no abdominal pain, no chest pain, no diaphoresis, no hemoptysis and no vomiting    Risk factors: hx of PE/DVT    Risk factors: no obesity        Review of Systems   Constitutional: Positive for fever. Negative for diaphoresis.   Respiratory: Positive for cough, sputum production, shortness of breath and wheezing. Negative for hemoptysis.    Cardiovascular: Negative for chest pain.   Gastrointestinal: Positive for diarrhea. Negative for abdominal pain, nausea and vomiting.   All other systems reviewed and are negative.      Past Medical History:   Diagnosis Date   • Anxiety    • Arthritis    • Asthma    • Clostridium difficile infection 01/2017    treated per dr carter with fecal transplant 8-2017    • H/O recurrent pneumonia    • Hypertension    • MRSA infection 2016    right lower extremity wound   • Pulmonary nodule     Followed by Dr. Galaviz    • Recurrent deep vein thrombosis (DVT) (CMS/HCC)     x 3 LLE; chronic anticoagulation therapy    • Ulcerative colitis (CMS/HCC)    • Wears glasses    • Wears hearing aid        Allergies   Allergen Reactions   • Beta Adrenergic Blockers Other (See Comments)     Vocal changes   • Levaquin [Levofloxacin In D5w] Other (See Comments)     Heaviness in legs, \"felt like lead\"       Past Surgical History:   Procedure Laterality Date   • BRONCHOSCOPY      by Dr. Galaviz for pulmonary nodule   • BRONCHOSCOPY N/A 11/7/2016    Procedure: BRONCHOSCOPY;  Surgeon: Eben Roberts MD;  Location:  [facility];  Service:    • COLON RESECTION N/A 9/19/2017    Procedure: TOTAL ABDOMINAL COLECTOMY WITH CREATION OF ILEOSTOMY;  Surgeon: David Nielsen MD;  Location:  [facility];  Service:    • COLONOSCOPY  08/2017   • UMBILICAL HERNIA REPAIR     • VASECTOMY     • VASECTOMY REVERSAL         Family History   Problem Relation Age of Onset   • Hypertension Mother        Social History "     Socioeconomic History   • Marital status:      Spouse name: Not on file   • Number of children: Not on file   • Years of education: Not on file   • Highest education level: Not on file   Tobacco Use   • Smoking status: Never Smoker   • Smokeless tobacco: Never Used   Substance and Sexual Activity   • Alcohol use: Yes     Comment: 4 drinks/week   • Drug use: No   • Sexual activity: Defer         Objective   Physical Exam   Constitutional: He is oriented to person, place, and time. He appears well-developed and well-nourished. No distress.   HENT:   Head: Normocephalic and atraumatic.   Right Ear: External ear normal.   Left Ear: External ear normal.   Nose: Nose normal.   Dry mucous membranes.    Eyes: Conjunctivae are normal. No scleral icterus.   Neck: Normal range of motion. Neck supple.   Cardiovascular: Regular rhythm, normal heart sounds and intact distal pulses. Tachycardia present. Exam reveals no friction rub.   No murmur heard.  2+ radial pulses.    Pulmonary/Chest: Accessory muscle usage present. Tachypnea noted. No respiratory distress. He has decreased breath sounds. He has no wheezes. He has rales.   Moderate short of breath.   Course rales throughout all lung fields   Minimal wheezes in upper lung fields  Tachypneic at 26 breaths per minutes.   Accessory muscle use but no retrations   Moderate air movement   Abdominal: Soft. Bowel sounds are normal. He exhibits no distension. There is no tenderness.   Musculoskeletal: Normal range of motion. He exhibits no edema or tenderness.   Neurological: He is alert and oriented to person, place, and time.   Skin: Skin is warm and dry. He is not diaphoretic. No pallor.   Slightly pale, warm and dry.    Psychiatric: He has a normal mood and affect. His behavior is normal.   Nursing note and vitals reviewed.      Critical Care  Performed by: Akash Galvez MD  Authorized by: Akash Galvez MD     Critical care provider statement:     Critical care  time (minutes):  35    Critical care time was exclusive of:  Separately billable procedures and treating other patients    Critical care was necessary to treat or prevent imminent or life-threatening deterioration of the following conditions:  Respiratory failure and sepsis    Critical care was time spent personally by me on the following activities:  Ordering and performing treatments and interventions, ordering and review of laboratory studies, ordering and review of radiographic studies, pulse oximetry, re-evaluation of patient's condition, review of old charts, obtaining history from patient or surrogate, examination of patient, evaluation of patient's response to treatment, discussions with consultants and development of treatment plan with patient or surrogate             ED Course  Recent Results (from the past 24 hour(s))   Comprehensive Metabolic Panel    Collection Time: 04/09/19 12:26 PM   Result Value Ref Range    Glucose 92 65 - 99 mg/dL    BUN 9 8 - 23 mg/dL    Creatinine 1.07 0.76 - 1.27 mg/dL    Sodium 134 (L) 136 - 145 mmol/L    Potassium 5.1 3.5 - 5.2 mmol/L    Chloride 95 (L) 98 - 107 mmol/L    CO2 23.0 22.0 - 29.0 mmol/L    Calcium 9.2 8.6 - 10.5 mg/dL    Total Protein 8.4 6.0 - 8.5 g/dL    Albumin 3.90 3.50 - 5.20 g/dL    ALT (SGPT) 17 1 - 41 U/L    AST (SGOT) 21 1 - 40 U/L    Alkaline Phosphatase 98 39 - 117 U/L    Total Bilirubin 0.7 0.2 - 1.2 mg/dL    eGFR Non African Amer 69 >60 mL/min/1.73    Globulin 4.5 gm/dL    A/G Ratio 0.9 g/dL    BUN/Creatinine Ratio 8.4 7.0 - 25.0    Anion Gap 16.0 mmol/L   BNP    Collection Time: 04/09/19 12:26 PM   Result Value Ref Range    proBNP 1,314.0 (H) 5.0 - 900.0 pg/mL   Light Blue Top    Collection Time: 04/09/19 12:26 PM   Result Value Ref Range    Extra Tube hold for add-on    Green Top (Gel)    Collection Time: 04/09/19 12:26 PM   Result Value Ref Range    Extra Tube Hold for add-ons.    Lavender Top    Collection Time: 04/09/19 12:26 PM   Result Value  Ref Range    Extra Tube hold for add-on    Gold Top - SST    Collection Time: 04/09/19 12:26 PM   Result Value Ref Range    Extra Tube Hold for add-ons.    Green Top (No Gel)    Collection Time: 04/09/19 12:26 PM   Result Value Ref Range    Extra Tube Hold for add-ons.    CBC Auto Differential    Collection Time: 04/09/19 12:26 PM   Result Value Ref Range    WBC 32.35 (C) 3.40 - 10.80 10*3/mm3    RBC 4.95 4.14 - 5.80 10*6/mm3    Hemoglobin 15.4 13.0 - 17.7 g/dL    Hematocrit 44.8 37.5 - 51.0 %    MCV 90.5 79.0 - 97.0 fL    MCH 31.1 26.6 - 33.0 pg    MCHC 34.4 31.5 - 35.7 g/dL    RDW 14.3 12.3 - 15.4 %    RDW-SD 46.4 37.0 - 54.0 fl    MPV 8.8 6.0 - 12.0 fL    Platelets 399 140 - 450 10*3/mm3   Lactic Acid, Plasma    Collection Time: 04/09/19 12:26 PM   Result Value Ref Range    Lactate 1.9 0.5 - 2.0 mmol/L   Protime-INR    Collection Time: 04/09/19 12:26 PM   Result Value Ref Range    Protime 36.0 (H) 11.2 - 14.3 Seconds    INR 3.79 (H) 0.85 - 1.16   Scan Slide    Collection Time: 04/09/19 12:26 PM   Result Value Ref Range    Scan Slide     Manual Differential    Collection Time: 04/09/19 12:26 PM   Result Value Ref Range    Neutrophil % 74.0 42.7 - 76.0 %    Lymphocyte % 1.0 (L) 19.6 - 45.3 %    Monocyte % 8.0 5.0 - 12.0 %    Eosinophil % 0.0 (L) 0.3 - 6.2 %    Basophil % 1.0 0.0 - 1.5 %    Bands %  16.0 (H) 0.0 - 5.0 %    Neutrophils Absolute 29.12 (H) 1.40 - 7.00 10*3/mm3    Lymphocytes Absolute 0.32 (L) 0.70 - 3.10 10*3/mm3    Monocytes Absolute 2.59 (H) 0.10 - 0.90 10*3/mm3    Eosinophils Absolute 0.00 0.00 - 0.40 10*3/mm3    Basophils Absolute 0.32 (H) 0.00 - 0.20 10*3/mm3    RBC Morphology Normal Normal    WBC Morphology Normal Normal    Platelet Morphology Normal Normal   Blood Gas, Arterial With Co-Ox    Collection Time: 04/09/19  1:23 PM   Result Value Ref Range    Site Right Radial     Du's Test N/A     pH, Arterial 7.464 (H) 7.350 - 7.450 pH units    pCO2, Arterial 29.0 mm Hg    pO2, Arterial 47.2 (L)  83.0 - 108.0 mm Hg    HCO3, Arterial 20.8 20.0 - 26.0 mmol/L    Base Excess, Arterial -1.7 (L) 0.0 - 2.0 mmol/L    Hemoglobin, Blood Gas 14.4 13.5 - 17.5 g/dL    Hematocrit, Blood Gas 44.2 %    Oxyhemoglobin 84.5 (L) 94 - 99 %    Methemoglobin 0.70 0.00 - 1.50 %    Carboxyhemoglobin 1.7 0 - 2 %    CO2 Content 21.7 (L) 23 - 27 mmol/L    Temperature 37.0 C    Barometric Pressure for Blood Gas  mmHg    Modality Nasal Cannula     FIO2 35 %    Ventilator Mode       Note      pH, Temp Corrected 7.464 pH Units    pCO2, Temperature Corrected 29.0 (L) 35 - 48 mm Hg    pO2, Temperature Corrected 47.2 (L) 83 - 108 mm Hg     Note: In addition to lab results from this visit, the labs listed above may include labs taken at another facility or during a different encounter within the last 24 hours. Please correlate lab times with ED admission and discharge times for further clarification of the services performed during this visit.    XR Chest 1 View   Final Result   New airspace disease in the upper lobe and possibly the   middle lobe on the right.       D:  04/09/2019   E:  04/09/2019       This report was finalized on 4/9/2019 1:49 PM by Bebo Le.            Vitals:    04/09/19 1500 04/09/19 1503 04/09/19 1530 04/09/19 1553   BP: 140/66  122/56 104/51   BP Location:    Left arm   Patient Position:    Lying   Pulse: (!) 124 (!) 125 (!) 125 (!) 123   Resp:  20  20   Temp:  (!) 102.5 °F (39.2 °C)  98.4 °F (36.9 °C)   TempSrc:    Oral   SpO2: 94% 95% 92% 90%   Weight:       Height:         Medications   sodium chloride 0.9 % flush 10 mL (not administered)   sodium chloride 0.9 % flush 3 mL (not administered)   sodium chloride 0.9 % flush 3-10 mL (not administered)   sodium chloride 0.9 % infusion (not administered)   acetaminophen (TYLENOL) tablet 650 mg (not administered)   potassium chloride (MICRO-K) CR capsule 40 mEq (not administered)     Or   potassium chloride (KLOR-CON) packet 40 mEq (not administered)     Or   potassium  chloride 10 mEq in 100 mL IVPB (not administered)   Magnesium Sulfate 2 gram Bolus, followed by 8 gram infusion (total Mg dose 10 grams)- Mg less than or equal to 1mg/dL (not administered)     Or   Magnesium Sulfate 2 gram / 50mL Infusion (GIVE X 3 BAGS TO EQUAL 6GM TOTAL DOSE) - Mg 1.1 - 1.5 mg/dl (not administered)     Or   Magnesium Sulfate 4 gram infusion- Mg 1.6-1.9 mg/dL (not administered)   melatonin tablet 5 mg (not administered)   sennosides-docusate sodium (SENOKOT-S) 8.6-50 MG tablet 2 tablet (not administered)   ondansetron (ZOFRAN) tablet 4 mg (not administered)     Or   ondansetron (ZOFRAN) injection 4 mg (not administered)   saccharomyces boulardii (FLORASTOR) capsule 250 mg (not administered)   ipratropium-albuterol (DUO-NEB) nebulizer solution 3 mL (3 mL Nebulization Given 4/9/19 1321)   piperacillin-tazobactam (ZOSYN) 3.375 g in iso-osmotic dextrose 50 ml (premix) (0 g Intravenous Stopped 4/9/19 1307)   vancomycin 1500 mg/500 mL 0.9% NS IVPB ([facility]) (0 mg Intravenous Stopped 4/9/19 1526)   acetaminophen (TYLENOL) tablet 1,000 mg (1,000 mg Oral Given 4/9/19 1438)   sodium chloride 0.9 % bolus 1,000 mL (1,000 mL Intravenous New Bag 4/9/19 1438)     ECG/EMG Results (last 24 hours)     Procedure Component Value Units Date/Time    ECG 12 Lead [[id number]] Collected:  04/09/19 1201     Updated:  04/09/19 1224        ECG 12 Lead   Final Result   Test Reason : SOA Protocol   Blood Pressure : **/** mmHG   Vent. Rate : 133 BPM     Atrial Rate : 133 BPM      P-R Int : 134 ms          QRS Dur : 120 ms       QT Int : 368 ms       P-R-T Axes : 042 -52 009 degrees      QTc Int : 547 ms      Sinus tachycardia   Right bundle branch block   Left anterior fascicular block   ** Bifascicular block **   Inferior infarct , age undetermined   Abnormal ECG   Confirmed by KENNETH DYSON MD ([id number]) on 4/9/2019 3:13:36 PM      Referred By:  KARIS           Confirmed By:KENNETH DYSON MD            ED Course as of Apr 09 1624 Tue  Apr 09, 2019   1413 Dr. Galvez spoke with the hospitalist who will admit.   [ML]      ED Course User Index  [ML] Moises Hamilton                     [facility]    Final diagnoses:   Acute respiratory failure with hypoxia (CMS/HCC)   Pneumonia of right upper lobe due to infectious organism (CMS/HCC)   Leukocytosis, unspecified type   Sepsis, due to unspecified organism (CMS/HCC)       Documentation assistance provided by yaritza Hamilton.  Information recorded by the scribe was done at my direction and has been verified and validated by me.     Moises Hamilton  04/09/19 1237       Moises Hamilton  04/09/19 1435       Moises Hamilton  04/09/19 1618       Moises Hamilton  04/09/19 1620       Moises Hamilton  04/09/19 6339       Akash Galvez MD  04/09/19 1852

## 2019-04-09 NOTE — PROGRESS NOTES
"Pharmacy Consult  -  Warfarin    Zackary Noonan II is a  63 y.o. male   Height - 167.6 cm (66\")  Weight - 76.2 kg (168 lb)    Consulting Provider: - Dr. Camilo  Indication: - DVT  Goal INR: -  2 - 3  Home Regimen:   - 5 mg daily   -     Bridge Therapy: No    Drug-Drug Interactions with current regimen:       Warfarin Dosing During Admission:    Date  4/9           INR  3.79           Dose  hold                Education Provided:    Discharge Follow up:   Following Provider -   Follow up time range or appointment -      Labs:    Results from last 7 days   Lab Units 04/09/19  1226   INR  3.79*   HEMOGLOBIN g/dL 15.4   HEMATOCRIT % 44.8   PLATELETS 10*3/mm3 399     Results from last 7 days   Lab Units 04/09/19  1226   SODIUM mmol/L 134*   POTASSIUM mmol/L 5.1   CHLORIDE mmol/L 95*   CO2 mmol/L 23.0   BUN mg/dL 9   CREATININE mg/dL 1.07   CALCIUM mg/dL 9.2   BILIRUBIN mg/dL 0.7   ALK PHOS U/L 98   ALT (SGPT) U/L 17   AST (SGOT) U/L 21   GLUCOSE mg/dL 92       Current dietary intake:       Assessment/Plan:      Hold until INR falls below 3       Thank you  Nomi Becerril [facility]  4/9/2019  5:49 PM        "

## 2019-04-09 NOTE — PROGRESS NOTES
"Pharmacy Consult-Vancomycin Dosing  Zackary Noonan II is a  63 y.o. male receiving vancomycin therapy.     Indication: PNA  Consulting Provider: Dr. Camilo  ID Consult:     Goal Trough:15 - 20    Current Antimicrobial Therapy  Anti-Infectives (From admission, onward)      Ordered     Dose/Rate Route Frequency Start Stop    04/09/19 1735  vancomycin 1250 mg/250 mL 0.9% NS IVPB ([facility])     Ordering Provider:  Anny Camilo II DO    15 mg/kg × 76.2 kg  over 90 Minutes Intravenous Every 24 Hours 04/10/19 0600 04/17/19 0559    04/09/19 1731  piperacillin-tazobactam (ZOSYN) 4.5 g in iso-osmotic dextrose 100 mL IVPB (premix)     Ordering Provider:  Anny Camilo II, DO    4.5 g  over 4 Hours Intravenous Every 8 Hours 04/09/19 2115 04/16/19 2106    04/09/19 1731  Pharmacy to dose vancomycin     Ordering Provider:  Anny Cmailo II, DO     Does not apply Continuous PRN 04/09/19 1731 04/16/19 1730    04/09/19 1209  piperacillin-tazobactam (ZOSYN) 3.375 g in iso-osmotic dextrose 50 ml (premix)     Ordering Provider:  Akash Galvez MD    3.375 g Intravenous Once 04/09/19 1211 04/09/19 1307    04/09/19 1209  vancomycin 1500 mg/500 mL 0.9% NS IVPB ([facility])     Ordering Provider:  Akash Galvez MD    20 mg/kg × 76.2 kg  over 90 Minutes Intravenous Once 04/09/19 1211 04/09/19 1526            Allergies  Allergies as of 04/09/2019 - Reviewed 04/09/2019   Allergen Reaction Noted   • Beta adrenergic blockers Other (See Comments) 11/05/2016   • Levaquin [levofloxacin in d5w] Other (See Comments) 09/15/2017       Labs    Results from last 7 days   Lab Units 04/09/19  1226   BUN mg/dL 9   CREATININE mg/dL 1.07       Results from last 7 days   Lab Units 04/09/19  1226   WBC 10*3/mm3 32.35*       Evaluation of Dosing     Last Dose Received in the ED/Outside Facility: Vancomycin 1500 mg IV on 4/9  Is Patient on Dialysis or Renal Replacement:     Ht - 167.6 cm (66\")  Wt - 76.2 kg (168 lb)    Estimated Creatinine Clearance: 74.2 " mL/min (by C-G formula based on SCr of 1.07 mg/dL).    Intake & Output (last 3 days)         04/06 0701 - 04/07 0700 04/07 0701 - 04/08 0700 04/08 0701 - 04/09 0700 04/09 0701 - 04/10 0700    I.V. (mL/kg)    1000 (13.1)    IV Piggyback    550    Total Intake(mL/kg)    1550 (20.3)    Net    +1550            Stool Unmeasured Occurrence    1 x            Microbiology and Radiology  Microbiology Results (last 10 days)       Procedure Component Value - Date/Time    Influenza A & B, RT PCR - Swab, Nasopharynx [[id number]]  (Abnormal) Collected:  04/01/19 1056    Lab Status:  Final result Specimen:  Swab from Nasopharynx Updated:  04/01/19 1324     Influenza A PCR Detected     Influenza B PCR Not Detected    Influenza Antigen, Rapid - Swab, Nasopharynx [[id number]]  (Normal) Collected:  03/31/19 2251    Lab Status:  Final result Specimen:  Swab from Nasopharynx Updated:  03/31/19 2303     Influenza A Ag, EIA Negative     Influenza B Ag, EIA Negative    Blood Culture - Blood, Arm, Left [[id number]] Collected:  03/31/19 2211    Lab Status:  Final result Specimen:  Blood from Arm, Left Updated:  04/06/19 0130     Blood Culture No growth at 5 days    Blood Culture - Blood, Arm, Left [[id number]] Collected:  03/31/19 2211    Lab Status:  Final result Specimen:  Blood from Arm, Left Updated:  04/06/19 0130     Blood Culture No growth at 5 days            Evaluation of Level    No results found for: LUKASZ HUNT VANCORANDOM    Assessment/Plan:    A trough will be drawn prior to the 3rd dose.  Second dose started early due to loading dose only 20 mg/kg instead of 25 mg/kg.

## 2019-04-10 ENCOUNTER — READMISSION MANAGEMENT (OUTPATIENT)
Dept: CALL CENTER | Facility: HOSPITAL | Age: 64
End: 2019-04-10

## 2019-04-10 PROBLEM — N17.9 AKI (ACUTE KIDNEY INJURY) (HCC): Status: ACTIVE | Noted: 2019-04-10

## 2019-04-10 LAB
ANION GAP SERPL CALCULATED.3IONS-SCNC: 14 MMOL/L
BUN BLD-MCNC: 17 MG/DL (ref 8–23)
BUN/CREAT SERPL: 8.1 (ref 7–25)
C DIFF TOX GENS STL QL NAA+PROBE: NOT DETECTED
CALCIUM SPEC-SCNC: 8.3 MG/DL (ref 8.6–10.5)
CHLORIDE SERPL-SCNC: 100 MMOL/L (ref 98–107)
CO2 SERPL-SCNC: 16 MMOL/L (ref 22–29)
CREAT BLD-MCNC: 2.1 MG/DL (ref 0.76–1.27)
CREAT UR-MCNC: 66.3 MG/DL
DEPRECATED RDW RBC AUTO: 47.8 FL (ref 37–54)
ERYTHROCYTE [DISTWIDTH] IN BLOOD BY AUTOMATED COUNT: 14.5 % (ref 12.3–15.4)
GFR SERPL CREATININE-BSD FRML MDRD: 32 ML/MIN/1.73
GLUCOSE BLD-MCNC: 150 MG/DL (ref 65–99)
HCT VFR BLD AUTO: 39.1 % (ref 37.5–51)
HGB BLD-MCNC: 12.8 G/DL (ref 13–17.7)
INR PPP: 4.45 (ref 0.85–1.16)
L PNEUMO1 AG UR QL IA: NEGATIVE
MCH RBC QN AUTO: 29.8 PG (ref 26.6–33)
MCHC RBC AUTO-ENTMCNC: 32.7 G/DL (ref 31.5–35.7)
MCV RBC AUTO: 91.1 FL (ref 79–97)
PLATELET # BLD AUTO: 362 10*3/MM3 (ref 140–450)
PMV BLD AUTO: 8.7 FL (ref 6–12)
POTASSIUM BLD-SCNC: 4.5 MMOL/L (ref 3.5–5.2)
PROTHROMBIN TIME: 40.8 SECONDS (ref 11.2–14.3)
RBC # BLD AUTO: 4.29 10*6/MM3 (ref 4.14–5.8)
S PNEUM AG SPEC QL LA: POSITIVE
SODIUM BLD-SCNC: 130 MMOL/L (ref 136–145)
SODIUM UR-SCNC: 28 MMOL/L
WBC NRBC COR # BLD: 30.16 10*3/MM3 (ref 3.4–10.8)

## 2019-04-10 PROCEDURE — 97530 THERAPEUTIC ACTIVITIES: CPT

## 2019-04-10 PROCEDURE — 97162 PT EVAL MOD COMPLEX 30 MIN: CPT

## 2019-04-10 PROCEDURE — 85610 PROTHROMBIN TIME: CPT | Performed by: INTERNAL MEDICINE

## 2019-04-10 PROCEDURE — 80048 BASIC METABOLIC PNL TOTAL CA: CPT | Performed by: INTERNAL MEDICINE

## 2019-04-10 PROCEDURE — 97166 OT EVAL MOD COMPLEX 45 MIN: CPT

## 2019-04-10 PROCEDURE — 84300 ASSAY OF URINE SODIUM: CPT | Performed by: INTERNAL MEDICINE

## 2019-04-10 PROCEDURE — 82570 ASSAY OF URINE CREATININE: CPT | Performed by: INTERNAL MEDICINE

## 2019-04-10 PROCEDURE — 85027 COMPLETE CBC AUTOMATED: CPT | Performed by: INTERNAL MEDICINE

## 2019-04-10 PROCEDURE — 25010000002 PIPERACILLIN SOD-TAZOBACTAM PER 1 G: Performed by: INTERNAL MEDICINE

## 2019-04-10 PROCEDURE — 87205 SMEAR GRAM STAIN: CPT | Performed by: INTERNAL MEDICINE

## 2019-04-10 PROCEDURE — 99233 SBSQ HOSP IP/OBS HIGH 50: CPT | Performed by: INTERNAL MEDICINE

## 2019-04-10 PROCEDURE — 25010000002 VANCOMYCIN 10 G RECONSTITUTED SOLUTION: Performed by: INTERNAL MEDICINE

## 2019-04-10 PROCEDURE — 94799 UNLISTED PULMONARY SVC/PX: CPT

## 2019-04-10 PROCEDURE — 87070 CULTURE OTHR SPECIMN AEROBIC: CPT | Performed by: INTERNAL MEDICINE

## 2019-04-10 PROCEDURE — 25010000002 ONDANSETRON PER 1 MG: Performed by: INTERNAL MEDICINE

## 2019-04-10 PROCEDURE — 25010000002 LINEZOLID 600 MG/300ML SOLUTION: Performed by: INTERNAL MEDICINE

## 2019-04-10 PROCEDURE — 87493 C DIFF AMPLIFIED PROBE: CPT | Performed by: INTERNAL MEDICINE

## 2019-04-10 RX ORDER — SODIUM CHLORIDE 9 MG/ML
125 INJECTION, SOLUTION INTRAVENOUS CONTINUOUS
Status: DISCONTINUED | OUTPATIENT
Start: 2019-04-10 | End: 2019-04-10

## 2019-04-10 RX ORDER — LOPERAMIDE HYDROCHLORIDE 2 MG/1
2 CAPSULE ORAL 4 TIMES DAILY PRN
Status: DISCONTINUED | OUTPATIENT
Start: 2019-04-10 | End: 2019-04-15 | Stop reason: HOSPADM

## 2019-04-10 RX ORDER — LINEZOLID 2 MG/ML
600 INJECTION, SOLUTION INTRAVENOUS EVERY 12 HOURS
Status: DISCONTINUED | OUTPATIENT
Start: 2019-04-10 | End: 2019-04-11

## 2019-04-10 RX ORDER — SODIUM CHLORIDE 9 MG/ML
125 INJECTION, SOLUTION INTRAVENOUS CONTINUOUS
Status: DISCONTINUED | OUTPATIENT
Start: 2019-04-10 | End: 2019-04-12

## 2019-04-10 RX ADMIN — VANCOMYCIN HYDROCHLORIDE 1250 MG: 10 INJECTION, POWDER, LYOPHILIZED, FOR SOLUTION INTRAVENOUS at 06:08

## 2019-04-10 RX ADMIN — ONDANSETRON HYDROCHLORIDE 4 MG: 4 TABLET, FILM COATED ORAL at 22:31

## 2019-04-10 RX ADMIN — ONDANSETRON HYDROCHLORIDE 4 MG: 4 TABLET, FILM COATED ORAL at 12:22

## 2019-04-10 RX ADMIN — LOPERAMIDE HYDROCHLORIDE 2 MG: 2 CAPSULE ORAL at 19:54

## 2019-04-10 RX ADMIN — GUAIFENESIN 600 MG: 600 TABLET, EXTENDED RELEASE ORAL at 22:29

## 2019-04-10 RX ADMIN — TAZOBACTAM SODIUM AND PIPERACILLIN SODIUM 4.5 G: 500; 4 INJECTION, SOLUTION INTRAVENOUS at 12:22

## 2019-04-10 RX ADMIN — MONTELUKAST SODIUM 10 MG: 10 TABLET, COATED ORAL at 22:29

## 2019-04-10 RX ADMIN — ONDANSETRON 4 MG: 2 INJECTION INTRAMUSCULAR; INTRAVENOUS at 06:14

## 2019-04-10 RX ADMIN — TAZOBACTAM SODIUM AND PIPERACILLIN SODIUM 4.5 G: 500; 4 INJECTION, SOLUTION INTRAVENOUS at 17:45

## 2019-04-10 RX ADMIN — GUAIFENESIN 600 MG: 600 TABLET, EXTENDED RELEASE ORAL at 08:39

## 2019-04-10 RX ADMIN — FLUTICASONE PROPIONATE 2 SPRAY: 50 SPRAY, METERED NASAL at 22:37

## 2019-04-10 RX ADMIN — BUSPIRONE HYDROCHLORIDE 15 MG: 10 TABLET ORAL at 06:08

## 2019-04-10 RX ADMIN — BUDESONIDE 0.5 MG: 0.5 INHALANT RESPIRATORY (INHALATION) at 09:48

## 2019-04-10 RX ADMIN — ESCITALOPRAM OXALATE 10 MG: 10 TABLET ORAL at 08:39

## 2019-04-10 RX ADMIN — Medication 250 MG: at 22:28

## 2019-04-10 RX ADMIN — TAZOBACTAM SODIUM AND PIPERACILLIN SODIUM 4.5 G: 500; 4 INJECTION, SOLUTION INTRAVENOUS at 02:02

## 2019-04-10 RX ADMIN — LINEZOLID 600 MG: 600 INJECTION, SOLUTION INTRAVENOUS at 08:40

## 2019-04-10 RX ADMIN — Medication 250 MG: at 08:39

## 2019-04-10 RX ADMIN — ACETAMINOPHEN 650 MG: 325 TABLET ORAL at 06:14

## 2019-04-10 RX ADMIN — BUDESONIDE 0.5 MG: 0.5 INHALANT RESPIRATORY (INHALATION) at 20:15

## 2019-04-10 RX ADMIN — FLUTICASONE PROPIONATE 2 SPRAY: 50 SPRAY, METERED NASAL at 08:42

## 2019-04-10 RX ADMIN — SODIUM CHLORIDE 125 ML/HR: 9 INJECTION, SOLUTION INTRAVENOUS at 12:23

## 2019-04-10 RX ADMIN — SODIUM CHLORIDE, PRESERVATIVE FREE 3 ML: 5 INJECTION INTRAVENOUS at 19:54

## 2019-04-10 RX ADMIN — BUSPIRONE HYDROCHLORIDE 15 MG: 10 TABLET ORAL at 22:29

## 2019-04-10 RX ADMIN — LINEZOLID 600 MG: 600 INJECTION, SOLUTION INTRAVENOUS at 22:27

## 2019-04-10 RX ADMIN — BUSPIRONE HYDROCHLORIDE 15 MG: 10 TABLET ORAL at 16:22

## 2019-04-10 NOTE — PLAN OF CARE
Problem: Patient Care Overview  Goal: Plan of Care Review  Outcome: Ongoing (interventions implemented as appropriate)   04/10/19 9527   Coping/Psychosocial   Plan of Care Reviewed With patient   OTHER   Outcome Summary VSS; Pt presents with decreased occupational endurance, deficits in ADL performance, fxl mobility. Supervision for bed mobility; CGA bed>chair transfer. Will benefit from skilled OT services to address deficits, facilitate increased fxl I. Recommend home with asist and HHOT at discharge.

## 2019-04-10 NOTE — OUTREACH NOTE
Sepsis Week 2 Survey      Responses   Facility patient discharged from?  [facility]   Does the patient have one of the following disease processes/diagnoses(primary or secondary)?  Sepsis   Week 2 attempt successful?  No   Revoke  Readmitted          Liseth Carlton RN

## 2019-04-10 NOTE — THERAPY EVALUATION
Acute Care - Occupational Therapy Initial Evaluation  [facility]     Patient Name: Zackary Noonan II  : 1955  MRN: [id number]  Today's Date: 4/10/2019  Onset of Illness/Injury or Date of Surgery: 19  Date of Referral to OT: 19  Referring Physician: Dr Camilo    Admit Date: 2019       ICD-10-CM ICD-9-CM   1. Acute respiratory failure with hypoxia (CMS/[facility]) J96.01 518.81   2. Pneumonia of right upper lobe due to infectious organism (CMS/[facility]) J18.1 486   3. Leukocytosis, unspecified type D72.829 288.60   4. Sepsis, due to unspecified organism (CMS/[facility]) A41.9 038.9     995.91   5. Impaired mobility and ADLs Z74.09 799.89     Patient Active Problem List   Diagnosis   • Pneumonia   • History of DVT (deep vein thrombosis)   • HTN (hypertension)   • Crohn's colitis (CMS/[facility])   • COPD (chronic obstructive pulmonary disease) (CMS/[facility])   • Asthma   • Infection of wound due to methicillin resistant Staphylococcus aureus (MRSA)   • H/O Pulmonary nodule   • Prediabetes   • Chronic anemia   • S/P total abdominal colectomy   • HO of IVC filter   • Pneumonia of right lower lobe due to infectious organism (CMS/[facility])   • History of C. difficile colitis s/p fecal transplant (2017)   • Sepsis (CMS/[facility])   • Left lower lobe pneumonia (CMS/[facility])   • Crohn's disease (CMS/[facility])   • Asthma   • Leukocytosis   • Influenza, pneumonia   • SIRS (systemic inflammatory response syndrome) (CMS/[facility])   • Acute respiratory failure with hypoxia (CMS/[facility])     Past Medical History:   Diagnosis Date   • Anxiety    • Arthritis    • Asthma    • Clostridium difficile infection 2017    treated per dr carter with fecal transplant     • H/O recurrent pneumonia    • Hypertension    • MRSA infection 2016    right lower extremity wound   • Pulmonary nodule     Followed by Dr. Galaviz    • Recurrent deep vein thrombosis (DVT) (CMS/[facility])     x 3 LLE; chronic anticoagulation therapy    • Ulcerative colitis (CMS/[facility])    • Wears glasses     • Wears hearing aid      Past Surgical History:   Procedure Laterality Date   • BRONCHOSCOPY      by Dr. Galaviz for pulmonary nodule   • BRONCHOSCOPY N/A 11/7/2016    Procedure: BRONCHOSCOPY;  Surgeon: Eben Roberts MD;  Location: [facility] ENDOSCOPY;  Service:    • COLON RESECTION N/A 9/19/2017    Procedure: TOTAL ABDOMINAL COLECTOMY WITH CREATION OF ILEOSTOMY;  Surgeon: David Nielsen MD;  Location:  [facility];  Service:    • COLONOSCOPY  08/2017   • UMBILICAL HERNIA REPAIR     • VASECTOMY     • VASECTOMY REVERSAL            OT ASSESSMENT FLOWSHEET (last 12 hours)      Occupational Therapy Evaluation     Row Name 04/10/19 0830                   OT Evaluation Time/Intention    Subjective Information  no complaints  -TA        Document Type  evaluation  -TA        Mode of Treatment  occupational therapy  -TA        Patient Effort  good  -TA        Symptoms Noted During/After Treatment  none  -TA           General Information    Patient Profile Reviewed?  yes  -TA        Onset of Illness/Injury or Date of Surgery  04/09/19  -TA        Referring Physician  Dr Camilo  -TA        Patient Observations  alert;cooperative;agree to therapy  -TA        Patient/Family Observations  No visitors present in room  -TA        General Observations of Patient  Pt supine in bed, O2 per NC, ostomy, IV intact RUE  -TA        Prior Level of Function  independent:;all household mobility;community mobility;gait;transfer;bed mobility;ADL's  -TA        Equipment Currently Used at Home  none  -TA        Pertinent History of Current Functional Problem  Pt to ED with weakness, dyspnea on exertion. Pt discharged [facility] 1 week ago after influenza. Pt followed for PNA.  -TA        Existing Precautions/Restrictions  fall;oxygen therapy device and L/min  -TA        Risks Reviewed  patient:;LOB;dizziness;increased discomfort;change in vital signs;lines disloged  -TA        Benefits Reviewed  patient:;improve function;increase  independence;increase strength;increase balance;decrease pain;increase knowledge  -TA        Barriers to Rehab  none identified  -TA           Relationship/Environment    Primary Source of Support/Comfort  spouse  -TA        Lives With  spouse  -TA           Resource/Environmental Concerns    Current Living Arrangements  home/apartment/condo  -TA           Cognitive Assessment/Intervention- PT/OT    Orientation Status (Cognition)  oriented x 4  -TA        Follows Commands (Cognition)  WFL  -TA        Safety Deficit (Cognitive)  mild deficit;insight into deficits/self awareness;safety precautions awareness;safety precautions follow-through/compliance  -TA           Safety Issues, Functional Mobility    Safety Issues Affecting Function (Mobility)  insight into deficits/self awareness;safety precautions follow-through/compliance;safety precaution awareness  -TA        Impairments Affecting Function (Mobility)  endurance/activity tolerance;shortness of breath  -TA           Bed Mobility Assessment/Treatment    Bed Mobility Assessment/Treatment  supine-sit  -TA        Supine-Sit [place] (Bed Mobility)  supervision;verbal cues  -TA           Transfer Assessment/Treatment    Transfer Assessment/Treatment  bed-chair transfer;sit-stand transfer;stand-sit transfer  -TA        Comment (Transfers)  VCs for safe technique  -TA           Bed-Chair Transfer    Bed-Chair [place] (Transfers)  contact guard;verbal cues  -TA           Sit-Stand Transfer    Sit-Stand [place] (Transfers)  contact guard;verbal cues  -TA           Stand-Sit Transfer    Stand-Sit [place] (Transfers)  contact guard;verbal cues  -TA           ADL Assessment/Intervention    BADL Assessment/Intervention  lower body dressing  -TA           Lower Body Dressing Assessment/Training    Lower Body Dressing [place] Level  don;socks;maximum assist (25% patient effort)  -TA        Lower Body Dressing Position  supine  -TA           BADL  Safety/Performance    Impairments, BADL Safety/Performance  endurance/activity tolerance;shortness of breath  -TA        Skilled BADL Treatment/Intervention  BADL process/adaptation training  -TA           General ROM    GENERAL ROM COMMENTS  BUE AROM WFL  -TA           MMT (Manual Muscle Testing)    General MMT Comments  BUE 5/5  -TA           Motor Assessment/Interventions    Additional Documentation  Balance Interventions (Group);Balance (Group)  -TA           Balance    Balance  dynamic sitting balance;static standing balance  -TA           Dynamic Sitting Balance    Level of [place], Reaches Outside Midline (Sitting, Dynamic Balance)  supervision  -TA        Sitting Position, Reaches Outside Midline (Sitting, Dynamic Balance)  sitting on edge of bed  -TA        Comment, Reaches Outside Midline (Sitting, Dynamic Balance)  MMT  -TA           Static Standing Balance    Level of [place] (Supported Standing, Static Balance)  contact guard assist  -TA        Time Able to Maintain Position (Supported Standing, Static Balance)  30 to 45 seconds  -TA           Sensory Assessment/Intervention    Sensory General Assessment  no sensation deficits identified;other (see comments) BUE intact  -TA           Positioning and Restraints    Pre-Treatment Position  in bed  -TA        Post Treatment Position  chair  -TA        In Chair  notified nsg;reclined;call light within reach;encouraged to call for assist;exit alarm on;waffle cushion;legs elevated  -TA           Pain Assessment    Additional Documentation  Pain Scale: Numbers Pre/Post-Treatment (Group)  -TA           Pain Scale: Numbers Pre/Post-Treatment    Pain Scale: Numbers, Pretreatment  2/10  -TA        Pain Scale: Numbers, Post-Treatment  2/10  -TA        Pain Location - Side  Right  -TA        Pain Location  abdomen  -TA        Pre/Post Treatment Pain Comment  tolerated  -TA        Pain Intervention(s)  Medication (See MAR);Repositioned;Ambulation/increased  activity  -TA           Coping    Observed Emotional State  calm;cooperative  -TA        Verbalized Emotional State  acceptance  -TA           Plan of Care Review    Plan of Care Reviewed With  patient  -TA           Clinical Impression (OT)    Date of Referral to OT  04/09/19  -TA        OT Diagnosis  Impaired mobility and ADLs  -TA        Patient/Family Goals Statement (OT Eval)  Breathe better  -TA        Criteria for Skilled Therapeutic Interventions Met (OT Eval)  yes;treatment indicated  -TA        Rehab Potential (OT Eval)  good, to achieve stated therapy goals  -TA        Therapy Frequency (OT Eval)  daily Per priority policy  -TA        Care Plan Review (OT)  evaluation/treatment results reviewed;risks/benefits reviewed;patient/other agree to care plan  -TA        Anticipated Discharge Disposition (OT)  home with assist;home with home health  -TA           Vital Signs    Pre Systolic BP Rehab  -- VSS; RN cleared pt for tx  -TA        Pre Patient Position  Supine  -TA        Intra Patient Position  Standing  -TA        Post Patient Position  Sitting  -TA           Planned OT Interventions    Planned Therapy Interventions (OT Eval)  activity tolerance training;BADL retraining;occupation/activity based interventions;transfer/mobility retraining;ROM/therapeutic exercise;strengthening exercise  -TA           OT Goals    Transfer Goal Selection (OT)  transfer, OT goal 1  -TA        Dressing Goal Selection (OT)  dressing, OT goal 1  -TA        Activity Tolerance Goal Selection (OT)  activity tolerance, OT goal 1  -TA        Additional Documentation  Activity Tolerance Goal Selection (OT) (Row)  -TA           Transfer Goal 1 (OT)    Activity/Assistive Device (Transfer Goal 1, OT)  sit-to-stand/stand-to-sit;bed-to-chair/chair-to-bed  -TA        [place] Level/Cues Needed (Transfer Goal 1, OT)  conditional independence  -TA        Time Frame (Transfer Goal 1, OT)  by discharge  -TA        Progress/Outcome  (Transfer Goal 1, OT)  goal ongoing  -TA           Dressing Goal 1 (OT)    Activity/Assistive Device (Dressing Goal 1, OT)  lower body dressing  -TA        [place]/Cues Needed (Dressing Goal 1, OT)  conditional independence  -TA        Time Frame (Dressing Goal 1, OT)  by discharge  -TA        Progress/Outcome (Dressing Goal 1, OT)  goal ongoing  -TA            Activity Tolerance Goal 1 (OT)    Activity Level (Endurance Goal 1, OT)  10 min activity;O2 sat >/ equal to 88% 1 rest break  -TA        Time Frame (Activity Tolerance Goal 1, OT)  by discharge  -TA        Progress/Outcome (Activity Tolerance Goal 1, OT)  goal ongoing  -TA          User Key  (r) = Recorded By, (t) = Taken By, (c) = Cosigned By    Initials Name Effective Dates    Jatin Sanchez OT 03/14/16 -          Occupational Therapy Education     Title: PT OT SLP Therapies (In Progress)     Topic: Occupational Therapy (In Progress)     Point: Precautions (Done)     Description: Instruct learner(s) on prescribed precautions during self-care and functional transfers.    Learning Progress Summary           Patient Acceptance, E, VU by SOBEIDA at 4/10/2019  9:18 AM    Comment:  Body mechanics with fxl transfers; reinforced need for call for assist with OOB activities.                   Point: Body mechanics (Done)     Description: Instruct learner(s) on proper positioning and spine alignment during self-care, functional mobility activities and/or exercises.    Learning Progress Summary           Patient Acceptance, E, VU by TA at 4/10/2019  9:18 AM    Comment:  Body mechanics with fxl transfers; reinforced need for call for assist with OOB activities.                               User Key     Initials Effective Dates Name Provider Type Discipline    SOBEIDA 03/14/16 -  Jatin Wen OT Occupational Therapist OT                  OT Recommendation and Plan  Outcome Summary/Treatment Plan (OT)  Anticipated Discharge Disposition (OT): home with  assist, home with home health  Planned Therapy Interventions (OT Eval): activity tolerance training, BADL retraining, occupation/activity based interventions, transfer/mobility retraining, ROM/therapeutic exercise, strengthening exercise  Therapy Frequency (OT Eval): daily(Per priority policy)  Plan of Care Review  Plan of Care Reviewed With: patient  Plan of Care Reviewed With: patient  Outcome Summary: VSS; Pt presents with decreased occupational endurance, deficits in ADL performance, fxl mobility. Supervision for bed mobility; CGA bed>chair transfer. Will benefit from skilled OT services to address deficits, facilitate increased fxl I. Recommend home with asist and HHOT at discharge.     Outcome Measures     Row Name 04/10/19 0830             How much help from another is currently needed...    Putting on and taking off regular lower body clothing?  3  -TA      Bathing (including washing, rinsing, and drying)  3  -TA      Toileting (which includes using toilet bed pan or urinal)  3  -TA      Putting on and taking off regular upper body clothing  4  -TA      Taking care of personal grooming (such as brushing teeth)  4  -TA      Eating meals  4  -TA      Score  21  -TA         Functional Assessment    Outcome Measure Options  AM-PAC 6 Clicks Daily Activity (OT)  -TA        User Key  (r) = Recorded By, (t) = Taken By, (c) = Cosigned By    Initials Name Provider Type    Jatin Sanchez OT Occupational Therapist          Time Calculation:   Time Calculation- OT     Row Name 04/10/19 0922             Time Calculation- OT    OT Start Time  0830 ttc 8 minutes  -TA      Total Timed Code Minutes- OT  8 minute(s)  -TA      OT Received On  04/10/19  -TA      OT Goal Re-Cert Due Date  04/20/19  -TA        User Key  (r) = Recorded By, (t) = Taken By, (c) = Cosigned By    Initials Name Provider Type    Jatin Sanchez OT Occupational Therapist        Therapy Charges for Today     Code Description Service Date  Service Provider Modifiers Qty    [id number]  OT EVAL MOD COMPLEXITY 4 4/10/2019 Jatin Wen, OT GO 1    [id number]  OT THERAPEUTIC ACT EA 15 MIN 4/10/2019 Jatin Wen, OT GO 1               Jatin Wen OT  4/10/2019

## 2019-04-10 NOTE — THERAPY EVALUATION
Acute Care - Physical Therapy Initial Evaluation  [facility]     Patient Name: Zackary Noonan II  : 1955  MRN: [id number]  Today's Date: 4/10/2019   Onset of Illness/Injury or Date of Surgery: 19  Date of Referral to PT: 04/10/19  Referring Physician: Leslee      Admit Date: 2019    Visit Dx:     ICD-10-CM ICD-9-CM   1. Acute respiratory failure with hypoxia (CMS/[facility]) J96.01 518.81   2. Pneumonia of right upper lobe due to infectious organism (CMS/[facility]) J18.1 486   3. Leukocytosis, unspecified type D72.829 288.60   4. Sepsis, due to unspecified organism (CMS/[facility]) A41.9 038.9     995.91   5. Impaired mobility and ADLs Z74.09 799.89   6. Impaired functional mobility, balance, gait, and endurance Z74.09 V49.89     Patient Active Problem List   Diagnosis   • Pneumonia   • History of DVT (deep vein thrombosis)   • HTN (hypertension)   • Crohn's colitis (CMS/[facility])   • COPD (chronic obstructive pulmonary disease) (CMS/[facility])   • Asthma   • Infection of wound due to methicillin resistant Staphylococcus aureus (MRSA)   • H/O Pulmonary nodule   • Prediabetes   • Chronic anemia   • S/P total abdominal colectomy   • HO of IVC filter   • Pneumonia of right lower lobe due to infectious organism (CMS/[facility])   • History of C. difficile colitis s/p fecal transplant (2017)   • Sepsis (CMS/[facility])   • Left lower lobe pneumonia (CMS/[facility])   • Crohn's disease (CMS/[facility])   • Asthma   • Leukocytosis   • Influenza, pneumonia   • SIRS (systemic inflammatory response syndrome) (CMS/[facility])   • Acute respiratory failure with hypoxia (CMS/[facility])     Past Medical History:   Diagnosis Date   • Anxiety    • Arthritis    • Asthma    • Clostridium difficile infection 2017    treated per dr carter with fecal transplant     • H/O recurrent pneumonia    • Hypertension    • MRSA infection 2016    right lower extremity wound   • Pulmonary nodule     Followed by Dr. Galaviz    • Recurrent deep vein thrombosis (DVT) (CMS/[facility])     x 3 LLE;  chronic anticoagulation therapy    • Ulcerative colitis (CMS/HCC)    • Wears glasses    • Wears hearing aid      Past Surgical History:   Procedure Laterality Date   • BRONCHOSCOPY      by Dr. Galaviz for pulmonary nodule   • BRONCHOSCOPY N/A 11/7/2016    Procedure: BRONCHOSCOPY;  Surgeon: Eben Roberts MD;  Location: [facility] ENDOSCOPY;  Service:    • COLON RESECTION N/A 9/19/2017    Procedure: TOTAL ABDOMINAL COLECTOMY WITH CREATION OF ILEOSTOMY;  Surgeon: David Nielsen MD;  Location: [facility] OR;  Service:    • COLONOSCOPY  08/2017   • UMBILICAL HERNIA REPAIR     • VASECTOMY     • VASECTOMY REVERSAL          PT ASSESSMENT (last 12 hours)      Physical Therapy Evaluation     Row Name 04/10/19 1000          PT Evaluation Time/Intention    Subjective Information  complains of;fatigue;weakness;nausea/vomiting  -SC     Document Type  evaluation  -SC     Mode of Treatment  physical therapy  -SC     Patient Effort  good  -SC     Symptoms Noted During/After Treatment  significant change in vital signs  -SC     Row Name 04/10/19 1000          General Information    Patient Profile Reviewed?  yes  -SC     Onset of Illness/Injury or Date of Surgery  04/09/19  -SC     Referring Physician  Leslee  -SC     Patient Observations  alert;cooperative;agree to therapy  -SC     Patient/Family Observations  none  -SC     General Observations of Patient  up in chair   -SC     Prior Level of Function  independent:;community mobility;gait;all household mobility  -SC     Equipment Currently Used at Home  none  -SC     Pertinent History of Current Functional Problem  To ED with dyspnea. Recent admission with influenza. Now dx with PNA, sepsis, ARF  -SC     Existing Precautions/Restrictions  fall;oxygen therapy device and L/min  -SC     Risks Reviewed  patient:;increased discomfort;change in vital signs  -SC     Benefits Reviewed  patient:;improve function;increase independence  -SC     Barriers to Rehab  none identified  -SC     Row Name  04/10/19 1000          Relationship/Environment    Lives With  spouse  -SC     Row Name 04/10/19 1000          Resource/Environmental Concerns    Current Living Arrangements  home/apartment/condo  -SC     Row Name 04/10/19 1000          Cognitive Assessment/Intervention- PT/OT    Orientation Status (Cognition)  oriented x 3  -SC     Follows Commands (Cognition)  WFL  -SC     Safety Deficit (Cognitive)  insight into deficits/self awareness  -SC     Row Name 04/10/19 1000          Safety Issues, Functional Mobility    Safety Issues Affecting Function (Mobility)  insight into deficits/self awareness  -SC     Impairments Affecting Function (Mobility)  endurance/activity tolerance  -SC     Row Name 04/10/19 1000          Bed Mobility Assessment/Treatment    Bed Mobility Assessment/Treatment  sit-supine  -SC     Supine-Sit [place] (Bed Mobility)  independent  -SC     Row Name 04/10/19 1000          Transfer Assessment/Treatment    Transfer Assessment/Treatment  sit-stand transfer;stand-sit transfer  -SC     Sit-Stand [place] (Transfers)  supervision  -SC     Stand-Sit [place] (Transfers)  supervision  -SC     Row Name 04/10/19 1000          Gait/Stairs Assessment/Training    Gait/Stairs Assessment/Training  gait/ambulation independence  -SC     [place] Level (Gait)  contact guard  -SC     Assistive Device (Gait)  walker, front-wheeled  -SC     Distance in Feet (Gait)  40  -SC     Pattern (Gait)  step-through  -SC     Bilateral Gait Deviations  forward flexed posture  -SC     Comment (Gait/Stairs)  cues to stand taller  -SC     Row Name 04/10/19 1000          General ROM    GENERAL ROM COMMENTS  wnl  -SC     Row Name 04/10/19 1000          MMT (Manual Muscle Testing)    General MMT Comments  grossly 5/5  -SC     Row Name 04/10/19 1000          Motor Assessment/Intervention    Additional Documentation  Balance (Group);Balance Interventions (Group);Therapeutic Exercise (Group);Therapeutic Exercise  Interventions (Group)  -[facility] Name 04/10/19 1000          Therapeutic Exercise    Lower Extremity (Therapeutic Exercise)  LAQ (long arc quad), bilateral;gastroc stretch, bilateral;quad sets, bilateral  -SC     Exercise Type (Therapeutic Exercise)  AROM (active range of motion);isometric contraction, static  -SC     Position (Therapeutic Exercise)  seated  -SC     Sets/Reps (Therapeutic Exercise)  10  -SC     Row Name 04/10/19 1000          Balance    Balance  dynamic standing balance  -SC     Row Name 04/10/19 1000          Dynamic Standing Balance    Level of [place], Reaches Outside Midline (Standing, Dynamic Balance)  contact guard assist  -SC     Assistive Device Utilized (Supported Standing, Dynamic Balance)  walker, rolling  -SC     Row Name 04/10/19 1000          Sensory Assessment/Intervention    Sensory General Assessment  no sensation deficits identified  -SC     Row Name 04/10/19 1000          Pain Assessment    Additional Documentation  Pain Scale: Numbers Pre/Post-Treatment (Group);Pain Scale: FACES Pre/Post-Treatment (Group)  -SC     Row Name 04/10/19 1000          Pain Scale: FACES Pre/Post-Treatment    Pain: FACES Scale, Pretreatment  0-->no hurt  -SC     Pain: FACES Scale, Post-Treatment  0-->no hurt  -SC     Row Name 04/10/19 1000          Coping    Observed Emotional State  calm;cooperative  -SC     Verbalized Emotional State  acceptance  -SC     Row Name 04/10/19 1000          Plan of Care Review    Plan of Care Reviewed With  patient  -SC     Row Name 04/10/19 1000          Physical Therapy Clinical Impression    Date of Referral to PT  04/10/19  -SC     PT Diagnosis (PT Clinical Impression)  impaired activity indurance  -SC     Patient/Family Goals Statement (PT Clinical Impression)  go home  -SC     Criteria for Skilled Interventions Met (PT Clinical Impression)  yes;treatment indicated  -SC     Pathology/Pathophysiology Noted (Describe Specifically for Each System)   pulmonary;cardiovascular  -SC     Impairments Found (describe specific impairments)  aerobic capacity/endurance;gait, locomotion, and balance;muscle performance  -SC     Rehab Potential (PT Clinical Summary)  good, to achieve stated therapy goals  -SC     Care Plan Review (PT)  risks/benefits reviewed  -SC     Row Name 04/10/19 1000          Vital Signs    Pretreatment Heart Rate (beats/min)  100  -SC     Intratreatment Heart Rate (beats/min)  115  -SC     Posttreatment Heart Rate (beats/min)  103  -SC     Post SpO2 (%)  96  -SC     O2 Delivery Post Treatment  supplemental O2 6L   -SC     Row Name 04/10/19 1000          Physical Therapy Goals    Gait Training Goal Selection (PT)  gait training, PT goal 1  -SC     Row Name 04/10/19 1000          Gait Training Goal 1 (PT)    Activity/Assistive Device (Gait Training Goal 1, PT)  gait (walking locomotion)  -SC     [place] Level (Gait Training Goal 1, PT)  independent  -SC     Distance (Gait Goal 1, PT)  600  -SC     Time Frame (Gait Training Goal 1, PT)  long term goal (LTG);10 days  -SC     Row Name 04/10/19 1000          Positioning and Restraints    Pre-Treatment Position  sitting in chair/recliner  -SC     Post Treatment Position  bed  -SC     In Bed  supine;notified nsg;call light within reach;encouraged to call for assist;exit alarm on  -SC     Row Name 04/10/19 1000          Living Environment    Home Accessibility  stairs within home  -SC       User Key  (r) = Recorded By, (t) = Taken By, (c) = Cosigned By    Initials Name Provider Type    SC Ankit Garcia, PT Physical Therapist        Physical Therapy Education     Title: PT OT SLP Therapies (In Progress)     Topic: Physical Therapy (Done)     Point: Mobility training (Done)     Learning Progress Summary           Patient MIKAYLA Lala, CRISTEL,ANIYA,NR by SC at 4/10/2019 10:00 AM    Comment:  reviewed  benefits of activity                   Point: Home exercise program (Done)     Learning Progress Summary            Patient MIKAYLA Lala, CRISTEL,ANIYA,NR by SC at 4/10/2019 10:00 AM    Comment:  reviewed  benefits of activity                   Point: Body mechanics (Done)     Learning Progress Summary           Patient MIKAYLA Lala CRISTEL,ANIYA,NR by SC at 4/10/2019 10:00 AM    Comment:  reviewed  benefits of activity                   Point: Precautions (Done)     Learning Progress Summary           Patient MIKAYLA Lala VU,ANIYA,NR by SC at 4/10/2019 10:00 AM    Comment:  reviewed  benefits of activity                               User Key     Initials Effective Dates Name Provider Type Discipline    SC 06/19/15 -  Ankit Garcia, PT Physical Therapist PT              PT Recommendation and Plan  Anticipated Discharge Disposition (PT): home with assist  Planned Therapy Interventions (PT Eval): home exercise program, patient/family education, stretching  Therapy Frequency (PT Clinical Impression): daily  Outcome Summary/Treatment Plan (PT)  Anticipated Discharge Disposition (PT): home with assist  Plan of Care Reviewed With: patient  Progress: improving  Outcome Summary: Patient presents with decreased endurance to activity with some tachycardia noted. Will benefit from skilled PT at this time.  Outcome Measures     Row Name 04/10/19 1000 04/10/19 0830          How much help from another person do you currently need...    Turning from your back to your side while in flat bed without using bedrails?  4  -SC  --     Moving from lying on back to sitting on the side of a flat bed without bedrails?  4  -SC  --     Moving to and from a bed to a chair (including a wheelchair)?  3  -SC  --     Standing up from a chair using your arms (e.g., wheelchair, bedside chair)?  3  -SC  --     Climbing 3-5 steps with a railing?  3  -SC  --     To walk in hospital room?  3  -SC  --     AM-PAC 6 Clicks Score  20  -SC  --        How much help from another is currently needed...    Putting on and taking off regular lower body clothing?  --  3  -TA     Bathing (including  washing, rinsing, and drying)  --  3  -TA     Toileting (which includes using toilet bed pan or urinal)  --  3  -TA     Putting on and taking off regular upper body clothing  --  4  -TA     Taking care of personal grooming (such as brushing teeth)  --  4  -TA     Eating meals  --  4  -TA     Score  --  21  -TA        Functional Assessment    Outcome Measure Options  AM-PAC 6 Clicks Basic Mobility (PT)  -SC  AM-PAC 6 Clicks Daily Activity (OT)  -TA       User Key  (r) = Recorded By, (t) = Taken By, (c) = Cosigned By    Initials Name Provider Type    SC Ankit Garcia, PT Physical Therapist    TA Jatin Wen, OT Occupational Therapist         Time Calculation:   PT Charges     Row Name 04/10/19 1000             Time Calculation    Start Time  1000  -SC      PT Received On  04/10/19  -SC      PT Goal Re-Cert Due Date  04/20/19  -SC        User Key  (r) = Recorded By, (t) = Taken By, (c) = Cosigned By    Initials Name Provider Type    SC Ankit Garcia, PT Physical Therapist        Therapy Charges for Today     Code Description Service Date Service Provider Modifiers Qty    [id number]  PT EVAL MOD COMPLEXITY 4 4/10/2019 Ankit Garcia PT GP 1          PT G-Codes  Outcome Measure Options: AM-PAC 6 Clicks Basic Mobility (PT)  AM-PAC 6 Clicks Score: 20  Score: 21      Ankit Garcia PT  4/10/2019

## 2019-04-10 NOTE — PROGRESS NOTES
[facility] Medicine Services  PROGRESS NOTE    Patient Name: Zackary Noonan II  : 1955  MRN: [id number]    Date of Admission: 2019  Length of Stay: 1  Primary Care Physician: Jillian Layne MD    Subjective   Subjective     CC: f/u PNA    HPI: Up in bed. Feels much better but still having a great deal of diarrhea. Breathing is better.    Review of Systems  Gen- No fevers, chills  CV- No chest pain, palpitations  GI- No N/V/D, abd pain    Otherwise ROS is negative except as mentioned in the HPI.    Objective   Objective     Vital Signs:   Temp:  [98.4 °F (36.9 °C)-102.5 °F (39.2 °C)] 98.5 °F (36.9 °C)  Heart Rate:  [103-129] 106  Resp:  [16-20] 16  BP: (104-142)/(51-82) 117/57        Physical Exam:  Constitutional: No acute distress, awake, alert, looks better  HENT: NCAT, mucous membranes moist  Respiratory: WOB better, rhonchi on right.  Cardiovascular: RRR, no murmurs, rubs, or gallops, palpable pedal pulses bilaterally  Gastrointestinal: Positive bowel sounds, soft, nontender, nondistended, right sided ostomy noted  Musculoskeletal: No bilateral ankle edema  Psychiatric: Appropriate affect, cooperative  Neurologic: Oriented x 3, strength symmetric in all extremities, Cranial Nerves grossly intact to confrontation, speech clear  Skin: No rashes    Results Reviewed:  I have personally reviewed current lab, radiology, and data and agree.    Results from last 7 days   Lab Units 04/10/19  0559 19  1752 19  1226   WBC 10*3/mm3 30.16*  --  32.35*   HEMOGLOBIN g/dL 12.8*  --  15.4   HEMATOCRIT % 39.1  --  44.8   PLATELETS 10*3/mm3 362  --  399   INR  4.45* 4.04* 3.79*     Results from last 7 days   Lab Units 04/10/19  0559 19  1226   SODIUM mmol/L 130* 134*   POTASSIUM mmol/L 4.5 5.1   CHLORIDE mmol/L 100 95*   CO2 mmol/L 16.0* 23.0   BUN mg/dL 17 9   CREATININE mg/dL 2.10* 1.07   GLUCOSE mg/dL 150* 92   CALCIUM mg/dL 8.3* 9.2   ALT (SGPT) U/L  --  17   AST  (SGOT) U/L  --  21     Estimated Creatinine Clearance: 37.8 mL/min (A) (by C-G formula based on SCr of 2.1 mg/dL (H)).    No results found for: BNP    Microbiology Results Abnormal     Procedure Component Value - Date/Time    Blood Culture - Blood, Arm, Right [[id number]] Collected:  04/09/19 1225    Lab Status:  Preliminary result Specimen:  Blood from Arm, Right Updated:  04/10/19 1300     Blood Culture No growth at 24 hours    Blood Culture - Blood, Arm, Left [[id number]] Collected:  04/09/19 1215    Lab Status:  Preliminary result Specimen:  Blood from Arm, Left Updated:  04/10/19 1300     Blood Culture No growth at 24 hours    Respiratory Culture - Sputum, Cough [[id number]] Collected:  04/10/19 0859    Lab Status:  Preliminary result Specimen:  Sputum from Cough Updated:  04/10/19 1130     Gram Stain Moderate (3+) WBCs per low power field      Few (2+) Epithelial cells per low power field      Moderate (3+) Budding yeast with pseudohyphae      Few (2+) Normal respiratory skyler    S. Pneumo Ag Urine or CSF - Urine, Urine, Clean Catch [[id number]]  (Abnormal) Collected:  04/09/19 2333    Lab Status:  Final result Specimen:  Urine, Clean Catch Updated:  04/10/19 0912     Strep Pneumo Ag Positive    Legionella Antigen, Urine - Urine, Urine, Clean Catch [[id number]]  (Normal) Collected:  04/09/19 2333    Lab Status:  Final result Specimen:  Urine, Clean Catch Updated:  04/10/19 0656     LEGIONELLA ANTIGEN, URINE Negative          Imaging Results (last 24 hours)     Procedure Component Value Units Date/Time    XR Chest 1 View [[id number]] Collected:  04/09/19 1221     Updated:  04/09/19 1352    Narrative:       EXAMINATION: XR CHEST 1 VW-      INDICATION: Shortness of air triage protocol.     TECHNIQUE:  Single view frontal chest.     COMPARISONS: CT 04/01/2019.     FINDINGS:  Since the comparison CT from 8 days ago, there is new  airspace disease in the upper lobe on the right, possibly also the  middle lobe. There is  also background interstitial prominence in  longstanding basilar opacity. The heart is mildly enlarged. There may be  trace volume effusions. No pneumothorax.       Impression:       New airspace disease in the upper lobe and possibly the  middle lobe on the right.     D:  04/09/2019  E:  04/09/2019     This report was finalized on 4/9/2019 1:49 PM by Bebo Le.                  I have reviewed the medications:    Current Facility-Administered Medications:   •  acetaminophen (TYLENOL) tablet 650 mg, 650 mg, Oral, Q4H PRN, Leslee, Anny M II, DO, 650 mg at 04/10/19 0614  •  albuterol (PROVENTIL) nebulizer solution 0.083% 2.5 mg/3mL, 2.5 mg, Nebulization, Q6H PRN, Leslee, Anny M II, DO  •  budesonide (PULMICORT) nebulizer solution 0.5 mg, 0.5 mg, Nebulization, BID - RT, Leslee, Anny M II, DO, 0.5 mg at 04/10/19 0948  •  busPIRone (BUSPAR) tablet 15 mg, 15 mg, Oral, Q8H, Leslee, Anny M II, DO, 15 mg at 04/10/19 0608  •  cetirizine (zyrTEC) tablet 10 mg, 10 mg, Oral, Daily, Leslee, Anny M II, DO, 10 mg at 04/09/19 2059  •  escitalopram (LEXAPRO) tablet 10 mg, 10 mg, Oral, Daily, Leslee, Anny M II, DO, 10 mg at 04/10/19 0839  •  fluticasone (FLONASE) 50 MCG/ACT nasal spray 2 spray, 2 spray, Nasal, BID, Leslee, Anny M II, DO, 2 spray at 04/10/19 0842  •  guaiFENesin (MUCINEX) 12 hr tablet 600 mg, 600 mg, Oral, Q12H, Leslee, Anny M II, DO, 600 mg at 04/10/19 0839  •  Linezolid (ZYVOX) 600 mg 300 mL, 600 mg, Intravenous, Q12H, Leslee, Anny M II, DO, Last Rate: 300 mL/hr at 04/10/19 0840, 600 mg at 04/10/19 0840  •  Magnesium Sulfate 2 gram Bolus, followed by 8 gram infusion (total Mg dose 10 grams)- Mg less than or equal to 1mg/dL, 2 g, Intravenous, PRN **OR** Magnesium Sulfate 2 gram / 50mL Infusion (GIVE X 3 BAGS TO EQUAL 6GM TOTAL DOSE) - Mg 1.1 - 1.5 mg/dl, 2 g, Intravenous, PRN **OR** Magnesium Sulfate 4 gram infusion- Mg 1.6-1.9 mg/dL, 4 g, Intravenous, PRN, Anny Camilo II, DO  •  melatonin  tablet 5 mg, 5 mg, Oral, Nightly PRN, Leslee, Anny M II, DO  •  montelukast (SINGULAIR) tablet 10 mg, 10 mg, Oral, Nightly, Leslee, Anny M II, DO, 10 mg at 04/09/19 2059  •  ondansetron (ZOFRAN) tablet 4 mg, 4 mg, Oral, Q6H PRN, 4 mg at 04/10/19 1222 **OR** ondansetron (ZOFRAN) injection 4 mg, 4 mg, Intravenous, Q6H PRN, Leslee, Anny M II, DO, 4 mg at 04/10/19 0614  •  Pharmacy to dose warfarin, , Does not apply, Continuous PRN, Leslee, Anny M II, DO  •  piperacillin-tazobactam (ZOSYN) 4.5 g in iso-osmotic dextrose 100 mL IVPB (premix), 4.5 g, Intravenous, Q8H, Leslee, Anny M II, DO, 4.5 g at 04/10/19 1222  •  potassium chloride (MICRO-K) CR capsule 40 mEq, 40 mEq, Oral, PRN **OR** potassium chloride (KLOR-CON) packet 40 mEq, 40 mEq, Oral, PRN **OR** potassium chloride 10 mEq in 100 mL IVPB, 10 mEq, Intravenous, Q1H PRN, Leslee, Anny M II, DO  •  saccharomyces boulardii (FLORASTOR) capsule 250 mg, 250 mg, Oral, BID, Leslee, Anny M II, DO, 250 mg at 04/10/19 0839  •  sennosides-docusate sodium (SENOKOT-S) 8.6-50 MG tablet 2 tablet, 2 tablet, Oral, BID PRN, Leslee, Anny M II, DO  •  sodium chloride 0.9 % flush 3 mL, 3 mL, Intravenous, Q12H, Leslee, Anny M II, DO, 3 mL at 04/09/19 2100  •  sodium chloride 0.9 % flush 3-10 mL, 3-10 mL, Intravenous, PRN, Leslee, Anny M II, DO  •  sodium chloride 0.9 % infusion, 125 mL/hr, Intravenous, Continuous, Leslee, Anny M II, DO, Last Rate: 125 mL/hr at 04/10/19 1223, 125 mL/hr at 04/10/19 1223  •  sodium chloride 0.9 % infusion, 125 mL/hr, Intravenous, Continuous, Anny Camilo II, DO  •  warfarin (COUMADIN) (dosing per levels), , Does not apply, Daily, Anny Camilo II, DO      Assessment/Plan   Assessment / Plan     Active Hospital Problems    Diagnosis POA   • **Acute respiratory failure with hypoxia (CMS/[facility]) [J96.01] Yes   • RAMYA (acute kidney injury) (CMS/[facility]) [N17.9] Yes   • Sepsis (CMS/[facility]) [A41.9] Yes   • History of C. difficile colitis s/p  fecal transplant (8/2017) [Z86.19] Not Applicable   • Hyponatremia [E87.1] Yes   • Pneumonia [J18.9] Yes   • History of DVT (deep vein thrombosis) [Z86.718] Not Applicable     LLE; x 3; chronic coumadin therapy     • Diarrhea [R19.7] Yes       Brief Hospital Course to date:  Zackary Noonan II is a 64 y/o male presenting from home with with fatigue and SOA due to post influenza bacterial pneumonia.     A/P:     Acute hypoxic respiratory failure due to pneumonia  Sepsis due to post influenza RLL bacterial pneumonia, likely streptococcus  --Switch to zyvox given RAMYA. Continue zosyn for now. Blood cx, sputum cx, urinary antigen + for strep pneumonia. If cx remain neg x 48 hours will deescalate to therapy for streptococcus.  --Has hx of bronchiectasis and was previously followed by Dr. Watkins at [facility]. If doesn't improve as expected may need pulmonary eval here for potential bronch.  --Continue home nebs/inhalers.     RAMYA  --Likely due to sepsis and volume depletion vs vanc toxicity. Stop vanc as above and check urine lytes.  --Strict I's and O's.   --BMP in am.    Hyponatremia, mild  --Continue IVF. Monitor.    Diarrhea  --Check c diff. If neg can use imodium.     Hx of DVT  Supratherapeutic INR  --On coumadin. Dosing per pharmacy.     DVT prophylaxis: Coumadin    Disposition: I expect the patient to be discharged home in 2-4 days.    CODE STATUS:   Code Status and Medical Interventions:   Ordered at: 04/09/19 1422     Code Status:    CPR     Medical Interventions (Level of Support Prior to Arrest):    Full         Electronically signed by Anny Camilo II, DO, 04/10/19, 1:39 PM.

## 2019-04-10 NOTE — PLAN OF CARE
Problem: Patient Care Overview  Goal: Plan of Care Review  Outcome: Ongoing (interventions implemented as appropriate)   04/10/19 0269   Coping/Psychosocial   Plan of Care Reviewed With patient   OTHER   Outcome Summary Patient is improving today alert and oriented. Needs assistance with x1 with transfers and ambulation patient complaints of periodic weakness. Stoma has mucus freen colored output. No complaints of pain. Able to wean patient down to 2L humidified nasal canula. Patient recieveing IV ABT's awaiting new urinary and stool specimen cultures

## 2019-04-10 NOTE — PLAN OF CARE
Problem: Patient Care Overview  Goal: Plan of Care Review  Outcome: Ongoing (interventions implemented as appropriate)   04/10/19 1000   Coping/Psychosocial   Plan of Care Reviewed With patient   OTHER   Outcome Summary Patient presents with decreased endurance to activity with some tachycardia noted. Will benefit from skilled PT at this time.   Plan of Care Review   Progress improving

## 2019-04-10 NOTE — PROGRESS NOTES
"Pharmacy Consult  -  Warfarin  Zackary Noonan II is a  63 y.o. male admitted for a viral illness.   Height - 167.6 cm (66\")  Weight - 76.2 kg (168 lb)    Consulting Provider: - Dr. Camilo  Indication: - DVT  Goal INR: -  2 - 3  Home Regimen:   - 5 mg daily    Bridge Therapy: No    Drug-Drug Interactions with current regimen:              - Linezolid: increased risk of bleeding      Warfarin Dosing During Admission:  Date  4/9 4/10          INR  3.79 4.45          Dose  hold hold             Discharge Follow up:   Following Provider -   Follow up time range or appointment -      Labs:  Results from last 7 days   Lab Units 04/09/19  1226   INR  3.79*   HEMOGLOBIN g/dL 15.4   HEMATOCRIT % 44.8   PLATELETS 10*3/mm3 399     Results from last 7 days   Lab Units 04/09/19  1226   SODIUM mmol/L 134*   POTASSIUM mmol/L 5.1   CHLORIDE mmol/L 95*   CO2 mmol/L 23.0   BUN mg/dL 9   CREATININE mg/dL 1.07   CALCIUM mg/dL 9.2   BILIRUBIN mg/dL 0.7   ALK PHOS U/L 98   ALT (SGPT) U/L 17   AST (SGOT) U/L 21   GLUCOSE mg/dL 92     Current dietary intake: 75% of meals documented in the last 24H  Diet Order   Procedures   • Diet Regular     Assessment/Plan:   1. Pharmacy to dose warfarin for a history of DVT's. Home dose is 5mg daily. Goal INR 2-3.   2. INR today continues to be SUPRAtherapeutic despite holding yesterday's dose.   3. Will continue to hold tonight's dose.   4. Pharmacy will continue      Thanks,  Ghassan Mas, PharmD  Pharmacy Resident  4/10/2019  11:14 AM            "

## 2019-04-10 NOTE — PROGRESS NOTES
"                  Clinical Nutrition     Nutrition Assessment  Reason for Visit:   Identified at risk by screening criteria, MST score 2+      Patient Name: Zackary Noonan II  YOB: 1955  MRN: [id number]  Date of Encounter: 04/10/19 10:51 AM  Admission date: 4/9/2019    Nutrition Assessment   Assessment     Admission Problem List  Acute respiratory failure with hypoxia    Hospital Problem List  Pneumonia  Sepsis    Applicable PMH  Hx of DVT  Hx of CDiff s/p fecal transplant  Anxiety  HTN  Ulcerative colitis    Applicable PSxH  Bronchoscopy  Colon resection    Other nutrition related factors:  Pt was recently admitted to [facility] ~ 1 week ago for influenza.     Reported/Observed/Food/Nutrition Related History:     Patient reports he is unsure if he has lost any weight recently, but thinks he could have given his recent influenza illness requiring hospitalization. Pt UBW is 171 lb. Pt reports he has experienced a loss of appetite over the past couple weeks due to being sick, weakness, and fatigue- not a big appetite has been present. Pt stated before previous hospital admission his appetite was good and his PO intake was normal with no changes. Pt reports no known food allergies.     Anthropometrics     Height: 167.6 cm (66\")  Last filed wt: Weight: 76.2 kg (168 lb) (04/09/19 1158)    BMI: BMI (Calculated): 27.1  Overweight: 25.0-29.9kg/m2     Ideal Body Weight (IBW) (kg): 65.3  Admission wt:168 lb  Method obtained: method unknown     Weight Change   UBW: 171 lb    Per Epic pt weighed 174 lb in September 2018.     Last 15 Recorded Weights   View Complete Flowsheet   Weight Weight (kg) Weight (lbs) Weight Method   4/9/2019 76.204 kg 168 lb -   3/31/2019 72.576 kg 160 lb -   9/7/2018 79.107 kg 174 lb 6.4 oz Standing scale   9/6/2018 77.111 kg 170 lb -   3/18/2018 75.569 kg 166 lb 9.6 oz Standing scale   3/17/2018 75.569 kg 166 lb 9.6 oz Standing scale   3/16/2018 75.479 kg 166 lb 6.4 oz Standing scale "   3/13/2018 75.297 kg 166 lb Stated   9/19/2017 69.854 kg 154 lb Standing scale   9/15/2017 75 kg 165 lb 5.5 oz Standing scale   11/5/2016 82.192 kg 181 lb 3.2 oz -     Weight change: 6 lb   % wt change: 3%   Time frame of weight loss: 7 months     Labs reviewed   Yes    Medications reviewed   Pertinent: lexapro, zosyn, florastor, coumadin, IVF @ 125 mL/hr    Current Nutrition Prescription     PO: Diet Regular      Intake: insufficient data  75% of 1 meal recorded    Nutrition Diagnosis     4/10  Problem Inadequate oral intake   Etiology ARF w/ hypoxia; weakness   Signs/Symptoms Pt report of poor appetite over past 2 weeks     Nutrition Intervention   1.  Follow treatment progress, Care plan reviewed  2. Interview for preferences, Encourage intake    Goal:   General: Nutrition support treatment  PO: Increase intake  Additional goals: pt is consistently able to consume > or equal to 67% of meal tray.     Monitoring/Evaluation:   Per protocol, PO intake, Pertinent labs, Weight, Symptoms    Will Continue to follow per protocol      Carrie Hardin RD  Time Spent: 30 minutes

## 2019-04-10 NOTE — PLAN OF CARE
Problem: Patient Care Overview  Goal: Plan of Care Review  Outcome: Ongoing (interventions implemented as appropriate)   04/10/19 3649   Coping/Psychosocial   Plan of Care Reviewed With patient   OTHER   Outcome Summary Pt VSS, heart rate has been elevated. Pt on 5-6L NC humidified, lungs diminished. Urine sample sent. Pt has not coughed so sputum sample still needed. Pt bottom is red but blanchable, waffle mattress added. Pt refused to wear scuds. 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 Pt taking pro-biotic and understood the decision. Pt with no c/o pain. Rested well.    Plan of Care Review   Progress no change       Problem: Infection, Risk/Actual (Adult)  Goal: Identify Related Risk Factors and Signs and Symptoms  Outcome: Ongoing (interventions implemented as appropriate)    Goal: Infection Prevention/Resolution  Outcome: Ongoing (interventions implemented as appropriate)

## 2019-04-10 NOTE — PAYOR COMM NOTE
"Ref # [id number]  Maia Clayton RN, BSN  Phone # [phone]  Fax # [phone]  Kaur Guerrero II (63 y.o. Male)     Date of Birth Social Security Number Address Home Phone MRN    1955  [address] [id number] [phone] [id number]    [employer] Marital Status          Other        Admission Date Admission Type Admitting Provider Attending Provider Department, Room/Bed    19 Emergency Anny Camilo II, DO Anny Camilo II, DO [facility] 3G, S357/1    Discharge Date Discharge Disposition Discharge Destination                       Attending Provider:  Anny Camilo II, DO    Allergies:  Beta Adrenergic Blockers, Levaquin [Levofloxacin In D5w]    Isolation:  None   Infection:  None   Code Status:  CPR    Ht:  167.6 cm (66\")   Wt:  76.2 kg (168 lb)    Admission Cmt:  None   Principal Problem:  Acute respiratory failure with hypoxia (CMS/HCC) [J96.01]                 Active Insurance as of 2019     Primary Coverage     Payor Plan Insurance Group Employer/Plan Group    [facility] [employer] BLUE CROSS [employer] [id number]     Payor Plan Address Payor Plan Phone Number Payor Plan Fax Number Effective Dates    [address] [phone]  2015 - None Entered    [place] [address]       Subscriber Name Subscriber Birth Date Member ID       KAUR GUERRERO II 1955 [id number]                 Emergency Contacts      (Rel.) Home Phone Work Phone Mobile Phone    Ronda Guerrero (Spouse) -- -- [phone]               History & Physical      Anny Camilo II, DO at 2019  2:44 PM              [facility]  HISTORY AND PHYSICAL    Patient Name: Kaur Guerrero II  : 1955  MRN: [id number]  Primary Care Physician: Jillian Layne MD  Date of admission: 2019      Subjective   Subjective     Chief Complaint: fatigue    HPI:  Kaur Guerrero II is a 63 y.o. male presenting from " home with weakness. Patient was discharged from our facility about 1 week ago after influenza illness. He reports feeling some better later last week but then suddenly last night developed acute worsening. At that time he experienced weakness, extreme soa, and fever > 101. He has associated non productive cough. Denies chest pain. Doesn't feel much better since coming into ED.    Review of Systems   Gen- No fevers, chills  CV- No chest pain, palpitations  GI- No N/V/D, abd pain    Otherwise complete ROS reviewed and is negative except as mentioned in the HPI.    Personal History     Past Medical History:   Diagnosis Date   • Anxiety    • Arthritis    • Asthma    • Clostridium difficile infection 01/2017    treated per dr carter with fecal transplant 8-2017    • H/O recurrent pneumonia    • Hypertension    • MRSA infection 2016    right lower extremity wound   • Pulmonary nodule     Followed by Dr. Galaviz    • Recurrent deep vein thrombosis (DVT) (CMS/HCC)     x 3 LLE; chronic anticoagulation therapy    • Ulcerative colitis (CMS/HCC)    • Wears glasses    • Wears hearing aid        Past Surgical History:   Procedure Laterality Date   • BRONCHOSCOPY      by Dr. Galaviz for pulmonary nodule   • BRONCHOSCOPY N/A 11/7/2016    Procedure: BRONCHOSCOPY;  Surgeon: Eben Roberts MD;  Location:  [facility] ENDOSCOPY;  Service:    • COLON RESECTION N/A 9/19/2017    Procedure: TOTAL ABDOMINAL COLECTOMY WITH CREATION OF ILEOSTOMY;  Surgeon: David Nielsen MD;  Location:  [facility] OR;  Service:    • COLONOSCOPY  08/2017   • UMBILICAL HERNIA REPAIR     • VASECTOMY     • VASECTOMY REVERSAL         Family History: family history includes Hypertension in his mother. Otherwise pertinent FHx was reviewed and unremarkable.     Social History:  reports that he has never smoked. He has never used smokeless tobacco. He reports that he drinks alcohol. He reports that he does not use drugs.  Social History     Social History Narrative   • Not on  "file       Medications:    Available home medication information reviewed.    (Not in a hospital admission)    Allergies   Allergen Reactions   • Beta Adrenergic Blockers Other (See Comments)     Vocal changes   • Levaquin [Levofloxacin In D5w] Other (See Comments)     Heaviness in legs, \"felt like lead\"       Objective   Objective     Vital Signs:   Temp:  [98.7 °F (37.1 °C)-101 °F (38.3 °C)] 101 °F (38.3 °C)  Heart Rate:  [123-132] 129  Resp:  [26] 26  BP: (123-142)/(67-82) 140/82        Physical Exam   Constitutional: Awake, appears to feel poorly,   Eyes: PERRLA, sclerae anicteric, no conjunctival injection  HENT: NCAT, mucous membranes moist  Neck: Supple, no thyromegaly, no lymphadenopathy, trachea midline  Respiratory: Marked increased WOB, right rhonchi  Cardiovascular: Tachy, regular, no murmur  Gastrointestinal: Positive bowel sounds, soft, nontender, nondistended  Musculoskeletal: No bilateral ankle edema, no clubbing or cyanosis to extremities  Psychiatric: Appropriate affect, cooperative  Neurologic: Oriented x 3, strength symmetric in all extremities, Cranial Nerves grossly intact to confrontation, speech clear  Skin: No rashes    Results Reviewed:  I have personally reviewed current lab, radiology, and data and agree.    Results from last 7 days   Lab Units 04/09/19  1226   WBC 10*3/mm3 32.35*   HEMOGLOBIN g/dL 15.4   HEMATOCRIT % 44.8   PLATELETS 10*3/mm3 399   INR  3.79*     Results from last 7 days   Lab Units 04/09/19  1226   SODIUM mmol/L 134*   POTASSIUM mmol/L 5.1   CHLORIDE mmol/L 95*   CO2 mmol/L 23.0   BUN mg/dL 9   CREATININE mg/dL 1.07   GLUCOSE mg/dL 92   CALCIUM mg/dL 9.2   ALT (SGPT) U/L 17   AST (SGOT) U/L 21     Estimated Creatinine Clearance: 74.2 mL/min (by C-G formula based on SCr of 1.07 mg/dL).  Brief Urine Lab Results  (Last result in the past 365 days)      Color   Clarity   Blood   Leuk Est   Nitrite   Protein   CREAT   Urine HCG        03/31/19 2311 Yellow Clear Negative " Negative Negative Negative             No results found for: BNP     Personally reviewed cxr with right sided lung disease noted. Agree with interpretation.  Imaging Results (last 24 hours)     Procedure Component Value Units Date/Time    XR Chest 1 View [[id number]] Collected:  04/09/19 1221     Updated:  04/09/19 1352    Narrative:       EXAMINATION: XR CHEST 1 VW-      INDICATION: Shortness of air triage protocol.     TECHNIQUE:  Single view frontal chest.     COMPARISONS: CT 04/01/2019.     FINDINGS:  Since the comparison CT from 8 days ago, there is new  airspace disease in the upper lobe on the right, possibly also the  middle lobe. There is also background interstitial prominence in  longstanding basilar opacity. The heart is mildly enlarged. There may be  trace volume effusions. No pneumothorax.       Impression:       New airspace disease in the upper lobe and possibly the  middle lobe on the right.     D:  04/09/2019  E:  04/09/2019     This report was finalized on 4/9/2019 1:49 PM by Bebo Le.                Assessment/Plan   Assessment / Plan     Active Hospital Problems    Diagnosis POA   • **Acute respiratory failure with hypoxia (CMS/HCC) [J96.01] Yes   • Sepsis (CMS/HCC) [A41.9] Yes   • Pneumonia [J18.9] Yes   • History of DVT (deep vein thrombosis) [Z86.718] Not Applicable     LLE; x 3; chronic coumadin therapy       64 y/o male presenting from home with with fatigue and SOA due to post influenza bacterial pneumonia.    A/P:    Acute hypoxic respiratory failure due to pneumonia  Sepsis due to post influenza RLL bacterial pneumonia  --IV vancomycin and zosyn. Blood cx, sputum cx, urinary antigen pending.  --Has hx of bronchiectasis and was previously followed by Dr. Watkins at [facility]. If doesn't improve as expected may need pulmonary eval here for potential bronch.  --Continue home nebs/inhalers.   --Probiotic. Monitor closely given hx of C diff requiring fecal transplant.    Hx of  DVT  --On coumadin. Dosing per pharmacy.    DVT prophylaxis: Coumadin    CODE STATUS:    Code Status and Medical Interventions:   Ordered at: 04/09/19 1422     Code Status:    CPR     Medical Interventions (Level of Support Prior to Arrest):    Full       Admission Status:  I believe this patient meets INPATIENT status due to the need for care which can only be reasonably provided in an hospital setting such as possible need for aggressive/expedited ancillary services and/or consultation services, IV medications, close physician monitoring, and/or procedures.  In such, I feel patient’s risk for adverse outcomes and need for care warrant INPATIENT evaluation and predict the patient’s care encounter to likely last beyond 2 midnights.    Electronically signed by Anny Camilo II, DO, 04/09/19, 2:45 PM.        Electronically signed by Anny Camilo II, DO at 4/9/2019  3:00 PM         Carrie Hardin RD   Registered Dietitian   Nutrition   Progress Notes   Addendum   Date of Service:  4/10/2019 10:51 AM   Creation Time:  4/10/2019 10:51 AM            Expand All Collapse All          Show:Clear all  [x]Manual[x]Template[x]Copied    Added by:  [x]Carrie Hardin RD      []Angel for details                        Clinical Nutrition      Nutrition Assessment  Reason for Visit:   Identified at risk by screening criteria, MST score 2+        Patient Name: Zackary Noonan II  YOB: 1955  MRN: [id number]  Date of Encounter: 04/10/19 10:51 AM  Admission date: 4/9/2019     Nutrition Assessment      Assessment         Admission Problem List  Acute respiratory failure with hypoxia     Hospital Problem List  Pneumonia  Sepsis     Applicable PMH  Hx of DVT  Hx of CDiff s/p fecal transplant  Anxiety  HTN  Ulcerative colitis     Applicable PSxH  Bronchoscopy  Colon resection     Other nutrition related factors:  Pt was recently admitted to [facility] ~ 1 week ago for influenza.      Reported/Observed/Food/Nutrition Related  "History:      Patient reports he is unsure if he has lost any weight recently, but thinks he could have given his recent influenza illness requiring hospitalization. Pt UBW is 171 lb. Pt reports he has experienced a loss of appetite over the past couple weeks due to being sick, weakness, and fatigue- not a big appetite has been present. Pt stated before previous hospital admission his appetite was good and his PO intake was normal with no changes. Pt reports no known food allergies.      Anthropometrics      Height: 167.6 cm (66\")  Last filed wt: Weight: 76.2 kg (168 lb) (04/09/19 1158)     BMI: BMI (Calculated): 27.1  Overweight: 25.0-29.9kg/m2      Ideal Body Weight (IBW) (kg): 65.3  Admission wt:168 lb  Method obtained: method unknown      Weight Change   UBW: 171 lb     Per Epic pt weighed 174 lb in September 2018.      Last 15 Recorded Weights   View Complete Flowsheet   Weight Weight (kg) Weight (lbs) Weight Method   4/9/2019 76.204 kg 168 lb -   3/31/2019 72.576 kg 160 lb -   9/7/2018 79.107 kg 174 lb 6.4 oz Standing scale   9/6/2018 77.111 kg 170 lb -   3/18/2018 75.569 kg 166 lb 9.6 oz Standing scale   3/17/2018 75.569 kg 166 lb 9.6 oz Standing scale   3/16/2018 75.479 kg 166 lb 6.4 oz Standing scale   3/13/2018 75.297 kg 166 lb Stated   9/19/2017 69.854 kg 154 lb Standing scale   9/15/2017 75 kg 165 lb 5.5 oz Standing scale   11/5/2016 82.192 kg 181 lb 3.2 oz -      Weight change: 6 lb      % wt change:  3%   Time frame of weight loss: 7 months       Labs reviewed   Yes     Medications reviewed   Pertinent: lexapro, zosyn, florastor, coumadin, IVF @ 125 mL/hr     Current Nutrition Prescription      PO: Diet Regular        Intake: insufficient data  75% of 1 meal recorded     Nutrition Diagnosis      4/10  Problem Inadequate oral intake   Etiology ARF w/ hypoxia; weakness   Signs/Symptoms Pt report of poor appetite over past 2 weeks      Nutrition Intervention   1.  Follow treatment progress, Care plan " reviewed  2. Interview for preferences, Encourage intake     Goal:   General: Nutrition support treatment  PO: Increase intake  Additional goals: pt is consistently able to consume > or equal to 67% of meal tray.      Monitoring/Evaluation:   Per protocol, PO intake, Pertinent labs, Weight, Symptoms     Will Continue to follow per protocol        Carrie Hardin RD  Time Spent: 30 minutes                        Study Result     EXAMINATION: XR CHEST 1 VW-      INDICATION: Shortness of air triage protocol.     TECHNIQUE:  Single view frontal chest.     COMPARISONS: CT 04/01/2019.     FINDINGS:  Since the comparison CT from 8 days ago, there is new  airspace disease in the upper lobe on the right, possibly also the  middle lobe. There is also background interstitial prominence in  longstanding basilar opacity. The heart is mildly enlarged. There may be  trace volume effusions. No pneumothorax.     IMPRESSION:  New airspace disease in the upper lobe and possibly the  middle lobe on the right.     D:  04/09/2019  E:  04/09/2019     This report was finalized on 4/9/2019 1:49 PM by Bebo Le.

## 2019-04-10 NOTE — PLAN OF CARE
Problem: Patient Care Overview  Goal: Plan of Care Review  Outcome: Ongoing (interventions implemented as appropriate)   04/10/19 2318   Coping/Psychosocial   Plan of Care Reviewed With patient   OTHER   Outcome Summary WOC nurse consulted to help pt with ileostomy appliance. Pt is well known to WO nurse; established ileostomy with shimon-stomal skin PG. Pt with wounds at 3:00 (1.0 X 1.0 cm) and 9:00 1.0 X 1.5 cm); denuded shimon-stomal skin above and below stoma. Appliance change done by WO nurse a pt. Pt applied Sesorcaine to PG wounds, then dried with alcohol swabs; Super Glue applied. Denuded skin crusted with stomal powder and barrier spray. Rufina paste applied around opening to Coloplast 1 piece convex Sensura Morgan appliance. Appliance belt applied. Pt having watery stool output; 300 ml emptied from pouch. WOC nurse will f/u. Please contact WOC nurse as needed for concerns.    Plan of Care Review   Progress no change

## 2019-04-10 NOTE — PROGRESS NOTES
Discharge Planning Assessment  [facility]     Patient Name: Zackary Noonan II  MRN: [id number]  Today's Date: 4/10/2019    Admit Date: 4/9/2019    Discharge Needs Assessment     Row Name 04/10/19 1545       Living Environment    Lives With  spouse pt resides in [facility]    Name(s) of Who Lives With Patient  wife- Ronda    Current Living Arrangements  home/apartment/condo    Primary Care Provided by  self    Provides Primary Care For  no one    Family Caregiver if Needed  spouse    Family Caregiver Names  Ronda    Quality of Family Relationships  helpful;involved;supportive    Able to Return to Prior Arrangements  yes       Resource/Environmental Concerns    Resource/Environmental Concerns  none       Transition Planning    Patient/Family Anticipates Transition to  home with family    Patient/Family Anticipated Services at Transition  none    Transportation Anticipated  family or friend will provide       Discharge Needs Assessment    Readmission Within the Last 30 Days  no previous admission in last 30 days    Concerns to be Addressed  discharge planning    Equipment Currently Used at Home  nebulizer;colostomy/ostomy supplies    Anticipated Changes Related to Illness  none    Equipment Needed After Discharge  none        Discharge Plan     Row Name 04/10/19 1546       Plan    Plan  home    Plan Comments  CM spoke with pt and wife at bedside. Pt resides in [facility] with his wife. Pt was independent of adl's and worked prior to admission. Pt has a nebulizer and ostomy supplies. Pt denies current home health or outpt services Pt reports he was going to outpt therapy until a couple weeks ago when he began feeling ill. Pt plans to return home and denies needs at this time. CM will continue to follow.     Final Discharge Disposition Code  01 - home or self-care        Destination      No service coordination in this encounter.      Durable Medical Equipment      No service coordination in this encounter.       Dialysis/Infusion      No service coordination in this encounter.      Home Medical Care      No service coordination in this encounter.      Therapy      No service coordination in this encounter.      Community Resources      No service coordination in this encounter.          Demographic Summary     Row Name 04/10/19 1543       General Information    Referral Source  admission list    Reason for Consult  discharge planning    Preferred Language  English    General Information Comments  PCP- Jillian Layne       Contact Information    Permission Granted to Share Info With      Contact Information Comments  [phone]        Functional Status     Row Name 04/10/19 1544       Functional Status    Usual Activity Tolerance  good    Current Activity Tolerance  moderate       Functional Status, IADL    Medications  independent    Meal Preparation  independent    Housekeeping  independent    Laundry  independent       Employment/    Employment/ Comments  pt confirms he has [employer] [place] [employer] insurance, denies concerns or disruption in coverage. Pt has prescription drug coverage and denies issues obtaining or affording current medications.         Psychosocial    No documentation.       Abuse/Neglect    No documentation.       Legal    No documentation.       Substance Abuse    No documentation.       Patient Forms    No documentation.           Nieves Pham

## 2019-04-11 LAB
ANION GAP SERPL CALCULATED.3IONS-SCNC: 14 MMOL/L
BACTERIA UR QL AUTO: NORMAL /HPF
BILIRUB UR QL STRIP: NEGATIVE
BUN BLD-MCNC: 22 MG/DL (ref 8–23)
BUN/CREAT SERPL: 8.9 (ref 7–25)
CALCIUM SPEC-SCNC: 8.8 MG/DL (ref 8.6–10.5)
CHLORIDE SERPL-SCNC: 97 MMOL/L (ref 98–107)
CK SERPL-CCNC: 23 U/L (ref 20–200)
CLARITY UR: CLEAR
CO2 SERPL-SCNC: 18 MMOL/L (ref 22–29)
COD CRY URNS QL: NORMAL /HPF
COLOR UR: YELLOW
CREAT BLD-MCNC: 2.48 MG/DL (ref 0.76–1.27)
CREAT UR-MCNC: 51.4 MG/DL
DEPRECATED RDW RBC AUTO: 47.2 FL (ref 37–54)
EOSINOPHIL SPEC QL MICRO: 0 % EOS/100 CELLS (ref 0–0)
ERYTHROCYTE [DISTWIDTH] IN BLOOD BY AUTOMATED COUNT: 14.3 % (ref 12.3–15.4)
GFR SERPL CREATININE-BSD FRML MDRD: 26 ML/MIN/1.73
GLUCOSE BLD-MCNC: 83 MG/DL (ref 65–99)
GLUCOSE UR STRIP-MCNC: NEGATIVE MG/DL
HCT VFR BLD AUTO: 38.1 % (ref 37.5–51)
HGB BLD-MCNC: 12.7 G/DL (ref 13–17.7)
HGB UR QL STRIP.AUTO: NEGATIVE
INR PPP: 3.94 (ref 0.85–1.16)
KETONES UR QL STRIP: NEGATIVE
LDH SERPL-CCNC: 474 U/L (ref 135–225)
LEUKOCYTE ESTERASE UR QL STRIP.AUTO: NEGATIVE
MCH RBC QN AUTO: 30.2 PG (ref 26.6–33)
MCHC RBC AUTO-ENTMCNC: 33.3 G/DL (ref 31.5–35.7)
MCV RBC AUTO: 90.5 FL (ref 79–97)
NITRITE UR QL STRIP: NEGATIVE
OSMOLALITY SERPL: 282 MOSM/KG (ref 275–295)
OSMOLALITY UR: 179 MOSM/KG (ref 300–1100)
PH UR STRIP.AUTO: <=5 [PH] (ref 5–8)
PLATELET # BLD AUTO: 394 10*3/MM3 (ref 140–450)
PMV BLD AUTO: 9 FL (ref 6–12)
POTASSIUM BLD-SCNC: 3.7 MMOL/L (ref 3.5–5.2)
PROT UR QL STRIP: ABNORMAL
PROTHROMBIN TIME: 37.1 SECONDS (ref 11.2–14.3)
RBC # BLD AUTO: 4.21 10*6/MM3 (ref 4.14–5.8)
RBC # UR: NORMAL /HPF
REF LAB TEST METHOD: NORMAL
SODIUM BLD-SCNC: 129 MMOL/L (ref 136–145)
SODIUM UR-SCNC: <20 MMOL/L
SP GR UR STRIP: 1.01 (ref 1–1.03)
SQUAMOUS #/AREA URNS HPF: NORMAL /HPF
URATE SERPL-MCNC: 4.2 MG/DL (ref 3.4–7)
UROBILINOGEN UR QL STRIP: ABNORMAL
UUN 24H UR-MCNC: 287 MG/DL
WBC NRBC COR # BLD: 26.81 10*3/MM3 (ref 3.4–10.8)
WBC UR QL AUTO: NORMAL /HPF

## 2019-04-11 PROCEDURE — 25010000002 LINEZOLID 600 MG/300ML SOLUTION: Performed by: INTERNAL MEDICINE

## 2019-04-11 PROCEDURE — 82570 ASSAY OF URINE CREATININE: CPT | Performed by: INTERNAL MEDICINE

## 2019-04-11 PROCEDURE — 81001 URINALYSIS AUTO W/SCOPE: CPT | Performed by: INTERNAL MEDICINE

## 2019-04-11 PROCEDURE — 82550 ASSAY OF CK (CPK): CPT | Performed by: INTERNAL MEDICINE

## 2019-04-11 PROCEDURE — 85610 PROTHROMBIN TIME: CPT | Performed by: INTERNAL MEDICINE

## 2019-04-11 PROCEDURE — 85027 COMPLETE CBC AUTOMATED: CPT | Performed by: INTERNAL MEDICINE

## 2019-04-11 PROCEDURE — 94799 UNLISTED PULMONARY SVC/PX: CPT

## 2019-04-11 PROCEDURE — 83615 LACTATE (LD) (LDH) ENZYME: CPT | Performed by: INTERNAL MEDICINE

## 2019-04-11 PROCEDURE — 83935 ASSAY OF URINE OSMOLALITY: CPT | Performed by: INTERNAL MEDICINE

## 2019-04-11 PROCEDURE — 97110 THERAPEUTIC EXERCISES: CPT

## 2019-04-11 PROCEDURE — 25010000002 AZITHROMYCIN: Performed by: INTERNAL MEDICINE

## 2019-04-11 PROCEDURE — 84540 ASSAY OF URINE/UREA-N: CPT | Performed by: INTERNAL MEDICINE

## 2019-04-11 PROCEDURE — 25010000002 CEFTRIAXONE PER 250 MG: Performed by: INTERNAL MEDICINE

## 2019-04-11 PROCEDURE — 84550 ASSAY OF BLOOD/URIC ACID: CPT | Performed by: INTERNAL MEDICINE

## 2019-04-11 PROCEDURE — 99232 SBSQ HOSP IP/OBS MODERATE 35: CPT | Performed by: INTERNAL MEDICINE

## 2019-04-11 PROCEDURE — 87205 SMEAR GRAM STAIN: CPT | Performed by: INTERNAL MEDICINE

## 2019-04-11 PROCEDURE — 80048 BASIC METABOLIC PNL TOTAL CA: CPT | Performed by: INTERNAL MEDICINE

## 2019-04-11 PROCEDURE — 84300 ASSAY OF URINE SODIUM: CPT | Performed by: INTERNAL MEDICINE

## 2019-04-11 PROCEDURE — 25010000002 PIPERACILLIN SOD-TAZOBACTAM PER 1 G: Performed by: INTERNAL MEDICINE

## 2019-04-11 PROCEDURE — 83930 ASSAY OF BLOOD OSMOLALITY: CPT | Performed by: INTERNAL MEDICINE

## 2019-04-11 RX ORDER — SODIUM CHLORIDE 9 MG/ML
150 INJECTION, SOLUTION INTRAVENOUS CONTINUOUS
Status: ACTIVE | OUTPATIENT
Start: 2019-04-11 | End: 2019-04-13

## 2019-04-11 RX ORDER — DOXYCYCLINE 100 MG/1
100 CAPSULE ORAL EVERY 12 HOURS SCHEDULED
Status: DISCONTINUED | OUTPATIENT
Start: 2019-04-11 | End: 2019-04-11

## 2019-04-11 RX ORDER — LOPERAMIDE HYDROCHLORIDE 2 MG/1
2 CAPSULE ORAL 4 TIMES DAILY PRN
Status: DISCONTINUED | OUTPATIENT
Start: 2019-04-11 | End: 2019-04-11 | Stop reason: SDUPTHER

## 2019-04-11 RX ORDER — CEFTRIAXONE SODIUM 1 G/50ML
1 INJECTION, SOLUTION INTRAVENOUS EVERY 24 HOURS
Status: DISCONTINUED | OUTPATIENT
Start: 2019-04-11 | End: 2019-04-15 | Stop reason: HOSPADM

## 2019-04-11 RX ORDER — DIPHENOXYLATE HYDROCHLORIDE AND ATROPINE SULFATE 2.5; .025 MG/1; MG/1
2 TABLET ORAL
Status: DISCONTINUED | OUTPATIENT
Start: 2019-04-11 | End: 2019-04-15 | Stop reason: HOSPADM

## 2019-04-11 RX ADMIN — ACETAMINOPHEN 650 MG: 325 TABLET ORAL at 10:09

## 2019-04-11 RX ADMIN — SODIUM CHLORIDE 150 ML/HR: 9 INJECTION, SOLUTION INTRAVENOUS at 19:30

## 2019-04-11 RX ADMIN — CEFTRIAXONE SODIUM 1 G: 1 INJECTION, SOLUTION INTRAVENOUS at 10:09

## 2019-04-11 RX ADMIN — SODIUM CHLORIDE, PRESERVATIVE FREE 3 ML: 5 INJECTION INTRAVENOUS at 20:34

## 2019-04-11 RX ADMIN — LOPERAMIDE HYDROCHLORIDE 2 MG: 2 CAPSULE ORAL at 20:34

## 2019-04-11 RX ADMIN — ACETAMINOPHEN 650 MG: 325 TABLET ORAL at 02:36

## 2019-04-11 RX ADMIN — FLUTICASONE PROPIONATE 2 SPRAY: 50 SPRAY, METERED NASAL at 20:37

## 2019-04-11 RX ADMIN — Medication 250 MG: at 20:34

## 2019-04-11 RX ADMIN — GUAIFENESIN 600 MG: 600 TABLET, EXTENDED RELEASE ORAL at 08:20

## 2019-04-11 RX ADMIN — BUSPIRONE HYDROCHLORIDE 15 MG: 10 TABLET ORAL at 13:32

## 2019-04-11 RX ADMIN — CETIRIZINE HYDROCHLORIDE 10 MG: 10 TABLET, FILM COATED ORAL at 08:20

## 2019-04-11 RX ADMIN — BUSPIRONE HYDROCHLORIDE 15 MG: 10 TABLET ORAL at 20:33

## 2019-04-11 RX ADMIN — AZITHROMYCIN MONOHYDRATE 500 MG: 500 INJECTION, POWDER, LYOPHILIZED, FOR SOLUTION INTRAVENOUS at 12:29

## 2019-04-11 RX ADMIN — BUDESONIDE 0.5 MG: 0.5 INHALANT RESPIRATORY (INHALATION) at 09:31

## 2019-04-11 RX ADMIN — DIPHENOXYLATE HYDROCHLORIDE AND ATROPINE SULFATE 2 TABLET: 2.5; .025 TABLET ORAL at 10:09

## 2019-04-11 RX ADMIN — LOPERAMIDE HYDROCHLORIDE 2 MG: 2 CAPSULE ORAL at 08:20

## 2019-04-11 RX ADMIN — FLUTICASONE PROPIONATE 2 SPRAY: 50 SPRAY, METERED NASAL at 08:20

## 2019-04-11 RX ADMIN — BUSPIRONE HYDROCHLORIDE 15 MG: 10 TABLET ORAL at 06:11

## 2019-04-11 RX ADMIN — ESCITALOPRAM OXALATE 10 MG: 10 TABLET ORAL at 08:20

## 2019-04-11 RX ADMIN — LOPERAMIDE HYDROCHLORIDE 2 MG: 2 CAPSULE ORAL at 02:39

## 2019-04-11 RX ADMIN — SODIUM CHLORIDE 125 ML/HR: 9 INJECTION, SOLUTION INTRAVENOUS at 09:36

## 2019-04-11 RX ADMIN — LINEZOLID 600 MG: 600 INJECTION, SOLUTION INTRAVENOUS at 08:20

## 2019-04-11 RX ADMIN — SODIUM CHLORIDE, PRESERVATIVE FREE: 5 INJECTION INTRAVENOUS at 08:20

## 2019-04-11 RX ADMIN — GUAIFENESIN 600 MG: 600 TABLET, EXTENDED RELEASE ORAL at 20:34

## 2019-04-11 RX ADMIN — TAZOBACTAM SODIUM AND PIPERACILLIN SODIUM 4.5 G: 500; 4 INJECTION, SOLUTION INTRAVENOUS at 02:58

## 2019-04-11 RX ADMIN — MONTELUKAST SODIUM 10 MG: 10 TABLET, COATED ORAL at 20:34

## 2019-04-11 RX ADMIN — BUDESONIDE 0.5 MG: 0.5 INHALANT RESPIRATORY (INHALATION) at 19:16

## 2019-04-11 RX ADMIN — DIPHENOXYLATE HYDROCHLORIDE AND ATROPINE SULFATE 2 TABLET: 2.5; .025 TABLET ORAL at 20:34

## 2019-04-11 RX ADMIN — Medication 250 MG: at 08:20

## 2019-04-11 RX ADMIN — ACETAMINOPHEN 650 MG: 325 TABLET ORAL at 20:34

## 2019-04-11 NOTE — PLAN OF CARE
Problem: Patient Care Overview  Goal: Plan of Care Review  Outcome: Ongoing (interventions implemented as appropriate)   04/11/19 0830   Coping/Psychosocial   Plan of Care Reviewed With patient   OTHER   Outcome Summary WOC nurse f/u for ileostomy. Appliance intact with no leakage; watery tan output. Pt on Imodium 2mg 4 times/day; history of taking Imdium 4mg QID plus Lomotil 4 times/day at home to thicken stool. RN to discuss increasing Imodium and/or adding Lomotil. Pt eating well; fluids encouraged. WOC nurse will f/u. Please contact WOC nurse as needed for concerns.    Plan of Care Review   Progress improving

## 2019-04-11 NOTE — PLAN OF CARE
Problem: Patient Care Overview  Goal: Plan of Care Review  Outcome: Ongoing (interventions implemented as appropriate)   04/11/19 1127   Coping/Psychosocial   Plan of Care Reviewed With patient;spouse   OTHER   Outcome Summary PT treatment completed. Pt supine-sit with conditional independence. STS and ambulated 6' from EOB->recliner CGA with RW. Tolerated seated therex with rest breaks d/t fatigue. Continue skilled PT to improve functional mobility and safety.

## 2019-04-11 NOTE — CONSULTS
Referring Provider: Anny Camilo  Reason for Consultation: RAMYA and Hyponatremia     Subjective     Chief complaint PNA    History of present illness:  This is 63 year old carlos with past medical hx of Htn, C diff infection and Crohn's disease s/p ostomy presents with generalized weakness and fatigue. He was admitted with working diagnosis of PNA. Started on antibiotics. At presentation Scr was 1.0 which has been trending up to 2.48 and Sodium is found to be 129 worsening today. Nephrology service has been consulted for further evalaution and treatment.     History  Past Medical History:   Diagnosis Date   • Anxiety    • Arthritis    • Asthma    • Clostridium difficile infection 01/2017    treated per dr carter with fecal transplant 8-2017    • H/O recurrent pneumonia    • Hypertension    • MRSA infection 2016    right lower extremity wound   • Pulmonary nodule     Followed by Dr. Galaviz    • Recurrent deep vein thrombosis (DVT) (CMS/HCC)     x 3 LLE; chronic anticoagulation therapy    • Ulcerative colitis (CMS/HCC)    • Wears glasses    • Wears hearing aid    ,   Past Surgical History:   Procedure Laterality Date   • BRONCHOSCOPY      by Dr. Galaviz for pulmonary nodule   • BRONCHOSCOPY N/A 11/7/2016    Procedure: BRONCHOSCOPY;  Surgeon: Eben Roberts MD;  Location:  [facility];  Service:    • COLON RESECTION N/A 9/19/2017    Procedure: TOTAL ABDOMINAL COLECTOMY WITH CREATION OF ILEOSTOMY;  Surgeon: David Nielsen MD;  Location:  [facility];  Service:    • COLONOSCOPY  08/2017   • UMBILICAL HERNIA REPAIR     • VASECTOMY     • VASECTOMY REVERSAL     ,   Family History   Problem Relation Age of Onset   • Hypertension Mother    ,   Social History     Tobacco Use   • Smoking status: Never Smoker   • Smokeless tobacco: Never Used   Substance Use Topics   • Alcohol use: Yes     Comment: 4 drinks/week   • Drug use: No   ,   Medications Prior to Admission   Medication Sig Dispense Refill Last Dose   •  acetaminophen (TYLENOL) 325 MG tablet Take 2 tablets by mouth Every 6 (Six) Hours As Needed for Mild Pain .   4/8/2019 at Unknown time   • albuterol (PROVENTIL HFA;VENTOLIN HFA) 108 (90 Base) MCG/ACT inhaler Inhale 2 puffs Every 4 (Four) Hours As Needed for Wheezing.   Past Month at Unknown time   • albuterol (PROVENTIL) (2.5 MG/3ML) 0.083% nebulizer solution Take 2.5 mg by nebulization Every 6 (Six) Hours As Needed for Wheezing or Shortness of Air.   4/9/2019 at Unknown time   • amLODIPine (NORVASC) 5 MG tablet Take 5 mg by mouth Every Night.   4/8/2019 at Unknown time   • beclomethasone (QVAR) 80 MCG/ACT inhaler Inhale 2 puffs 2 (Two) Times a Day.   4/8/2019 at Unknown time   • busPIRone (BUSPAR) 15 MG tablet Take 15 mg by mouth 3 (Three) Times a Day As Needed.   4/8/2019 at Unknown time   • clotrimazole (MYCELEX) 10 MG nick Take 10 mg by mouth 4 (Four) Times a Day.   4/8/2019 at Unknown time   • Cyanocobalamin (VITAMIN B 12 PO) Take 1 tablet by mouth Daily.   4/8/2019 at Unknown time   • diphenoxylate-atropine (LOMOTIL) 2.5-0.025 MG per tablet Take 1-2 tablets by mouth Every 6 (Six) Hours.   4/8/2019 at Unknown time   • escitalopram (LEXAPRO) 10 MG tablet Take 10 mg by mouth Daily.   4/8/2019 at Unknown time   • famotidine-calcium carb-mag hydroxide (PEPCID COMPLETE) -165 MG chewable tablet Chew 1 tablet Daily As Needed for Heartburn.   4/8/2019 at Unknown time   • fluticasone (FLONASE) 50 MCG/ACT nasal spray 2 sprays into each nostril 2 (Two) Times a Day.   4/8/2019 at Unknown time   • Fluticasone-Umeclidin-Vilant (TRELEGY ELLIPTA) 100-62.5-25 MCG/INH aerosol powder  Inhale 1 each Daily.   4/8/2019 at Unknown time   • guaiFENesin (HUMIBID 3) 400 MG tablet Take 800 mg by mouth 3 (Three) Times a Day.   4/8/2019 at Unknown time   • levocetirizine (XYZAL) 5 MG tablet Take 5 mg by mouth Every Evening.   4/8/2019 at Unknown time   • loperamide (IMODIUM) 2 MG capsule Take 4 mg by mouth Every 4 (Four) Hours.    4/8/2019 at Unknown time   • losartan (COZAAR) 100 MG tablet Take 100 mg by mouth Daily.   4/8/2019 at Unknown time   • montelukast (SINGULAIR) 10 MG tablet Take 10 mg by mouth Every Night.   4/8/2019 at Unknown time   • Multiple Vitamins-Minerals (SENIOR MULTIVITAMIN PLUS PO) Take 1 tablet/day by mouth.   4/8/2019 at Unknown time   • nystatin (MYCOSTATIN) 664694 UNIT/GM cream Apply 1 application topically to the appropriate area as directed 2 (Two) Times a Day As Needed.   4/8/2019 at Unknown time   • ondansetron (ZOFRAN) 4 MG tablet Take 4 mg by mouth Every 8 (Eight) Hours As Needed for Nausea or Vomiting.   4/8/2019 at Unknown time   • Probiotic Product (PROBIOTIC DAILY) capsule Take 1 capsule by mouth Daily.   4/8/2019 at Unknown time   • vitamin B-6 (PYRIDOXINE) 100 MG tablet Take 100 mcg by mouth Daily.   4/8/2019 at Unknown time   • vitamin E 400 UNIT capsule Take 400 Units by mouth 3 (Three) Times a Day.   4/8/2019 at Unknown time   • warfarin (COUMADIN) 5 MG tablet Take 5 mg by mouth Daily.   4/8/2019 at 1800   • valACYclovir (VALTREX) 500 MG tablet Take 500 mg by mouth Daily As Needed (fever blisters).   More than a month at Unknown time   , Scheduled Meds:    azithromycin 500 mg Intravenous Q24H   budesonide 0.5 mg Nebulization BID - RT   busPIRone 15 mg Oral Q8H   ceftriaxone 1 g Intravenous Q24H   cetirizine 10 mg Oral Daily   escitalopram 10 mg Oral Daily   fluticasone 2 spray Nasal BID   guaiFENesin 600 mg Oral Q12H   montelukast 10 mg Oral Nightly   saccharomyces boulardii 250 mg Oral BID   sodium chloride 3 mL Intravenous Q12H   warfarin (COUMADIN) (dosing per levels)  Does not apply Daily   , Continuous Infusions:    Pharmacy to dose warfarin     sodium chloride 125 mL/hr Last Rate: 125 mL/hr (04/11/19 0936)   sodium chloride 125 mL/hr Last Rate: 125 mL/hr (04/11/19 1230)   , PRN Meds:  •  acetaminophen  •  albuterol  •  diphenoxylate-atropine  •  loperamide  •  magnesium sulfate **OR** magnesium  sulfate **OR** magnesium sulfate  •  melatonin  •  ondansetron **OR** ondansetron  •  Pharmacy to dose warfarin  •  potassium chloride **OR** potassium chloride **OR** potassium chloride  •  sennosides-docusate sodium  •  sodium chloride and Allergies:  Beta adrenergic blockers and Levaquin [levofloxacin in d5w]    Review of Systems  Pertinent items are noted in HPI    Objective     Vital Signs  Temp:  [97.6 °F (36.4 °C)-98.5 °F (36.9 °C)] 98.5 °F (36.9 °C)  Heart Rate:  [] 89  Resp:  [16-21] 18  BP: (115-128)/(61-69) 128/61    I/O this shift:  In: 240 [P.O.:240]  Out: -   I/O last 3 completed shifts:  In: 1960 [P.O.:960; I.V.:1000]  Out: 4085 [Urine:600; Stool:3485]    Physical Exam:     General Appearance:    Alert, cooperative, in no acute distress   Head:    Normocephalic, without obvious abnormality, atraumatic   Eyes:            Lids and lashes normal, conjunctivae and sclerae normal, no   icterus, no pallor, corneas clear, PERRLA   Ears:    Ears appear intact with no abnormalities noted   Throat:   No oral lesions, no thrush, oral mucosa moist   Neck:   No adenopathy, supple, trachea midline, no thyromegaly, no   carotid bruit, no JVD   Back:     No kyphosis present, no scoliosis present, no skin lesions,      erythema or scars, no tenderness to percussion or                   palpation,   range of motion normal   Lungs:     Clear to auscultation,respirations regular, even and                  unlabored    Heart:    Regular rhythm and normal rate, normal S1 and S2, no            murmur, no gallop, no rub, no click   Chest Wall:    No abnormalities observed   Abdomen:     Normal bowel sounds, no masses, no organomegaly, soft        non-tender, non-distended, no guarding, no rebound                tenderness   Rectal:     Deferred   Extremities:   Moves all extremities well, no edema, no cyanosis, no             redness   Pulses:   Pulses palpable and equal bilaterally   Skin:   No bleeding, bruising or  rash   Lymph nodes:   No palpable adenopathy   Neurologic:   Cranial nerves 2 - 12 grossly intact, sensation intact, DTR       present and equal bilaterally       Results Review:   I reviewed the patient's new clinical results.    WBC WBC   Date Value Ref Range Status   04/11/2019 26.81 (H) 3.40 - 10.80 10*3/mm3 Final   04/10/2019 30.16 (C) 3.40 - 10.80 10*3/mm3 Final   04/09/2019 32.35 (C) 3.40 - 10.80 10*3/mm3 Final      HGB Hemoglobin   Date Value Ref Range Status   04/11/2019 12.7 (L) 13.0 - 17.7 g/dL Final   04/10/2019 12.8 (L) 13.0 - 17.7 g/dL Final   04/09/2019 15.4 13.0 - 17.7 g/dL Final      HCT Hematocrit   Date Value Ref Range Status   04/11/2019 38.1 37.5 - 51.0 % Final   04/10/2019 39.1 37.5 - 51.0 % Final   04/09/2019 44.8 37.5 - 51.0 % Final      Platlets No results found for: LABPLAT   MCV MCV   Date Value Ref Range Status   04/11/2019 90.5 79.0 - 97.0 fL Final   04/10/2019 91.1 79.0 - 97.0 fL Final   04/09/2019 90.5 79.0 - 97.0 fL Final          Sodium Sodium   Date Value Ref Range Status   04/11/2019 129 (L) 136 - 145 mmol/L Final   04/10/2019 130 (L) 136 - 145 mmol/L Final   04/09/2019 134 (L) 136 - 145 mmol/L Final      Potassium Potassium   Date Value Ref Range Status   04/11/2019 3.7 3.5 - 5.2 mmol/L Final   04/10/2019 4.5 3.5 - 5.2 mmol/L Final   04/09/2019 5.1 3.5 - 5.2 mmol/L Final      Chloride Chloride   Date Value Ref Range Status   04/11/2019 97 (L) 98 - 107 mmol/L Final   04/10/2019 100 98 - 107 mmol/L Final   04/09/2019 95 (L) 98 - 107 mmol/L Final      CO2 CO2   Date Value Ref Range Status   04/11/2019 18.0 (L) 22.0 - 29.0 mmol/L Final   04/10/2019 16.0 (L) 22.0 - 29.0 mmol/L Final   04/09/2019 23.0 22.0 - 29.0 mmol/L Final      BUN BUN   Date Value Ref Range Status   04/11/2019 22 8 - 23 mg/dL Final   04/10/2019 17 8 - 23 mg/dL Final   04/09/2019 9 8 - 23 mg/dL Final      Creatinine Creatinine   Date Value Ref Range Status   04/11/2019 2.48 (H) 0.76 - 1.27 mg/dL Final   04/10/2019  2.10 (H) 0.76 - 1.27 mg/dL Final   04/09/2019 1.07 0.76 - 1.27 mg/dL Final      Calcium Calcium   Date Value Ref Range Status   04/11/2019 8.8 8.6 - 10.5 mg/dL Final   04/10/2019 8.3 (L) 8.6 - 10.5 mg/dL Final   04/09/2019 9.2 8.6 - 10.5 mg/dL Final      PO4 No results found for: CAPO4   Albumin Albumin   Date Value Ref Range Status   04/09/2019 3.90 3.50 - 5.20 g/dL Final      Magnesium No results found for: MG   Uric Acid No results found for: URICACID           azithromycin 500 mg Intravenous Q24H   budesonide 0.5 mg Nebulization BID - RT   busPIRone 15 mg Oral Q8H   ceftriaxone 1 g Intravenous Q24H   cetirizine 10 mg Oral Daily   escitalopram 10 mg Oral Daily   fluticasone 2 spray Nasal BID   guaiFENesin 600 mg Oral Q12H   montelukast 10 mg Oral Nightly   saccharomyces boulardii 250 mg Oral BID   sodium chloride 3 mL Intravenous Q12H   warfarin (COUMADIN) (dosing per levels)  Does not apply Daily       Pharmacy to dose warfarin     sodium chloride 125 mL/hr Last Rate: 125 mL/hr (04/11/19 0936)   sodium chloride 125 mL/hr Last Rate: 125 mL/hr (04/11/19 1230)       Assessment/Plan       Acute respiratory failure with hypoxia (CMS/[facility])    Pneumonia    History of DVT (deep vein thrombosis)    Diarrhea    Hyponatremia    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)    Sepsis (CMS/[facility])    RAMYA (acute kidney injury) (CMS/[facility])      1- RAMYA - non olgiuric  - Pre-renal azotemia -high output from ostomy  and  intravascular volume depletion No hydro on CT scan. Small Left renal simple cyst.   2- Hyponatremia     Plan:  - Urine lytes   - Urine and serum osmolality   - Continue with IV fluids - will increase rate   - Avoid nephrotoxic agents.   - Renal diet.   - Adjust meds per renal function   - Monitor I/O   - No need of dialysis.     I discussed the patients findings and my recommendations with patient, family and nursing staff    Jessika Chandler MD  04/11/19  @NOW

## 2019-04-11 NOTE — DISCHARGE PLACEMENT REQUEST
"Kaur Guerrero II (63 y.o. Male)   Outpatient PT orders.  Patient to call to schedule appointment.  From Rajani Alexis RN BSN, Case Management,  [phone].    Date of Birth Social Security Number Address Home Phone MRN    1955  [address] [address] [phone] [id number]    Protestant Marital Status          Other        Admission Date Admission Type Admitting Provider Attending Provider Department, Room/Bed    19 Emergency Anny Camilo II, Anny Guerra II,  [facility] 3G, S357/1    Discharge Date Discharge Disposition Discharge Destination                       Attending Provider:  Anny Camilo II, DO    Allergies:  Beta Adrenergic Blockers, Levaquin [Levofloxacin In D5w]    Isolation:  None   Infection:  None   Code Status:  CPR    Ht:  167.6 cm (66\")   Wt:  76.2 kg (168 lb)    Admission Cmt:  None   Principal Problem:  Acute respiratory failure with hypoxia (CMS/[facility]) [J96.01]                 Active Insurance as of 2019     Primary Coverage     Payor Plan Insurance Group Employer/Plan Group    [facility] BLUE CROSS [employer] EMPLOYEE [id number]     Payor Plan Address Payor Plan Phone Number Payor Plan Fax Number Effective Dates    [address] [phone]  2015 - None Entered    [place] [address]       Subscriber Name Subscriber Birth Date Member ID       KAUR GUERRERO II 1955 [id number]                 Emergency Contacts      (Rel.) Home Phone Work Phone Mobile Phone    Ronda Guerrero (Spouse) -- -- [phone]        [place] [address] [facility] [address]  Phone:  [phone]  Fax:   Date: 2019      Ambulatory Referral to Physical Therapy Evaluate and treat; Full weight bearing     Patient:  Kaur Guerrero II MRN:  [id number]   [address] [address] :  1955  SSN:    Phone: [phone] Sex:  M      INSURANCE PAYOR " PLAN GROUP # SUBSCRIBER ID   Primary:    KASHMIR BRUSNON [id number]      Referring Provider Information:  ANNY BARRERA II Phone: [phone] Fax:       Referral Information:   # Visits:  1 Referral Type: Therapy [AE1]   Urgency:  Routine Referral Reason: Specialty Services Required   Start Date: 2019 End Date:  To be determined by Insurer   Diagnosis: Acute respiratory failure with hypoxia (CMS/HCC) (J96.01 [ICD-10-CM] 518.81 [ICD-9-CM])  Impaired functional mobility, balance, gait, and endurance (Z74.09 [ICD-10-CM] V49.89 [ICD-9-CM])      Refer to Dept:   Refer to Provider:   Refer to Facility:       Specialty needed: Evaluate and treat  Weight Bearing Status: Full weight bearing     This document serves as a request of services and does not constitute Insurance authorization or approval of services.  To determine eligibility, please contact the members Insurance carrier to verify and review coverage.     If you have medical questions regarding this request for services. Please contact [address] at [phone] between the hours of 8:00am - 5:00pm (Mon-Fri).       Verbal Order Mode: Per protocol: cosign required  Authorizing Provider: Anny Barrera II, DO  Authorizing Provider's NPI: [id number]     Order Entered By: Rajani Alexis 2019 12:34 PM     Electronically signed by:  Dr. Barrera                 History & Physical      Anny Barrera II, DO at 2019  2:44 PM              [facility] Medicine Services  HISTORY AND PHYSICAL    Patient Name: Zackary Noonan II  : 1955  MRN: [id number]  Primary Care Physician: Jillian Layne MD  Date of admission: 2019      Subjective   Subjective     Chief Complaint: fatigue    HPI:  Zackary Noonan II is a 63 y.o. male presenting from home with weakness. Patient was discharged from our facility about 1 week ago after influenza illness. He reports feeling some better  later last week but then suddenly last night developed acute worsening. At that time he experienced weakness, extreme soa, and fever > 101. He has associated non productive cough. Denies chest pain. Doesn't feel much better since coming into ED.    Review of Systems   Gen- No fevers, chills  CV- No chest pain, palpitations  GI- No N/V/D, abd pain    Otherwise complete ROS reviewed and is negative except as mentioned in the HPI.    Personal History     Past Medical History:   Diagnosis Date   • Anxiety    • Arthritis    • Asthma    • Clostridium difficile infection 01/2017    treated per dr carter with fecal transplant 8-2017    • H/O recurrent pneumonia    • Hypertension    • MRSA infection 2016    right lower extremity wound   • Pulmonary nodule     Followed by Dr. Galaviz    • Recurrent deep vein thrombosis (DVT) (CMS/HCC)     x 3 LLE; chronic anticoagulation therapy    • Ulcerative colitis (CMS/HCC)    • Wears glasses    • Wears hearing aid        Past Surgical History:   Procedure Laterality Date   • BRONCHOSCOPY      by Dr. Galaviz for pulmonary nodule   • BRONCHOSCOPY N/A 11/7/2016    Procedure: BRONCHOSCOPY;  Surgeon: Eben Roberts MD;  Location:  [facility];  Service:    • COLON RESECTION N/A 9/19/2017    Procedure: TOTAL ABDOMINAL COLECTOMY WITH CREATION OF ILEOSTOMY;  Surgeon: David Nielsen MD;  Location:  [facility];  Service:    • COLONOSCOPY  08/2017   • UMBILICAL HERNIA REPAIR     • VASECTOMY     • VASECTOMY REVERSAL         Family History: family history includes Hypertension in his mother. Otherwise pertinent FHx was reviewed and unremarkable.     Social History:  reports that he has never smoked. He has never used smokeless tobacco. He reports that he drinks alcohol. He reports that he does not use drugs.  Social History     Social History Narrative   • Not on file       Medications:    Available home medication information reviewed.    (Not in a hospital admission)    Allergies   Allergen  "Reactions   • Beta Adrenergic Blockers Other (See Comments)     Vocal changes   • Levaquin [Levofloxacin In D5w] Other (See Comments)     Heaviness in legs, \"felt like lead\"       Objective   Objective     Vital Signs:   Temp:  [98.7 °F (37.1 °C)-101 °F (38.3 °C)] 101 °F (38.3 °C)  Heart Rate:  [123-132] 129  Resp:  [26] 26  BP: (123-142)/(67-82) 140/82        Physical Exam   Constitutional: Awake, appears to feel poorly,   Eyes: PERRLA, sclerae anicteric, no conjunctival injection  HENT: NCAT, mucous membranes moist  Neck: Supple, no thyromegaly, no lymphadenopathy, trachea midline  Respiratory: Marked increased WOB, right rhonchi  Cardiovascular: Tachy, regular, no murmur  Gastrointestinal: Positive bowel sounds, soft, nontender, nondistended  Musculoskeletal: No bilateral ankle edema, no clubbing or cyanosis to extremities  Psychiatric: Appropriate affect, cooperative  Neurologic: Oriented x 3, strength symmetric in all extremities, Cranial Nerves grossly intact to confrontation, speech clear  Skin: No rashes    Results Reviewed:  I have personally reviewed current lab, radiology, and data and agree.    Results from last 7 days   Lab Units 04/09/19  1226   WBC 10*3/mm3 32.35*   HEMOGLOBIN g/dL 15.4   HEMATOCRIT % 44.8   PLATELETS 10*3/mm3 399   INR  3.79*     Results from last 7 days   Lab Units 04/09/19  1226   SODIUM mmol/L 134*   POTASSIUM mmol/L 5.1   CHLORIDE mmol/L 95*   CO2 mmol/L 23.0   BUN mg/dL 9   CREATININE mg/dL 1.07   GLUCOSE mg/dL 92   CALCIUM mg/dL 9.2   ALT (SGPT) U/L 17   AST (SGOT) U/L 21     Estimated Creatinine Clearance: 74.2 mL/min (by C-G formula based on SCr of 1.07 mg/dL).  Brief Urine Lab Results  (Last result in the past 365 days)      Color   Clarity   Blood   Leuk Est   Nitrite   Protein   CREAT   Urine HCG        03/31/19 2311 Yellow Clear Negative Negative Negative Negative             No results found for: BNP     Personally reviewed cxr with right sided lung disease noted. Agree " with interpretation.  Imaging Results (last 24 hours)     Procedure Component Value Units Date/Time    XR Chest 1 View [[id number]] Collected:  04/09/19 1221     Updated:  04/09/19 1352    Narrative:       EXAMINATION: XR CHEST 1 VW-      INDICATION: Shortness of air triage protocol.     TECHNIQUE:  Single view frontal chest.     COMPARISONS: CT 04/01/2019.     FINDINGS:  Since the comparison CT from 8 days ago, there is new  airspace disease in the upper lobe on the right, possibly also the  middle lobe. There is also background interstitial prominence in  longstanding basilar opacity. The heart is mildly enlarged. There may be  trace volume effusions. No pneumothorax.       Impression:       New airspace disease in the upper lobe and possibly the  middle lobe on the right.     D:  04/09/2019  E:  04/09/2019     This report was finalized on 4/9/2019 1:49 PM by Bebo Le.                Assessment/Plan   Assessment / Plan     Active Hospital Problems    Diagnosis POA   • **Acute respiratory failure with hypoxia (CMS/HCC) [J96.01] Yes   • Sepsis (CMS/HCC) [A41.9] Yes   • Pneumonia [J18.9] Yes   • History of DVT (deep vein thrombosis) [Z86.718] Not Applicable     LLE; x 3; chronic coumadin therapy       62 y/o male presenting from home with with fatigue and SOA due to post influenza bacterial pneumonia.    A/P:    Acute hypoxic respiratory failure due to pneumonia  Sepsis due to post influenza RLL bacterial pneumonia  --IV vancomycin and zosyn. Blood cx, sputum cx, urinary antigen pending.  --Has hx of bronchiectasis and was previously followed by Dr. Watkins at [facility]. If doesn't improve as expected may need pulmonary eval here for potential bronch.  --Continue home nebs/inhalers.   --Probiotic. Monitor closely given hx of C diff requiring fecal transplant.    Hx of DVT  --On coumadin. Dosing per pharmacy.    DVT prophylaxis: Coumadin    CODE STATUS:    Code Status and Medical Interventions:   Ordered at:  19 1422     Code Status:    CPR     Medical Interventions (Level of Support Prior to Arrest):    Full       Admission Status:  I believe this patient meets INPATIENT status due to the need for care which can only be reasonably provided in an hospital setting such as possible need for aggressive/expedited ancillary services and/or consultation services, IV medications, close physician monitoring, and/or procedures.  In such, I feel patient’s risk for adverse outcomes and need for care warrant INPATIENT evaluation and predict the patient’s care encounter to likely last beyond 2 midnights.    Electronically signed by Anny Camilo II, DO, 19, 2:45 PM.        Electronically signed by Anny Camilo II, DO at 2019  3:00 PM          Physician Progress Notes (most recent note)      Anny Camilo II, DO at 2019 11:44 AM              [facility] Medicine Services  PROGRESS NOTE    Patient Name: Zackary Noonan II  : 1955  MRN: [id number]    Date of Admission: 2019  Length of Stay: 2  Primary Care Physician: Jillian Layne MD    Subjective   Subjective     CC: f/u PNA    HPI: Up in bed. Feels like his breathing is better. Feels weak. Still c/o high output from ostomy.    Review of Systems  Gen- No fevers, chills  CV- No chest pain, palpitations  GI- No N/V/D, abd pain    Otherwise ROS is negative except as mentioned in the HPI.    Objective   Objective     Vital Signs:   Temp:  [97.6 °F (36.4 °C)-98.5 °F (36.9 °C)] 98.5 °F (36.9 °C)  Heart Rate:  [] 89  Resp:  [16-21] 18  BP: (115-128)/(61-69) 128/61        Physical Exam:  Constitutional: No acute distress, awake, alert, looks better  HENT: NCAT, mucous membranes moist  Respiratory: Clear to auscultation bilaterally, respiratory effort normal   Cardiovascular: RRR, no murmurs, rubs, or gallops, palpable pedal pulses bilaterally  Gastrointestinal: Positive bowel sounds, soft, nontender, nondistended,  patent ostomy  Musculoskeletal: No bilateral ankle edema  Psychiatric: Appropriate affect, cooperative  Neurologic: Oriented x 3, strength symmetric in all extremities, Cranial Nerves grossly intact to confrontation, speech clear  Skin: No rashes    Results Reviewed:  I have personally reviewed current lab, radiology, and data and agree.    Results from last 7 days   Lab Units 04/11/19  0634 04/11/19  0527 04/10/19  0559 04/09/19  1752 04/09/19  1226   WBC 10*3/mm3  --  26.81* 30.16*  --  32.35*   HEMOGLOBIN g/dL  --  12.7* 12.8*  --  15.4   HEMATOCRIT %  --  38.1 39.1  --  44.8   PLATELETS 10*3/mm3  --  394 362  --  399   INR  3.94*  --  4.45* 4.04* 3.79*     Results from last 7 days   Lab Units 04/11/19  0527 04/10/19  0559 04/09/19  1226   SODIUM mmol/L 129* 130* 134*   POTASSIUM mmol/L 3.7 4.5 5.1   CHLORIDE mmol/L 97* 100 95*   CO2 mmol/L 18.0* 16.0* 23.0   BUN mg/dL 22 17 9   CREATININE mg/dL 2.48* 2.10* 1.07   GLUCOSE mg/dL 83 150* 92   CALCIUM mg/dL 8.8 8.3* 9.2   ALT (SGPT) U/L  --   --  17   AST (SGOT) U/L  --   --  21     Estimated Creatinine Clearance: 32 mL/min (A) (by C-G formula based on SCr of 2.48 mg/dL (H)).    No results found for: BNP    Microbiology Results Abnormal     Procedure Component Value - Date/Time    Respiratory Culture - Sputum, Cough [[id number]] Collected:  04/10/19 0859    Lab Status:  Preliminary result Specimen:  Sputum from Cough Updated:  04/11/19 0838     Respiratory Culture Light growth (2+) Normal Respiratory Skyler     Gram Stain Moderate (3+) WBCs per low power field      Few (2+) Epithelial cells per low power field      Moderate (3+) Budding yeast with pseudohyphae      Few (2+) Normal respiratory skyler    Clostridium Difficile Toxin - Stool, Per Rectum [[id number]] Collected:  04/10/19 1629    Lab Status:  Final result Specimen:  Stool from Per Rectum Updated:  04/10/19 3271    Narrative:       The following orders were created for panel order Clostridium Difficile Toxin -  Stool, Per Rectum.  Procedure                               Abnormality         Status                     ---------                               -----------         ------                     Clostridium Difficile To...[[id number]]  Normal              Final result                 Please view results for these tests on the individual orders.    Clostridium Difficile Toxin, PCR - Stool, Per Rectum [[id number]]  (Normal) Collected:  04/10/19 1629    Lab Status:  Final result Specimen:  Stool from Per Rectum Updated:  04/10/19 1721     C. Difficile Toxins by PCR Not Detected    Narrative:       Performance characteristics of test not established for patients <2 years of age.  Negative for Toxigenic C. Difficile    Blood Culture - Blood, Arm, Right [[id number]] Collected:  04/09/19 1225    Lab Status:  Preliminary result Specimen:  Blood from Arm, Right Updated:  04/10/19 1300     Blood Culture No growth at 24 hours    Blood Culture - Blood, Arm, Left [[id number]] Collected:  04/09/19 1215    Lab Status:  Preliminary result Specimen:  Blood from Arm, Left Updated:  04/10/19 1300     Blood Culture No growth at 24 hours    S. Pneumo Ag Urine or CSF - Urine, Urine, Clean Catch [[id number]]  (Abnormal) Collected:  04/09/19 2333    Lab Status:  Final result Specimen:  Urine, Clean Catch Updated:  04/10/19 0912     Strep Pneumo Ag Positive    Legionella Antigen, Urine - Urine, Urine, Clean Catch [[id number]]  (Normal) Collected:  04/09/19 2333    Lab Status:  Final result Specimen:  Urine, Clean Catch Updated:  04/10/19 0656     LEGIONELLA ANTIGEN, URINE Negative             I have reviewed the medications:    Current Facility-Administered Medications:   •  acetaminophen (TYLENOL) tablet 650 mg, 650 mg, Oral, Q4H PRN, Leslee Anny M II, DO, 650 mg at 04/11/19 1009  •  albuterol (PROVENTIL) nebulizer solution 0.083% 2.5 mg/3mL, 2.5 mg, Nebulization, Q6H PRN, Leslee Anny M II, DO  •  azithromycin 500 MG/250 ML 0.9% NS IVPB  (MBP), 500 mg, Intravenous, Q24H, Leslee, Anny M II, DO  •  budesonide (PULMICORT) nebulizer solution 0.5 mg, 0.5 mg, Nebulization, BID - RT, Leslee, Anny M II, DO, 0.5 mg at 04/11/19 0931  •  busPIRone (BUSPAR) tablet 15 mg, 15 mg, Oral, Q8H, Leslee, Anny M II, DO, 15 mg at 04/11/19 0611  •  cefTRIAXone (ROCEPHIN) IVPB 1 g, 1 g, Intravenous, Q24H, Leslee, Anny M II, DO, Last Rate: 100 mL/hr at 04/11/19 1009, 1 g at 04/11/19 1009  •  cetirizine (zyrTEC) tablet 10 mg, 10 mg, Oral, Daily, Leslee, Anny M II, DO, 10 mg at 04/11/19 0820  •  diphenoxylate-atropine (LOMOTIL) 2.5-0.025 MG per tablet 2 tablet, 2 tablet, Oral, Q2H PRN, Leslee, Anny M II, DO, 2 tablet at 04/11/19 1009  •  escitalopram (LEXAPRO) tablet 10 mg, 10 mg, Oral, Daily, Leslee, Anny M II, DO, 10 mg at 04/11/19 0820  •  fluticasone (FLONASE) 50 MCG/ACT nasal spray 2 spray, 2 spray, Nasal, BID, Leslee, Anny M II, DO, 2 spray at 04/11/19 0820  •  guaiFENesin (MUCINEX) 12 hr tablet 600 mg, 600 mg, Oral, Q12H, Leslee, Anny M II, DO, 600 mg at 04/11/19 0820  •  loperamide (IMODIUM) capsule 2 mg, 2 mg, Oral, 4x Daily PRN, Elizabeth Parikh APRN, 2 mg at 04/11/19 0820  •  Magnesium Sulfate 2 gram Bolus, followed by 8 gram infusion (total Mg dose 10 grams)- Mg less than or equal to 1mg/dL, 2 g, Intravenous, PRN **OR** Magnesium Sulfate 2 gram / 50mL Infusion (GIVE X 3 BAGS TO EQUAL 6GM TOTAL DOSE) - Mg 1.1 - 1.5 mg/dl, 2 g, Intravenous, PRN **OR** Magnesium Sulfate 4 gram infusion- Mg 1.6-1.9 mg/dL, 4 g, Intravenous, PRN, Leslee, Anny M II, DO  •  melatonin tablet 5 mg, 5 mg, Oral, Nightly PRN, Leslee, Anny M II, DO  •  montelukast (SINGULAIR) tablet 10 mg, 10 mg, Oral, Nightly, Leslee, Anny M II, DO, 10 mg at 04/10/19 2229  •  ondansetron (ZOFRAN) tablet 4 mg, 4 mg, Oral, Q6H PRN, 4 mg at 04/10/19 2231 **OR** ondansetron (ZOFRAN) injection 4 mg, 4 mg, Intravenous, Q6H PRN, Leslee, Anny M II, DO, 4 mg at 04/10/19 0614  •  Pharmacy to  dose warfarin, , Does not apply, Continuous PRN, Leslee, Anny M II, DO  •  potassium chloride (MICRO-K) CR capsule 40 mEq, 40 mEq, Oral, PRN **OR** potassium chloride (KLOR-CON) packet 40 mEq, 40 mEq, Oral, PRN **OR** potassium chloride 10 mEq in 100 mL IVPB, 10 mEq, Intravenous, Q1H PRN, Leslee, Anny M II, DO  •  saccharomyces boulardii (FLORASTOR) capsule 250 mg, 250 mg, Oral, BID, Leslee, Anny M II, DO, 250 mg at 04/11/19 0820  •  sennosides-docusate sodium (SENOKOT-S) 8.6-50 MG tablet 2 tablet, 2 tablet, Oral, BID PRN, Leslee, Anny M II, DO  •  sodium chloride 0.9 % flush 3 mL, 3 mL, Intravenous, Q12H, Leslee, Anny M II, DO  •  sodium chloride 0.9 % flush 3-10 mL, 3-10 mL, Intravenous, PRN, Leslee, Anny M II, DO  •  sodium chloride 0.9 % infusion, 125 mL/hr, Intravenous, Continuous, Leslee, Anny M II, DO, Last Rate: 125 mL/hr at 04/11/19 0936, 125 mL/hr at 04/11/19 0936  •  sodium chloride 0.9 % infusion, 125 mL/hr, Intravenous, Continuous, Leslee, Anny M II, DO  •  warfarin (COUMADIN) (dosing per levels), , Does not apply, Daily, Leslee, Anny M II, DO      Assessment/Plan   Assessment / Plan     Active Hospital Problems    Diagnosis POA   • **Acute respiratory failure with hypoxia (CMS/HCC) [J96.01] Yes   • RAMYA (acute kidney injury) (CMS/HCC) [N17.9] Yes   • Sepsis (CMS/HCC) [A41.9] Yes   • 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) [Z86.19] Not Applicable   • Hyponatremia [E87.1] Yes   • Pneumonia [J18.9] Yes   • History of DVT (deep vein thrombosis) [Z86.718] Not Applicable     LLE; x 3; chronic coumadin therapy     • Diarrhea [R19.7] Yes          Brief Hospital Course to date:  Zackary Noonan II is a 64 y/o male presenting from home with with fatigue and SOA due to post influenza bacterial pneumonia.     A/P:     Acute hypoxic respiratory failure due to pneumonia  Sepsis due to post influenza right sided streptococcal bacterial pneumonia  --All other cultures negative.  Switch to IV rocephin and azithro for strep coverage.  --Has hx of bronchiectasis and was previously followed by Dr. Watkins at [facility]. Seems to be improving so will hold on involving pulmonary.  --Continue home nebs/inhalers.      RAMYA, worse  --Likely due to sepsis and volume depletion vs vanc toxicity. Stop vanc as above and check urine lytes.  --Strict I's and O's.   --Consult NAL     Hyponatremia, worse  --Continue IVF. Monitor.     Diarrhea  --C diff pcr neg. Resume imodium and lomotil.     Hx of DVT  Supratherapeutic INR  --On coumadin. Dosing per pharmacy.     DVT prophylaxis: Coumadin     Disposition: I expect the patient to be discharged home in 2-4 days.    CODE STATUS:   Code Status and Medical Interventions:   Ordered at: 19 1422     Code Status:    CPR     Medical Interventions (Level of Support Prior to Arrest):    Full         Electronically signed by Anny Camilo II, DO, 19, 11:44 AM.      Electronically signed by Anny Camilo II, DO at 2019 11:48 AM          Physical Therapy Notes (last 72 hours) (Notes from 2019 12:40 PM through 2019 12:40 PM)      Ankit Garcia PT at 4/10/2019 10:00 AM  Version 1 of 1         Acute Care - Physical Therapy Initial Evaluation  [facility]     Patient Name: Zackary Noonan II  : 1955  MRN: [id number]  Today's Date: 4/10/2019   Onset of Illness/Injury or Date of Surgery: 19  Date of Referral to PT: 04/10/19  Referring Physician: Leslee      Admit Date: 2019    Visit Dx:     ICD-10-CM ICD-9-CM   1. Acute respiratory failure with hypoxia (CMS/HCC) J96.01 518.81   2. Pneumonia of right upper lobe due to infectious organism (CMS/HCC) J18.1 486   3. Leukocytosis, unspecified type D72.829 288.60   4. Sepsis, due to unspecified organism (CMS/HCC) A41.9 038.9     995.91   5. Impaired mobility and ADLs Z74.09 799.89   6. Impaired functional mobility, balance, gait, and endurance Z74.09 V49.89     Patient Active  Problem List   Diagnosis   • Pneumonia   • History of DVT (deep vein thrombosis)   • HTN (hypertension)   • Crohn's colitis (CMS/HCC)   • COPD (chronic obstructive pulmonary disease) (CMS/HCC)   • Asthma   • Infection of wound due to methicillin resistant Staphylococcus aureus (MRSA)   • H/O Pulmonary nodule   • Prediabetes   • Chronic anemia   • S/P total abdominal colectomy   • HO of IVC filter   • Pneumonia of right lower lobe due to infectious organism (CMS/HCC)   • 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)   • Sepsis (CMS/HCC)   • Left lower lobe pneumonia (CMS/HCC)   • Crohn's disease (CMS/HCC)   • Asthma   • Leukocytosis   • Influenza, pneumonia   • SIRS (systemic inflammatory response syndrome) (CMS/HCC)   • Acute respiratory failure with hypoxia (CMS/HCC)     Past Medical History:   Diagnosis Date   • Anxiety    • Arthritis    • Asthma    • Clostridium difficile infection 01/2017    treated per dr carter with fecal transplant 8-2017    • H/O recurrent pneumonia    • Hypertension    • MRSA infection 2016    right lower extremity wound   • Pulmonary nodule     Followed by Dr. Galaviz    • Recurrent deep vein thrombosis (DVT) (CMS/HCC)     x 3 LLE; chronic anticoagulation therapy    • Ulcerative colitis (CMS/HCC)    • Wears glasses    • Wears hearing aid      Past Surgical History:   Procedure Laterality Date   • BRONCHOSCOPY      by Dr. Galaviz for pulmonary nodule   • BRONCHOSCOPY N/A 11/7/2016    Procedure: BRONCHOSCOPY;  Surgeon: Eben Roberts MD;  Location: [facility];  Service:    • COLON RESECTION N/A 9/19/2017    Procedure: TOTAL ABDOMINAL COLECTOMY WITH CREATION OF ILEOSTOMY;  Surgeon: David Nielsen MD;  Location: [facility] OR;  Service:    • COLONOSCOPY  08/2017   • UMBILICAL HERNIA REPAIR     • VASECTOMY     • VASECTOMY REVERSAL          PT ASSESSMENT (last 12 hours)      Physical Therapy Evaluation     Row Name 04/10/19 1000          PT Evaluation Time/Intention     Subjective Information  complains of;fatigue;weakness;nausea/vomiting  -SC     Document Type  evaluation  -SC     Mode of Treatment  physical therapy  -SC     Patient Effort  good  -SC     Symptoms Noted During/After Treatment  significant change in vital signs  -SC     Row Name 04/10/19 1000          General Information    Patient Profile Reviewed?  yes  -SC     Onset of Illness/Injury or Date of Surgery  04/09/19  -SC     Referring Physician  Leslee  -SC     Patient Observations  alert;cooperative;agree to therapy  -SC     Patient/Family Observations  none  -SC     General Observations of Patient  up in chair   -SC     Prior Level of Function  independent:;community mobility;gait;all household mobility  -SC     Equipment Currently Used at Home  none  -SC     Pertinent History of Current Functional Problem  To ED with dyspnea. Recent admission with influenza. Now dx with PNA, sepsis, ARF  -SC     Existing Precautions/Restrictions  fall;oxygen therapy device and L/min  -SC     Risks Reviewed  patient:;increased discomfort;change in vital signs  -SC     Benefits Reviewed  patient:;improve function;increase independence  -SC     Barriers to Rehab  none identified  -SC     Row Name 04/10/19 1000          Relationship/Environment    Lives With  spouse  -SC     Row Name 04/10/19 1000          Resource/Environmental Concerns    Current Living Arrangements  home/apartment/condo  -SC     Row Name 04/10/19 1000          Cognitive Assessment/Intervention- PT/OT    Orientation Status (Cognition)  oriented x 3  -SC     Follows Commands (Cognition)  WFL  -SC     Safety Deficit (Cognitive)  insight into deficits/self awareness  -SC     Row Name 04/10/19 1000          Safety Issues, Functional Mobility    Safety Issues Affecting Function (Mobility)  insight into deficits/self awareness  -SC     Impairments Affecting Function (Mobility)  endurance/activity tolerance  -SC     Row Name 04/10/19 1000          Bed Mobility  Assessment/Treatment    Bed Mobility Assessment/Treatment  sit-supine  -SC     Supine-Sit [place] (Bed Mobility)  independent  -SC     Row Name 04/10/19 1000          Transfer Assessment/Treatment    Transfer Assessment/Treatment  sit-stand transfer;stand-sit transfer  -SC     Sit-Stand [place] (Transfers)  supervision  -SC     Stand-Sit [place] (Transfers)  supervision  -[facility] Name 04/10/19 1000          Gait/Stairs Assessment/Training    Gait/Stairs Assessment/Training  gait/ambulation independence  -SC     [place] Level (Gait)  contact guard  -SC     Assistive Device (Gait)  walker, front-wheeled  -SC     Distance in Feet (Gait)  40  -SC     Pattern (Gait)  step-through  -SC     Bilateral Gait Deviations  forward flexed posture  -SC     Comment (Gait/Stairs)  cues to stand taller  -[facility] Name 04/10/19 1000          General ROM    GENERAL ROM COMMENTS  wnl  -[facility] Name 04/10/19 1000          MMT (Manual Muscle Testing)    General MMT Comments  grossly 5/5  [facility] Name 04/10/19 1000          Motor Assessment/Intervention    Additional Documentation  Balance (Group);Balance Interventions (Group);Therapeutic Exercise (Group);Therapeutic Exercise Interventions (Group)  -[facility] Name 04/10/19 1000          Therapeutic Exercise    Lower Extremity (Therapeutic Exercise)  LAQ (long arc quad), bilateral;gastroc stretch, bilateral;quad sets, bilateral  -SC     Exercise Type (Therapeutic Exercise)  AROM (active range of motion);isometric contraction, static  -SC     Position (Therapeutic Exercise)  seated  -SC     Sets/Reps (Therapeutic Exercise)  10  -SC     Row Name 04/10/19 1000          Balance    Balance  dynamic standing balance  -[facility] Name 04/10/19 1000          Dynamic Standing Balance    Level of [place], Reaches Outside Midline (Standing, Dynamic Balance)  contact guard assist  -SC     Assistive Device Utilized (Supported Standing, Dynamic Balance)  walker,  rolling  -[facility] Name 04/10/19 1000          Sensory Assessment/Intervention    Sensory General Assessment  no sensation deficits identified  -SC     Row Name 04/10/19 1000          Pain Assessment    Additional Documentation  Pain Scale: Numbers Pre/Post-Treatment (Group);Pain Scale: FACES Pre/Post-Treatment (Group)  -SC     Row Name 04/10/19 1000          Pain Scale: FACES Pre/Post-Treatment    Pain: FACES Scale, Pretreatment  0-->no hurt  -SC     Pain: FACES Scale, Post-Treatment  0-->no hurt  -SC     Row Name 04/10/19 1000          Coping    Observed Emotional State  calm;cooperative  -SC     Verbalized Emotional State  acceptance  -[facility] Name 04/10/19 1000          Plan of Care Review    Plan of Care Reviewed With  patient  -[facility] Name 04/10/19 1000          Physical Therapy Clinical Impression    Date of Referral to PT  04/10/19  -SC     PT Diagnosis (PT Clinical Impression)  impaired activity indurance  -SC     Patient/Family Goals Statement (PT Clinical Impression)  go home  -SC     Criteria for Skilled Interventions Met (PT Clinical Impression)  yes;treatment indicated  -SC     Pathology/Pathophysiology Noted (Describe Specifically for Each System)  pulmonary;cardiovascular  -SC     Impairments Found (describe specific impairments)  aerobic capacity/endurance;gait, locomotion, and balance;muscle performance  -SC     Rehab Potential (PT Clinical Summary)  good, to achieve stated therapy goals  -SC     Care Plan Review (PT)  risks/benefits reviewed  -SC     Row Name 04/10/19 1000          Vital Signs    Pretreatment Heart Rate (beats/min)  100  -SC     Intratreatment Heart Rate (beats/min)  115  -SC     Posttreatment Heart Rate (beats/min)  103  -SC     Post SpO2 (%)  96  -SC     O2 Delivery Post Treatment  supplemental O2 6L   -SC     Row Name 04/10/19 1000          Physical Therapy Goals    Gait Training Goal Selection (PT)  gait training, PT goal 1  -SC     Row Name 04/10/19 1000           Gait Training Goal 1 (PT)    Activity/Assistive Device (Gait Training Goal 1, PT)  gait (walking locomotion)  -SC     [place] Level (Gait Training Goal 1, PT)  independent  -SC     Distance (Gait Goal 1, PT)  600  -SC     Time Frame (Gait Training Goal 1, PT)  long term goal (LTG);10 days  -SC     Row Name 04/10/19 1000          Positioning and Restraints    Pre-Treatment Position  sitting in chair/recliner  -SC     Post Treatment Position  bed  -SC     In Bed  supine;notified nsg;call light within reach;encouraged to call for assist;exit alarm on  -SC     Row Name 04/10/19 1000          Living Environment    Home Accessibility  stairs within home  -SC       User Key  (r) = Recorded By, (t) = Taken By, (c) = Cosigned By    Initials Name Provider Type    SC Ankit Garcia PT Physical Therapist        Physical Therapy Education     Title: PT OT SLP Therapies (In Progress)     Topic: Physical Therapy (Done)     Point: Mobility training (Done)     Learning Progress Summary           Patient MIKAYLA Lala VU, DU, NR by SC at 4/10/2019 10:00 AM    Comment:  reviewed  benefits of activity                   Point: Home exercise program (Done)     Learning Progress Summary           Patient MIKAYLA Lala VU, DU, NR by SC at 4/10/2019 10:00 AM    Comment:  reviewed  benefits of activity                   Point: Body mechanics (Done)     Learning Progress Summary           Patient MIKAYLA Lala VU, DU, NR by SC at 4/10/2019 10:00 AM    Comment:  reviewed  benefits of activity                   Point: Precautions (Done)     Learning Progress Summary           Patient MIKAYLA Lala VU, DU, NR by SC at 4/10/2019 10:00 AM    Comment:  reviewed  benefits of activity                               User Key     Initials Effective Dates Name Provider Type [facility] 06/19/15 -  Ankit aGrcia PT Physical Therapist PT              PT Recommendation and Plan  Anticipated Discharge Disposition (PT): home with assist  Planned Therapy Interventions  (PT Eval): home exercise program, patient/family education, stretching  Therapy Frequency (PT Clinical Impression): daily  Outcome Summary/Treatment Plan (PT)  Anticipated Discharge Disposition (PT): home with assist  Plan of Care Reviewed With: patient  Progress: improving  Outcome Summary: Patient presents with decreased endurance to activity with some tachycardia noted. Will benefit from skilled PT at this time.  Outcome Measures     Row Name 04/10/19 1000 04/10/19 0830          How much help from another person do you currently need...    Turning from your back to your side while in flat bed without using bedrails?  4  -SC  --     Moving from lying on back to sitting on the side of a flat bed without bedrails?  4  -SC  --     Moving to and from a bed to a chair (including a wheelchair)?  3  -SC  --     Standing up from a chair using your arms (e.g., wheelchair, bedside chair)?  3  -SC  --     Climbing 3-5 steps with a railing?  3  -SC  --     To walk in hospital room?  3  -SC  --     AM-PAC 6 Clicks Score  20  -SC  --        How much help from another is currently needed...    Putting on and taking off regular lower body clothing?  --  3  -TA     Bathing (including washing, rinsing, and drying)  --  3  -TA     Toileting (which includes using toilet bed pan or urinal)  --  3  -TA     Putting on and taking off regular upper body clothing  --  4  -TA     Taking care of personal grooming (such as brushing teeth)  --  4  -TA     Eating meals  --  4  -TA     Score  --  21  -TA        Functional Assessment    Outcome Measure Options  AM-PAC 6 Clicks Basic Mobility (PT)  -SC  AM-PAC 6 Clicks Daily Activity (OT)  -TA       User Key  (r) = Recorded By, (t) = Taken By, (c) = Cosigned By    Initials Name Provider Type    SC Ankit Garcia, PT Physical Therapist    TA Jatin Wen, OT Occupational Therapist         Time Calculation:   PT Charges     Row Name 04/10/19 1000             Time Calculation    Start Time   1000  -SC      PT Received On  04/10/19  -SC      PT Goal Re-Cert Due Date  04/20/19  -SC        User Key  (r) = Recorded By, (t) = Taken By, (c) = Cosigned By    Initials Name Provider Type    SC Ankit Garcia PT Physical Therapist        Therapy Charges for Today     Code Description Service Date Service Provider Modifiers Qty    25118532961 HC PT EVAL MOD COMPLEXITY 4 4/10/2019 Ankit Garcia PT GP 1          PT G-Codes  Outcome Measure Options: AM-PAC 6 Clicks Basic Mobility (PT)  AM-PAC 6 Clicks Score: 20  Score: 21      Ankit Garcia PT  4/10/2019         Electronically signed by Ankit Garcia PT at 4/10/2019 10:56 AM     Ankit Garcia PT at 4/10/2019 10:55 AM  Version 1 of 1         Problem: Patient Care Overview  Goal: Plan of Care Review  Outcome: Ongoing (interventions implemented as appropriate)   04/10/19 1000   Coping/Psychosocial   Plan of Care Reviewed With patient   OTHER   Outcome Summary Patient presents with decreased endurance to activity with some tachycardia noted. Will benefit from skilled PT at this time.   Plan of Care Review   Progress improving           Electronically signed by Ankit Garcia PT at 4/10/2019 10:55 AM

## 2019-04-11 NOTE — PROGRESS NOTES
"Pharmacy Consult  -  Warfarin  Zackary Noonan II is a  63 y.o. male admitted for a viral illness.   Height - 167.6 cm (66\")  Weight - 76.2 kg (168 lb)    Consulting Provider: - Dr. Camilo  Indication: - DVT  Goal INR: -  2 - 3  Home Regimen:   - 5 mg daily    Bridge Therapy: No    Drug-Drug Interactions with current regimen:              - No clinically significant drug-drug interactions    Warfarin Dosing During Admission:  Date  4/9 4/10 4/11         INR  3.79 4.45 3.94         Dose  hold hold hold            Discharge Follow up:   Following Provider -   Follow up time range or appointment -      Labs:  Results from last 7 days   Lab Units 04/11/19  0634 04/11/19  0527 04/10/19  0559 04/09/19  1752 04/09/19  1226   INR  3.94*  --  4.45* 4.04* 3.79*   HEMOGLOBIN g/dL  --  12.7* 12.8*  --  15.4   HEMATOCRIT %  --  38.1 39.1  --  44.8   PLATELETS 10*3/mm3  --  394 362  --  399     Results from last 7 days   Lab Units 04/11/19  0527 04/10/19  0559 04/09/19  1226   SODIUM mmol/L 129* 130* 134*   POTASSIUM mmol/L 3.7 4.5 5.1   CHLORIDE mmol/L 97* 100 95*   CO2 mmol/L 18.0* 16.0* 23.0   BUN mg/dL 22 17 9   CREATININE mg/dL 2.48* 2.10* 1.07   CALCIUM mg/dL 8.8 8.3* 9.2   BILIRUBIN mg/dL  --   --  0.7   ALK PHOS U/L  --   --  98   ALT (SGPT) U/L  --   --  17   AST (SGOT) U/L  --   --  21   GLUCOSE mg/dL 83 150* 92     Current dietary intake: 0-50% of meals documented in the last 24H  Diet Order   Procedures   • Diet Regular     Assessment/Plan:   1. Pharmacy to dose warfarin for a history of DVT's. Home dose is 5mg daily. Goal INR 2-3.   2. INR again SUPRAtherapeutic at 3.94. Have held 2x doses.  3. Will continue to hold tonight's dose.   4. Pharmacy will continue to monitor.      Thanks,  Ghassan Eolia, PharmD  Pharmacy Resident  4/11/2019  11:09 AM              "

## 2019-04-11 NOTE — THERAPY TREATMENT NOTE
Acute Care - Physical Therapy Treatment Note  [facility]     Patient Name: Zackary Noonan II  : 1955  MRN: [id number]  Today's Date: 2019  Onset of Illness/Injury or Date of Surgery: 19  Date of Referral to PT: 04/10/19  Referring Physician: Leslee    Admit Date: 2019    Visit Dx:    ICD-10-CM ICD-9-CM   1. Acute respiratory failure with hypoxia (CMS/Formerly [facility]) J96.01 518.81   2. Pneumonia of right upper lobe due to infectious organism (CMS/Formerly [facility]) J18.1 486   3. Leukocytosis, unspecified type D72.829 288.60   4. Sepsis, due to unspecified organism (CMS/Formerly [facility]) A41.9 038.9     995.91   5. Impaired mobility and ADLs Z74.09 799.89   6. Impaired functional mobility, balance, gait, and endurance Z74.09 V49.89     Patient Active Problem List   Diagnosis   • Pneumonia   • History of DVT (deep vein thrombosis)   • HTN (hypertension)   • Diarrhea   • Crohn's colitis (CMS/[facility]ly [facility])   • COPD (chronic obstructive pulmonary disease) (CMS/Formerly [facility])   • Asthma   • Infection of wound due to methicillin resistant Staphylococcus aureus (MRSA)   • H/O Pulmonary nodule   • Prediabetes   • Chronic anemia   • S/P total abdominal colectomy   • HO of IVC filter   • Pneumonia of right lower lobe due to infectious organism (CMS/[facility]ly [facility])   • Hyponatremia   • History of C. difficile colitis s/p fecal transplant (2017)   • Sepsis (CMS/[facility])   • Left lower lobe pneumonia (CMS/[facility])   • Crohn's disease (CMS/[facility])   • Asthma   • Leukocytosis   • Influenza, pneumonia   • SIRS (systemic inflammatory response syndrome) (CMS/[facility]ly [facility])   • Acute respiratory failure with hypoxia (CMS/[facility])   • RAMYA (acute kidney injury) (CMS/Formerly [facility])       Therapy Treatment    Rehabilitation Treatment Summary     Row Name 19 1129             Treatment Time/Intention    Discipline  physical therapist  (Pended)   -ES      Document Type  therapy note (daily note)  (Pended)   -ES      Subjective Information  complains of;weakness;fatigue;nausea/vomiting  (Pended)   -ES      Mode  of Treatment  individual therapy;physical therapy  (Pended)   -ES      Patient/Family Observations  Wife present. Pt supine in bed upon arrival, O2 intact. Declines ambulation but agreeable to get up to the chair and participate in therex.  (Pended)   -ES      Care Plan Review  care plan/treatment goals reviewed;risks/benefits reviewed;current/potential barriers reviewed;patient/other agree to care plan  (Pended)   -ES      Care Plan Review, Other Participant(s)  spouse  (Pended)   -ES2      Patient Effort  good  (Pended)   -ES      Existing Precautions/Restrictions  fall;oxygen therapy device and L/min  (Pended)   -ES      Recorded by [ES] Joselin Escalera, PT Student 19 1310  [ES2] Joselin Escalera, PT Student 19 1314      Row Name 19 1129             Vital Signs    Pre Systolic BP Rehab  128  (Pended)   -ES      Pre Treatment Diastolic BP  61  (Pended)   -ES      Pretreatment Heart Rate (beats/min)  94  (Pended)   -ES      Posttreatment Heart Rate (beats/min)  97  (Pended)   -ES      Pre SpO2 (%)  95  (Pended)   -ES      O2 Delivery Pre Treatment  supplemental O2  (Pended)   -ES      Post SpO2 (%)  94  (Pended)   -ES      O2 Delivery Post Treatment  supplemental O2  (Pended)   -ES      Pre Patient Position  Supine  (Pended)   -ES      Intra Patient Position  Standing  (Pended)   -ES      Post Patient Position  Sitting  (Pended)   -ES      Recorded by [ES] Joselin Escalera, PT Student 19 1310      Row Name 19 1129             Cognitive Assessment/Intervention    Additional Documentation  Cognitive Assessment/Intervention (Group)  (Pended)   -ES      Recorded by [ES] Joselin Escalera, PT Student 19 1310      Row Name 19 1129             Cognitive Assessment/Intervention- PT/OT    Affect/Mental Status (Cognitive)  flat/blunted affect  (Pended)  Wife reports pt's mother's  is today  -ES      Orientation Status (Cognition)  oriented x 4  (Pended)   -ES      Follows  Commands (Cognition)  WFL  (Pended)   -ES      Cognitive Function (Cognitive)  safety deficit  (Pended)   -ES      Safety Deficit (Cognitive)  mild deficit;safety precautions awareness;safety precautions follow-through/compliance  (Pended)   -ES      Personal Safety Interventions  fall prevention program maintained;gait belt;muscle strengthening facilitated;nonskid shoes/slippers when out of bed  (Pended)   -ES      Recorded by [ES] Joselin Escalera, PT Student 04/11/19 1311      Row Name 04/11/19 1129             Bed Mobility Assessment/Treatment    Bed Mobility Assessment/Treatment  supine-sit  (Pended)   -ES      Supine-Sit [place] (Bed Mobility)  conditional independence  (Pended)   -ES      Assistive Device (Bed Mobility)  bed rails;head of bed elevated  (Pended)   -ES      Recorded by [ES] Joselin Escalera, PT Student 04/11/19 1311      Row Name 04/11/19 1129             Transfer Assessment/Treatment    Transfer Assessment/Treatment  sit-stand transfer;stand-sit transfer  (Pended)   -ES      Comment (Transfers)  Vcs for hand placements  (Pended)   -ES2      Recorded by [ES] Joselin Escalera, PT Student 04/11/19 1311  [ES2] Joselin Escalera, PT Student 04/11/19 1312      Row Name 04/11/19 1129             Sit-Stand Transfer    Sit-Stand [place] (Transfers)  supervision  (Pended)   -ES      Assistive Device (Sit-Stand Transfers)  walker, front-wheeled  (Pended)   -ES      Recorded by [ES] Joselin Escalera, PT Student 04/11/19 1312      Row Name 04/11/19 1129             Stand-Sit Transfer    Stand-Sit [place] (Transfers)  supervision  (Pended)   -ES      Assistive Device (Stand-Sit Transfers)  walker, front-wheeled  (Pended)   -ES      Recorded by [ES] Joselin Escalera, PT Student 04/11/19 1312      Row Name 04/11/19 1129             Gait/Stairs Assessment/Training    [place] Level (Gait)  contact guard  (Pended)   -ES      Assistive Device (Gait)  walker, front-wheeled  (Pended)   -ES       Distance in Feet (Gait)  6  (Pended)   -ES      Pattern (Gait)  step-through  (Pended)   -ES      Bilateral Gait Deviations  forward flexed posture  (Pended)   -ES      Recorded by [ES] Joselin Escalera, PT Student 04/11/19 1312      Row Name 04/11/19 1129             Motor Skills Assessment/Interventions    Additional Documentation  Therapeutic Exercise (Group);Therapeutic Exercise Interventions (Group)  (Pended)   -ES      Recorded by [ES] Joselin Escalera, PT Student 04/11/19 1312      Row Name 04/11/19 1129             Therapeutic Exercise    58355 - PT Therapeutic Exercise Minutes  23  (Pended)   -ES      Recorded by [ES] Joselin Escalera, PT Student 04/11/19 1314      Row Name 04/11/19 1129             Therapeutic Exercise    Lower Extremity (Therapeutic Exercise)  gluteal sets;heel slides, bilateral;LAQ (long arc quad), bilateral;marching while seated;quad sets, bilateral;SLR (straight leg raise), bilateral  (Pended)   -ES      Lower Extremity Range of Motion (Therapeutic Exercise)  hip abduction/adduction, bilateral;ankle dorsiflexion/plantar flexion, bilateral  (Pended)   -ES      Exercise Type (Therapeutic Exercise)  AROM (active range of motion)  (Pended)   -ES      Position (Therapeutic Exercise)  seated  (Pended)   -ES      Sets/Reps (Therapeutic Exercise)  15x BLE  (Pended)   -ES      Comment (Therapeutic Exercise)  Rest breaks between exercise d/t fatigue and nausea  (Pended)   -ES      Recorded by [ES] Joselin Escalera, PT Student 04/11/19 1314      Row Name 04/11/19 1129             Dynamic Sitting Balance    Level of [place], Reaches Outside Midline (Sitting, Dynamic Balance)  independent  (Pended)   -ES      Sitting Position, Reaches Outside Midline (Sitting, Dynamic Balance)  sitting in chair  (Pended)   -ES      Comment, Reaches Outside Midline (Sitting, Dynamic Balance)  LE therex  (Pended)   -ES      Recorded by [ES] Joselin Escalera, PT Student 04/11/19 1314      Row Name 04/11/19 1129              Dynamic Standing Balance    Level of [place], Reaches Outside Midline (Standing, Dynamic Balance)  contact guard assist  (Pended)   -ES      Assistive Device Utilized (Supported Standing, Dynamic Balance)  walker, rolling  (Pended)   -ES      Recorded by [ES] Joselin Escalera, PT Student 04/11/19 1314      Row Name 04/11/19 1129             Positioning and Restraints    Pre-Treatment Position  in bed  (Pended)   -ES      Post Treatment Position  chair  (Pended)   -ES      In Chair  notified nsg;reclined;sitting;call light within reach;encouraged to call for assist;exit alarm on;with family/caregiver  (Pended)   -ES      Recorded by [ES] Joselin Escalera, PT Student 04/11/19 1314      Row Name 04/11/19 1129             Pain Scale: Numbers Pre/Post-Treatment    Pain Scale: Numbers, Pretreatment  0/10 - no pain  (Pended)   -ES      Pain Scale: Numbers, Post-Treatment  0/10 - no pain  (Pended)   -ES      Recorded by [ES] Joselin Escalera, PT Student 04/11/19 1314      Row Name 04/11/19 1129             Coping    Observed Emotional State  accepting;flat;cooperative  (Pended)   -ES      Verbalized Emotional State  acceptance  (Pended)   -ES      Recorded by [ES] Joselin Escalera, PT Student 04/11/19 1314      Row Name 04/11/19 1129             Plan of Care Review    Plan of Care Reviewed With  patient;spouse  (Pended)   -ES      Recorded by [ES] Joslein Escalera, PT Student 04/11/19 1314      Row Name 04/11/19 1129             Outcome Summary/Treatment Plan (PT)    Daily Summary of Progress (PT)  progress toward functional goals as expected  (Pended)   -ES      Recorded by [ES] Joselin Escalera, PT Student 04/11/19 1314        User Key  (r) = Recorded By, (t) = Taken By, (c) = Cosigned By    Initials Name Effective Dates Discipline    ES Joselin Escalera, PT Student 02/08/19 -  PT                   Physical Therapy Education     Title: PT OT SLP Therapies (In Progress)     Topic: Physical Therapy (Done)      Point: Mobility training (Done)     Learning Progress Summary           Patient MIKAYLA Lala, CRISTEL,DU,NR by SC at 4/10/2019 10:00 AM    Comment:  reviewed  benefits of activity                   Point: Home exercise program (Done)     Learning Progress Summary           Patient MIKAYLA Lala VU,DU,NR by SC at 4/10/2019 10:00 AM    Comment:  reviewed  benefits of activity                   Point: Body mechanics (Done)     Learning Progress Summary           Patient MIKAYLA Lala, CRISTEL,DU,NR by SC at 4/10/2019 10:00 AM    Comment:  reviewed  benefits of activity                   Point: Precautions (Done)     Learning Progress Summary           Patient MIKAYLA Lala, CRISTEL,DU,NR by SC at 4/10/2019 10:00 AM    Comment:  reviewed  benefits of activity                               User Key     Initials Effective Dates Name Provider Type Discipline    SC 06/19/15 -  Ankit Garcia, PT Physical Therapist PT                PT Recommendation and Plan     Outcome Summary/Treatment Plan (PT)  Daily Summary of Progress (PT): (P) progress toward functional goals as expected  Plan of Care Reviewed With: (P) patient, spouse  Outcome Summary: (P) PT treatment completed. Pt supine-sit with conditional independence. STS and ambulated 6' from EOB->recliner CGA with RW. Tolerated seated therex with rest breaks d/t fatigue. Continue skilled PT to improve functional mobility and safety.  Outcome Measures     Row Name 04/11/19 1129 04/10/19 1000 04/10/19 0830       How much help from another person do you currently need...    Turning from your back to your side while in flat bed without using bedrails?  4  (Pended)   -ES  4  -SC  --    Moving from lying on back to sitting on the side of a flat bed without bedrails?  4  (Pended)   -ES  4  -SC  --    Moving to and from a bed to a chair (including a wheelchair)?  3  (Pended)   -ES  3  -SC  --    Standing up from a chair using your arms (e.g., wheelchair, bedside chair)?  3  (Pended)   -ES  3  -SC  --     Climbing 3-5 steps with a railing?  3  (Pended)   -ES  3  -SC  --    To walk in hospital room?  3  (Pended)   -ES  3  -SC  --    AM-PAC 6 Clicks Score  20  (Pended)   -ES  20  -SC  --       How much help from another is currently needed...    Putting on and taking off regular lower body clothing?  --  --  3  -TA    Bathing (including washing, rinsing, and drying)  --  --  3  -TA    Toileting (which includes using toilet bed pan or urinal)  --  --  3  -TA    Putting on and taking off regular upper body clothing  --  --  4  -TA    Taking care of personal grooming (such as brushing teeth)  --  --  4  -TA    Eating meals  --  --  4  -TA    Score  --  --  21  -TA       Functional Assessment    Outcome Measure Options  AM-PAC 6 Clicks Basic Mobility (PT)  (Pended)   -ES  AM-PAC 6 Clicks Basic Mobility (PT)  -SC  AM-PAC 6 Clicks Daily Activity (OT)  -TA      User Key  (r) = Recorded By, (t) = Taken By, (c) = Cosigned By    Initials Name Provider Type    SC Ankit Garcia, PT Physical Therapist    TA Jatin Wen, OT Occupational Therapist    Joselin Starkey, PT Student PT Student         Time Calculation:   PT Charges     Row Name 04/11/19 1129             Time Calculation    Start Time  1129  (Pended)   -ES      PT Received On  04/11/19  (Pended)   -ES      PT Goal Re-Cert Due Date  04/20/19  (Pended)   -ES         Timed Charges    90186 - PT Therapeutic Exercise Minutes  23  (Pended)   -ES        User Key  (r) = Recorded By, (t) = Taken By, (c) = Cosigned By    Initials Name Provider Type    Joselin Starkey, PT Student PT Student        Therapy Charges for Today     Code Description Service Date Service Provider Modifiers Qty    [id number] HC PT THER PROC EA 15 MIN 4/11/2019 Joselin Escalera, PT Student GP 2          PT G-Codes  Outcome Measure Options: (P) AM-PAC 6 Clicks Basic Mobility (PT)  AM-PAC 6 Clicks Score: (P) 20  Score: 21    Joselin Escalera PT Student  4/11/2019

## 2019-04-11 NOTE — PROGRESS NOTES
[facility] Medicine Services  PROGRESS NOTE    Patient Name: Zackary Noonan II  : 1955  MRN: [id number]    Date of Admission: 2019  Length of Stay: 2  Primary Care Physician: Jillian Layne MD    Subjective   Subjective     CC: f/u PNA    HPI: Up in bed. Feels like his breathing is better. Feels weak. Still c/o high output from ostomy.    Review of Systems  Gen- No fevers, chills  CV- No chest pain, palpitations  GI- No N/V/D, abd pain    Otherwise ROS is negative except as mentioned in the HPI.    Objective   Objective     Vital Signs:   Temp:  [97.6 °F (36.4 °C)-98.5 °F (36.9 °C)] 98.5 °F (36.9 °C)  Heart Rate:  [] 89  Resp:  [16-21] 18  BP: (115-128)/(61-69) 128/61        Physical Exam:  Constitutional: No acute distress, awake, alert, looks better  HENT: NCAT, mucous membranes moist  Respiratory: Clear to auscultation bilaterally, respiratory effort normal   Cardiovascular: RRR, no murmurs, rubs, or gallops, palpable pedal pulses bilaterally  Gastrointestinal: Positive bowel sounds, soft, nontender, nondistended, patent ostomy  Musculoskeletal: No bilateral ankle edema  Psychiatric: Appropriate affect, cooperative  Neurologic: Oriented x 3, strength symmetric in all extremities, Cranial Nerves grossly intact to confrontation, speech clear  Skin: No rashes    Results Reviewed:  I have personally reviewed current lab, radiology, and data and agree.    Results from last 7 days   Lab Units 19  0634 04/11/19  0527 04/10/19  0559 19  1752 19  1226   WBC 10*3/mm3  --  26.81* 30.16*  --  32.35*   HEMOGLOBIN g/dL  --  12.7* 12.8*  --  15.4   HEMATOCRIT %  --  38.1 39.1  --  44.8   PLATELETS 10*3/mm3  --  394 362  --  399   INR  3.94*  --  4.45* 4.04* 3.79*     Results from last 7 days   Lab Units 04/11/19  0527 04/10/19  0559 19  1226   SODIUM mmol/L 129* 130* 134*   POTASSIUM mmol/L 3.7 4.5 5.1   CHLORIDE mmol/L 97* 100 95*   CO2 mmol/L 18.0* 16.0*  23.0   BUN mg/dL 22 17 9   CREATININE mg/dL 2.48* 2.10* 1.07   GLUCOSE mg/dL 83 150* 92   CALCIUM mg/dL 8.8 8.3* 9.2   ALT (SGPT) U/L  --   --  17   AST (SGOT) U/L  --   --  21     Estimated Creatinine Clearance: 32 mL/min (A) (by C-G formula based on SCr of 2.48 mg/dL (H)).    No results found for: BNP    Microbiology Results Abnormal     Procedure Component Value - Date/Time    Respiratory Culture - Sputum, Cough [[id number]] Collected:  04/10/19 0859    Lab Status:  Preliminary result Specimen:  Sputum from Cough Updated:  04/11/19 0838     Respiratory Culture Light growth (2+) Normal Respiratory Skyler     Gram Stain Moderate (3+) WBCs per low power field      Few (2+) Epithelial cells per low power field      Moderate (3+) Budding yeast with pseudohyphae      Few (2+) Normal respiratory skyler    Clostridium Difficile Toxin - Stool, Per Rectum [[id number]] Collected:  04/10/19 1629    Lab Status:  Final result Specimen:  Stool from Per Rectum Updated:  04/10/19 1721    Narrative:       The following orders were created for panel order Clostridium Difficile Toxin - Stool, Per Rectum.  Procedure                               Abnormality         Status                     ---------                               -----------         ------                     Clostridium Difficile To...[[id number]]  Normal              Final result                 Please view results for these tests on the individual orders.    Clostridium Difficile Toxin, PCR - Stool, Per Rectum [[id number]]  (Normal) Collected:  04/10/19 1629    Lab Status:  Final result Specimen:  Stool from Per Rectum Updated:  04/10/19 1721     C. Difficile Toxins by PCR Not Detected    Narrative:       Performance characteristics of test not established for patients <2 years of age.  Negative for Toxigenic C. Difficile    Blood Culture - Blood, Arm, Right [[id number]] Collected:  04/09/19 1224    Lab Status:  Preliminary result Specimen:  Blood from Arm, Right  Updated:  04/10/19 1300     Blood Culture No growth at 24 hours    Blood Culture - Blood, Arm, Left [[id number]] Collected:  04/09/19 1215    Lab Status:  Preliminary result Specimen:  Blood from Arm, Left Updated:  04/10/19 1300     Blood Culture No growth at 24 hours    S. Pneumo Ag Urine or CSF - Urine, Urine, Clean Catch [[id number]]  (Abnormal) Collected:  04/09/19 2333    Lab Status:  Final result Specimen:  Urine, Clean Catch Updated:  04/10/19 0912     Strep Pneumo Ag Positive    Legionella Antigen, Urine - Urine, Urine, Clean Catch [[id number]]  (Normal) Collected:  04/09/19 2333    Lab Status:  Final result Specimen:  Urine, Clean Catch Updated:  04/10/19 0656     LEGIONELLA ANTIGEN, URINE Negative             I have reviewed the medications:    Current Facility-Administered Medications:   •  acetaminophen (TYLENOL) tablet 650 mg, 650 mg, Oral, Q4H PRN, Leslee, Anny M II, DO, 650 mg at 04/11/19 1009  •  albuterol (PROVENTIL) nebulizer solution 0.083% 2.5 mg/3mL, 2.5 mg, Nebulization, Q6H PRN, Leslee, Anny M II, DO  •  azithromycin 500 MG/250 ML 0.9% NS IVPB (MBP), 500 mg, Intravenous, Q24H, Leslee, Anny M II, DO  •  budesonide (PULMICORT) nebulizer solution 0.5 mg, 0.5 mg, Nebulization, BID - RT, Leslee, Anny M II, DO, 0.5 mg at 04/11/19 0931  •  busPIRone (BUSPAR) tablet 15 mg, 15 mg, Oral, Q8H, Leslee, Anny M II, DO, 15 mg at 04/11/19 0611  •  cefTRIAXone (ROCEPHIN) IVPB 1 g, 1 g, Intravenous, Q24H, Leslee, Anny M II, DO, Last Rate: 100 mL/hr at 04/11/19 1009, 1 g at 04/11/19 1009  •  cetirizine (zyrTEC) tablet 10 mg, 10 mg, Oral, Daily, Leslee, Anny M II, DO, 10 mg at 04/11/19 0820  •  diphenoxylate-atropine (LOMOTIL) 2.5-0.025 MG per tablet 2 tablet, 2 tablet, Oral, Q2H PRN, Anny Camilo II, DO, 2 tablet at 04/11/19 1009  •  escitalopram (LEXAPRO) tablet 10 mg, 10 mg, Oral, Daily, Anny Camilo II, DO, 10 mg at 04/11/19 0820  •  fluticasone (FLONASE) 50 MCG/ACT nasal spray 2 spray, 2  spray, Nasal, BID, Leslee, Anny M II, DO, 2 spray at 04/11/19 0820  •  guaiFENesin (MUCINEX) 12 hr tablet 600 mg, 600 mg, Oral, Q12H, Leslee, Anny M II, DO, 600 mg at 04/11/19 0820  •  loperamide (IMODIUM) capsule 2 mg, 2 mg, Oral, 4x Daily PRN, Elizabeth Parikh, APRN, 2 mg at 04/11/19 0820  •  Magnesium Sulfate 2 gram Bolus, followed by 8 gram infusion (total Mg dose 10 grams)- Mg less than or equal to 1mg/dL, 2 g, Intravenous, PRN **OR** Magnesium Sulfate 2 gram / 50mL Infusion (GIVE X 3 BAGS TO EQUAL 6GM TOTAL DOSE) - Mg 1.1 - 1.5 mg/dl, 2 g, Intravenous, PRN **OR** Magnesium Sulfate 4 gram infusion- Mg 1.6-1.9 mg/dL, 4 g, Intravenous, PRN, Leslee, Anny M II, DO  •  melatonin tablet 5 mg, 5 mg, Oral, Nightly PRN, Leslee, Anny M II, DO  •  montelukast (SINGULAIR) tablet 10 mg, 10 mg, Oral, Nightly, Leslee, Anny M II, DO, 10 mg at 04/10/19 2229  •  ondansetron (ZOFRAN) tablet 4 mg, 4 mg, Oral, Q6H PRN, 4 mg at 04/10/19 2231 **OR** ondansetron (ZOFRAN) injection 4 mg, 4 mg, Intravenous, Q6H PRN, Leslee, Anny M II, DO, 4 mg at 04/10/19 0614  •  Pharmacy to dose warfarin, , Does not apply, Continuous PRN, Leslee, Anny M II, DO  •  potassium chloride (MICRO-K) CR capsule 40 mEq, 40 mEq, Oral, PRN **OR** potassium chloride (KLOR-CON) packet 40 mEq, 40 mEq, Oral, PRN **OR** potassium chloride 10 mEq in 100 mL IVPB, 10 mEq, Intravenous, Q1H PRN, Leslee, Anny M II, DO  •  saccharomyces boulardii (FLORASTOR) capsule 250 mg, 250 mg, Oral, BID, Leslee, Anny M II, DO, 250 mg at 04/11/19 0820  •  sennosides-docusate sodium (SENOKOT-S) 8.6-50 MG tablet 2 tablet, 2 tablet, Oral, BID PRN, Leslee, Anny M II, DO  •  sodium chloride 0.9 % flush 3 mL, 3 mL, Intravenous, Q12H, Leslee, Anny M II, DO  •  sodium chloride 0.9 % flush 3-10 mL, 3-10 mL, Intravenous, PRN, Leslee, Anny M II, DO  •  sodium chloride 0.9 % infusion, 125 mL/hr, Intravenous, Continuous, Leslee, Anny M II, DO, Last Rate: 125 mL/hr at 04/11/19  0936, 125 mL/hr at 04/11/19 0936  •  sodium chloride 0.9 % infusion, 125 mL/hr, Intravenous, Continuous, Anny Camilo II, DO  •  warfarin (COUMADIN) (dosing per levels), , Does not apply, Daily, Anny Camilo II, DO      Assessment/Plan   Assessment / Plan     Active Hospital Problems    Diagnosis POA   • **Acute respiratory failure with hypoxia (CMS/[facility]) [J96.01] Yes   • RAMYA (acute kidney injury) (CMS/[facility]ly [facility]) [N17.9] Yes   • Sepsis (CMS/[facility]) [A41.9] Yes   • 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) [Z86.19] Not Applicable   • Hyponatremia [E87.1] Yes   • Pneumonia [J18.9] Yes   • History of DVT (deep vein thrombosis) [Z86.718] Not Applicable     LLE; x 3; chronic coumadin therapy     • Diarrhea [R19.7] Yes          Brief Hospital Course to date:  Zackary Noonan II is a 64 y/o male presenting from home with with fatigue and SOA due to post influenza bacterial pneumonia.     A/P:     Acute hypoxic respiratory failure due to pneumonia  Sepsis due to post influenza right sided streptococcal bacterial pneumonia  --All other cultures negative. Switch to IV rocephin and azithro for strep coverage.  --Has hx of bronchiectasis and was previously followed by Dr. Watkins at [facility]. Seems to be improving so will hold on involving pulmonary.  --Continue home nebs/inhalers.      RAMYA, worse  --Likely due to sepsis and volume depletion vs vanc toxicity. Stop vanc as above and check urine lytes.  --Strict I's and O's.   --Consult NAL     Hyponatremia, worse  --Continue IVF. Monitor.     Diarrhea  --C diff pcr neg. Resume imodium and lomotil.     Hx of DVT  Supratherapeutic INR  --On coumadin. Dosing per pharmacy.     DVT prophylaxis: Coumadin     Disposition: I expect the patient to be discharged home in 2-4 days.    CODE STATUS:   Code Status and Medical Interventions:   Ordered at: 04/09/19 142     Code Status:    CPR     Medical Interventions (Level of Support Prior to Arrest):    Full          Electronically signed by Anny Camilo II, DO, 04/11/19, 11:44 AM.

## 2019-04-12 PROBLEM — J13 PNEUMONIA DUE TO STREPTOCOCCUS PNEUMONIAE (HCC): Status: ACTIVE | Noted: 2019-04-12

## 2019-04-12 PROBLEM — Z93.9 HISTORY OF CREATION OF OSTOMY: Status: ACTIVE | Noted: 2019-04-12

## 2019-04-12 LAB
ANION GAP SERPL CALCULATED.3IONS-SCNC: 12 MMOL/L
BACTERIA SPEC RESP CULT: NORMAL
BUN BLD-MCNC: 20 MG/DL (ref 8–23)
BUN/CREAT SERPL: 8.4 (ref 7–25)
CALCIUM SPEC-SCNC: 8.4 MG/DL (ref 8.6–10.5)
CHLORIDE SERPL-SCNC: 102 MMOL/L (ref 98–107)
CO2 SERPL-SCNC: 18 MMOL/L (ref 22–29)
CREAT BLD-MCNC: 2.39 MG/DL (ref 0.76–1.27)
DEPRECATED RDW RBC AUTO: 46.9 FL (ref 37–54)
ERYTHROCYTE [DISTWIDTH] IN BLOOD BY AUTOMATED COUNT: 14.3 % (ref 12.3–15.4)
GFR SERPL CREATININE-BSD FRML MDRD: 27 ML/MIN/1.73
GLUCOSE BLD-MCNC: 83 MG/DL (ref 65–99)
GRAM STN SPEC: NORMAL
HCT VFR BLD AUTO: 36.4 % (ref 37.5–51)
HGB BLD-MCNC: 12 G/DL (ref 13–17.7)
INR PPP: 3.8 (ref 0.85–1.16)
MCH RBC QN AUTO: 29.4 PG (ref 26.6–33)
MCHC RBC AUTO-ENTMCNC: 33 G/DL (ref 31.5–35.7)
MCV RBC AUTO: 89.2 FL (ref 79–97)
PLATELET # BLD AUTO: 423 10*3/MM3 (ref 140–450)
PMV BLD AUTO: 8.1 FL (ref 6–12)
POTASSIUM BLD-SCNC: 4 MMOL/L (ref 3.5–5.2)
PROTHROMBIN TIME: 36.1 SECONDS (ref 11.2–14.3)
RBC # BLD AUTO: 4.08 10*6/MM3 (ref 4.14–5.8)
SODIUM BLD-SCNC: 132 MMOL/L (ref 136–145)
WBC NRBC COR # BLD: 16.19 10*3/MM3 (ref 3.4–10.8)

## 2019-04-12 PROCEDURE — 85027 COMPLETE CBC AUTOMATED: CPT | Performed by: INTERNAL MEDICINE

## 2019-04-12 PROCEDURE — 80048 BASIC METABOLIC PNL TOTAL CA: CPT | Performed by: INTERNAL MEDICINE

## 2019-04-12 PROCEDURE — 94799 UNLISTED PULMONARY SVC/PX: CPT

## 2019-04-12 PROCEDURE — 25010000002 AZITHROMYCIN: Performed by: INTERNAL MEDICINE

## 2019-04-12 PROCEDURE — 97530 THERAPEUTIC ACTIVITIES: CPT

## 2019-04-12 PROCEDURE — 99232 SBSQ HOSP IP/OBS MODERATE 35: CPT | Performed by: INTERNAL MEDICINE

## 2019-04-12 PROCEDURE — 85610 PROTHROMBIN TIME: CPT | Performed by: INTERNAL MEDICINE

## 2019-04-12 PROCEDURE — 25010000002 CEFTRIAXONE PER 250 MG: Performed by: INTERNAL MEDICINE

## 2019-04-12 RX ORDER — SODIUM CHLORIDE 9 MG/ML
150 INJECTION, SOLUTION INTRAVENOUS CONTINUOUS
Status: DISCONTINUED | OUTPATIENT
Start: 2019-04-12 | End: 2019-04-13

## 2019-04-12 RX ORDER — BENZONATATE 100 MG/1
100 CAPSULE ORAL 3 TIMES DAILY PRN
Status: DISCONTINUED | OUTPATIENT
Start: 2019-04-12 | End: 2019-04-15 | Stop reason: HOSPADM

## 2019-04-12 RX ADMIN — MONTELUKAST SODIUM 10 MG: 10 TABLET, COATED ORAL at 19:40

## 2019-04-12 RX ADMIN — SODIUM CHLORIDE, PRESERVATIVE FREE 3 ML: 5 INJECTION INTRAVENOUS at 20:07

## 2019-04-12 RX ADMIN — BENZONATATE 100 MG: 100 CAPSULE ORAL at 22:32

## 2019-04-12 RX ADMIN — ACETAMINOPHEN 650 MG: 325 TABLET ORAL at 06:23

## 2019-04-12 RX ADMIN — GUAIFENESIN 600 MG: 600 TABLET, EXTENDED RELEASE ORAL at 19:40

## 2019-04-12 RX ADMIN — BENZONATATE 100 MG: 100 CAPSULE ORAL at 00:59

## 2019-04-12 RX ADMIN — Medication 250 MG: at 19:39

## 2019-04-12 RX ADMIN — SODIUM CHLORIDE 150 ML/HR: 9 INJECTION, SOLUTION INTRAVENOUS at 11:26

## 2019-04-12 RX ADMIN — ESCITALOPRAM OXALATE 10 MG: 10 TABLET ORAL at 08:51

## 2019-04-12 RX ADMIN — SODIUM CHLORIDE, PRESERVATIVE FREE 3 ML: 5 INJECTION INTRAVENOUS at 08:52

## 2019-04-12 RX ADMIN — FLUTICASONE PROPIONATE 2 SPRAY: 50 SPRAY, METERED NASAL at 08:51

## 2019-04-12 RX ADMIN — BUSPIRONE HYDROCHLORIDE 15 MG: 10 TABLET ORAL at 14:07

## 2019-04-12 RX ADMIN — DIPHENOXYLATE HYDROCHLORIDE AND ATROPINE SULFATE 2 TABLET: 2.5; .025 TABLET ORAL at 04:18

## 2019-04-12 RX ADMIN — BUSPIRONE HYDROCHLORIDE 15 MG: 10 TABLET ORAL at 21:59

## 2019-04-12 RX ADMIN — GUAIFENESIN 600 MG: 600 TABLET, EXTENDED RELEASE ORAL at 08:51

## 2019-04-12 RX ADMIN — CEFTRIAXONE SODIUM 1 G: 1 INJECTION, SOLUTION INTRAVENOUS at 11:57

## 2019-04-12 RX ADMIN — Medication 250 MG: at 08:51

## 2019-04-12 RX ADMIN — BUSPIRONE HYDROCHLORIDE 15 MG: 10 TABLET ORAL at 06:15

## 2019-04-12 RX ADMIN — DIPHENOXYLATE HYDROCHLORIDE AND ATROPINE SULFATE 2 TABLET: 2.5; .025 TABLET ORAL at 00:59

## 2019-04-12 RX ADMIN — AZITHROMYCIN MONOHYDRATE 500 MG: 500 INJECTION, POWDER, LYOPHILIZED, FOR SOLUTION INTRAVENOUS at 14:07

## 2019-04-12 RX ADMIN — BUDESONIDE 0.5 MG: 0.5 INHALANT RESPIRATORY (INHALATION) at 20:35

## 2019-04-12 RX ADMIN — BUDESONIDE 0.5 MG: 0.5 INHALANT RESPIRATORY (INHALATION) at 09:14

## 2019-04-12 RX ADMIN — SODIUM CHLORIDE 150 ML/HR: 9 INJECTION, SOLUTION INTRAVENOUS at 04:19

## 2019-04-12 RX ADMIN — FLUTICASONE PROPIONATE 2 SPRAY: 50 SPRAY, METERED NASAL at 19:41

## 2019-04-12 RX ADMIN — CETIRIZINE HYDROCHLORIDE 10 MG: 10 TABLET, FILM COATED ORAL at 08:51

## 2019-04-12 RX ADMIN — LOPERAMIDE HYDROCHLORIDE 2 MG: 2 CAPSULE ORAL at 19:39

## 2019-04-12 NOTE — THERAPY TREATMENT NOTE
Acute Care - Physical Therapy Treatment Note  [facility]     Patient Name: Zackary Noonan II  : 1955  MRN: [id number]  Today's Date: 2019  Onset of Illness/Injury or Date of Surgery: 19  Date of Referral to PT: 04/10/19  Referring Physician: Leslee    Admit Date: 2019    Visit Dx:    ICD-10-CM ICD-9-CM   1. Acute respiratory failure with hypoxia (CMS/[facility]) J96.01 518.81   2. Pneumonia of right upper lobe due to infectious organism (CMS/[facility]) J18.1 486   3. Leukocytosis, unspecified type D72.829 288.60   4. Sepsis, due to unspecified organism (CMS/[facility]) A41.9 038.9     995.91   5. Impaired mobility and ADLs Z74.09 799.89   6. Impaired functional mobility, balance, gait, and endurance Z74.09 V49.89     Patient Active Problem List   Diagnosis   • Pneumonia   • History of DVT (deep vein thrombosis)   • HTN (hypertension)   • Diarrhea   • Crohn's colitis (CMS/[facility])   • COPD (chronic obstructive pulmonary disease) (CMS/[facility])   • Asthma   • Infection of wound due to methicillin resistant Staphylococcus aureus (MRSA)   • H/O Pulmonary nodule   • Prediabetes   • Chronic anemia   • S/P total abdominal colectomy   • HO of IVC filter   • Pneumonia of right lower lobe due to infectious organism (CMS/[facility])   • Hyponatremia   • History of C. difficile colitis s/p fecal transplant (2017)   • Sepsis (CMS/[facility])   • Left lower lobe pneumonia (CMS/[facility])   • Inflammatory bowel disease (Crohn's disease) (CMS/[facility])   • Asthma   • Leukocytosis   • Influenza, pneumonia   • SIRS (systemic inflammatory response syndrome) (CMS/[facility])   • Acute respiratory failure with hypoxia (CMS/[facility])   • RAMYA (acute kidney injury) (CMS/[facility])   • Pneumonia due to Streptococcus pneumoniae (CMS/[facility])   • History of creation of ostomy (CMS/[facility])       Therapy Treatment    Rehabilitation Treatment Summary     Row Name 19 1500             Treatment Time/Intention    Discipline  physical therapist  -SC      Document Type  therapy note (daily note)   -SC      Subjective Information  complains of  -SC      Mode of Treatment  physical therapy  -SC      Care Plan Review  risks/benefits reviewed  -SC      Therapy Frequency (PT Clinical Impression)  daily  -SC      Patient Effort  good  -SC      Existing Precautions/Restrictions  fall;oxygen therapy device and L/min  -SC      Patient Response to Treatment  tachycardia  -SC      Recorded by [SC] Ankit Garcia, PT 04/12/19 1557      Row Name 04/12/19 1500             Vital Signs    Intratreatment Heart Rate (beats/min)  115  -SC      Posttreatment Heart Rate (beats/min)  111  -SC      Intra SpO2 (%)  89  -SC      O2 Delivery Intra Treatment  supplemental O2  -SC      Post SpO2 (%)  92  -SC      O2 Delivery Post Treatment  supplemental O2  -SC      Recorded by [SC] Ankit Garcia, PT 04/12/19 1557      Row Name 04/12/19 1500             Cognitive Assessment/Intervention- PT/OT    Orientation Status (Cognition)  oriented x 4  -SC      Follows Commands (Cognition)  WFL  -SC      Cognitive Function (Cognitive)  safety deficit  -SC      Safety Deficit (Cognitive)  mild deficit  -SC      Personal Safety Interventions  gait belt;fall prevention program maintained  -SC      Recorded by [SC] Ankit Garcia, PT 04/12/19 1557      Row Name 04/12/19 1500             Safety Issues, Functional Mobility    Safety Issues Affecting Function (Mobility)  insight into deficits/self awareness  -SC      Impairments Affecting Function (Mobility)  endurance/activity tolerance  -SC      Recorded by [SC] Ankit Garcia, PT 04/12/19 1557      Row Name 04/12/19 1500             Bed Mobility Assessment/Treatment    Supine-Sit [place] (Bed Mobility)  independent  -SC      Recorded by [SC] Ankit Gracia, PT 04/12/19 1557      Row Name 04/12/19 1500             Transfer Assessment/Treatment    Transfer Assessment/Treatment  sit-stand transfer;stand-sit transfer;toilet transfer  -SC      Comment (Transfers)  cues for hand placement  -SC2       Recorded by [SC] Ankit Garcia, PT 04/12/19 1600  [SC2] Ankit Garcia, PT 04/12/19 1557      Row Name 04/12/19 1500             Sit-Stand Transfer    Sit-Stand [place] (Transfers)  supervision;verbal cues  -SC      Assistive Device (Sit-Stand Transfers)  walker, front-wheeled  -SC      Recorded by [SC] Ankit Garcia, PT 04/12/19 1557      Row Name 04/12/19 1500             Stand-Sit Transfer    Stand-Sit [place] (Transfers)  supervision  -SC      Assistive Device (Stand-Sit Transfers)  walker, front-wheeled  -SC      Recorded by [SC] Ankit Garcia, PT 04/12/19 1557      Row Name 04/12/19 1500             Toilet Transfer    Type (Toilet Transfer)  sit-stand;stand-sit  -SC      [place] Level (Toilet Transfer)  supervision  -SC      Recorded by [SC] Ankit Garcia, PT 04/12/19 1600      Row Name 04/12/19 1500             Gait/Stairs Assessment/Training    Gait/Stairs Assessment/Training  gait/ambulation independence  -SC      [place] Level (Gait)  verbal cues;supervision  -SC      Assistive Device (Gait)  walker, front-wheeled  -SC      Distance in Feet (Gait)  350  -SC      Pattern (Gait)  swing-through  -SC      Deviations/Abnormal Patterns (Gait)  tika decreased  -SC      Bilateral Gait Deviations  forward flexed posture  -SC      Comment (Gait/Stairs)  cues for upright posture. Took on standing rest with cues for breathing and one sit down rest  -SC      Recorded by [SC] Ankit Garcia, PT 04/12/19 1557      Row Name 04/12/19 1500             Therapeutic Exercise    30054 - PT Therapeutic Activity Minutes  30  -SC      Recorded by [SC] Ankit Garcia, PT 04/12/19 1600      Row Name 04/12/19 1500             Balance    Balance  dynamic standing balance  -SC      Recorded by [SC] Ankit Garcia, PT 04/12/19 1557      Row Name 04/12/19 1500             Dynamic Standing Balance    Level of [place], Reaches Outside Midline (Standing, Dynamic Balance)  supervision  -SC       Assistive Device Utilized (Supported Standing, Dynamic Balance)  walker, rolling  -SC      Recorded by [SC] Ankit Garcia, PT 04/12/19 1557      Row Name 04/12/19 1500             Positioning and Restraints    Pre-Treatment Position  in bed  -SC      Post Treatment Position  chair  -SC      In Chair  notified nsg;reclined;call light within reach;encouraged to call for assist;exit alarm on;with family/caregiver  -SC      Recorded by [SC] Ankit Garcia, PT 04/12/19 1557      Row Name 04/12/19 1500             Pain Scale: Numbers Pre/Post-Treatment    Pain Location - Side  Right  -SC      Pain Location  abdomen  -SC      Pain Intervention(s)  Repositioned  -SC      Recorded by [SC] Ankit Garcia, PT 04/12/19 1557      Row Name 04/12/19 1500             Pain Scale: FACES Pre/Post-Treatment    Pain: FACES Scale, Pretreatment  0-->no hurt  -SC      Pain: FACES Scale, Post-Treatment  2-->hurts little bit  -SC      Recorded by [SC] Ankit Garcia, PT 04/12/19 1557      Row Name 04/12/19 1500             Coping    Observed Emotional State  accepting;calm;cooperative  -SC      Verbalized Emotional State  acceptance  -SC      Recorded by [SC] Ankit Garcia, PT 04/12/19 1557      Row Name 04/12/19 1500             Plan of Care Review    Plan of Care Reviewed With  patient;daughter  -SC      Recorded by [SC] Ankit Garcia, PT 04/12/19 1557      Row Name 04/12/19 1500             Outcome Summary/Treatment Plan (PT)    Daily Summary of Progress (PT)  progress toward functional goals is good  -SC      Recorded by [SC] Ankit Garcia, PT 04/12/19 1557        User Key  (r) = Recorded By, (t) = Taken By, (c) = Cosigned By    Initials Name Effective Dates Discipline    SC Ankit Garcia, PT 06/19/15 -  PT                   Physical Therapy Education     Title: PT OT SLP Therapies (In Progress)     Topic: Physical Therapy (Done)     Point: Mobility training (Done)     Learning Progress Summary           Patient Acceptance,  E, NR,VU by SC at 4/12/2019  3:00 PM    Comment:  reviewed benefits of activity    Eager, E, VU,DU,NR by SC at 4/10/2019 10:00 AM    Comment:  reviewed  benefits of activity                   Point: Home exercise program (Done)     Learning Progress Summary           Patient Acceptance, E, NR,VU by SC at 4/12/2019  3:00 PM    Comment:  reviewed benefits of activity    Eager, E, VU,DU,NR by SC at 4/10/2019 10:00 AM    Comment:  reviewed  benefits of activity                   Point: Body mechanics (Done)     Learning Progress Summary           Patient Acceptance, E, NR,VU by SC at 4/12/2019  3:00 PM    Comment:  reviewed benefits of activity    Eager, E, VU,DU,NR by SC at 4/10/2019 10:00 AM    Comment:  reviewed  benefits of activity                   Point: Precautions (Done)     Learning Progress Summary           Patient Acceptance, E, NR,VU by SC at 4/12/2019  3:00 PM    Comment:  reviewed benefits of activity    Eager, E, VU,DU,NR by SC at 4/10/2019 10:00 AM    Comment:  reviewed  benefits of activity                               User Key     Initials Effective Dates Name Provider Type Discipline    SC 06/19/15 -  Ankit Garcia, PT Physical Therapist PT                PT Recommendation and Plan  Anticipated Discharge Disposition (PT): home with assist  Planned Therapy Interventions (PT Eval): home exercise program, patient/family education, stretching  Therapy Frequency (PT Clinical Impression): daily  Outcome Summary/Treatment Plan (PT)  Daily Summary of Progress (PT): progress toward functional goals is good  Anticipated Discharge Disposition (PT): home with assist  Plan of Care Reviewed With: patient, daughter  Progress: improving  Outcome Summary: Patietn able to ambulate in hallway 350 feet. He required some standing and sitting breaks. He was limited by tachycardia and some drop in SATs into upper 80's  Outcome Measures     Row Name 04/12/19 1500 04/11/19 1129 04/10/19 1000       How much help from  another person do you currently need...    Turning from your back to your side while in flat bed without using bedrails?  4  -SC  4  -SC (r) ES (t) SC (c)  4  -SC    Moving from lying on back to sitting on the side of a flat bed without bedrails?  4  -SC  4  -SC (r) ES (t) SC (c)  4  -SC    Moving to and from a bed to a chair (including a wheelchair)?  3  -SC  3  -SC (r) ES (t) SC (c)  3  -SC    Standing up from a chair using your arms (e.g., wheelchair, bedside chair)?  3  -SC  3  -SC (r) ES (t) SC (c)  3  -SC    Climbing 3-5 steps with a railing?  3  -SC  3  -SC (r) ES (t) SC (c)  3  -SC    To walk in hospital room?  3  -SC  3  -SC (r) ES (t) SC (c)  3  -SC    AM-PAC 6 Clicks Score  20  -SC  20  -SC (r) ES (t)  20  -SC       Functional Assessment    Outcome Measure Options  AM-PAC 6 Clicks Basic Mobility (PT)  -SC  AM-PAC 6 Clicks Basic Mobility (PT)  -SC (r) ES (t) SC (c)  AM-PAC 6 Clicks Basic Mobility (PT)  -SC    Row Name 04/10/19 0830             How much help from another is currently needed...    Putting on and taking off regular lower body clothing?  3  -TA      Bathing (including washing, rinsing, and drying)  3  -TA      Toileting (which includes using toilet bed pan or urinal)  3  -TA      Putting on and taking off regular upper body clothing  4  -TA      Taking care of personal grooming (such as brushing teeth)  4  -TA      Eating meals  4  -TA      Score  21  -TA         Functional Assessment    Outcome Measure Options  AM-PAC 6 Clicks Daily Activity (OT)  -TA        User Key  (r) = Recorded By, (t) = Taken By, (c) = Cosigned By    Initials Name Provider Type    Ankit Townsend, PT Physical Therapist    TA Jatin Wen, OT Occupational Therapist    Joselin Starkey, PT Student PT Student         Time Calculation:   PT Charges     Row Name 04/12/19 1500             Time Calculation    Start Time  1500  -SC      PT Received On  04/12/19  -SC      PT Goal Re-Cert Due Date  04/20/19  -SC          Time Calculation- PT    Total Timed Code Minutes- PT  30 minute(s)  -SC         Timed Charges    68470 - PT Therapeutic Activity Minutes  30  -SC        User Key  (r) = Recorded By, (t) = Taken By, (c) = Cosigned By    Initials Name Provider Type    Ankit Townsend PT Physical Therapist        Therapy Charges for Today     Code Description Service Date Service Provider Modifiers Qty    [id number]  PT THERAPEUTIC ACT EA 15 MIN 4/12/2019 Ankit Garcia PT GP 2          PT G-Codes  Outcome Measure Options: AM-PAC 6 Clicks Basic Mobility (PT)  AM-PAC 6 Clicks Score: 20  Score: 21    Ankit Garcia PT  4/12/2019

## 2019-04-12 NOTE — PROGRESS NOTES
"   LOS: 3 days    Patient Care Team:  Jillian Layne MD as PCP - General (Family Medicine)    Chief Complaint:   History of present illness:  This is 63 year old carlos with past medical hx of Htn, C diff infection and Crohn's disease s/p ostomy presents with generalized weakness and fatigue. He was admitted with working diagnosis of PNA. Started on antibiotics. At presentation Scr was 1.0 which has been trending up to 2.48 and Sodium is found to be 129 worsening today. Nephrology service has been consulted for further evalaution and treatment.      Subjective   Renal function slightly improving.  No other events.    Review of Systems:   High ostomy output.  No other complaints.    Objective     Vital Sign Min/Max for last 24 hours  Temp  Min: 97.6 °F (36.4 °C)  Max: 99.2 °F (37.3 °C)   BP  Min: 107/60  Max: 139/70   Pulse  Min: 88  Max: 100   Resp  Min: 16  Max: 21   SpO2  Min: 92 %  Max: 98 %   No Data Recorded   No Data Recorded     Flowsheet Rows      First Filed Value   Admission Height  167.6 cm (66\") Documented at 04/09/2019 1158   Admission Weight  76.2 kg (168 lb) Documented at 04/09/2019 1158          No intake/output data recorded.  I/O last 3 completed shifts:  In: 1836 [P.O.:720; I.V.:1116]  Out: 2485 [Urine:600; Stool:1885]    Physical Exam:  General Appearance: Alert, oriented, no obvious distress.  Eyes: PER, EOMI.  Neck: Supple no JVD.  Lungs: Clear auscultation, no rales rhonchi's, equal chest movement, nonlabored.  Heart: No gallop, murmur, rub, RRR.  Abdomen: Soft, nontender, positive bowel sounds, no organomegaly.  Ostomy noted  Extremities: No edema, no cyanosis.  Neuro: No focal deficit, moving all extremities, alert oriented X 3      WBC WBC   Date Value Ref Range Status   04/12/2019 16.19 (H) 3.40 - 10.80 10*3/mm3 Final   04/11/2019 26.81 (H) 3.40 - 10.80 10*3/mm3 Final   04/10/2019 30.16 (C) 3.40 - 10.80 10*3/mm3 Final      HGB Hemoglobin   Date Value Ref Range Status   04/12/2019 12.0 " (L) 13.0 - 17.7 g/dL Final   04/11/2019 12.7 (L) 13.0 - 17.7 g/dL Final   04/10/2019 12.8 (L) 13.0 - 17.7 g/dL Final      HCT Hematocrit   Date Value Ref Range Status   04/12/2019 36.4 (L) 37.5 - 51.0 % Final   04/11/2019 38.1 37.5 - 51.0 % Final   04/10/2019 39.1 37.5 - 51.0 % Final      Platlets No results found for: LABPLAT   MCV MCV   Date Value Ref Range Status   04/12/2019 89.2 79.0 - 97.0 fL Final   04/11/2019 90.5 79.0 - 97.0 fL Final   04/10/2019 91.1 79.0 - 97.0 fL Final          Sodium Sodium   Date Value Ref Range Status   04/12/2019 132 (L) 136 - 145 mmol/L Final   04/11/2019 129 (L) 136 - 145 mmol/L Final   04/10/2019 130 (L) 136 - 145 mmol/L Final      Potassium Potassium   Date Value Ref Range Status   04/12/2019 4.0 3.5 - 5.2 mmol/L Final   04/11/2019 3.7 3.5 - 5.2 mmol/L Final   04/10/2019 4.5 3.5 - 5.2 mmol/L Final      Chloride Chloride   Date Value Ref Range Status   04/12/2019 102 98 - 107 mmol/L Final   04/11/2019 97 (L) 98 - 107 mmol/L Final   04/10/2019 100 98 - 107 mmol/L Final      CO2 CO2   Date Value Ref Range Status   04/12/2019 18.0 (L) 22.0 - 29.0 mmol/L Final   04/11/2019 18.0 (L) 22.0 - 29.0 mmol/L Final   04/10/2019 16.0 (L) 22.0 - 29.0 mmol/L Final      BUN BUN   Date Value Ref Range Status   04/12/2019 20 8 - 23 mg/dL Final   04/11/2019 22 8 - 23 mg/dL Final   04/10/2019 17 8 - 23 mg/dL Final      Creatinine Creatinine   Date Value Ref Range Status   04/12/2019 2.39 (H) 0.76 - 1.27 mg/dL Final   04/11/2019 2.48 (H) 0.76 - 1.27 mg/dL Final   04/10/2019 2.10 (H) 0.76 - 1.27 mg/dL Final      Calcium Calcium   Date Value Ref Range Status   04/12/2019 8.4 (L) 8.6 - 10.5 mg/dL Final   04/11/2019 8.8 8.6 - 10.5 mg/dL Final   04/10/2019 8.3 (L) 8.6 - 10.5 mg/dL Final      PO4 No results found for: CAPO4   Albumin No results found for: ALBUMIN   Magnesium No results found for: MG   Uric Acid Uric Acid   Date Value Ref Range Status   04/11/2019 4.2 3.4 - 7.0 mg/dL Final     Comment:      Falsely depressed results may occur on samples drawn from patients receiving N-Acetylcysteine (NAC) or Metamizole.           Results Review:     I reviewed the patient's new clinical results.      azithromycin 500 mg Intravenous Q24H   budesonide 0.5 mg Nebulization BID - RT   busPIRone 15 mg Oral Q8H   ceftriaxone 1 g Intravenous Q24H   cetirizine 10 mg Oral Daily   escitalopram 10 mg Oral Daily   fluticasone 2 spray Nasal BID   guaiFENesin 600 mg Oral Q12H   montelukast 10 mg Oral Nightly   saccharomyces boulardii 250 mg Oral BID   sodium chloride 3 mL Intravenous Q12H   warfarin (COUMADIN) (dosing per levels)  Does not apply Daily       Pharmacy to dose warfarin     sodium chloride 150 mL/hr Last Rate: 150 mL/hr (04/12/19 1126)       Medication Review:      Assessment/Plan       1- RAMYA - non olgiuric  - Pre-renal azotemia -high output from ostomy  and  intravascular volume depletion No hydro on CT scan. Small Left renal simple cyst.   2- Hyponatremia improving with IV fluids.     Plan:  - Urine lytes seen  - Urine and serum osmolality seen  - Continue with IV fluids at 150 mL/h, monitor closely  - Avoid nephrotoxic agents.   - Renal diet.   - Adjust meds per renal function   - Monitor I/O   - No need of dialysis           Sim Martell MD  04/12/19  4:42 PM

## 2019-04-12 NOTE — PLAN OF CARE
Problem: Patient Care Overview  Goal: Plan of Care Review  Outcome: Ongoing (interventions implemented as appropriate)   04/12/19 1612   Coping/Psychosocial   Plan of Care Reviewed With patient   OTHER   Outcome Summary Pt. A&Ox3, vitals stable, 2L NC @ 95%. Creatnine elevated, NS @ 150 to correct. Ax1 w/ walker, ambulated w/ PT in day. Voids well, illeostomy w/ loose stool output r/t hx of chrone's. May d/c tomorrow.    Plan of Care Review   Progress improving

## 2019-04-12 NOTE — PROGRESS NOTES
[facility] Medicine Services  PROGRESS NOTE    Patient Name: Zackary Noonan II  : 1955  MRN: [id number]    Date of Admission: 2019  Length of Stay: 3  Primary Care Physician: Jillian Layne MD    Subjective   Subjective     CC: f/u PNA    HPI: Patient sitting up in bed eating breakfast.  States that his breathing is better but still has some cough with pleuritic chest pain.  His ostomy output has decreased he states.  He overall feels better today.  He says he is walking with some assistance and hoping to go home soon.      Review of Systems  No current fevers or chills  No current shortness of breath or cough  No current nausea, vomiting, or diarrhea  No current chest pain or palpitations      Objective   Objective     Vital Signs:   Temp:  [97.8 °F (36.6 °C)-99.2 °F (37.3 °C)] 98.7 °F (37.1 °C)  Heart Rate:  [86-99] 89  Resp:  [16-21] 16  BP: (107-134)/(60-70) 107/60        Physical Exam:  Constitutional: No acute distress, awake, alert, looks better  HENT: NCAT, mucous membranes moist  Respiratory: Rhonchi on the right, clear on the left, occasional cough, nonlabored breathing  Cardiovascular: RRR, no murmurs, rubs, or gallops, palpable pedal pulses bilaterally  Gastrointestinal: Positive bowel sounds, soft, nontender, nondistended, patent ostomy  Musculoskeletal: No bilateral ankle edema, BMI 27  Psychiatric: Appropriate affect, cooperative  Neurologic: No slurred speech or facial droop, follows commands  Skin: No rashes or jaundice    Results Reviewed:  I have personally reviewed current lab, radiology, and data and agree.    Results from last 7 days   Lab Units 19  04219  0634 19  0527 04/10/19  0559   WBC 10*3/mm3 16.19*  --  26.81* 30.16*   HEMOGLOBIN g/dL 12.0*  --  12.7* 12.8*   HEMATOCRIT % 36.4*  --  38.1 39.1   PLATELETS 10*3/mm3 423  --  394 362   INR  3.80* 3.94*  --  4.45*     Results from last 7 days   Lab Units 19  04/11/19  0527 04/10/19  0559 04/09/19  1226   SODIUM mmol/L 132* 129* 130* 134*   POTASSIUM mmol/L 4.0 3.7 4.5 5.1   CHLORIDE mmol/L 102 97* 100 95*   CO2 mmol/L 18.0* 18.0* 16.0* 23.0   BUN mg/dL 20 22 17 9   CREATININE mg/dL 2.39* 2.48* 2.10* 1.07   GLUCOSE mg/dL 83 83 150* 92   CALCIUM mg/dL 8.4* 8.8 8.3* 9.2   ALT (SGPT) U/L  --   --   --  17   AST (SGOT) U/L  --   --   --  21     Estimated Creatinine Clearance: 33.2 mL/min (A) (by C-G formula based on SCr of 2.39 mg/dL (H)).    No results found for: BNP    Microbiology Results Abnormal     Procedure Component Value - Date/Time    Eosinophil Smear - Urine, Urine, Clean Catch [[id number]]  (Normal) Collected:  04/11/19 1601    Lab Status:  Final result Specimen:  Urine, Clean Catch Updated:  04/11/19 1853     Eosinophil Smear 0 % EOS/100 Cells     Narrative:       No eosinophil seen    Blood Culture - Blood, Arm, Right [[id number]] Collected:  04/09/19 1225    Lab Status:  Preliminary result Specimen:  Blood from Arm, Right Updated:  04/11/19 1300     Blood Culture No growth at 2 days    Blood Culture - Blood, Arm, Left [[id number]] Collected:  04/09/19 1215    Lab Status:  Preliminary result Specimen:  Blood from Arm, Left Updated:  04/11/19 1300     Blood Culture No growth at 2 days    Respiratory Culture - Sputum, Cough [[id number]] Collected:  04/10/19 0859    Lab Status:  Preliminary result Specimen:  Sputum from Cough Updated:  04/11/19 0838     Respiratory Culture Light growth (2+) Normal Respiratory Skyler     Gram Stain Moderate (3+) WBCs per low power field      Few (2+) Epithelial cells per low power field      Moderate (3+) Budding yeast with pseudohyphae      Few (2+) Normal respiratory skyler    Clostridium Difficile Toxin - Stool, Per Rectum [[id number]] Collected:  04/10/19 1629    Lab Status:  Final result Specimen:  Stool from Per Rectum Updated:  04/10/19 2796    Narrative:       The following orders were created for panel order Clostridium Difficile  Toxin - Stool, Per Rectum.  Procedure                               Abnormality         Status                     ---------                               -----------         ------                     Clostridium Difficile To...[[id number]]  Normal              Final result                 Please view results for these tests on the individual orders.    Clostridium Difficile Toxin, PCR - Stool, Per Rectum [[id number]]  (Normal) Collected:  04/10/19 1629    Lab Status:  Final result Specimen:  Stool from Per Rectum Updated:  04/10/19 1721     C. Difficile Toxins by PCR Not Detected    Narrative:       Performance characteristics of test not established for patients <2 years of age.  Negative for Toxigenic C. Difficile    S. Pneumo Ag Urine or CSF - Urine, Urine, Clean Catch [[id number]]  (Abnormal) Collected:  04/09/19 2333    Lab Status:  Final result Specimen:  Urine, Clean Catch Updated:  04/10/19 0912     Strep Pneumo Ag Positive    Legionella Antigen, Urine - Urine, Urine, Clean Catch [[id number]]  (Normal) Collected:  04/09/19 2333    Lab Status:  Final result Specimen:  Urine, Clean Catch Updated:  04/10/19 0656     LEGIONELLA ANTIGEN, URINE Negative             I have reviewed the medications:    Current Facility-Administered Medications:   •  acetaminophen (TYLENOL) tablet 650 mg, 650 mg, Oral, Q4H PRN, Anny Camilo M II, DO, 650 mg at 04/12/19 0623  •  albuterol (PROVENTIL) nebulizer solution 0.083% 2.5 mg/3mL, 2.5 mg, Nebulization, Q6H PRN, Anny Camilo M II, DO  •  azithromycin 500 MG/250 ML 0.9% NS IVPB (MBP), 500 mg, Intravenous, Q24H, Anny Camilo M II, DO, 500 mg at 04/11/19 1229  •  benzonatate (TESSALON) capsule 100 mg, 100 mg, Oral, TID PRN, Pilar Head APRN, 100 mg at 04/12/19 0059  •  budesonide (PULMICORT) nebulizer solution 0.5 mg, 0.5 mg, Nebulization, BID - RT, Anny Camilo M II, DO, 0.5 mg at 04/11/19 1916  •  busPIRone (BUSPAR) tablet 15 mg, 15 mg, Oral, Q8H, Anny Camilo II,  DO, 15 mg at 04/12/19 0615  •  cefTRIAXone (ROCEPHIN) IVPB 1 g, 1 g, Intravenous, Q24H, Leslee, Anny M II, DO, Last Rate: 100 mL/hr at 04/11/19 1009, 1 g at 04/11/19 1009  •  cetirizine (zyrTEC) tablet 10 mg, 10 mg, Oral, Daily, Leslee, Anny M II, DO, 10 mg at 04/11/19 0820  •  diphenoxylate-atropine (LOMOTIL) 2.5-0.025 MG per tablet 2 tablet, 2 tablet, Oral, Q2H PRN, Leslee, Anny M II, DO, 2 tablet at 04/12/19 0418  •  escitalopram (LEXAPRO) tablet 10 mg, 10 mg, Oral, Daily, Leslee, Anny M II, DO, 10 mg at 04/11/19 0820  •  fluticasone (FLONASE) 50 MCG/ACT nasal spray 2 spray, 2 spray, Nasal, BID, Leslee, Anny M II, DO, 2 spray at 04/11/19 2037  •  guaiFENesin (MUCINEX) 12 hr tablet 600 mg, 600 mg, Oral, Q12H, Leslee, Anny M II, DO, 600 mg at 04/11/19 2034  •  loperamide (IMODIUM) capsule 2 mg, 2 mg, Oral, 4x Daily PRN, Elizabeth Parikh, APRN, 2 mg at 04/11/19 2034  •  Magnesium Sulfate 2 gram Bolus, followed by 8 gram infusion (total Mg dose 10 grams)- Mg less than or equal to 1mg/dL, 2 g, Intravenous, PRN **OR** Magnesium Sulfate 2 gram / 50mL Infusion (GIVE X 3 BAGS TO EQUAL 6GM TOTAL DOSE) - Mg 1.1 - 1.5 mg/dl, 2 g, Intravenous, PRN **OR** Magnesium Sulfate 4 gram infusion- Mg 1.6-1.9 mg/dL, 4 g, Intravenous, PRN, Leslee, Anny M II, DO  •  melatonin tablet 5 mg, 5 mg, Oral, Nightly PRN, Leslee, Anny M II, DO  •  montelukast (SINGULAIR) tablet 10 mg, 10 mg, Oral, Nightly, LesleeAnny M II, DO, 10 mg at 04/11/19 2034  •  ondansetron (ZOFRAN) tablet 4 mg, 4 mg, Oral, Q6H PRN, 4 mg at 04/10/19 2231 **OR** ondansetron (ZOFRAN) injection 4 mg, 4 mg, Intravenous, Q6H PRN, Anny Camilo M II, DO, 4 mg at 04/10/19 0614  •  Pharmacy to dose warfarin, , Does not apply, Continuous PRN, Leslee, Anny M II, DO  •  potassium chloride (MICRO-K) CR capsule 40 mEq, 40 mEq, Oral, PRN **OR** potassium chloride (KLOR-CON) packet 40 mEq, 40 mEq, Oral, PRN **OR** potassium chloride 10 mEq in 100 mL IVPB, 10 mEq,  Intravenous, Q1H PRN, Leslee, Anny M II, DO  •  saccharomyces boulardii (FLORASTOR) capsule 250 mg, 250 mg, Oral, BID, Leslee, Anny M II, DO, 250 mg at 04/11/19 2034  •  sennosides-docusate sodium (SENOKOT-S) 8.6-50 MG tablet 2 tablet, 2 tablet, Oral, BID PRN, Leslee, Anny M II, DO  •  sodium chloride 0.9 % flush 3 mL, 3 mL, Intravenous, Q12H, Leslee, Anny M II, DO, 3 mL at 04/11/19 2034  •  sodium chloride 0.9 % flush 3-10 mL, 3-10 mL, Intravenous, PRN, Leslee, Anny M II, DO  •  sodium chloride 0.9 % infusion, 125 mL/hr, Intravenous, Continuous, Leslee, Anny M II, DO, Last Rate: 125 mL/hr at 04/11/19 0936, 125 mL/hr at 04/11/19 0936  •  sodium chloride 0.9 % infusion, 150 mL/hr, Intravenous, Continuous, Ramesh, Jessika Carey MD, Last Rate: 150 mL/hr at 04/12/19 0419, 150 mL/hr at 04/12/19 0419  •  warfarin (COUMADIN) (dosing per levels), , Does not apply, Daily, Leslee, Anny M II, DO, Stopped at 04/11/19 1712      Assessment/Plan   Assessment / Plan     Active Hospital Problems    Diagnosis POA   • **Acute respiratory failure with hypoxia (CMS/[facility]) [J96.01] Yes   • Pneumonia due to Streptococcus pneumoniae (CMS/[facility]) [J13] Yes   • History of creation of ostomy (CMS/[facility]) [Z93.9] Not Applicable   • RAMYA (acute kidney injury) (CMS/[facility]) [N17.9] Yes   • Sepsis (CMS/[facility]) [A41.9] Yes   • Inflammatory bowel disease (Crohn's disease) (CMS/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     A/P:     Acute hypoxic respiratory failure due to pneumonia  Sepsis due to post influenza streptococcal pneumoniae of the right upper and middle lobe  --All other cultures negative. Switch to IV rocephin and azithro for strep coverage.  --Has hx of bronchiectasis and was previously followed by Dr. Watkins at [facility]. Seems to be improving so will hold on involving pulmonary.  --Continue home nebs/inhalers.      Acute kidney injury  -Possible prerenal azotemia versus ATN.  Avoid nephrotoxic medications, monitor urine output and electrolytes.  --Strict I's and O's.   --Nephrology consult team following     Hyponatremia, worse  --Monitor, improved with IV fluids, asymptomatic     Diarrhea  --C diff pcr negative. Resume imodium and lomotil.     History of DVT on chronic anticoagulation with warfarin  Supratherapeutic INR  --On coumadin. Dosing per pharmacy.  Hold for now and monitor INR daily     DVT prophylaxis: Coumadin     Disposition: I expect the patient to be discharged home in 2-4 days.    CODE STATUS:   Code Status and Medical Interventions:   Ordered at: 04/09/19 1422     Code Status:    CPR     Medical Interventions (Level of Support Prior to Arrest):    Full         Electronically signed by Tyrell Alcala MD, 04/12/19, 8:04 AM.

## 2019-04-12 NOTE — PROGRESS NOTES
"Pharmacy Consult  -  Warfarin  Zackary Noonan II is a  63 y.o. male admitted for a viral illness.   Height - 167.6 cm (66\")  Weight - 76.2 kg (168 lb)    Consulting Provider: - Dr. Camilo  Indication: - DVT  Goal INR: -  2 - 3  Home Regimen:   - 5 mg daily    Bridge Therapy: No    Drug-Drug Interactions with current regimen:              - No clinically significant drug-drug interactions    Warfarin Dosing During Admission:  Date  4/9 4/10 4/11 4/12        INR  3.79 4.45 3.94 3.8        Dose  hold hold hold hold           Discharge Follow up:   Following Provider -   Follow up time range or appointment -      Labs:  Results from last 7 days   Lab Units 04/12/19  0429 04/11/19  0634 04/11/19  0527 04/10/19  0559 04/09/19  1752 04/09/19  1226   INR  3.80* 3.94*  --  4.45* 4.04* 3.79*   HEMOGLOBIN g/dL 12.0*  --  12.7* 12.8*  --  15.4   HEMATOCRIT % 36.4*  --  38.1 39.1  --  44.8   PLATELETS 10*3/mm3 423  --  394 362  --  399     Results from last 7 days   Lab Units 04/12/19  0429 04/11/19  0527 04/10/19  0559 04/09/19  1226   SODIUM mmol/L 132* 129* 130* 134*   POTASSIUM mmol/L 4.0 3.7 4.5 5.1   CHLORIDE mmol/L 102 97* 100 95*   CO2 mmol/L 18.0* 18.0* 16.0* 23.0   BUN mg/dL 20 22 17 9   CREATININE mg/dL 2.39* 2.48* 2.10* 1.07   CALCIUM mg/dL 8.4* 8.8 8.3* 9.2   BILIRUBIN mg/dL  --   --   --  0.7   ALK PHOS U/L  --   --   --  98   ALT (SGPT) U/L  --   --   --  17   AST (SGOT) U/L  --   --   --  21   GLUCOSE mg/dL 83 83 150* 92     Current dietary intake: % of meals documented in the last 24H  Diet Order   Procedures   • Diet Regular     Assessment/Plan:   1. Pharmacy to dose warfarin for a history of DVT's. Home dose is 5mg daily. Goal INR 2-3.   2. INR again SUPRAtherapeutic at 3.8. Have held warfarin since admit.  3. Continuing to hold warfarin. Spoke with patient- last INR was last week. Apparently no change in regimen after testing at that time. He feels his elevated INR is due to being nauseous and not " eating.   4. Pharmacy will continue to monitor.      Thanks,  Ghassan Mas, PharmD  Pharmacy Resident  4/12/2019  11:17 AM

## 2019-04-12 NOTE — PROGRESS NOTES
Continued Stay Note  [facility]     Patient Name: Zackary Noonan II  MRN: [id number]  Today's Date: 4/12/2019    Admit Date: 4/9/2019    Discharge Plan     Row Name 04/12/19 0856       Plan    Plan  Home with wife's assistance and [facility]    Patient/Family in Agreement with Plan  yes    Plan Comments  Followed up with Mr. Noonan and his wife at the bedside for discharge planning.  Physical therapy recommended skilled PT after discharge.  Discussed PT with patient and his family and they requested  [facility] in [place].  Faxed orders and clinical to [facility].  Requested that Mr. Noonan contact [facility] after discharge to schedule first appointment.    CM will continue to follow.    Final Discharge Disposition Code  01 - home or self-care        Discharge Codes    No documentation.             Rajani Alexis

## 2019-04-12 NOTE — PLAN OF CARE
Problem: Patient Care Overview  Goal: Plan of Care Review  Outcome: Ongoing (interventions implemented as appropriate)   04/12/19 1500   Coping/Psychosocial   Plan of Care Reviewed With patient;daughter   OTHER   Outcome Summary Patient able to ambulate in hallway 350 feet. He required some standing and sitting breaks. He was limited by tachycardia and some drop in SATs into upper 80's   Plan of Care Review   Progress improving

## 2019-04-12 NOTE — PROGRESS NOTES
"                  Clinical Nutrition     Nutrition Assessment  Reason for Visit:   Follow-up protocol      Patient Name: Zackary Noonan II  YOB: 1955  MRN: [id number]  Date of Encounter: 04/12/19 1:43 PM  Admission date: 4/9/2019    Nutrition Assessment   Assessment     Admission Problem List  Acute respiratory failure with hypoxia    Hospital Problem List  Pneumonia  Sepsis    Applicable PMH  Hx of DVT  Hx of CDiff s/p fecal transplant  Anxiety  HTN  Ulcerative colitis    Applicable PSxH  Bronchoscopy  Colon resection    Other nutrition related factors:  Pt was recently admitted to [facility] ~ 1 week ago for influenza.     Reported/Observed/Food/Nutrition Related History:   Pt reported appetite has been improving, ate very well for dinner last night and breakfast this morning, reported eating ok at lunch but disliked the food. Pt reported  taking menu preferences and already had a patient menu. Pt did not have food preferences at this time.     Anthropometrics     Height: 167.6 cm (66\")  Last filed wt: Weight: 76.2 kg (168 lb) (04/09/19 1158)    BMI: BMI (Calculated): 27.1  Overweight: 25.0-29.9kg/m2     Ideal Body Weight (IBW) (kg): 65.3  Admission wt:168 lb  Method obtained: method unknown     Weight Change   UBW: 171 lb    Per Epic pt weighed 174 lb in September 2018.     Last 15 Recorded Weights   View Complete Flowsheet   Weight Weight (kg) Weight (lbs) Weight Method   4/9/2019 76.204 kg 168 lb -   3/31/2019 72.576 kg 160 lb -   9/7/2018 79.107 kg 174 lb 6.4 oz Standing scale   9/6/2018 77.111 kg 170 lb -   3/18/2018 75.569 kg 166 lb 9.6 oz Standing scale   3/17/2018 75.569 kg 166 lb 9.6 oz Standing scale   3/16/2018 75.479 kg 166 lb 6.4 oz Standing scale   3/13/2018 75.297 kg 166 lb Stated   9/19/2017 69.854 kg 154 lb Standing scale   9/15/2017 75 kg 165 lb 5.5 oz Standing scale   11/5/2016 82.192 kg 181 lb 3.2 oz -     Weight change: 6 lb   % wt change: 3%   Time frame of weight " loss: 7 months       Current Nutrition Prescription     PO: Diet Regular      Intake: 63% of 6 meals     Nutrition Diagnosis     4/10; updated 4/12  Problem Inadequate oral intake   Etiology ARF w/ hypoxia; weakness   Signs/Symptoms 63% of 6 meals      Nutrition Intervention   Follow treatment progress, Care plan reviewed, Advise alternate selection, Advised available snacks, Interview for preferences, Encourage intake  Made pt aware of late tray orders if dislikes meal    Goal:   General: Nutrition support treatment  PO: Increase intake  Additional goals: pt is consistently able to consume > or equal to 67% of meal tray.     Monitoring/Evaluation:   Per protocol, PO intake, Pertinent labs, Weight, Symptoms    Will Continue to follow per protocol      Maia HOLDER Waits  Time Spent: 25 minutes

## 2019-04-13 LAB
ANION GAP SERPL CALCULATED.3IONS-SCNC: 13 MMOL/L
BUN BLD-MCNC: 15 MG/DL (ref 8–23)
BUN/CREAT SERPL: 6.8 (ref 7–25)
CALCIUM SPEC-SCNC: 8.2 MG/DL (ref 8.6–10.5)
CHLORIDE SERPL-SCNC: 108 MMOL/L (ref 98–107)
CO2 SERPL-SCNC: 14 MMOL/L (ref 22–29)
CREAT BLD-MCNC: 2.21 MG/DL (ref 0.76–1.27)
DEPRECATED RDW RBC AUTO: 47.6 FL (ref 37–54)
ERYTHROCYTE [DISTWIDTH] IN BLOOD BY AUTOMATED COUNT: 14.5 % (ref 12.3–15.4)
GFR SERPL CREATININE-BSD FRML MDRD: 30 ML/MIN/1.73
GLUCOSE BLD-MCNC: 99 MG/DL (ref 65–99)
HCT VFR BLD AUTO: 34.2 % (ref 37.5–51)
HGB BLD-MCNC: 11.1 G/DL (ref 13–17.7)
INR PPP: 2.29 (ref 0.85–1.16)
MCH RBC QN AUTO: 29.4 PG (ref 26.6–33)
MCHC RBC AUTO-ENTMCNC: 32.5 G/DL (ref 31.5–35.7)
MCV RBC AUTO: 90.5 FL (ref 79–97)
PLATELET # BLD AUTO: 447 10*3/MM3 (ref 140–450)
PMV BLD AUTO: 8.6 FL (ref 6–12)
POTASSIUM BLD-SCNC: 3.5 MMOL/L (ref 3.5–5.2)
PROTHROMBIN TIME: 24.2 SECONDS (ref 11.2–14.3)
RBC # BLD AUTO: 3.78 10*6/MM3 (ref 4.14–5.8)
SODIUM BLD-SCNC: 135 MMOL/L (ref 136–145)
WBC NRBC COR # BLD: 12.99 10*3/MM3 (ref 3.4–10.8)

## 2019-04-13 PROCEDURE — 94799 UNLISTED PULMONARY SVC/PX: CPT

## 2019-04-13 PROCEDURE — 85027 COMPLETE CBC AUTOMATED: CPT | Performed by: INTERNAL MEDICINE

## 2019-04-13 PROCEDURE — 25010000002 CEFTRIAXONE PER 250 MG: Performed by: INTERNAL MEDICINE

## 2019-04-13 PROCEDURE — 99232 SBSQ HOSP IP/OBS MODERATE 35: CPT | Performed by: INTERNAL MEDICINE

## 2019-04-13 PROCEDURE — 80048 BASIC METABOLIC PNL TOTAL CA: CPT | Performed by: INTERNAL MEDICINE

## 2019-04-13 PROCEDURE — 85610 PROTHROMBIN TIME: CPT | Performed by: INTERNAL MEDICINE

## 2019-04-13 RX ORDER — SODIUM CHLORIDE 9 MG/ML
100 INJECTION, SOLUTION INTRAVENOUS CONTINUOUS
Status: DISCONTINUED | OUTPATIENT
Start: 2019-04-13 | End: 2019-04-14

## 2019-04-13 RX ORDER — AZITHROMYCIN 250 MG/1
250 TABLET, FILM COATED ORAL
Status: DISCONTINUED | OUTPATIENT
Start: 2019-04-13 | End: 2019-04-13

## 2019-04-13 RX ORDER — WARFARIN SODIUM 2.5 MG/1
2.5 TABLET ORAL
Status: DISCONTINUED | OUTPATIENT
Start: 2019-04-13 | End: 2019-04-15

## 2019-04-13 RX ADMIN — BUSPIRONE HYDROCHLORIDE 15 MG: 10 TABLET ORAL at 13:57

## 2019-04-13 RX ADMIN — Medication 250 MG: at 20:46

## 2019-04-13 RX ADMIN — SODIUM CHLORIDE, PRESERVATIVE FREE 3 ML: 5 INJECTION INTRAVENOUS at 20:47

## 2019-04-13 RX ADMIN — DIPHENOXYLATE HYDROCHLORIDE AND ATROPINE SULFATE 2 TABLET: 2.5; .025 TABLET ORAL at 20:46

## 2019-04-13 RX ADMIN — CETIRIZINE HYDROCHLORIDE 10 MG: 10 TABLET, FILM COATED ORAL at 08:27

## 2019-04-13 RX ADMIN — LOPERAMIDE HYDROCHLORIDE 2 MG: 2 CAPSULE ORAL at 03:54

## 2019-04-13 RX ADMIN — GUAIFENESIN 600 MG: 600 TABLET, EXTENDED RELEASE ORAL at 20:46

## 2019-04-13 RX ADMIN — MONTELUKAST SODIUM 10 MG: 10 TABLET, COATED ORAL at 20:46

## 2019-04-13 RX ADMIN — LOPERAMIDE HYDROCHLORIDE 2 MG: 2 CAPSULE ORAL at 20:46

## 2019-04-13 RX ADMIN — BUSPIRONE HYDROCHLORIDE 15 MG: 10 TABLET ORAL at 06:10

## 2019-04-13 RX ADMIN — BUDESONIDE 0.5 MG: 0.5 INHALANT RESPIRATORY (INHALATION) at 07:43

## 2019-04-13 RX ADMIN — DIPHENOXYLATE HYDROCHLORIDE AND ATROPINE SULFATE 2 TABLET: 2.5; .025 TABLET ORAL at 08:27

## 2019-04-13 RX ADMIN — BUSPIRONE HYDROCHLORIDE 15 MG: 10 TABLET ORAL at 20:46

## 2019-04-13 RX ADMIN — FLUTICASONE PROPIONATE 2 SPRAY: 50 SPRAY, METERED NASAL at 20:47

## 2019-04-13 RX ADMIN — CEFTRIAXONE SODIUM 1 G: 1 INJECTION, SOLUTION INTRAVENOUS at 09:59

## 2019-04-13 RX ADMIN — ACETAMINOPHEN 650 MG: 325 TABLET ORAL at 14:33

## 2019-04-13 RX ADMIN — AZITHROMYCIN 250 MG: 250 TABLET, FILM COATED ORAL at 10:43

## 2019-04-13 RX ADMIN — LOPERAMIDE HYDROCHLORIDE 2 MG: 2 CAPSULE ORAL at 13:57

## 2019-04-13 RX ADMIN — FLUTICASONE PROPIONATE 2 SPRAY: 50 SPRAY, METERED NASAL at 08:27

## 2019-04-13 RX ADMIN — DIPHENOXYLATE HYDROCHLORIDE AND ATROPINE SULFATE 2 TABLET: 2.5; .025 TABLET ORAL at 13:57

## 2019-04-13 RX ADMIN — ALBUTEROL SULFATE 2.5 MG: 2.5 SOLUTION RESPIRATORY (INHALATION) at 20:35

## 2019-04-13 RX ADMIN — BENZONATATE 100 MG: 100 CAPSULE ORAL at 14:33

## 2019-04-13 RX ADMIN — GUAIFENESIN 600 MG: 600 TABLET, EXTENDED RELEASE ORAL at 08:27

## 2019-04-13 RX ADMIN — Medication 250 MG: at 08:27

## 2019-04-13 RX ADMIN — WARFARIN SODIUM 2.5 MG: 2.5 TABLET ORAL at 18:17

## 2019-04-13 RX ADMIN — ESCITALOPRAM OXALATE 10 MG: 10 TABLET ORAL at 08:27

## 2019-04-13 RX ADMIN — SODIUM CHLORIDE, PRESERVATIVE FREE 3 ML: 5 INJECTION INTRAVENOUS at 08:27

## 2019-04-13 RX ADMIN — DIPHENOXYLATE HYDROCHLORIDE AND ATROPINE SULFATE 2 TABLET: 2.5; .025 TABLET ORAL at 06:11

## 2019-04-13 RX ADMIN — BUDESONIDE 0.5 MG: 0.5 INHALANT RESPIRATORY (INHALATION) at 20:30

## 2019-04-13 NOTE — PROGRESS NOTES
"   LOS: 4 days    Patient Care Team:  Jillian Layne MD as PCP - General (Family Medicine)    Chief Complaint:   History of present illness:  This is 63 year old carlos with past medical hx of Htn, C diff infection and Crohn's disease s/p ostomy presents with generalized weakness and fatigue. He was admitted with working diagnosis of PNA. Started on antibiotics. At presentation Scr was 1.0 which has been trending up to 2.48 and Sodium is found to be 129 worsening today. Nephrology service has been consulted for further evalaution and treatment.      Subjective   Renal function and sodium improving.  No new events.  No obvious distress.    Review of Systems:   High ostomy output.  No other complaints.    Objective     Vital Sign Min/Max for last 24 hours  Temp  Min: 97.6 °F (36.4 °C)  Max: 99.6 °F (37.6 °C)   BP  Min: 136/72  Max: 147/67   Pulse  Min: 86  Max: 100   Resp  Min: 16  Max: 18   SpO2  Min: 91 %  Max: 96 %   No Data Recorded   No Data Recorded     Flowsheet Rows      First Filed Value   Admission Height  167.6 cm (66\") Documented at 04/09/2019 1158   Admission Weight  76.2 kg (168 lb) Documented at 04/09/2019 1158          I/O this shift:  In: -   Out: 500 [Urine:500]  I/O last 3 completed shifts:  In: 1596 [P.O.:480; I.V.:1116]  Out: 2350 [Urine:2050; Stool:300]    Physical Exam:  General Appearance: Alert, oriented x3 no obvious distress laying comfortable in bed.  Eyes: PER, EOMI.  Neck: Supple no JVD.  Lungs: Clear auscultation, no rales rhonchi's, equal chest movement, nonlabored.  Heart: No gallop, murmur, rub, RRR.  Abdomen: Soft, nontender, positive bowel sounds, no organomegaly.  Ostomy noted  Extremities: No edema, no cyanosis.  Neuro: No focal deficit, moving all extremities, alert oriented X 3      WBC WBC   Date Value Ref Range Status   04/13/2019 12.99 (H) 3.40 - 10.80 10*3/mm3 Final   04/12/2019 16.19 (H) 3.40 - 10.80 10*3/mm3 Final   04/11/2019 26.81 (H) 3.40 - 10.80 10*3/mm3 Final    "   HGB Hemoglobin   Date Value Ref Range Status   04/13/2019 11.1 (L) 13.0 - 17.7 g/dL Final   04/12/2019 12.0 (L) 13.0 - 17.7 g/dL Final   04/11/2019 12.7 (L) 13.0 - 17.7 g/dL Final      HCT Hematocrit   Date Value Ref Range Status   04/13/2019 34.2 (L) 37.5 - 51.0 % Final   04/12/2019 36.4 (L) 37.5 - 51.0 % Final   04/11/2019 38.1 37.5 - 51.0 % Final      Platlets No results found for: LABPLAT   MCV MCV   Date Value Ref Range Status   04/13/2019 90.5 79.0 - 97.0 fL Final   04/12/2019 89.2 79.0 - 97.0 fL Final   04/11/2019 90.5 79.0 - 97.0 fL Final          Sodium Sodium   Date Value Ref Range Status   04/13/2019 135 (L) 136 - 145 mmol/L Final   04/12/2019 132 (L) 136 - 145 mmol/L Final   04/11/2019 129 (L) 136 - 145 mmol/L Final      Potassium Potassium   Date Value Ref Range Status   04/13/2019 3.5 3.5 - 5.2 mmol/L Final   04/12/2019 4.0 3.5 - 5.2 mmol/L Final   04/11/2019 3.7 3.5 - 5.2 mmol/L Final      Chloride Chloride   Date Value Ref Range Status   04/13/2019 108 (H) 98 - 107 mmol/L Final   04/12/2019 102 98 - 107 mmol/L Final   04/11/2019 97 (L) 98 - 107 mmol/L Final      CO2 CO2   Date Value Ref Range Status   04/13/2019 14.0 (L) 22.0 - 29.0 mmol/L Final   04/12/2019 18.0 (L) 22.0 - 29.0 mmol/L Final   04/11/2019 18.0 (L) 22.0 - 29.0 mmol/L Final      BUN BUN   Date Value Ref Range Status   04/13/2019 15 8 - 23 mg/dL Final   04/12/2019 20 8 - 23 mg/dL Final   04/11/2019 22 8 - 23 mg/dL Final      Creatinine Creatinine   Date Value Ref Range Status   04/13/2019 2.21 (H) 0.76 - 1.27 mg/dL Final   04/12/2019 2.39 (H) 0.76 - 1.27 mg/dL Final   04/11/2019 2.48 (H) 0.76 - 1.27 mg/dL Final      Calcium Calcium   Date Value Ref Range Status   04/13/2019 8.2 (L) 8.6 - 10.5 mg/dL Final   04/12/2019 8.4 (L) 8.6 - 10.5 mg/dL Final   04/11/2019 8.8 8.6 - 10.5 mg/dL Final      PO4 No results found for: CAPO4   Albumin No results found for: ALBUMIN   Magnesium No results found for: MG   Uric Acid Uric Acid   Date Value  Ref Range Status   04/11/2019 4.2 3.4 - 7.0 mg/dL Final     Comment:     Falsely depressed results may occur on samples drawn from patients receiving N-Acetylcysteine (NAC) or Metamizole.           Results Review:     I reviewed the patient's new clinical results.      azithromycin 250 mg Oral Q24H   budesonide 0.5 mg Nebulization BID - RT   busPIRone 15 mg Oral Q8H   ceftriaxone 1 g Intravenous Q24H   cetirizine 10 mg Oral Daily   escitalopram 10 mg Oral Daily   fluticasone 2 spray Nasal BID   guaiFENesin 600 mg Oral Q12H   montelukast 10 mg Oral Nightly   saccharomyces boulardii 250 mg Oral BID   sodium chloride 3 mL Intravenous Q12H   warfarin 2.5 mg Oral Daily       Pharmacy to dose warfarin     sodium chloride 150 mL/hr Last Rate: 150 mL/hr (04/12/19 1126)   sodium chloride 150 mL/hr        Medication Review:      Assessment/Plan       1- RAMYA - non olgiuric  - Pre-renal azotemia -high output from ostomy  and  intravascular volume depletion No hydro on CT scan. Small Left renal simple cyst.   2- Hyponatremia improving with IV fluids.     Plan:  -Decrease IV fluids at 100 mL/h, patient oral intake improving and ostomy output decreasing monitor closely  - Avoid nephrotoxic agents.   - Renal diet.   - Adjust meds per renal function   - Monitor I/O   - No need of dialysis           Sim Martell MD  04/13/19  12:19 PM

## 2019-04-13 NOTE — PLAN OF CARE
Problem: Patient Care Overview  Goal: Plan of Care Review  Outcome: Ongoing (interventions implemented as appropriate)   04/13/19 5706   Coping/Psychosocial   Plan of Care Reviewed With patient   OTHER   Outcome Summary Pt. A&Ox3, vitals stable, 2L NC @ 95%. Creatnine elevated, is declining, NS @ 100 to correct. Ax1 w/ walker, ambulated in day x2 today. Using I/S with good results, tessalon given PRN for coughing. Voids well, illeostomy w/ loose stool output, lomotil and immodium given PRN for chron's s/s. May d/c Monday.    Plan of Care Review   Progress improving

## 2019-04-13 NOTE — PLAN OF CARE
Problem: Patient Care Overview  Goal: Plan of Care Review  Outcome: Ongoing (interventions implemented as appropriate)   04/13/19 3397   Coping/Psychosocial   Plan of Care Reviewed With patient   OTHER   Outcome Summary Pt remains A&Ox3. Pt VSS throughout shift. Pt remains on 2L NC. Pt has ambulated to restroom during shift, and did desat in the mid to upper 80's. Pt does well w assist of one, and a walker. Pt continues to void well, and illeostomy continues to have loose stools. Imodium given per pt request. Pt has slept well during shift. Will continue to monitor.    Plan of Care Review   Progress improving       Problem: Infection, Risk/Actual (Adult)  Goal: Identify Related Risk Factors and Signs and Symptoms  Outcome: Ongoing (interventions implemented as appropriate)

## 2019-04-13 NOTE — PROGRESS NOTES
[facility] Medicine Services  PROGRESS NOTE    Patient Name: Zackary Noonan II  : 1955  MRN: [id number]    Date of Admission: 2019  Length of Stay: 4  Primary Care Physician: Jillian Layne MD    Subjective   Subjective     CC: f/u PNA    HPI: Patient resting in his bed this morning.  He says his breathing continues to improve but he does get a little short of breath with walking.  He notes that his ostomy output has improved.  He continues to feel better.  He is hoping to go home in the next few days once he gets off of his oxygen.  No other new complaints.      Review of Systems  No current fevers or chills  Improving shortness of breath with occasional cough  No current nausea, vomiting, or diarrhea  No current chest pain or palpitations      Objective   Objective     Vital Signs:   Temp:  [97.6 °F (36.4 °C)-99.6 °F (37.6 °C)] 98.8 °F (37.1 °C)  Heart Rate:  [] 91  Resp:  [16-18] 16  BP: (132-147)/(67-75) 147/67        Physical Exam:  Constitutional: No acute distress, awake, alert, looks better  HENT: NCAT, mucous membranes moist  Respiratory: Decreased rhonchi on the right, clear on the left, occasional cough, nonlabored breathing  Cardiovascular: RRR, no murmurs, rubs, or gallops, palpable pedal pulses bilaterally  Gastrointestinal: Positive bowel sounds, soft, nontender, nondistended, patent ostomy  Musculoskeletal: No bilateral ankle edema, BMI 27  Psychiatric: Appropriate affect, cooperative  Neurologic: No slurred speech or facial droop, follows commands  Skin: No rashes or jaundice    Results Reviewed:  I have personally reviewed current lab, radiology, and data and agree.    Results from last 7 days   Lab Units 19  0500 19  0429 19  0634 19  0527   WBC 10*3/mm3 12.99* 16.19*  --  26.81*   HEMOGLOBIN g/dL 11.1* 12.0*  --  12.7*   HEMATOCRIT % 34.2* 36.4*  --  38.1   PLATELETS 10*3/mm3 447 423  --  394   INR  2.29* 3.80* 3.94*  --       Results from last 7 days   Lab Units 04/13/19  0500 04/12/19  0429 04/11/19  0527  04/09/19  1226   SODIUM mmol/L 135* 132* 129*   < > 134*   POTASSIUM mmol/L 3.5 4.0 3.7   < > 5.1   CHLORIDE mmol/L 108* 102 97*   < > 95*   CO2 mmol/L 14.0* 18.0* 18.0*   < > 23.0   BUN mg/dL 15 20 22   < > 9   CREATININE mg/dL 2.21* 2.39* 2.48*   < > 1.07   GLUCOSE mg/dL 99 83 83   < > 92   CALCIUM mg/dL 8.2* 8.4* 8.8   < > 9.2   ALT (SGPT) U/L  --   --   --   --  17   AST (SGOT) U/L  --   --   --   --  21    < > = values in this interval not displayed.     Estimated Creatinine Clearance: 35.9 mL/min (A) (by C-G formula based on SCr of 2.21 mg/dL (H)).    No results found for: BNP    Microbiology Results Abnormal     Procedure Component Value - Date/Time    Blood Culture - Blood, Arm, Right [[id number]] Collected:  04/09/19 1225    Lab Status:  Preliminary result Specimen:  Blood from Arm, Right Updated:  04/12/19 1300     Blood Culture No growth at 3 days    Blood Culture - Blood, Arm, Left [[id number]] Collected:  04/09/19 1215    Lab Status:  Preliminary result Specimen:  Blood from Arm, Left Updated:  04/12/19 1300     Blood Culture No growth at 3 days    Respiratory Culture - Sputum, Cough [[id number]] Collected:  04/10/19 0859    Lab Status:  Final result Specimen:  Sputum from Cough Updated:  04/12/19 0947     Respiratory Culture Light growth (2+) Normal Respiratory Skyler     Gram Stain Moderate (3+) WBCs per low power field      Few (2+) Epithelial cells per low power field      Moderate (3+) Budding yeast with pseudohyphae      Few (2+) Normal respiratory skyler    Eosinophil Smear - Urine, Urine, Clean Catch [[id number]]  (Normal) Collected:  04/11/19 1601    Lab Status:  Final result Specimen:  Urine, Clean Catch Updated:  04/11/19 1853     Eosinophil Smear 0 % EOS/100 Cells     Narrative:       No eosinophil seen    Clostridium Difficile Toxin - Stool, Per Rectum [[id number]] Collected:  04/10/19 1029    Lab Status:  Final  result Specimen:  Stool from Per Rectum Updated:  04/10/19 1721    Narrative:       The following orders were created for panel order Clostridium Difficile Toxin - Stool, Per Rectum.  Procedure                               Abnormality         Status                     ---------                               -----------         ------                     Clostridium Difficile To...[[id number]]  Normal              Final result                 Please view results for these tests on the individual orders.    Clostridium Difficile Toxin, PCR - Stool, Per Rectum [[id number]]  (Normal) Collected:  04/10/19 1629    Lab Status:  Final result Specimen:  Stool from Per Rectum Updated:  04/10/19 1721     C. Difficile Toxins by PCR Not Detected    Narrative:       Performance characteristics of test not established for patients <2 years of age.  Negative for Toxigenic C. Difficile    S. Pneumo Ag Urine or CSF - Urine, Urine, Clean Catch [[id number]]  (Abnormal) Collected:  04/09/19 2333    Lab Status:  Final result Specimen:  Urine, Clean Catch Updated:  04/10/19 0912     Strep Pneumo Ag Positive    Legionella Antigen, Urine - Urine, Urine, Clean Catch [[id number]]  (Normal) Collected:  04/09/19 2333    Lab Status:  Final result Specimen:  Urine, Clean Catch Updated:  04/10/19 0656     LEGIONELLA ANTIGEN, URINE Negative             I have reviewed the medications:    Current Facility-Administered Medications:   •  acetaminophen (TYLENOL) tablet 650 mg, 650 mg, Oral, Q4H PRN, Anny Camilo M II, DO, 650 mg at 04/12/19 0623  •  albuterol (PROVENTIL) nebulizer solution 0.083% 2.5 mg/3mL, 2.5 mg, Nebulization, Q6H PRN, Anny Camilo M II, DO  •  azithromycin 500 MG/250 ML 0.9% NS IVPB (MBP), 500 mg, Intravenous, Q24H, Anny Camilo M II, DO, 500 mg at 04/12/19 1407  •  benzonatate (TESSALON) capsule 100 mg, 100 mg, Oral, TID PRN, Pilar Head, APRN, 100 mg at 04/12/19 2232  •  budesonide (PULMICORT) nebulizer solution 0.5 mg,  0.5 mg, Nebulization, BID - RT, Leslee, Anny M II, DO, 0.5 mg at 04/13/19 0743  •  busPIRone (BUSPAR) tablet 15 mg, 15 mg, Oral, Q8H, Leslee, Anny M II, DO, 15 mg at 04/13/19 0610  •  cefTRIAXone (ROCEPHIN) IVPB 1 g, 1 g, Intravenous, Q24H, Leslee, Anny M II, DO, Last Rate: 100 mL/hr at 04/12/19 1157, 1 g at 04/12/19 1157  •  cetirizine (zyrTEC) tablet 10 mg, 10 mg, Oral, Daily, Leslee, Anny M II, DO, 10 mg at 04/12/19 0851  •  diphenoxylate-atropine (LOMOTIL) 2.5-0.025 MG per tablet 2 tablet, 2 tablet, Oral, Q2H PRN, Leslee, Anny M II, DO, 2 tablet at 04/13/19 0611  •  escitalopram (LEXAPRO) tablet 10 mg, 10 mg, Oral, Daily, Leslee, Anny M II, DO, 10 mg at 04/12/19 0851  •  fluticasone (FLONASE) 50 MCG/ACT nasal spray 2 spray, 2 spray, Nasal, BID, Leslee, Anny M II, DO, 2 spray at 04/12/19 1941  •  guaiFENesin (MUCINEX) 12 hr tablet 600 mg, 600 mg, Oral, Q12H, Leslee, Anny M II, DO, 600 mg at 04/12/19 1940  •  loperamide (IMODIUM) capsule 2 mg, 2 mg, Oral, 4x Daily PRN, Elizabeth Parikh, HARMAN, 2 mg at 04/13/19 0354  •  Magnesium Sulfate 2 gram Bolus, followed by 8 gram infusion (total Mg dose 10 grams)- Mg less than or equal to 1mg/dL, 2 g, Intravenous, PRN **OR** Magnesium Sulfate 2 gram / 50mL Infusion (GIVE X 3 BAGS TO EQUAL 6GM TOTAL DOSE) - Mg 1.1 - 1.5 mg/dl, 2 g, Intravenous, PRN **OR** Magnesium Sulfate 4 gram infusion- Mg 1.6-1.9 mg/dL, 4 g, Intravenous, PRN, Leslee, Anny M II, DO  •  melatonin tablet 5 mg, 5 mg, Oral, Nightly PRN, Leslee, Anny M II, DO  •  montelukast (SINGULAIR) tablet 10 mg, 10 mg, Oral, Nightly, Leslee, Anny M II, DO, 10 mg at 04/12/19 1940  •  ondansetron (ZOFRAN) tablet 4 mg, 4 mg, Oral, Q6H PRN, 4 mg at 04/10/19 2231 **OR** ondansetron (ZOFRAN) injection 4 mg, 4 mg, Intravenous, Q6H PRN, Leslee, Anny M II, DO, 4 mg at 04/10/19 0614  •  Pharmacy to dose warfarin, , Does not apply, Continuous PRN, Leslee, Anny M II, DO  •  potassium chloride (MICRO-K) CR  capsule 40 mEq, 40 mEq, Oral, PRN **OR** potassium chloride (KLOR-CON) packet 40 mEq, 40 mEq, Oral, PRN **OR** potassium chloride 10 mEq in 100 mL IVPB, 10 mEq, Intravenous, Q1H PRN, Leslee, Anny M II, DO  •  saccharomyces boulardii (FLORASTOR) capsule 250 mg, 250 mg, Oral, BID, Leslee, Anny M II, DO, 250 mg at 04/12/19 1939  •  sennosides-docusate sodium (SENOKOT-S) 8.6-50 MG tablet 2 tablet, 2 tablet, Oral, BID PRN, Leslee, Anny M II, DO  •  sodium chloride 0.9 % flush 3 mL, 3 mL, Intravenous, Q12H, Leslee, Anny M II, DO, 3 mL at 04/12/19 2007  •  sodium chloride 0.9 % flush 3-10 mL, 3-10 mL, Intravenous, PRN, Leslee, Anny M II, DO  •  sodium chloride 0.9 % infusion, 150 mL/hr, Intravenous, Continuous, Ramesh, Jessika Carey MD, Last Rate: 150 mL/hr at 04/12/19 1126, 150 mL/hr at 04/12/19 1126  •  sodium chloride 0.9 % infusion, 150 mL/hr, Intravenous, Continuous, Sim Martell MD  •  warfarin (COUMADIN) (dosing per levels), , Does not apply, Daily, Leslee, Anny M II, DO, Stopped at 04/11/19 1712      Assessment/Plan   Assessment / Plan     Active Hospital Problems    Diagnosis POA   • **Acute respiratory failure with hypoxia (CMS/Formerly [facility]) [J96.01] Yes   • Pneumonia due to Streptococcus pneumoniae (CMS/[facility]ly [facility]) [J13] Yes   • History of creation of ostomy (CMS/Formerly [facility]) [Z93.9] Not Applicable   • RAMYA (acute kidney injury) (CMS/Formerly [facility]) [N17.9] Yes   • Sepsis (CMS/Formerly [facility]) [A41.9] Yes   • Inflammatory bowel disease (Crohn's disease) (CMS/Formerly [facility]) [K50.90] Yes   • 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) [Z86.19] Not Applicable   • Hyponatremia [E87.1] Yes   • S/P total abdominal colectomy [Z90.49] Not Applicable   • Pneumonia [J18.9] Yes   • History of DVT (deep vein thrombosis) [Z86.718] Not Applicable     LLE; x 3; chronic coumadin therapy     • Diarrhea [R19.7] Yes   • COPD (chronic obstructive pulmonary disease) (CMS/HCC) [J44.9] Yes          Brief Hospital Course to date:  Zackary Noonan II is a 63  y/o male presenting from home with with fatigue and SOA due to post Streptococcus pneumonia following influenza infection     A/P:     Acute hypoxic respiratory failure due to pneumonia  Sepsis due to post influenza streptococcal pneumoniae of the right upper and middle lobe  --All other cultures negative.  IV rocephin and azithro for strep coverage.  --Has hx of bronchiectasis and was previously followed by Dr. Watkins at [facility]. Seems to be improving so will hold on involving pulmonary.  --Continue home nebs/inhalers.      Acute kidney injury  -Possible prerenal azotemia versus ATN.  Avoid nephrotoxic medications, monitor urine output and electrolytes.  --Strict I's and O's.   --Nephrology consult team following     Hyponatremia, worse  --Monitor, improved with IV fluids, asymptomatic     Diarrhea  --C diff pcr negative. Resume imodium and lomotil.     History of DVT on chronic anticoagulation with warfarin  Supratherapeutic INR  --On coumadin. Dosing per pharmacy.  monitor INR daily    INR has returned to the therapeutic range.  Patient continues to chronically improved overall.  Continue to wean oxygen as tolerated.  Hopefully home soon once weaned off of oxygen completely and renal function continues to stabilize.     DVT prophylaxis: Coumadin     Disposition: I expect the patient to be discharged home     CODE STATUS:   Code Status and Medical Interventions:   Ordered at: 04/09/19 1422     Code Status:    CPR     Medical Interventions (Level of Support Prior to Arrest):    Full         Electronically signed by Tyrell Alcala MD, 04/13/19, 7:58 AM.

## 2019-04-13 NOTE — PLAN OF CARE
Problem: Patient Care Overview  Goal: Plan of Care Review  Outcome: Ongoing (interventions implemented as appropriate)   04/13/19 9613   Coping/Psychosocial   Plan of Care Reviewed With patient   OTHER   Outcome Summary Appliance change performed with patient. Assisted with care of peristomal ulcerations. Applied glue, let dry, then applied Rufina paste to bag of appliance prior to placing Coloplast Morgan 1-piece convexity. Secured with barrier strips on the outside. Please contact if needs arise. Thanks    Plan of Care Review   Progress improving

## 2019-04-13 NOTE — PROGRESS NOTES
"Pharmacy Consult  -  Warfarin  Zackary Noonan II is a  63 y.o. male admitted for a viral illness.   Height - 167.6 cm (66\")  Weight - 76.2 kg (168 lb)    Consulting Provider: Hospitalist  Indication: History of DVT  Goal INR: 2-3  Home Regimen:   - Warfarin 5 mg daily    Bridge Therapy: No    Drug-Drug Interactions with current regimen:       Azithromycin - may increase risk of bleeding    Warfarin Dosing During Admission:    Date  4/9 4/10 4/11 4/12 4/13       INR  3.79 4.45 3.94 3.8 2.29       Dose  hold hold hold hold (2.5 mg)         Education: Education provided verbally and in writing.  Discussed effects of warfarin, importance of checking INR, drug-drug and drug-food interactions, and signs/symptoms of bleeding and clotting.  Patient verbalized understanding through teach back.  All pertinent questions were answered.      Discharge Follow up:   Following Provider - Dr. Layne   Follow up time range or appointment - Patient gets INR tested twice weekly. He will schedule an appointment within 2-3 days after discharge.     Labs:    Results from last 7 days   Lab Units 04/13/19  0500 04/12/19  0429 04/11/19  0634 04/11/19  0527 04/10/19  0559 04/09/19  1752 04/09/19  1226   INR  2.29* 3.80* 3.94*  --  4.45* 4.04* 3.79*   HEMOGLOBIN g/dL 11.1* 12.0*  --  12.7* 12.8*  --  15.4   HEMATOCRIT % 34.2* 36.4*  --  38.1 39.1  --  44.8   PLATELETS 10*3/mm3 447 423  --  394 362  --  399     Results from last 7 days   Lab Units 04/13/19  0500 04/12/19  0429 04/11/19  0527  04/09/19  1226   SODIUM mmol/L 135* 132* 129*   < > 134*   POTASSIUM mmol/L 3.5 4.0 3.7   < > 5.1   CHLORIDE mmol/L 108* 102 97*   < > 95*   CO2 mmol/L 14.0* 18.0* 18.0*   < > 23.0   BUN mg/dL 15 20 22   < > 9   CREATININE mg/dL 2.21* 2.39* 2.48*   < > 1.07   CALCIUM mg/dL 8.2* 8.4* 8.8   < > 9.2   BILIRUBIN mg/dL  --   --   --   --  0.7   ALK PHOS U/L  --   --   --   --  98   ALT (SGPT) U/L  --   --   --   --  17   AST (SGOT) U/L  --   --   --   --  21 "   GLUCOSE mg/dL 99 83 83   < > 92    < > = values in this interval not displayed.     Current dietary intake: 100% of two meals documented 4/12.    Diet Order   Procedures   • Diet Regular     Assessment/Plan:     Patient's INR is 2.29 today, significant drop after warfarin was held for 4 days. Anticipate INR will continue to fall.  Will give warfarin 2.5 mg today. Dose conservatively thereafter due to potential drug-drug interaction with azithromycin.  Daily PT/INR ordered.  Monitor signs/symptoms of bleeding, dietary intake, and drug-drug interactions. Make dose adjustments as necessary.  Pharmacy will continue to follow.     Estela Ortega, PharmD  4/13/2019  9:56 AM

## 2019-04-14 LAB
ANION GAP SERPL CALCULATED.3IONS-SCNC: 13 MMOL/L
BACTERIA SPEC AEROBE CULT: NORMAL
BACTERIA SPEC AEROBE CULT: NORMAL
BUN BLD-MCNC: 14 MG/DL (ref 8–23)
BUN/CREAT SERPL: 7.4 (ref 7–25)
CALCIUM SPEC-SCNC: 8.7 MG/DL (ref 8.6–10.5)
CHLORIDE SERPL-SCNC: 110 MMOL/L (ref 98–107)
CO2 SERPL-SCNC: 13 MMOL/L (ref 22–29)
CREAT BLD-MCNC: 1.88 MG/DL (ref 0.76–1.27)
DEPRECATED RDW RBC AUTO: 49 FL (ref 37–54)
ERYTHROCYTE [DISTWIDTH] IN BLOOD BY AUTOMATED COUNT: 14.8 % (ref 12.3–15.4)
GFR SERPL CREATININE-BSD FRML MDRD: 36 ML/MIN/1.73
GLUCOSE BLD-MCNC: 82 MG/DL (ref 65–99)
HCT VFR BLD AUTO: 34 % (ref 37.5–51)
HGB BLD-MCNC: 11 G/DL (ref 13–17.7)
INR PPP: 2.35 (ref 0.85–1.16)
MCH RBC QN AUTO: 29.3 PG (ref 26.6–33)
MCHC RBC AUTO-ENTMCNC: 32.4 G/DL (ref 31.5–35.7)
MCV RBC AUTO: 90.7 FL (ref 79–97)
PLATELET # BLD AUTO: 424 10*3/MM3 (ref 140–450)
PMV BLD AUTO: 8.5 FL (ref 6–12)
POTASSIUM BLD-SCNC: 3.9 MMOL/L (ref 3.5–5.2)
PROTHROMBIN TIME: 24.8 SECONDS (ref 11.2–14.3)
RBC # BLD AUTO: 3.75 10*6/MM3 (ref 4.14–5.8)
SODIUM BLD-SCNC: 136 MMOL/L (ref 136–145)
WBC NRBC COR # BLD: 11.72 10*3/MM3 (ref 3.4–10.8)

## 2019-04-14 PROCEDURE — 85610 PROTHROMBIN TIME: CPT | Performed by: INTERNAL MEDICINE

## 2019-04-14 PROCEDURE — 85027 COMPLETE CBC AUTOMATED: CPT | Performed by: INTERNAL MEDICINE

## 2019-04-14 PROCEDURE — 80048 BASIC METABOLIC PNL TOTAL CA: CPT | Performed by: INTERNAL MEDICINE

## 2019-04-14 PROCEDURE — 94799 UNLISTED PULMONARY SVC/PX: CPT

## 2019-04-14 PROCEDURE — 25010000002 CEFTRIAXONE PER 250 MG: Performed by: INTERNAL MEDICINE

## 2019-04-14 PROCEDURE — 99232 SBSQ HOSP IP/OBS MODERATE 35: CPT | Performed by: INTERNAL MEDICINE

## 2019-04-14 PROCEDURE — 97530 THERAPEUTIC ACTIVITIES: CPT

## 2019-04-14 PROCEDURE — 97116 GAIT TRAINING THERAPY: CPT

## 2019-04-14 RX ORDER — SODIUM BICARBONATE 650 MG/1
1300 TABLET ORAL 2 TIMES DAILY
Status: DISCONTINUED | OUTPATIENT
Start: 2019-04-14 | End: 2019-04-15 | Stop reason: HOSPADM

## 2019-04-14 RX ADMIN — DIPHENOXYLATE HYDROCHLORIDE AND ATROPINE SULFATE 2 TABLET: 2.5; .025 TABLET ORAL at 17:31

## 2019-04-14 RX ADMIN — SODIUM CHLORIDE, PRESERVATIVE FREE 3 ML: 5 INJECTION INTRAVENOUS at 09:00

## 2019-04-14 RX ADMIN — CEFTRIAXONE SODIUM 1 G: 1 INJECTION, SOLUTION INTRAVENOUS at 09:31

## 2019-04-14 RX ADMIN — LOPERAMIDE HYDROCHLORIDE 2 MG: 2 CAPSULE ORAL at 17:31

## 2019-04-14 RX ADMIN — FLUTICASONE PROPIONATE 2 SPRAY: 50 SPRAY, METERED NASAL at 08:36

## 2019-04-14 RX ADMIN — LOPERAMIDE HYDROCHLORIDE 2 MG: 2 CAPSULE ORAL at 11:45

## 2019-04-14 RX ADMIN — BENZONATATE 100 MG: 100 CAPSULE ORAL at 12:50

## 2019-04-14 RX ADMIN — BUDESONIDE 0.5 MG: 0.5 INHALANT RESPIRATORY (INHALATION) at 20:22

## 2019-04-14 RX ADMIN — BUSPIRONE HYDROCHLORIDE 15 MG: 10 TABLET ORAL at 13:28

## 2019-04-14 RX ADMIN — BENZONATATE 100 MG: 100 CAPSULE ORAL at 05:02

## 2019-04-14 RX ADMIN — LOPERAMIDE HYDROCHLORIDE 2 MG: 2 CAPSULE ORAL at 23:01

## 2019-04-14 RX ADMIN — MONTELUKAST SODIUM 10 MG: 10 TABLET, COATED ORAL at 21:02

## 2019-04-14 RX ADMIN — ESCITALOPRAM OXALATE 10 MG: 10 TABLET ORAL at 08:36

## 2019-04-14 RX ADMIN — DIPHENOXYLATE HYDROCHLORIDE AND ATROPINE SULFATE 2 TABLET: 2.5; .025 TABLET ORAL at 23:01

## 2019-04-14 RX ADMIN — BUSPIRONE HYDROCHLORIDE 15 MG: 10 TABLET ORAL at 21:03

## 2019-04-14 RX ADMIN — ALBUTEROL SULFATE 2.5 MG: 2.5 SOLUTION RESPIRATORY (INHALATION) at 09:00

## 2019-04-14 RX ADMIN — SODIUM CHLORIDE 100 ML/HR: 9 INJECTION, SOLUTION INTRAVENOUS at 08:44

## 2019-04-14 RX ADMIN — BUDESONIDE 0.5 MG: 0.5 INHALANT RESPIRATORY (INHALATION) at 09:00

## 2019-04-14 RX ADMIN — CETIRIZINE HYDROCHLORIDE 10 MG: 10 TABLET, FILM COATED ORAL at 08:36

## 2019-04-14 RX ADMIN — Medication 250 MG: at 21:02

## 2019-04-14 RX ADMIN — DIPHENOXYLATE HYDROCHLORIDE AND ATROPINE SULFATE 2 TABLET: 2.5; .025 TABLET ORAL at 09:32

## 2019-04-14 RX ADMIN — GUAIFENESIN 600 MG: 600 TABLET, EXTENDED RELEASE ORAL at 08:36

## 2019-04-14 RX ADMIN — ALBUTEROL SULFATE 2.5 MG: 2.5 SOLUTION RESPIRATORY (INHALATION) at 20:22

## 2019-04-14 RX ADMIN — Medication 100 ML/HR: at 12:50

## 2019-04-14 RX ADMIN — LOPERAMIDE HYDROCHLORIDE 2 MG: 2 CAPSULE ORAL at 05:02

## 2019-04-14 RX ADMIN — MELATONIN TAB 5 MG 5 MG: 5 TAB at 21:02

## 2019-04-14 RX ADMIN — ALBUTEROL SULFATE 2.5 MG: 2.5 SOLUTION RESPIRATORY (INHALATION) at 05:00

## 2019-04-14 RX ADMIN — BUSPIRONE HYDROCHLORIDE 15 MG: 10 TABLET ORAL at 05:02

## 2019-04-14 RX ADMIN — GUAIFENESIN 600 MG: 600 TABLET, EXTENDED RELEASE ORAL at 21:03

## 2019-04-14 RX ADMIN — SODIUM BICARBONATE 650 MG TABLET 1300 MG: at 21:03

## 2019-04-14 RX ADMIN — Medication 100 ML/HR: at 22:49

## 2019-04-14 RX ADMIN — Medication 250 MG: at 08:36

## 2019-04-14 RX ADMIN — WARFARIN SODIUM 2.5 MG: 2.5 TABLET ORAL at 17:24

## 2019-04-14 RX ADMIN — FLUTICASONE PROPIONATE 2 SPRAY: 50 SPRAY, METERED NASAL at 21:04

## 2019-04-14 RX ADMIN — DIPHENOXYLATE HYDROCHLORIDE AND ATROPINE SULFATE 2 TABLET: 2.5; .025 TABLET ORAL at 05:02

## 2019-04-14 RX ADMIN — SODIUM BICARBONATE 650 MG TABLET 1300 MG: at 13:28

## 2019-04-14 NOTE — PLAN OF CARE
Problem: Patient Care Overview  Goal: Plan of Care Review  Outcome: Ongoing (interventions implemented as appropriate)   04/14/19 5782   Coping/Psychosocial   Plan of Care Reviewed With patient;spouse   OTHER   Outcome Summary VSS. pt reports stool consistency improving. Lomotil/Immodium continue. Does continue to have fatigue and cough. IV antibiotic continues.   Plan of Care Review   Progress improving     Goal: Individualization and Mutuality  Outcome: Ongoing (interventions implemented as appropriate)    Goal: Interprofessional Rounds/Family Conf  Outcome: Ongoing (interventions implemented as appropriate)      Problem: Infection, Risk/Actual (Adult)  Goal: Infection Prevention/Resolution  Outcome: Ongoing (interventions implemented as appropriate)

## 2019-04-14 NOTE — PLAN OF CARE
Problem: Patient Care Overview  Goal: Plan of Care Review  Outcome: Ongoing (interventions implemented as appropriate)   04/14/19 6058   Coping/Psychosocial   Plan of Care Reviewed With patient;spouse   OTHER   Outcome Summary Pt able to ambulate 320' with 1 UE support on IV pole and supervision. Pt limited by fatigue. Continue per IPPT POC.   Plan of Care Review   Progress improving

## 2019-04-14 NOTE — PROGRESS NOTES
"Pharmacy Consult  -  Warfarin  Zackary Noonan II is a  63 y.o. male admitted for a viral illness.   Height - 167.6 cm (66\")  Weight - 76.2 kg (168 lb)    Consulting Provider: Hospitalist  Indication: History of DVT  Goal INR: 2-3  Home Regimen:   - Warfarin 5 mg daily    Bridge Therapy: No    Drug-Drug Interactions with current regimen:       Azithromycin - may increase risk of bleeding (discontinued 4/13)    Warfarin Dosing During Admission:    Date  4/9 4/10 4/11 4/12 4/13 4/14      INR  3.79 4.45 3.94 3.8 2.29 2.35      Dose  hold hold hold hold 2.5 mg (2.5 mg)        Education: Education provided verbally and in writing.  Discussed effects of warfarin, importance of checking INR, drug-drug and drug-food interactions, and signs/symptoms of bleeding and clotting.  Patient verbalized understanding through teach back.  All pertinent questions were answered.      Discharge Follow up:   Following Provider - Dr. Layne   Follow up time range or appointment - Patient gets INR tested twice weekly. He will schedule an appointment within 2-3 days after discharge.     Labs:    Results from last 7 days   Lab Units 04/14/19  0526 04/13/19  0500 04/12/19  0429 04/11/19  0634 04/11/19  0527 04/10/19  0559 04/09/19  1752 04/09/19  1226   INR  2.35* 2.29* 3.80* 3.94*  --  4.45* 4.04* 3.79*   HEMOGLOBIN g/dL 11.0* 11.1* 12.0*  --  12.7* 12.8*  --  15.4   HEMATOCRIT % 34.0* 34.2* 36.4*  --  38.1 39.1  --  44.8   PLATELETS 10*3/mm3 424 447 423  --  394 362  --  399     Results from last 7 days   Lab Units 04/14/19  0526 04/13/19  0500 04/12/19  0429  04/09/19  1226   SODIUM mmol/L 136 135* 132*   < > 134*   POTASSIUM mmol/L 3.9 3.5 4.0   < > 5.1   CHLORIDE mmol/L 110* 108* 102   < > 95*   CO2 mmol/L 13.0* 14.0* 18.0*   < > 23.0   BUN mg/dL 14 15 20   < > 9   CREATININE mg/dL 1.88* 2.21* 2.39*   < > 1.07   CALCIUM mg/dL 8.7 8.2* 8.4*   < > 9.2   BILIRUBIN mg/dL  --   --   --   --  0.7   ALK PHOS U/L  --   --   --   --  98   ALT (SGPT) " U/L  --   --   --   --  17   AST (SGOT) U/L  --   --   --   --  21   GLUCOSE mg/dL 82 99 83   < > 92    < > = values in this interval not displayed.     Current dietary intake: 100% of meals documented 4/13.    Diet Order   Procedures   • Diet Regular     Assessment/Plan:     Patient's INR is therapeutic at 2.35 today.  Will give warfarin 2.5 mg today.  Daily PT/INR ordered.  Monitor signs/symptoms of bleeding, dietary intake, and drug-drug interactions. Make dose adjustments as necessary.  Pharmacy will continue to follow.     Estela Ortega, PharmD  4/14/2019  8:18 AM

## 2019-04-14 NOTE — THERAPY TREATMENT NOTE
Acute Care - Physical Therapy Treatment Note  [facility]     Patient Name: Zackary Noonan II  : 1955  MRN: [id number]  Today's Date: 2019  Onset of Illness/Injury or Date of Surgery: 19  Date of Referral to PT: 04/10/19  Referring Physician: Leslee    Admit Date: 2019    Visit Dx:    ICD-10-CM ICD-9-CM   1. Acute respiratory failure with hypoxia (CMS/[facility]) J96.01 518.81   2. Pneumonia of right upper lobe due to infectious organism (CMS/[facility]) J18.1 486   3. Leukocytosis, unspecified type D72.829 288.60   4. Sepsis, due to unspecified organism (CMS/[facility]) A41.9 038.9     995.91   5. Impaired mobility and ADLs Z74.09 799.89   6. Impaired functional mobility, balance, gait, and endurance Z74.09 V49.89     Patient Active Problem List   Diagnosis   • Pneumonia   • History of DVT (deep vein thrombosis)   • HTN (hypertension)   • Diarrhea   • Crohn's colitis (CMS/[facility])   • COPD (chronic obstructive pulmonary disease) (CMS/[facility])   • Asthma   • Infection of wound due to methicillin resistant Staphylococcus aureus (MRSA)   • H/O Pulmonary nodule   • Prediabetes   • Chronic anemia   • S/P total abdominal colectomy   • HO of IVC filter   • Pneumonia of right lower lobe due to infectious organism (CMS/[facility])   • Hyponatremia   • History of C. difficile colitis s/p fecal transplant (2017)   • Sepsis (CMS/[facility])   • Left lower lobe pneumonia (CMS/[facility])   • Inflammatory bowel disease (Crohn's disease) (CMS/[facility])   • Asthma   • Leukocytosis   • Influenza, pneumonia   • SIRS (systemic inflammatory response syndrome) (CMS/[facility])   • Acute respiratory failure with hypoxia (CMS/[facility])   • RAMYA (acute kidney injury) (CMS/[facility])   • Pneumonia due to Streptococcus pneumoniae (CMS/[facility])   • History of creation of ostomy (CMS/[facility])       Therapy Treatment    Rehabilitation Treatment Summary     Row Name 19 1440             Treatment Time/Intention    Discipline  physical therapist  -ES      Document Type  therapy note (daily note)   -ES      Subjective Information  no complaints  -ES      Mode of Treatment  physical therapy  -ES      Patient/Family Observations  Wife present  -ES      Therapy Frequency (PT Clinical Impression)  daily  -ES      Patient Effort  good  -ES      Existing Precautions/Restrictions  fall;oxygen therapy device and L/min  -ES      Recorded by [ES] Viv Flores, PT 04/14/19 1548      Row Name 04/14/19 1440             Vital Signs    Pre Systolic BP Rehab  -- RN cleared for tx  -ES      Pre SpO2 (%)  95  -ES      O2 Delivery Pre Treatment  supplemental O2  -ES      Post SpO2 (%)  92  -ES      O2 Delivery Post Treatment  supplemental O2  -ES      Pre Patient Position  Supine  -ES      Intra Patient Position  Standing  -ES      Post Patient Position  Sitting  -ES      Recorded by [ES] Viv Flores, PT 04/14/19 1548      Row Name 04/14/19 1440             Cognitive Assessment/Intervention- PT/OT    Orientation Status (Cognition)  oriented x 4  -ES      Follows Commands (Cognition)  WFL  -ES      Safety Deficit (Cognitive)  mild deficit;insight into deficits/self awareness;awareness of need for assistance;safety precautions awareness;safety precautions follow-through/compliance  -ES      Personal Safety Interventions  fall prevention program maintained;gait belt;muscle strengthening facilitated;nonskid shoes/slippers when out of bed  -ES      Recorded by [ES] Viv Flores, PT 04/14/19 1548      Row Name 04/14/19 1440             Bed Mobility Assessment/Treatment    Bed Mobility Assessment/Treatment  supine-sit  -ES      Supine-Sit [place] (Bed Mobility)  independent  -ES      Recorded by [ES] Viv Flores, PT 04/14/19 1548      Row Name 04/14/19 1440             Transfer Assessment/Treatment    Transfer Assessment/Treatment  sit-stand transfer;stand-sit transfer  -ES      Recorded by [ES] Viv Flores, PT 04/14/19 1548      Row Name 04/14/19 1440             Bed-Chair Transfer    Bed-Chair [place]  (Transfers)  contact guard  -ES      Recorded by [ES] Viv Flores, PT 04/14/19 1548      Row Name 04/14/19 1440             Sit-Stand Transfer    Sit-Stand [place] (Transfers)  supervision  -ES      Recorded by [ES] Viv Flores, PT 04/14/19 1548      Row Name 04/14/19 1440             Stand-Sit Transfer    Stand-Sit [place] (Transfers)  supervision  -ES      Recorded by [ES] Viv Flores, PT 04/14/19 1548      Row Name 04/14/19 1440             Toilet Transfer    Type (Toilet Transfer)  sit-stand;stand-sit  -ES      [place] Level (Toilet Transfer)  supervision  -ES      Assistive Device (Toilet Transfer)  grab bars/safety frame  -ES      Recorded by [ES] Vvi Flores, PT 04/14/19 1548      Row Name 04/14/19 1440             Gait/Stairs Assessment/Training    39904 - Gait Training Minutes   15  -ES      Gait/Stairs Assessment/Training  gait/ambulation independence  -ES      [place] Level (Gait)  supervision;verbal cues  -ES      Assistive Device (Gait)  other (see comments) 1 UE support on IV pole  -ES      Distance in Feet (Gait)  320  -ES      Pattern (Gait)  step-through  -ES      Deviations/Abnormal Patterns (Gait)  tika decreased  -ES      Bilateral Gait Deviations  forward flexed posture  -ES      Comment (Gait/Stairs)  cues for upright posture  -ES      Recorded by [ES] Viv Flores, PT 04/14/19 1548      Row Name 04/14/19 1440             Therapeutic Exercise    39474 - PT Therapeutic Activity Minutes  14  -ES      Recorded by [ES] Viv Flores, PT 04/14/19 1548      Row Name 04/14/19 1440             Dynamic Sitting Balance    Level of [place], Reaches Outside Midline (Sitting, Dynamic Balance)  independent  -ES      Sitting Position, Reaches Outside Midline (Sitting, Dynamic Balance)  sitting on edge of bed  -ES      Recorded by [ES] Viv Flores, PT 04/14/19 1548      Row Name 04/14/19 1440             Static Standing Balance    Level of [place]  (Supported Standing, Static Balance)  contact guard assist  -ES      Recorded by [ES] Viv Flores, PT 04/14/19 1548      Row Name 04/14/19 1440             Dynamic Standing Balance    Level of [place], Reaches Outside Midline (Standing, Dynamic Balance)  contact guard assist  -ES      Recorded by [ES] Viv Flores, PT 04/14/19 1548      Row Name 04/14/19 1440             Positioning and Restraints    Pre-Treatment Position  in bed  -ES      Post Treatment Position  chair  -ES      In Chair  notified nsg;reclined;call light within reach;encouraged to call for assist;with family/caregiver;legs elevated RN ok'd no exit alarm with wife present  -ES      Recorded by [ES] Viv Flores, PT 04/14/19 1548      Row Name 04/14/19 1440             Pain Scale: Numbers Pre/Post-Treatment    Pain Scale: Numbers, Pretreatment  0/10 - no pain  -ES      Pain Scale: Numbers, Post-Treatment  0/10 - no pain  -ES      Recorded by [ES] Viv Flores, PT 04/14/19 1548        User Key  (r) = Recorded By, (t) = Taken By, (c) = Cosigned By    Initials Name Effective Dates Discipline    ES Viv Flores, PT 11/13/17 -  PT                   Physical Therapy Education     Title: PT OT SLP Therapies (In Progress)     Topic: Physical Therapy (Done)     Point: Mobility training (Done)     Learning Progress Summary           Patient Acceptance, E, VU by  at 4/14/2019  3:48 PM    Acceptance, E, NR,VU by SC at 4/12/2019  3:00 PM    Comment:  reviewed benefits of activity    Eager, E, VU,DU,NR by SC at 4/10/2019 10:00 AM    Comment:  reviewed  benefits of activity   Family Acceptance, E, VU by  at 4/14/2019  3:48 PM                   Point: Home exercise program (Done)     Learning Progress Summary           Patient Acceptance, E, NR,VU by SC at 4/12/2019  3:00 PM    Comment:  reviewed benefits of activity    Eager, E, VU,DU,NR by SC at 4/10/2019 10:00 AM    Comment:  reviewed  benefits of activity                   Point: Body  mechanics (Done)     Learning Progress Summary           Patient Acceptance, E, VU by  at 4/14/2019  3:48 PM    Acceptance, E, NR,VU by SC at 4/12/2019  3:00 PM    Comment:  reviewed benefits of activity    Eager, E, VU,DU,NR by SC at 4/10/2019 10:00 AM    Comment:  reviewed  benefits of activity   Family Acceptance, E, VU by  at 4/14/2019  3:48 PM                   Point: Precautions (Done)     Learning Progress Summary           Patient Acceptance, E, VU by  at 4/14/2019  3:48 PM    Acceptance, E, NR,VU by SC at 4/12/2019  3:00 PM    Comment:  reviewed benefits of activity    Eager, E, VU,DU,NR by SC at 4/10/2019 10:00 AM    Comment:  reviewed  benefits of activity   Family Acceptance, E, VU by  at 4/14/2019  3:48 PM                               User Key     Initials Effective Dates Name Provider Type Discipline    SC 06/19/15 -  Ankit Garcia, PT Physical Therapist PT     11/13/17 -  Viv Flores, PT Physical Therapist PT                PT Recommendation and Plan  Therapy Frequency (PT Clinical Impression): daily  Plan of Care Reviewed With: patient, spouse  Progress: improving  Outcome Summary: Pt able to ambulate 320' with 1 UE support on IV pole and supervision.  Pt limited by fatigue.  Continue per IPPT POC.  Outcome Measures     Row Name 04/14/19 1440 04/12/19 1500          How much help from another person do you currently need...    Turning from your back to your side while in flat bed without using bedrails?  4  -ES  4  -SC     Moving from lying on back to sitting on the side of a flat bed without bedrails?  4  -ES  4  -SC     Moving to and from a bed to a chair (including a wheelchair)?  3  -ES  3  -SC     Standing up from a chair using your arms (e.g., wheelchair, bedside chair)?  3  -ES  3  -SC     Climbing 3-5 steps with a railing?  3  -ES  3  -SC     To walk in hospital room?  3  -ES  3  -SC     AM-PAC 6 Clicks Score  20  -ES  20  -SC        Functional Assessment    Outcome Measure  Options  AM-PAC 6 Clicks Basic Mobility (PT)  -ES  AM-PAC 6 Clicks Basic Mobility (PT)  -SC       User Key  (r) = Recorded By, (t) = Taken By, (c) = Cosigned By    Initials Name Provider Type    Ankit Townsend, PT Physical Therapist    ES Viv Flores PT Physical Therapist         Time Calculation:   PT Charges     Row Name 04/14/19 1440             Time Calculation    Start Time  1440  -ES      PT Received On  04/14/19  -ES      PT Goal Re-Cert Due Date  04/20/19  -ES         Timed Charges    50280 - Gait Training Minutes   15  -ES      25135 - PT Therapeutic Activity Minutes  14  -ES        User Key  (r) = Recorded By, (t) = Taken By, (c) = Cosigned By    Initials Name Provider Type    Viv Gordon, PT Physical Therapist        Therapy Charges for Today     Code Description Service Date Service Provider Modifiers Qty    04466725105 HC GAIT TRAINING EA 15 MIN 4/14/2019 Viv Flores, PT GP 1    [id number] HC PT THERAPEUTIC ACT EA 15 MIN 4/14/2019 Viv Flores, PT GP 1          PT G-Codes  Outcome Measure Options: AM-PAC 6 Clicks Basic Mobility (PT)  AM-PAC 6 Clicks Score: 20  Score: 21    Viv Flores PT  4/14/2019

## 2019-04-14 NOTE — PROGRESS NOTES
[facility] Medicine Services  PROGRESS NOTE    Patient Name: Zackary Noonan II  : 1955  MRN: [id number]    Date of Admission: 2019  Length of Stay: 5  Primary Care Physician: Jillian Layne MD    Subjective   Subjective     CC: f/u PNA    HPI: He is currently resting comfortably in bed.  He says his breathing continues to improve but is still on nasal cannula oxygen.  He says his ostomy output is back to a controlled rate.  He notes good urine output.  No other new complaints.  He is hoping to get out of the hospital tomorrow.      Review of Systems  No current fevers or chills  Improving shortness of breath with occasional cough  No current nausea, vomiting, or diarrhea  No current chest pain or palpitations      Objective   Objective     Vital Signs:   Temp:  [98.1 °F (36.7 °C)-98.5 °F (36.9 °C)] 98.5 °F (36.9 °C)  Heart Rate:  [76-96] 90  Resp:  [16-18] 16  BP: (131-139)/(65-70) 135/65        Physical Exam:  Constitutional: No acute distress, awake, alert, looks better  HENT: NCAT, mucous membranes moist  Respiratory: Decreased rhonchi on the right, clear on the left, occasional cough, nonlabored breathing  Cardiovascular: RRR, no murmurs, rubs, or gallops, palpable pedal pulses bilaterally  Gastrointestinal: Positive bowel sounds, soft, nontender, nondistended, patent ostomy  Musculoskeletal: No bilateral ankle edema, BMI 27  Psychiatric: Appropriate affect, cooperative  Neurologic: No slurred speech or facial droop, follows commands  Skin: No rashes or jaundice    Results Reviewed:  I have personally reviewed current lab, radiology, and data and agree.    Results from last 7 days   Lab Units 19  0519  05019  0429   WBC 10*3/mm3 11.72* 12.99* 16.19*   HEMOGLOBIN g/dL 11.0* 11.1* 12.0*   HEMATOCRIT % 34.0* 34.2* 36.4*   PLATELETS 10*3/mm3 424 447 423   INR  2.35* 2.29* 3.80*     Results from last 7 days   Lab Units 19  0519  0500  04/12/19  0429  04/09/19  1226   SODIUM mmol/L 136 135* 132*   < > 134*   POTASSIUM mmol/L 3.9 3.5 4.0   < > 5.1   CHLORIDE mmol/L 110* 108* 102   < > 95*   CO2 mmol/L 13.0* 14.0* 18.0*   < > 23.0   BUN mg/dL 14 15 20   < > 9   CREATININE mg/dL 1.88* 2.21* 2.39*   < > 1.07   GLUCOSE mg/dL 82 99 83   < > 92   CALCIUM mg/dL 8.7 8.2* 8.4*   < > 9.2   ALT (SGPT) U/L  --   --   --   --  17   AST (SGOT) U/L  --   --   --   --  21    < > = values in this interval not displayed.     Estimated Creatinine Clearance: 42.2 mL/min (A) (by C-G formula based on SCr of 1.88 mg/dL (H)).    No results found for: BNP    Microbiology Results Abnormal     Procedure Component Value - Date/Time    Blood Culture - Blood, Arm, Right [[id number]] Collected:  04/09/19 1225    Lab Status:  Preliminary result Specimen:  Blood from Arm, Right Updated:  04/13/19 1300     Blood Culture No growth at 4 days    Blood Culture - Blood, Arm, Left [[id number]] Collected:  04/09/19 1215    Lab Status:  Preliminary result Specimen:  Blood from Arm, Left Updated:  04/13/19 1300     Blood Culture No growth at 4 days    Respiratory Culture - Sputum, Cough [[id number]] Collected:  04/10/19 0859    Lab Status:  Final result Specimen:  Sputum from Cough Updated:  04/12/19 0947     Respiratory Culture Light growth (2+) Normal Respiratory Skyler     Gram Stain Moderate (3+) WBCs per low power field      Few (2+) Epithelial cells per low power field      Moderate (3+) Budding yeast with pseudohyphae      Few (2+) Normal respiratory skyler    Eosinophil Smear - Urine, Urine, Clean Catch [[id number]]  (Normal) Collected:  04/11/19 1601    Lab Status:  Final result Specimen:  Urine, Clean Catch Updated:  04/11/19 1853     Eosinophil Smear 0 % EOS/100 Cells     Narrative:       No eosinophil seen    Clostridium Difficile Toxin - Stool, Per Rectum [[id number]] Collected:  04/10/19 1629    Lab Status:  Final result Specimen:  Stool from Per Rectum Updated:  04/10/19 5196     Narrative:       The following orders were created for panel order Clostridium Difficile Toxin - Stool, Per Rectum.  Procedure                               Abnormality         Status                     ---------                               -----------         ------                     Clostridium Difficile To...[[id number]]  Normal              Final result                 Please view results for these tests on the individual orders.    Clostridium Difficile Toxin, PCR - Stool, Per Rectum [[id number]]  (Normal) Collected:  04/10/19 1629    Lab Status:  Final result Specimen:  Stool from Per Rectum Updated:  04/10/19 1721     C. Difficile Toxins by PCR Not Detected    Narrative:       Performance characteristics of test not established for patients <2 years of age.  Negative for Toxigenic C. Difficile    S. Pneumo Ag Urine or CSF - Urine, Urine, Clean Catch [[id number]]  (Abnormal) Collected:  04/09/19 2333    Lab Status:  Final result Specimen:  Urine, Clean Catch Updated:  04/10/19 0912     Strep Pneumo Ag Positive    Legionella Antigen, Urine - Urine, Urine, Clean Catch [[id number]]  (Normal) Collected:  04/09/19 2333    Lab Status:  Final result Specimen:  Urine, Clean Catch Updated:  04/10/19 0656     LEGIONELLA ANTIGEN, URINE Negative             I have reviewed the medications:    Current Facility-Administered Medications:   •  acetaminophen (TYLENOL) tablet 650 mg, 650 mg, Oral, Q4H PRN, Leslee Anny M II, DO, 650 mg at 04/13/19 1433  •  albuterol (PROVENTIL) nebulizer solution 0.083% 2.5 mg/3mL, 2.5 mg, Nebulization, Q6H PRN, Anny Camilo M II, DO, 2.5 mg at 04/14/19 0500  •  benzonatate (TESSALON) capsule 100 mg, 100 mg, Oral, TID PRN, Pilar Head, APRN, 100 mg at 04/14/19 0502  •  budesonide (PULMICORT) nebulizer solution 0.5 mg, 0.5 mg, Nebulization, BID - RT, Anny Camilo M II, DO, 0.5 mg at 04/13/19 2030  •  busPIRone (BUSPAR) tablet 15 mg, 15 mg, Oral, Q8H, Inderjit Camilocy M II, DO, 15 mg at  04/14/19 0502  •  cefTRIAXone (ROCEPHIN) IVPB 1 g, 1 g, Intravenous, Q24H, Leslee, Anny M II, DO, Last Rate: 100 mL/hr at 04/13/19 0959, 1 g at 04/13/19 0959  •  cetirizine (zyrTEC) tablet 10 mg, 10 mg, Oral, Daily, Leslee, Anny M II, DO, 10 mg at 04/13/19 0827  •  diphenoxylate-atropine (LOMOTIL) 2.5-0.025 MG per tablet 2 tablet, 2 tablet, Oral, Q2H PRN, Leslee, Anny M II, DO, 2 tablet at 04/14/19 0502  •  escitalopram (LEXAPRO) tablet 10 mg, 10 mg, Oral, Daily, Leslee, Anny M II, DO, 10 mg at 04/13/19 0827  •  fluticasone (FLONASE) 50 MCG/ACT nasal spray 2 spray, 2 spray, Nasal, BID, Leslee, Anny M II, DO, 2 spray at 04/13/19 2047  •  guaiFENesin (MUCINEX) 12 hr tablet 600 mg, 600 mg, Oral, Q12H, Leslee, Anny M II, DO, 600 mg at 04/13/19 2046  •  loperamide (IMODIUM) capsule 2 mg, 2 mg, Oral, 4x Daily PRN, Elizabeth Parikh, APRN, 2 mg at 04/14/19 0502  •  Magnesium Sulfate 2 gram Bolus, followed by 8 gram infusion (total Mg dose 10 grams)- Mg less than or equal to 1mg/dL, 2 g, Intravenous, PRN **OR** Magnesium Sulfate 2 gram / 50mL Infusion (GIVE X 3 BAGS TO EQUAL 6GM TOTAL DOSE) - Mg 1.1 - 1.5 mg/dl, 2 g, Intravenous, PRN **OR** Magnesium Sulfate 4 gram infusion- Mg 1.6-1.9 mg/dL, 4 g, Intravenous, PRN, Leslee, Anny M II, DO  •  melatonin tablet 5 mg, 5 mg, Oral, Nightly PRN, Leslee, Anny M II, DO  •  montelukast (SINGULAIR) tablet 10 mg, 10 mg, Oral, Nightly, Anny Camilo II, DO, 10 mg at 04/13/19 2046  •  ondansetron (ZOFRAN) tablet 4 mg, 4 mg, Oral, Q6H PRN, 4 mg at 04/10/19 2231 **OR** ondansetron (ZOFRAN) injection 4 mg, 4 mg, Intravenous, Q6H PRN, Anny Camilo II, DO, 4 mg at 04/10/19 0614  •  Pharmacy to dose warfarin, , Does not apply, Continuous PRN, Anny Camilo II, DO  •  potassium chloride (MICRO-K) CR capsule 40 mEq, 40 mEq, Oral, PRN **OR** potassium chloride (KLOR-CON) packet 40 mEq, 40 mEq, Oral, PRN **OR** potassium chloride 10 mEq in 100 mL IVPB, 10 mEq, Intravenous,  Q1H PRN, Leslee, Anny M II, DO  •  saccharomyces boulardii (FLORASTOR) capsule 250 mg, 250 mg, Oral, BID, Leslee, Anny M II, DO, 250 mg at 04/13/19 2046  •  sennosides-docusate sodium (SENOKOT-S) 8.6-50 MG tablet 2 tablet, 2 tablet, Oral, BID PRN, Leslee, Anny M II, DO  •  sodium chloride 0.9 % flush 3 mL, 3 mL, Intravenous, Q12H, Leslee, Anny M II, DO, 3 mL at 04/13/19 2047  •  sodium chloride 0.9 % flush 3-10 mL, 3-10 mL, Intravenous, PRN, Leslee, Anny M II, DO  •  sodium chloride 0.9 % infusion, 100 mL/hr, Intravenous, Continuous, Sim Martell MD, Last Rate: 100 mL/hr at 04/13/19 1423, 100 mL/hr at 04/13/19 1423  •  warfarin (COUMADIN) tablet 2.5 mg, 2.5 mg, Oral, Daily, Estela Ortega, [facility], 2.5 mg at 04/13/19 1817      Assessment/Plan   Assessment / Plan     Active Hospital Problems    Diagnosis POA   • **Acute respiratory failure with hypoxia (CMS/[facility]) [J96.01] Yes   • Pneumonia due to Streptococcus pneumoniae (CMS/[facility]) [J13] Yes   • History of creation of ostomy (CMS/[facility]) [Z93.9] Not Applicable   • RAMYA (acute kidney injury) (CMS/[facility]) [N17.9] Yes   • Sepsis (CMS/[facility]) [A41.9] Yes   • Inflammatory bowel disease (Crohn's disease) (CMS/[facility]) [K50.90] Yes   • 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) [Z86.19] Not Applicable   • Hyponatremia [E87.1] Yes   • S/P total abdominal colectomy [Z90.49] Not Applicable   • Pneumonia [J18.9] Yes   • History of DVT (deep vein thrombosis) [Z86.718] Not Applicable     LLE; x 3; chronic coumadin therapy     • Diarrhea [R19.7] Yes   • COPD (chronic obstructive pulmonary disease) (CMS/[facility]) [J44.9] Yes          Brief Hospital Course to date:  Zackary Noonan II is a 62 y/o male presenting from home with with fatigue and SOA due to post Streptococcus pneumonia following influenza infection     A/P:     Acute hypoxic respiratory failure due to pneumonia  Sepsis due to post influenza streptococcal pneumoniae of the right upper and middle lobe  --All  other cultures negative.  IV rocephin and completed azithro for strep coverage.  --Has hx of bronchiectasis and was previously followed by Dr. Watkins at [facility]. Seems to be improving so will hold on involving pulmonary.  --Continue home nebs/inhalers.      Acute kidney injury  -Possible prerenal azotemia versus ATN.  Avoid nephrotoxic medications, monitor urine output and electrolytes.  --Strict I's and O's.   --Nephrology consult team following     Hyponatremia, worse  --Monitor, improved with IV fluids, asymptomatic     Diarrhea  --C diff pcr negative. Resume imodium and lomotil.     History of DVT on chronic anticoagulation with warfarin  Supratherapeutic INR  --On coumadin. Dosing per pharmacy.  monitor INR daily    INR remains therapeutic.  Gradually improving.  Continue to wean oxygen.  Continue ceftriaxone.  Completed azithromycin.  Possibly home tomorrow if weaned completely off oxygen.       DVT prophylaxis: Coumadin     Disposition: I expect the patient to be discharged home     CODE STATUS:   Code Status and Medical Interventions:   Ordered at: 04/09/19 1422     Code Status:    CPR     Medical Interventions (Level of Support Prior to Arrest):    Full         Electronically signed by Tyrell Alcala MD, 04/14/19, 7:48 AM.

## 2019-04-14 NOTE — PLAN OF CARE
Problem: Patient Care Overview  Goal: Plan of Care Review  Outcome: Ongoing (interventions implemented as appropriate)   04/14/19 1981   Coping/Psychosocial   Plan of Care Reviewed With patient   OTHER   Outcome Summary Completed gooming task with I, sitting, room air with O2 SATs >90%, no LOB, no c/o fatigue.    Plan of Care Review   Progress improving

## 2019-04-14 NOTE — PLAN OF CARE
Problem: Patient Care Overview  Goal: Plan of Care Review  Outcome: Ongoing (interventions implemented as appropriate)   04/14/19 9145   Coping/Psychosocial   Plan of Care Reviewed With patient   OTHER   Outcome Summary Pt has had an uneventful shift. Pt has rested comfortably during shift. NS remains @ 100, and creat is noted to be trending down. Pt has ambulated to restroom during shift, and has done well. Pt VSS throughout shift, and continues to void well. Pt given doses of lomotil and imodium during shift, per pt req. Plan for possible d/c today or tomorrow. Will continue to monitor.    Plan of Care Review   Progress improving       Problem: Infection, Risk/Actual (Adult)  Goal: Identify Related Risk Factors and Signs and Symptoms  Outcome: Ongoing (interventions implemented as appropriate)    Goal: Infection Prevention/Resolution  Outcome: Ongoing (interventions implemented as appropriate)

## 2019-04-14 NOTE — THERAPY TREATMENT NOTE
Acute Care - Occupational Therapy Treatment Note  [facility]     Patient Name: Zackary Noonan II  : 1955  MRN: [id number]  Today's Date: 2019  Onset of Illness/Injury or Date of Surgery: 19  Date of Referral to OT: 19  Referring Physician: Leslee    Admit Date: 2019       ICD-10-CM ICD-9-CM   1. Acute respiratory failure with hypoxia (CMS/[facility]) J96.01 518.81   2. Pneumonia of right upper lobe due to infectious organism (CMS/[facility]) J18.1 486   3. Leukocytosis, unspecified type D72.829 288.60   4. Sepsis, due to unspecified organism (CMS/[facility]) A41.9 038.9     995.91   5. Impaired mobility and ADLs Z74.09 799.89   6. Impaired functional mobility, balance, gait, and endurance Z74.09 V49.89     Patient Active Problem List   Diagnosis   • Pneumonia   • History of DVT (deep vein thrombosis)   • HTN (hypertension)   • Diarrhea   • Crohn's colitis (CMS/[facility])   • COPD (chronic obstructive pulmonary disease) (CMS/[facility])   • Asthma   • Infection of wound due to methicillin resistant Staphylococcus aureus (MRSA)   • H/O Pulmonary nodule   • Prediabetes   • Chronic anemia   • S/P total abdominal colectomy   • HO of IVC filter   • Pneumonia of right lower lobe due to infectious organism (CMS/[facility])   • Hyponatremia   • History of C. difficile colitis s/p fecal transplant (2017)   • Sepsis (CMS/[facility])   • Left lower lobe pneumonia (CMS/[facility])   • Inflammatory bowel disease (Crohn's disease) (CMS/[facility])   • Asthma   • Leukocytosis   • Influenza, pneumonia   • SIRS (systemic inflammatory response syndrome) (CMS/[facility])   • Acute respiratory failure with hypoxia (CMS/[facility])   • RAMYA (acute kidney injury) (CMS/[facility])   • Pneumonia due to Streptococcus pneumoniae (CMS/[facility])   • History of creation of ostomy (CMS/[facility])     Past Medical History:   Diagnosis Date   • Anxiety    • Arthritis    • Asthma    • Clostridium difficile infection 2017    treated per dr carter with fecal transplant     • H/O recurrent pneumonia     • Hypertension    • MRSA infection 2016    right lower extremity wound   • Pulmonary nodule     Followed by Dr. Galaviz    • Recurrent deep vein thrombosis (DVT) (CMS/HCC)     x 3 LLE; chronic anticoagulation therapy    • Ulcerative colitis (CMS/HCC)    • Wears glasses    • Wears hearing aid      Past Surgical History:   Procedure Laterality Date   • BRONCHOSCOPY      by Dr. Galaviz for pulmonary nodule   • BRONCHOSCOPY N/A 11/7/2016    Procedure: BRONCHOSCOPY;  Surgeon: Eben Roberts MD;  Location:  [facility];  Service:    • COLON RESECTION N/A 9/19/2017    Procedure: TOTAL ABDOMINAL COLECTOMY WITH CREATION OF ILEOSTOMY;  Surgeon: David Nielsen MD;  Location:  [facility];  Service:    • COLONOSCOPY  08/2017   • UMBILICAL HERNIA REPAIR     • VASECTOMY     • VASECTOMY REVERSAL         Therapy Treatment    Rehabilitation Treatment Summary     Row Name 04/14/19 1526 04/14/19 1440          Treatment Time/Intention    Discipline  occupational therapist  -AN  physical therapist  -ES     Document Type  therapy note (daily note)  -AN  therapy note (daily note)  -ES     Subjective Information  no complaints  -AN  no complaints  -ES     Mode of Treatment  occupational therapy  -AN  physical therapy  -ES     Patient/Family Observations  pt reclined in chair, O2NC, IV intact; spouse prsent  -AN  Wife present  -ES     Care Plan Review  care plan/treatment goals reviewed;risks/benefits reviewed  -AN  --     Therapy Frequency (PT Clinical Impression)  --  daily  -ES     Patient Effort  good  -AN  good  -ES     Comment  pt reports he just walked with PT  -AN  --     Existing Precautions/Restrictions  --  fall;oxygen therapy device and L/min  -ES     Treatment Considerations/Comments  pt requested to shave  -AN  --     Recorded by [AN] Mala Fuller, OT 04/14/19 1602 [ES] Viv Flores, PT 04/14/19 1548     Row Name 04/14/19 1526 04/14/19 1440          Vital Signs    Pre Systolic BP Rehab  --  -- RN cleared for tx  -ES      Pre SpO2 (%)  96  -AN  95  -ES     O2 Delivery Pre Treatment  supplemental O2  -AN  supplemental O2  -ES     Post SpO2 (%)  94  -AN  92  -ES     O2 Delivery Post Treatment  room air RN agreeable to remain on O2 end of session  -AN  supplemental O2  -ES     Pre Patient Position  --  Supine  -ES     Intra Patient Position  --  Standing  -ES     Post Patient Position  --  Sitting  -ES     Recorded by [AN] Mala Fuller, OT 04/14/19 1602 [ES] Viv Flores, PT 04/14/19 1548     Row Name 04/14/19 1526 04/14/19 1440          Cognitive Assessment/Intervention- PT/OT    Affect/Mental Status (Cognitive)  WFL  -AN  --     Orientation Status (Cognition)  oriented x 4  -AN  oriented x 4  -ES     Follows Commands (Cognition)  WFL  -AN  WFL  -ES     Safety Deficit (Cognitive)  --  mild deficit;insight into deficits/self awareness;awareness of need for assistance;safety precautions awareness;safety precautions follow-through/compliance  -ES     Personal Safety Interventions  fall prevention program maintained  -AN  fall prevention program maintained;gait belt;muscle strengthening facilitated;nonskid shoes/slippers when out of bed  -ES     Recorded by [AN] Mala Fuller, OT 04/14/19 1602 [ES] Viv Flores, PT 04/14/19 1548     Row Name 04/14/19 1526 04/14/19 1440          Bed Mobility Assessment/Treatment    Bed Mobility Assessment/Treatment  --  supine-sit  -ES     Supine-Sit [place] (Bed Mobility)  --  independent  -ES     Comment (Bed Mobility)  up in chair  -AN  --     Recorded by [AN] Mala Fuller, OT 04/14/19 1602 [ES] Viv Flores, PT 04/14/19 1548     Row Name 04/14/19 1526             Functional Mobility    Functional Mobility- Ind. Level  contact guard assist  -AN      Functional Mobility-Distance (Feet)  25  -AN      Recorded by [AN] Mala Fuller, OT 04/14/19 1602      Row Name 04/14/19 1440             Transfer Assessment/Treatment    Transfer Assessment/Treatment  sit-stand transfer;stand-sit  transfer  -ES      Recorded by [ES] Viv Flores, PT 04/14/19 1548      Row Name 04/14/19 1440             Bed-Chair Transfer    Bed-Chair [place] (Transfers)  contact guard  -ES      Recorded by [ES] Viv Flores, PT 04/14/19 1548      Row Name 04/14/19 1526 04/14/19 1440          Sit-Stand Transfer    Sit-Stand [place] (Transfers)  supervision;verbal cues  -AN  supervision  -ES     Recorded by [AN] Mala Fuller, OT 04/14/19 1602 [ES] Viv Flores, PT 04/14/19 1548     Row Name 04/14/19 1526 04/14/19 1440          Stand-Sit Transfer    Stand-Sit [place] (Transfers)  supervision;verbal cues  -AN  supervision  -ES     Recorded by [AN] Mala Fuller, OT 04/14/19 1602 [ES] Viv Flores, PT 04/14/19 1548     Row Name 04/14/19 1440             Toilet Transfer    Type (Toilet Transfer)  sit-stand;stand-sit  -ES      [place] Level (Toilet Transfer)  supervision  -ES      Assistive Device (Toilet Transfer)  grab bars/safety frame  -ES      Recorded by [ES] Viv Flores, PT 04/14/19 1548      Row Name 04/14/19 1440             Gait/Stairs Assessment/Training    72021 - Gait Training Minutes   15  -ES      Gait/Stairs Assessment/Training  gait/ambulation independence  -ES      [place] Level (Gait)  supervision;verbal cues  -ES      Assistive Device (Gait)  other (see comments) 1 UE support on IV pole  -ES      Distance in Feet (Gait)  320  -ES      Pattern (Gait)  step-through  -ES      Deviations/Abnormal Patterns (Gait)  tika decreased  -ES      Bilateral Gait Deviations  forward flexed posture  -ES      Comment (Gait/Stairs)  cues for upright posture  -ES      Recorded by [ES] Viv Flores, PT 04/14/19 1548      Row Name 04/14/19 1526             ADL Assessment/Intervention    BADL Assessment/Intervention  grooming  -AN      Recorded by [AN] Mala Fuller, OT 04/14/19 1602      Row Name 04/14/19 1526             Grooming Assessment/Training    [place] Level (Grooming)   shave face;set up;supervision  -AN      Grooming Position  unsupported sitting  -AN      Recorded by [AN] Mala Fuller, OT 04/14/19 1602      Row Name 04/14/19 1526 04/14/19 1440          Therapeutic Exercise    60226 - PT Therapeutic Activity Minutes  --  14  -ES     20669 - OT Therapeutic Activity Minutes  15  -AN  --     Recorded by [AN] Mala Fuller, OT 04/14/19 1602 [ES] Viv Flores, PT 04/14/19 1548     Row Name 04/14/19 1526 04/14/19 1440          Dynamic Sitting Balance    Level of [place], Reaches Outside Midline (Sitting, Dynamic Balance)  independent  -AN  independent  -ES     Sitting Position, Reaches Outside Midline (Sitting, Dynamic Balance)  --  sitting on edge of bed  -ES     Recorded by [AN] Mala Fuller, OT 04/14/19 1602 [ES] Viv Flores, PT 04/14/19 1548     Row Name 04/14/19 1526 04/14/19 1440          Static Standing Balance    Level of [place] (Supported Standing, Static Balance)  supervision  -AN  contact guard assist  -ES     Recorded by [AN] Mala Fuller, OT 04/14/19 1602 [ES] Viv Flores, PT 04/14/19 1548     Row Name 04/14/19 1526 04/14/19 1440          Dynamic Standing Balance    Level of [place], Reaches Outside Midline (Standing, Dynamic Balance)  contact guard assist  -AN  contact guard assist  -ES     Comment, Reaches Outside Midline (Standing, Dynamic Balance)  turns, functional reachiing  -AN  --     Recorded by [AN] Mala Fuller, OT 04/14/19 1602 [ES] Viv Flores, PT 04/14/19 1548     Row Name 04/14/19 1526 04/14/19 1440          Positioning and Restraints    Pre-Treatment Position  sitting in chair/recliner  -AN  in bed  -ES     Post Treatment Position  chair  -AN  chair  -ES     In Chair  reclined;call light within reach;encouraged to call for assist;with family/caregiver  -AN  notified nsg;reclined;call light within reach;encouraged to call for assist;with family/caregiver;legs elevated RN ok'd no exit alarm with wife present  -ES      Recorded by [AN] Mala Fuller, OT 04/14/19 1602 [ES] Viv Flores, PT 04/14/19 1548     Row Name 04/14/19 1526 04/14/19 1440          Pain Scale: Numbers Pre/Post-Treatment    Pain Scale: Numbers, Pretreatment  0/10 - no pain  -AN  0/10 - no pain  -ES     Pain Scale: Numbers, Post-Treatment  0/10 - no pain  -AN  0/10 - no pain  -ES     Recorded by [AN] Mala Fuller, OT 04/14/19 1602 [ES] Viv Flores, PT 04/14/19 1548     Row Name 04/14/19 1526             Plan of Care Review    Plan of Care Reviewed With  patient  -AN      Recorded by [AN] Mala Fuller, OT 04/14/19 1602      Row Name 04/14/19 1526             Outcome Summary/Treatment Plan (OT)    Anticipated Discharge Disposition (OT)  home with home health;home with assist  -AN      Recorded by [AN] Mala Fuller, OT 04/14/19 1602        User Key  (r) = Recorded By, (t) = Taken By, (c) = Cosigned By    Initials Name Effective Dates Discipline    AN Mala Fuller, OT 06/22/15 -  OT    ES Viv Flores, PT 11/13/17 -  PT             Occupational Therapy Education     Title: PT OT SLP Therapies (In Progress)     Topic: Occupational Therapy (In Progress)     Point: ADL training (Done)     Description: Instruct learner(s) on proper safety adaptation and remediation techniques during self care or transfers.   Instruct in proper use of assistive devices.    Learning Progress Summary           Patient Acceptance, E, ANIYA FAM by CAMILLE at 4/14/2019  1:52 AM    Comment:  ADl retraining.                   Point: Precautions (Done)     Description: Instruct learner(s) on prescribed precautions during self-care and functional transfers.    Learning Progress Summary           Patient Acceptance, E, VU by SOBEIDA at 4/10/2019  9:18 AM    Comment:  Body mechanics with fxl transfers; reinforced need for call for assist with OOB activities.                   Point: Body mechanics (Done)     Description: Instruct learner(s) on proper positioning and spine alignment during  self-care, functional mobility activities and/or exercises.    Learning Progress Summary           Patient Acceptance, E, VU by SOBEIDA at 4/10/2019  9:18 AM    Comment:  Body mechanics with fxl transfers; reinforced need for call for assist with OOB activities.                               User Key     Initials Effective Dates Name Provider Type Discipline    AN 06/22/15 -  Mala Fuller, OT Occupational Therapist OT    SOBEIDA 03/14/16 -  Jatin Wen, OT Occupational Therapist OT                OT Recommendation and Plan  Outcome Summary/Treatment Plan (OT)  Anticipated Discharge Disposition (OT): home with home health, home with assist  Plan of Care Review  Plan of Care Reviewed With: patient  Plan of Care Reviewed With: patient  Outcome Summary: Completed gooming task with I, sitting, room air with O2 SATs >90%, no LOB, no c/o fatigue.   Outcome Measures     Row Name 04/14/19 1526 04/14/19 1440 04/12/19 1500       How much help from another person do you currently need...    Turning from your back to your side while in flat bed without using bedrails?  --  4  -ES  4  -SC    Moving from lying on back to sitting on the side of a flat bed without bedrails?  --  4  -ES  4  -SC    Moving to and from a bed to a chair (including a wheelchair)?  --  3  -ES  3  -SC    Standing up from a chair using your arms (e.g., wheelchair, bedside chair)?  --  3  -ES  3  -SC    Climbing 3-5 steps with a railing?  --  3  -ES  3  -SC    To walk in hospital room?  --  3  -ES  3  -SC    AM-PAC 6 Clicks Score  --  20  -ES  20  -SC       How much help from another is currently needed...    Putting on and taking off regular lower body clothing?  3  -AN  --  --    Bathing (including washing, rinsing, and drying)  3  -AN  --  --    Toileting (which includes using toilet bed pan or urinal)  3  -AN  --  --    Putting on and taking off regular upper body clothing  4  -AN  --  --    Taking care of personal grooming (such as brushing teeth)  4   -AN  --  --    Eating meals  4  -AN  --  --    Score  21  -AN  --  --       Functional Assessment    Outcome Measure Options  --  AM-PAC 6 Clicks Basic Mobility (PT)  -ES  AM-PAC 6 Clicks Basic Mobility (PT)  -SC      User Key  (r) = Recorded By, (t) = Taken By, (c) = Cosigned By    Initials Name Provider Type    SC Ankit Garcia, PT Physical Therapist    Mala Borjas, OT Occupational Therapist    Viv Gordon, PT Physical Therapist           Time Calculation:   Time Calculation- OT     Row Name 04/14/19 1603 04/14/19 1526 04/14/19 1440       Time Calculation- OT    OT Start Time  1526  -AN  --  --    Total Timed Code Minutes- OT  15 minute(s)  -AN  --  --    OT Received On  04/14/19  -AN  --  --    OT Goal Re-Cert Due Date  04/20/19  -AN  --  --       Timed Charges    77799 - Gait Training Minutes   --  --  15  -ES    10791 - OT Therapeutic Activity Minutes  --  15  -AN  --      User Key  (r) = Recorded By, (t) = Taken By, (c) = Cosigned By    Initials Name Provider Type    Mala Borjas, OT Occupational Therapist    Viv Gordon, PT Physical Therapist        Therapy Charges for Today     Code Description Service Date Service Provider Modifiers Qty    [id number]  OT THERAPEUTIC ACT EA 15 MIN 4/14/2019 Mala Fuller OT GO 1               Mala Fuller OT  4/14/2019

## 2019-04-14 NOTE — PROGRESS NOTES
"   LOS: 5 days    Patient Care Team:  Jillian Layne MD as PCP - General (Family Medicine)    Chief Complaint:   History of present illness:  This is 63 year old carlos with past medical hx of Htn, C diff infection and Crohn's disease s/p ostomy presents with generalized weakness and fatigue. He was admitted with working diagnosis of PNA. Started on antibiotics. At presentation Scr was 1.0 which has been trending up to 2.48 and Sodium is found to be 129 worsening today. Nephrology service has been consulted for further evalaution and treatment.      Subjective   Renal function and sodium improving.  No new events.  No obvious distress.    Review of Systems:   High ostomy output.  Complains of stomach upset, patient denies shortness of breath, chest pain, dysuria, hematuria, nausea, vomiting.      Objective     Vital Sign Min/Max for last 24 hours  Temp  Min: 98.1 °F (36.7 °C)  Max: 98.5 °F (36.9 °C)   BP  Min: 131/70  Max: 147/68   Pulse  Min: 76  Max: 96   Resp  Min: 16  Max: 18   SpO2  Min: 93 %  Max: 97 %   No Data Recorded   No Data Recorded     Flowsheet Rows      First Filed Value   Admission Height  167.6 cm (66\") Documented at 04/09/2019 1158   Admission Weight  76.2 kg (168 lb) Documented at 04/09/2019 1158          I/O this shift:  In: 50 [IV Piggyback:50]  Out: -   I/O last 3 completed shifts:  In: 960 [P.O.:960]  Out: 2550 [Urine:1950; Stool:600]    Physical Exam:  Exam unchanged  General Appearance: Alert, oriented x3 no obvious distress laying comfortable in bed.  Eyes: PER, EOMI.  Neck: Supple no JVD.  Lungs: Clear auscultation, no rales rhonchi's, equal chest movement, nonlabored.  Heart: No gallop, murmur, rub, RRR.  Abdomen: Soft, nontender, positive bowel sounds, no organomegaly.  Ostomy noted  Extremities: No edema, no cyanosis.  Neuro: No focal deficit, moving all extremities, alert oriented X 3      WBC WBC   Date Value Ref Range Status   04/14/2019 11.72 (H) 3.40 - 10.80 10*3/mm3 Final "   04/13/2019 12.99 (H) 3.40 - 10.80 10*3/mm3 Final   04/12/2019 16.19 (H) 3.40 - 10.80 10*3/mm3 Final      HGB Hemoglobin   Date Value Ref Range Status   04/14/2019 11.0 (L) 13.0 - 17.7 g/dL Final   04/13/2019 11.1 (L) 13.0 - 17.7 g/dL Final   04/12/2019 12.0 (L) 13.0 - 17.7 g/dL Final      HCT Hematocrit   Date Value Ref Range Status   04/14/2019 34.0 (L) 37.5 - 51.0 % Final   04/13/2019 34.2 (L) 37.5 - 51.0 % Final   04/12/2019 36.4 (L) 37.5 - 51.0 % Final      Platlets No results found for: LABPLAT   MCV MCV   Date Value Ref Range Status   04/14/2019 90.7 79.0 - 97.0 fL Final   04/13/2019 90.5 79.0 - 97.0 fL Final   04/12/2019 89.2 79.0 - 97.0 fL Final          Sodium Sodium   Date Value Ref Range Status   04/14/2019 136 136 - 145 mmol/L Final   04/13/2019 135 (L) 136 - 145 mmol/L Final   04/12/2019 132 (L) 136 - 145 mmol/L Final      Potassium Potassium   Date Value Ref Range Status   04/14/2019 3.9 3.5 - 5.2 mmol/L Final   04/13/2019 3.5 3.5 - 5.2 mmol/L Final   04/12/2019 4.0 3.5 - 5.2 mmol/L Final      Chloride Chloride   Date Value Ref Range Status   04/14/2019 110 (H) 98 - 107 mmol/L Final   04/13/2019 108 (H) 98 - 107 mmol/L Final   04/12/2019 102 98 - 107 mmol/L Final      CO2 CO2   Date Value Ref Range Status   04/14/2019 13.0 (L) 22.0 - 29.0 mmol/L Final   04/13/2019 14.0 (L) 22.0 - 29.0 mmol/L Final   04/12/2019 18.0 (L) 22.0 - 29.0 mmol/L Final      BUN BUN   Date Value Ref Range Status   04/14/2019 14 8 - 23 mg/dL Final   04/13/2019 15 8 - 23 mg/dL Final   04/12/2019 20 8 - 23 mg/dL Final      Creatinine Creatinine   Date Value Ref Range Status   04/14/2019 1.88 (H) 0.76 - 1.27 mg/dL Final   04/13/2019 2.21 (H) 0.76 - 1.27 mg/dL Final   04/12/2019 2.39 (H) 0.76 - 1.27 mg/dL Final      Calcium Calcium   Date Value Ref Range Status   04/14/2019 8.7 8.6 - 10.5 mg/dL Final   04/13/2019 8.2 (L) 8.6 - 10.5 mg/dL Final   04/12/2019 8.4 (L) 8.6 - 10.5 mg/dL Final      PO4 No results found for: CAPO4    Albumin No results found for: ALBUMIN   Magnesium No results found for: MG   Uric Acid Uric Acid   Date Value Ref Range Status   04/11/2019 4.2 3.4 - 7.0 mg/dL Final     Comment:     Falsely depressed results may occur on samples drawn from patients receiving N-Acetylcysteine (NAC) or Metamizole.           Results Review:     I reviewed the patient's new clinical results.      budesonide 0.5 mg Nebulization BID - RT   busPIRone 15 mg Oral Q8H   ceftriaxone 1 g Intravenous Q24H   cetirizine 10 mg Oral Daily   escitalopram 10 mg Oral Daily   fluticasone 2 spray Nasal BID   guaiFENesin 600 mg Oral Q12H   montelukast 10 mg Oral Nightly   saccharomyces boulardii 250 mg Oral BID   sodium chloride 3 mL Intravenous Q12H   warfarin 2.5 mg Oral Daily       Pharmacy to dose warfarin     sodium chloride 100 mL/hr Last Rate: 100 mL/hr (04/14/19 0890)       Medication Review:      Assessment/Plan       1- RAMYA - non olgiuric  - Pre-renal azotemia -high output from ostomy  and  intravascular volume depletion No hydro on CT scan. Small Left renal simple cyst.   2- Hyponatremia improving with IV fluids.  3.  Metabolic acidosis.  Plan:  -Change IV fluid to sodium bicarb 100 mL/h.  Add sodium bicarb tablets.  Patient oral intake improving and ostomy output decreasing monitor closely  - Avoid nephrotoxic agents.   - Renal diet.   - Adjust meds per renal function   - Monitor I/O   Case discussed with the patient in detail.           Sim Martell MD  04/14/19  11:19 AM

## 2019-04-15 VITALS
SYSTOLIC BLOOD PRESSURE: 151 MMHG | OXYGEN SATURATION: 96 % | HEART RATE: 74 BPM | TEMPERATURE: 98.4 F | RESPIRATION RATE: 18 BRPM | DIASTOLIC BLOOD PRESSURE: 69 MMHG | BODY MASS INDEX: 27 KG/M2 | WEIGHT: 168 LBS | HEIGHT: 66 IN

## 2019-04-15 LAB
ANION GAP SERPL CALCULATED.3IONS-SCNC: 11 MMOL/L
BUN BLD-MCNC: 10 MG/DL (ref 8–23)
BUN/CREAT SERPL: 5.2 (ref 7–25)
CALCIUM SPEC-SCNC: 8.3 MG/DL (ref 8.6–10.5)
CHLORIDE SERPL-SCNC: 108 MMOL/L (ref 98–107)
CO2 SERPL-SCNC: 20 MMOL/L (ref 22–29)
CREAT BLD-MCNC: 1.92 MG/DL (ref 0.76–1.27)
DEPRECATED RDW RBC AUTO: 47 FL (ref 37–54)
ERYTHROCYTE [DISTWIDTH] IN BLOOD BY AUTOMATED COUNT: 14.5 % (ref 12.3–15.4)
GFR SERPL CREATININE-BSD FRML MDRD: 35 ML/MIN/1.73
GLUCOSE BLD-MCNC: 78 MG/DL (ref 65–99)
HCT VFR BLD AUTO: 28.7 % (ref 37.5–51)
HGB BLD-MCNC: 9.8 G/DL (ref 13–17.7)
INR PPP: 2.88 (ref 0.85–1.16)
MCH RBC QN AUTO: 30 PG (ref 26.6–33)
MCHC RBC AUTO-ENTMCNC: 34.1 G/DL (ref 31.5–35.7)
MCV RBC AUTO: 87.8 FL (ref 79–97)
PLATELET # BLD AUTO: 452 10*3/MM3 (ref 140–450)
PMV BLD AUTO: 8.1 FL (ref 6–12)
POTASSIUM BLD-SCNC: 3.6 MMOL/L (ref 3.5–5.2)
PROTHROMBIN TIME: 29.1 SECONDS (ref 11.2–14.3)
RBC # BLD AUTO: 3.27 10*6/MM3 (ref 4.14–5.8)
SODIUM BLD-SCNC: 139 MMOL/L (ref 136–145)
WBC NRBC COR # BLD: 9.73 10*3/MM3 (ref 3.4–10.8)

## 2019-04-15 PROCEDURE — 94640 AIRWAY INHALATION TREATMENT: CPT

## 2019-04-15 PROCEDURE — 80048 BASIC METABOLIC PNL TOTAL CA: CPT | Performed by: INTERNAL MEDICINE

## 2019-04-15 PROCEDURE — 85610 PROTHROMBIN TIME: CPT | Performed by: INTERNAL MEDICINE

## 2019-04-15 PROCEDURE — 99239 HOSP IP/OBS DSCHRG MGMT >30: CPT | Performed by: NURSE PRACTITIONER

## 2019-04-15 PROCEDURE — 25010000002 CEFTRIAXONE PER 250 MG: Performed by: INTERNAL MEDICINE

## 2019-04-15 PROCEDURE — 85027 COMPLETE CBC AUTOMATED: CPT | Performed by: INTERNAL MEDICINE

## 2019-04-15 PROCEDURE — 25010000002 ONDANSETRON PER 1 MG: Performed by: INTERNAL MEDICINE

## 2019-04-15 RX ORDER — SODIUM BICARBONATE 650 MG/1
1300 TABLET ORAL 2 TIMES DAILY
Qty: 120 TABLET | Refills: 0 | Status: SHIPPED | OUTPATIENT
Start: 2019-04-15

## 2019-04-15 RX ORDER — SACCHAROMYCES BOULARDII 250 MG
250 CAPSULE ORAL 2 TIMES DAILY
Qty: 10 CAPSULE | Refills: 0 | Status: SHIPPED | OUTPATIENT
Start: 2019-04-15

## 2019-04-15 RX ORDER — CEFUROXIME AXETIL 500 MG/1
500 TABLET ORAL 2 TIMES DAILY
Qty: 10 TABLET | Refills: 0 | Status: SHIPPED | OUTPATIENT
Start: 2019-04-16

## 2019-04-15 RX ORDER — WARFARIN SODIUM 1 MG/1
1 TABLET ORAL
Status: DISCONTINUED | OUTPATIENT
Start: 2019-04-15 | End: 2019-04-15 | Stop reason: HOSPADM

## 2019-04-15 RX ADMIN — BENZONATATE 100 MG: 100 CAPSULE ORAL at 05:25

## 2019-04-15 RX ADMIN — FLUTICASONE PROPIONATE 2 SPRAY: 50 SPRAY, METERED NASAL at 09:43

## 2019-04-15 RX ADMIN — SODIUM BICARBONATE 650 MG TABLET 1300 MG: at 13:14

## 2019-04-15 RX ADMIN — DIPHENOXYLATE HYDROCHLORIDE AND ATROPINE SULFATE 2 TABLET: 2.5; .025 TABLET ORAL at 09:43

## 2019-04-15 RX ADMIN — ACETAMINOPHEN 650 MG: 325 TABLET ORAL at 09:48

## 2019-04-15 RX ADMIN — CEFTRIAXONE SODIUM 1 G: 1 INJECTION, SOLUTION INTRAVENOUS at 09:50

## 2019-04-15 RX ADMIN — GUAIFENESIN 600 MG: 600 TABLET, EXTENDED RELEASE ORAL at 09:37

## 2019-04-15 RX ADMIN — LOPERAMIDE HYDROCHLORIDE 2 MG: 2 CAPSULE ORAL at 09:43

## 2019-04-15 RX ADMIN — BUDESONIDE 0.5 MG: 0.5 INHALANT RESPIRATORY (INHALATION) at 09:17

## 2019-04-15 RX ADMIN — Medication 100 ML/HR: at 09:26

## 2019-04-15 RX ADMIN — BUSPIRONE HYDROCHLORIDE 15 MG: 10 TABLET ORAL at 05:28

## 2019-04-15 RX ADMIN — CETIRIZINE HYDROCHLORIDE 10 MG: 10 TABLET, FILM COATED ORAL at 09:36

## 2019-04-15 RX ADMIN — ESCITALOPRAM OXALATE 10 MG: 10 TABLET ORAL at 09:37

## 2019-04-15 RX ADMIN — BUSPIRONE HYDROCHLORIDE 15 MG: 10 TABLET ORAL at 13:13

## 2019-04-15 RX ADMIN — Medication 250 MG: at 09:37

## 2019-04-15 RX ADMIN — ONDANSETRON 4 MG: 2 INJECTION INTRAMUSCULAR; INTRAVENOUS at 11:40

## 2019-04-15 NOTE — DISCHARGE SUMMARY
[facility]  DISCHARGE SUMMARY    Patient Name: Zackary Noonan II  : 1955  MRN: [id number]    Date of Admission: 2019  Date of Discharge:  4/15/19  Primary Care Physician: Jillian Layne MD    Consults     Date and Time Order Name Status Description    2019 1143 Inpatient Nephrology Consult Completed     2019 0305 Inpatient Infectious Diseases Consult Completed           Hospital Course     Presenting Problem:   Pneumonia [J18.9]    Active Hospital Problems    Diagnosis  POA   • **Acute respiratory failure with hypoxia (CMS/[facility]) [J96.01]  Yes   • Pneumonia due to Streptococcus pneumoniae (CMS/[facility]) [J13]  Yes   • History of creation of ostomy (CMS/[facility]) [Z93.9]  Not Applicable   • RAMYA (acute kidney injury) (CMS/[facility]) [N17.9]  Yes   • Sepsis (CMS/[facility]) [A41.9]  Yes   • Inflammatory bowel disease (Crohn's disease) (CMS/[facility]) [K50.90]  Yes   • History of C. difficile colitis s/p fecal transplant (2017) [Z86.19]  Not Applicable   • Hyponatremia [E87.1]  Yes   • S/P total abdominal colectomy [Z90.49]  Not Applicable   • Pneumonia [J18.9]  Yes   • History of DVT (deep vein thrombosis) [Z86.718]  Not Applicable   • Diarrhea [R19.7]  Yes   • COPD (chronic obstructive pulmonary disease) (CMS/[facility]) [J44.9]  Yes      Resolved Hospital Problems   No resolved problems to display.          Hospital Course:  Zackary Noonan II is a 63 y.o. male with PMH of HTN, and crohn's s/p ostomy presenting from home with fatigue and SOA due to post Streptococcus pneumonia following influenza infection, found to be septic. Admitted to hospital medicine for further treatment. Treated for acute hypoxic respiratory failure due to his pneumonia with IV rocephin for 5 days, in addition to Azithromycin. Patient will complete a total 10 day course by completing an additional 5 days of Ceftin. All culture has remained negative. Has had improvement in respiratory failure and is now  maintaining adequate oxygenation saturations on room air, and will not require supplemental oxygenation at discharge.   RAMYA thought related to prerenal azotemia vs ATN, nephrology consulted for renal insufficiency, hyponatremia and metabolic acidosis. Likely due to high volume output from stoma and intravascular volume depletion. Treated with IVF and IV/ oral sodium bicarbonate. Once high volume output corrected with antidiarrheal medications (Cdiff negative) has had improvement in volume status. Will cont on oral bicarb at discharge.   Warfarin continued and will require INR check in 2 days at PCP office. Supratherapeutic on admission, has now returned to therapeutic range of 2.88. Will be discharged home today.     Discharge Follow Up Recommendations for labs/diagnostics:  PCP in 1 week, INR rechecked in 2 days   JORDON Masters, 3-4 weeks     Day of Discharge     HPI:   Feels well, very eager to go home. NAD. No family present. Able to use IS very well, up to 1500 ml with non productive cough. Resting oxygen saturation of 95% on RA, and walking pulse ox of 90% on RA. Has some mild dyspnea with ambulation, but no SOA at rest. Has continued to have less ostomy output that is more controlled while using PRN antidiarrheal medications. Denies f/c, n/v/d, soa at rest, abd pain or cp.     Review of Systems    Otherwise ROS is negative except as mentioned in the HPI.    Vital Signs:   Temp:  [98.4 °F (36.9 °C)-98.8 °F (37.1 °C)] 98.4 °F (36.9 °C)  Heart Rate:  [67-81] 74  Resp:  [16-20] 18  BP: (134-155)/(63-72) 151/69     Physical Exam:  Constitutional: Awake, alert  Eyes: PERRLA, sclerae anicteric, no conjunctival injection  HENT: NCAT, mucous membranes moist  Neck: Supple, no thyromegaly, no lymphadenopathy, trachea midline  Respiratory: Clear to auscultation bilaterally, nonlabored respirations on RA  Cardiovascular: RRR, no murmurs, rubs, or gallops, palpable pedal pulses bilaterally  Gastrointestinal: Positive bowel  sounds, soft, nontender, nondistended  Musculoskeletal: No bilateral ankle edema, no clubbing or cyanosis to extremities  Psychiatric: Appropriate affect, cooperative  Neurologic: Oriented x 3, strength symmetric in all extremities, Cranial Nerves grossly intact to confrontation, speech clear  Skin: No rashes     Pertinent  and/or Most Recent Results     Results from last 7 days   Lab Units 04/15/19  0403 04/14/19  0526 04/13/19  0500 04/12/19  0429 04/11/19  0527 04/10/19  0559 04/09/19  1226   WBC 10*3/mm3 9.73 11.72* 12.99* 16.19* 26.81* 30.16* 32.35*   HEMOGLOBIN g/dL 9.8* 11.0* 11.1* 12.0* 12.7* 12.8* 15.4   HEMATOCRIT % 28.7* 34.0* 34.2* 36.4* 38.1 39.1 44.8   PLATELETS 10*3/mm3 452* 424 447 423 394 362 399   SODIUM mmol/L 139 136 135* 132* 129* 130* 134*   POTASSIUM mmol/L 3.6 3.9 3.5 4.0 3.7 4.5 5.1   CHLORIDE mmol/L 108* 110* 108* 102 97* 100 95*   CO2 mmol/L 20.0* 13.0* 14.0* 18.0* 18.0* 16.0* 23.0   BUN mg/dL 10 14 15 20 22 17 9   CREATININE mg/dL 1.92* 1.88* 2.21* 2.39* 2.48* 2.10* 1.07   GLUCOSE mg/dL 78 82 99 83 83 150* 92   CALCIUM mg/dL 8.3* 8.7 8.2* 8.4* 8.8 8.3* 9.2     Results from last 7 days   Lab Units 04/15/19  0403 04/14/19  0526 04/13/19  0500 04/12/19  0429 04/11/19  0634 04/10/19  0559 04/09/19  1752 04/09/19  1226   BILIRUBIN mg/dL  --   --   --   --   --   --   --  0.7   ALK PHOS U/L  --   --   --   --   --   --   --  98   ALT (SGPT) U/L  --   --   --   --   --   --   --  17   AST (SGOT) U/L  --   --   --   --   --   --   --  21   PROTIME Seconds 29.1* 24.8* 24.2* 36.1* 37.1* 40.8* 37.8* 36.0*   INR  2.88* 2.35* 2.29* 3.80* 3.94* 4.45* 4.04* 3.79*           Invalid input(s): TG, LDLCALC, LDLREALC        Brief Urine Lab Results  (Last result in the past 365 days)      Color   Clarity   Blood   Leuk Est   Nitrite   Protein   CREAT   Urine HCG        04/11/19 1601 Yellow Clear Negative Negative Negative 30 mg/dL (1+)               Microbiology Results Abnormal     Procedure Component Value  - Date/Time    Blood Culture - Blood, Arm, Right [[id number]] Collected:  04/09/19 1225    Lab Status:  Final result Specimen:  Blood from Arm, Right Updated:  04/14/19 1300     Blood Culture No growth at 5 days    Blood Culture - Blood, Arm, Left [[id number]] Collected:  04/09/19 1215    Lab Status:  Final result Specimen:  Blood from Arm, Left Updated:  04/14/19 1300     Blood Culture No growth at 5 days    Respiratory Culture - Sputum, Cough [[id number]] Collected:  04/10/19 0859    Lab Status:  Final result Specimen:  Sputum from Cough Updated:  04/12/19 0947     Respiratory Culture Light growth (2+) Normal Respiratory Skyler     Gram Stain Moderate (3+) WBCs per low power field      Few (2+) Epithelial cells per low power field      Moderate (3+) Budding yeast with pseudohyphae      Few (2+) Normal respiratory skyler    Eosinophil Smear - Urine, Urine, Clean Catch [[id number]]  (Normal) Collected:  04/11/19 1601    Lab Status:  Final result Specimen:  Urine, Clean Catch Updated:  04/11/19 1853     Eosinophil Smear 0 % EOS/100 Cells     Narrative:       No eosinophil seen    Clostridium Difficile Toxin - Stool, Per Rectum [[id number]] Collected:  04/10/19 1629    Lab Status:  Final result Specimen:  Stool from Per Rectum Updated:  04/10/19 1721    Narrative:       The following orders were created for panel order Clostridium Difficile Toxin - Stool, Per Rectum.  Procedure                               Abnormality         Status                     ---------                               -----------         ------                     Clostridium Difficile To...[[id number]]  Normal              Final result                 Please view results for these tests on the individual orders.    Clostridium Difficile Toxin, PCR - Stool, Per Rectum [[id number]]  (Normal) Collected:  04/10/19 1629    Lab Status:  Final result Specimen:  Stool from Per Rectum Updated:  04/10/19 1721     C. Difficile Toxins by PCR Not Detected     Narrative:       Performance characteristics of test not established for patients <2 years of age.  Negative for Toxigenic C. Difficile    S. Pneumo Ag Urine or CSF - Urine, Urine, Clean Catch [[id number]]  (Abnormal) Collected:  04/09/19 2333    Lab Status:  Final result Specimen:  Urine, Clean Catch Updated:  04/10/19 0912     Strep Pneumo Ag Positive    Legionella Antigen, Urine - Urine, Urine, Clean Catch [[id number]]  (Normal) Collected:  04/09/19 2333    Lab Status:  Final result Specimen:  Urine, Clean Catch Updated:  04/10/19 0656     LEGIONELLA ANTIGEN, URINE Negative          Imaging Results (all)     Procedure Component Value Units Date/Time    XR Chest 1 View [[id number]] Collected:  04/09/19 1221     Updated:  04/09/19 1352    Narrative:       EXAMINATION: XR CHEST 1 VW-      INDICATION: Shortness of air triage protocol.     TECHNIQUE:  Single view frontal chest.     COMPARISONS: CT 04/01/2019.     FINDINGS:  Since the comparison CT from 8 days ago, there is new  airspace disease in the upper lobe on the right, possibly also the  middle lobe. There is also background interstitial prominence in  longstanding basilar opacity. The heart is mildly enlarged. There may be  trace volume effusions. No pneumothorax.       Impression:       New airspace disease in the upper lobe and possibly the  middle lobe on the right.     D:  04/09/2019  E:  04/09/2019     This report was finalized on 4/9/2019 1:49 PM by Bebo Le.                              Discharge Details        Discharge Medications      New Medications      Instructions Start Date   cefuroxime 500 MG tablet  Commonly known as:  CEFTIN   500 mg, Oral, 2 Times Daily   Start Date:  4/16/2019     saccharomyces boulardii 250 MG capsule  Commonly known as:  FLORASTOR   250 mg, Oral, 2 Times Daily      sodium bicarbonate 650 MG tablet   1,300 mg, Oral, 2 Times Daily         Continue These Medications      Instructions Start Date   acetaminophen 325 MG  tablet  Commonly known as:  TYLENOL   650 mg, Oral, Every 6 Hours PRN      albuterol sulfate  (90 Base) MCG/ACT inhaler  Commonly known as:  PROVENTIL HFA;VENTOLIN HFA;PROAIR HFA   2 puffs, Inhalation, Every 4 Hours PRN      albuterol (2.5 MG/3ML) 0.083% nebulizer solution  Commonly known as:  PROVENTIL   2.5 mg, Nebulization, Every 6 Hours PRN      beclomethasone 80 MCG/ACT inhaler  Commonly known as:  QVAR   2 puffs, Inhalation, 2 Times Daily - RT      busPIRone 15 MG tablet  Commonly known as:  BUSPAR   15 mg, Oral, 3 Times Daily PRN      diphenoxylate-atropine 2.5-0.025 MG per tablet  Commonly known as:  LOMOTIL   1-2 tablets, Oral, Every 6 Hours      escitalopram 10 MG tablet  Commonly known as:  LEXAPRO   10 mg, Oral, Daily      famotidine-calcium carb-mag hydroxide -165 MG chewable tablet  Commonly known as:  PEPCID COMPLETE   1 tablet, Oral, Daily PRN      fluticasone 50 MCG/ACT nasal spray  Commonly known as:  FLONASE   2 sprays, Nasal, 2 Times Daily      guaiFENesin 400 MG tablet  Commonly known as:  HUMIBID 3   800 mg, Oral, 3 Times Daily      levocetirizine 5 MG tablet  Commonly known as:  XYZAL   5 mg, Oral, Every Evening      loperamide 2 MG capsule  Commonly known as:  IMODIUM   4 mg, Oral, Every 4 Hours      montelukast 10 MG tablet  Commonly known as:  SINGULAIR   10 mg, Oral, Nightly      ondansetron 4 MG tablet  Commonly known as:  ZOFRAN   4 mg, Oral, Every 8 Hours PRN      PROBIOTIC DAILY capsule   1 capsule, Oral, Daily      SENIOR MULTIVITAMIN PLUS PO   1 tablet/day, Oral      TRELEGY ELLIPTA 100-62.5-25 MCG/INH aerosol powder   Generic drug:  Fluticasone-Umeclidin-Vilant   1 each, Inhalation, Daily      valACYclovir 500 MG tablet  Commonly known as:  VALTREX   500 mg, Oral, Daily PRN      VITAMIN B 12 PO   1 tablet, Oral, Daily      vitamin B-6 100 MG tablet  Commonly known as:  PYRIDOXINE   100 mcg, Oral, Daily      vitamin E 400 UNIT capsule   400 Units, Oral, 3 Times Daily     "  warfarin 5 MG tablet  Commonly known as:  COUMADIN   5 mg, Oral, Daily Warfarin         Stop These Medications    amLODIPine 5 MG tablet  Commonly known as:  NORVASC     clotrimazole 10 MG nick  Commonly known as:  MYCELEX     losartan 100 MG tablet  Commonly known as:  COZAAR     nystatin 743095 UNIT/GM cream  Commonly known as:  MYCOSTATIN            Allergies   Allergen Reactions   • Beta Adrenergic Blockers Other (See Comments)     Vocal changes   • Levaquin [Levofloxacin In D5w] Other (See Comments)     Heaviness in legs, \"felt like lead\"         Discharge Disposition:  Home or Self Care    Discharge Diet:  Diet Order   Procedures   • Diet Regular         Discharge Activity:   Activity Instructions     Activity as Tolerated              CODE STATUS:    Code Status and Medical Interventions:   Ordered at: 04/09/19 1422     Code Status:    CPR     Medical Interventions (Level of Support Prior to Arrest):    Full         No future appointments.    Additional Instructions for the Follow-ups that You Need to Schedule     Ambulatory Referral to Physical Therapy Evaluate and treat; Full weight bearing   As directed      Specialty needed:  Evaluate and treat    Weight Bearing Status:  Full weight bearing         Discharge Follow-up with PCP   As directed       Currently Documented PCP:    Jillian Layne MD    PCP Phone Number:    [phone]     Follow Up Details:  1 week         Discharge Follow-up with Specified Provider: Jayshree; 3 Weeks   As directed      To:  Jayshree    Follow Up:  3 Weeks               Time Spent on Discharge:  50 minutes    Electronically signed by HARMAN Colby, 04/15/19, 4:33 PM.      "

## 2019-04-15 NOTE — PROGRESS NOTES
"   LOS: 6 days    Patient Care Team:  Jillian Layne MD as PCP - General (Family Medicine)    Chief Complaint:   History of present illness:  past medical hx of Htn, C diff infection and Crohn's disease s/p ostomy. admitted with diagnosis of PNA. Started on antibiotics. At presentation Scr was 1.0 which has been trending up to 2.48 and Sodium is found to be 129 worsening today     Subjective   Renal function and sodium improving.  No new events.  No obvious distress.    Review of Systems:   High ostomy output.  Patient denies shortness of breath, chest pain, dysuria, hematuria, nausea, vomiting.        Objective     Vital Sign Min/Max for last 24 hours  Temp  Min: 98.4 °F (36.9 °C)  Max: 98.8 °F (37.1 °C)   BP  Min: 134/63  Max: 155/72   Pulse  Min: 67  Max: 83   Resp  Min: 16  Max: 20   SpO2  Min: 95 %  Max: 96 %   No Data Recorded   No Data Recorded     Flowsheet Rows      First Filed Value   Admission Height  167.6 cm (66\") Documented at 04/09/2019 1158   Admission Weight  76.2 kg (168 lb) Documented at 04/09/2019 1158          I/O this shift:  In: 240 [P.O.:240]  Out: 400 [Stool:400]  I/O last 3 completed shifts:  In: 923 [I.V.:873; IV Piggyback:50]  Out: 200 [Urine:200]    Physical Exam:  Exam unchanged  General Appearance: Alert oriented no obvious distress sitting in chair comfortable  Eyes: PER, EOMI.  Neck: Supple, no JVD.  Lungs: Clear auscultation, no rales rhonchi's, equal chest movement, nonlabored.  Heart: No gallop, murmur, rub, RRR.  Abdomen: Soft, nontender, positive bowel sounds, no organomegaly.  Ostomy noted  Extremities: No edema, no cyanosis.  Neuro: No focal deficit, moving all extremities, alert oriented X 3      WBC WBC   Date Value Ref Range Status   04/15/2019 9.73 3.40 - 10.80 10*3/mm3 Final   04/14/2019 11.72 (H) 3.40 - 10.80 10*3/mm3 Final   04/13/2019 12.99 (H) 3.40 - 10.80 10*3/mm3 Final      HGB Hemoglobin   Date Value Ref Range Status   04/15/2019 9.8 (L) 13.0 - 17.7 g/dL Final "   04/14/2019 11.0 (L) 13.0 - 17.7 g/dL Final   04/13/2019 11.1 (L) 13.0 - 17.7 g/dL Final      HCT Hematocrit   Date Value Ref Range Status   04/15/2019 28.7 (L) 37.5 - 51.0 % Final   04/14/2019 34.0 (L) 37.5 - 51.0 % Final   04/13/2019 34.2 (L) 37.5 - 51.0 % Final      Platlets No results found for: LABPLAT   MCV MCV   Date Value Ref Range Status   04/15/2019 87.8 79.0 - 97.0 fL Final   04/14/2019 90.7 79.0 - 97.0 fL Final   04/13/2019 90.5 79.0 - 97.0 fL Final          Sodium Sodium   Date Value Ref Range Status   04/15/2019 139 136 - 145 mmol/L Final   04/14/2019 136 136 - 145 mmol/L Final   04/13/2019 135 (L) 136 - 145 mmol/L Final      Potassium Potassium   Date Value Ref Range Status   04/15/2019 3.6 3.5 - 5.2 mmol/L Final   04/14/2019 3.9 3.5 - 5.2 mmol/L Final   04/13/2019 3.5 3.5 - 5.2 mmol/L Final      Chloride Chloride   Date Value Ref Range Status   04/15/2019 108 (H) 98 - 107 mmol/L Final   04/14/2019 110 (H) 98 - 107 mmol/L Final   04/13/2019 108 (H) 98 - 107 mmol/L Final      CO2 CO2   Date Value Ref Range Status   04/15/2019 20.0 (L) 22.0 - 29.0 mmol/L Final   04/14/2019 13.0 (L) 22.0 - 29.0 mmol/L Final   04/13/2019 14.0 (L) 22.0 - 29.0 mmol/L Final      BUN BUN   Date Value Ref Range Status   04/15/2019 10 8 - 23 mg/dL Final   04/14/2019 14 8 - 23 mg/dL Final   04/13/2019 15 8 - 23 mg/dL Final      Creatinine Creatinine   Date Value Ref Range Status   04/15/2019 1.92 (H) 0.76 - 1.27 mg/dL Final   04/14/2019 1.88 (H) 0.76 - 1.27 mg/dL Final   04/13/2019 2.21 (H) 0.76 - 1.27 mg/dL Final      Calcium Calcium   Date Value Ref Range Status   04/15/2019 8.3 (L) 8.6 - 10.5 mg/dL Final   04/14/2019 8.7 8.6 - 10.5 mg/dL Final   04/13/2019 8.2 (L) 8.6 - 10.5 mg/dL Final      PO4 No results found for: CAPO4   Albumin No results found for: ALBUMIN   Magnesium No results found for: MG   Uric Acid No results found for: URICACID        Results Review:     I reviewed the patient's new clinical  results.      budesonide 0.5 mg Nebulization BID - RT   busPIRone 15 mg Oral Q8H   ceftriaxone 1 g Intravenous Q24H   cetirizine 10 mg Oral Daily   escitalopram 10 mg Oral Daily   fluticasone 2 spray Nasal BID   guaiFENesin 600 mg Oral Q12H   montelukast 10 mg Oral Nightly   saccharomyces boulardii 250 mg Oral BID   sodium bicarbonate 1,300 mg Oral BID   sodium chloride 3 mL Intravenous Q12H   warfarin 2.5 mg Oral Daily       Pharmacy to dose warfarin     sodium bicarbonate drip less than 75 mEq/bag 100 mL/hr Last Rate: 100 mL/hr (04/15/19 0926)       Medication Review:      Assessment/Plan       1- RAMYA - non olgiuric  - Pre-renal azotemia -high output from ostomy  and  intravascular volume depletion No hydro on CT scan. Small Left renal simple cyst.   2- Hyponatremia improving with IV fluids.  3.  Metabolic acidosis.  Plan:    Continue sodium bicarb tablets.  Patient oral intake improving and ostomy output decreasing monitor closely  - Avoid nephrotoxic agents.   - Renal diet.   - Adjust meds per renal function   - Monitor I/O   Case discussed with the patient in detail.  [facility] bicarb [facility], return to office in 3-4 weeks in [facility]           Sim Martell MD  04/15/19  12:42 PM

## 2019-04-15 NOTE — PLAN OF CARE
Problem: Patient Care Overview  Goal: Plan of Care Review  Outcome: Ongoing (interventions implemented as appropriate)   04/15/19 1719   Coping/Psychosocial   Plan of Care Reviewed With patient   OTHER   Outcome Summary patient had a good night. slept approximately 6 hours without signs of discomfort or pain. up 3 times to void. ambulated without difficulty. immodium and lomotil given per patient request. states that he believes stool becoming more formed. VSS and remains in good spirits. continues to have a productive cough with small amount of green/brown sputum. will continue to monitor patient   Plan of Care Review   Progress no change       Problem: Infection, Risk/Actual (Adult)  Goal: Identify Related Risk Factors and Signs and Symptoms  Outcome: Ongoing (interventions implemented as appropriate)    Goal: Infection Prevention/Resolution  Outcome: Ongoing (interventions implemented as appropriate)      Problem: Fall Risk (Adult)  Goal: Identify Related Risk Factors and Signs and Symptoms  Outcome: Ongoing (interventions implemented as appropriate)    Goal: Absence of Fall  Outcome: Ongoing (interventions implemented as appropriate)

## 2019-04-15 NOTE — PLAN OF CARE
Problem: Patient Care Overview  Goal: Plan of Care Review  Outcome: Outcome(s) achieved Date Met: 04/15/19   04/15/19 1013   Coping/Psychosocial   Plan of Care Reviewed With patient   OTHER   Outcome Summary Patient follow up-known to [facility]OC-established ileostomy-appliance changed Saturday-[employer] [place] convexity I piece appliance in place-secured with belt-no leaking-no concerns per patient at this time-patient may need additional supplies if not discharged in next couple of days-WOC will continue to follow and provide support as needed.    Plan of Care Review   Progress improving

## 2019-04-15 NOTE — PROGRESS NOTES
"Pharmacy Consult  -  Warfarin  Zackary Noonan II is a  63 y.o. male admitted for a viral illness.   Height - 167.6 cm (66\")  Weight - 76.2 kg (168 lb)    Consulting Provider: Hospitalist  Indication: History of DVT  Goal INR: 2-3  Home Regimen:   - Warfarin 5 mg daily    Bridge Therapy: No    Drug-Drug Interactions with current regimen:       Azithromycin - may increase risk of bleeding (discontinued 4/13)       Ceftriaxone - may increase risk of bleeding    Warfarin Dosing During Admission:    Date  4/9 4/10 4/11 4/12 4/13 4/14 4/15     INR  3.79 4.45 3.94 3.8 2.29 2.35 2.88     Dose  hold hold hold hold 2.5 mg 2.5 mg 1 mg       Education: Education provided verbally and in writing.  Discussed effects of warfarin, importance of checking INR, drug-drug and drug-food interactions, and signs/symptoms of bleeding and clotting.  Patient verbalized understanding through teach back.  All pertinent questions were answered.      Discharge Follow up:   Following Provider - Dr. Layne   Follow up time range or appointment - Patient gets INR tested twice weekly. He will schedule an appointment within 2-3 days after discharge.     Labs:    Results from last 7 days   Lab Units 04/15/19  0403 04/14/19  0526 04/13/19  0500 04/12/19  0429 04/11/19  0634 04/11/19  0527 04/10/19  0559 04/09/19  1752 04/09/19  1226   INR  2.88* 2.35* 2.29* 3.80* 3.94*  --  4.45* 4.04* 3.79*   HEMOGLOBIN g/dL 9.8* 11.0* 11.1* 12.0*  --  12.7* 12.8*  --  15.4   HEMATOCRIT % 28.7* 34.0* 34.2* 36.4*  --  38.1 39.1  --  44.8   PLATELETS 10*3/mm3 452* 424 447 423  --  394 362  --  399     Results from last 7 days   Lab Units 04/15/19  0403 04/14/19  0526 04/13/19  0500  04/09/19  1226   SODIUM mmol/L 139 136 135*   < > 134*   POTASSIUM mmol/L 3.6 3.9 3.5   < > 5.1   CHLORIDE mmol/L 108* 110* 108*   < > 95*   CO2 mmol/L 20.0* 13.0* 14.0*   < > 23.0   BUN mg/dL 10 14 15   < > 9   CREATININE mg/dL 1.92* 1.88* 2.21*   < > 1.07   CALCIUM mg/dL 8.3* 8.7 8.2*   < > " 9.2   BILIRUBIN mg/dL  --   --   --   --  0.7   ALK PHOS U/L  --   --   --   --  98   ALT (SGPT) U/L  --   --   --   --  17   AST (SGOT) U/L  --   --   --   --  21   GLUCOSE mg/dL 78 82 99   < > 92    < > = values in this interval not displayed.     Current dietary intake: 100% of meals documented 4/15.    Diet Order   Procedures   • Diet Regular     Assessment/Plan:     1. INR is therapeutic today at 2.88, increased from 2.35 yesterday. INR goal of 2-3.  2. Will order a decreased dose of warfarin 1 mg today due to increasing INR with prior doses of warfarin 2.5 mg. INR potentially increasing due to drug interactions with antibiotics.  3. Daily PT/INR ordered.  4. Pharmacy will continue to follow and adjust regimen appropriately as needed.    Andrzej Byrne, PharmD  Pharmacy Resident  4/15/2019  1:36 PM

## 2019-04-15 NOTE — NURSING NOTE
Pt ambulated 2 full loops around the 3G Unit. O2 saturation while abmulating was 90%. Pt was slightly short of air after ambulation.

## 2019-04-16 ENCOUNTER — READMISSION MANAGEMENT (OUTPATIENT)
Dept: CALL CENTER | Facility: HOSPITAL | Age: 64
End: 2019-04-16

## 2019-04-16 NOTE — OUTREACH NOTE
Prep Survey      Responses   Facility patient discharged from?  [place]   Is patient eligible?  Yes   Discharge diagnosis  Acute respiratory failure with hypoxia,  Pneumonia   Does the patient have one of the following disease processes/diagnoses(primary or secondary)?  COPD/Pneumonia   Does the patient have Home health ordered?  No   What is the Home health agency?   Doing Outpatient Physical Therapy at [facility]    Is there a DME ordered?  No   Prep survey completed?  Yes          Stefanie Roach RN

## 2019-04-18 ENCOUNTER — READMISSION MANAGEMENT (OUTPATIENT)
Dept: CALL CENTER | Facility: HOSPITAL | Age: 64
End: 2019-04-18

## 2019-04-18 NOTE — OUTREACH NOTE
COPD/PN Week 1 Survey      Responses   Facility patient discharged from?  [facility]   Does the patient have one of the following disease processes/diagnoses(primary or secondary)?  COPD/Pneumonia   Is there a successful TCM telephone encounter documented?  No   Was the primary reason for admission:  Pneumonia   Week 1 attempt successful?  Yes   Call start time  0949   Rescheduled  Rescheduled-pt requested [At doctor's appt.]   Call end time  0949          Yolanda Garcia RN

## 2019-04-20 ENCOUNTER — READMISSION MANAGEMENT (OUTPATIENT)
Dept: CALL CENTER | Facility: HOSPITAL | Age: 64
End: 2019-04-20

## 2019-04-20 NOTE — OUTREACH NOTE
COPD/PN Week 1 Survey      Responses   Facility patient discharged from?  [facility]   Does the patient have one of the following disease processes/diagnoses(primary or secondary)?  COPD/Pneumonia   Is there a successful TCM telephone encounter documented?  No   Was the primary reason for admission:  Pneumonia   Week 1 attempt successful?  Yes   Call start time  1523   Call end time  1528   Discharge diagnosis  Acute respiratory failure with hypoxia,  Pneumonia   Meds reviewed with patient/caregiver?  Yes   Is the patient having any side effects they believe may be caused by any medication additions or changes?  No   Does the patient have all medications ordered at discharge?  Yes   Is the patient taking all medications as directed (includes completed medication regime)?  Yes   Does the patient have a primary care provider?   Yes   Does the patient have an appointment with their PCP or pulmonologist within 7 days of discharge?  Yes   Has the patient kept scheduled appointments due by today?  Yes   Has home health visited the patient within 72 hours of discharge?  N/A   Psychosocial issues?  No   Did the patient receive a copy of their discharge instructions?  Yes   Nursing interventions  Reviewed instructions with patient   What is the patient's perception of their health status since discharge?  Improving   Nursing Interventions  Nurse provided patient education   Are the patient's immunizations up to date?   Yes   Nursing interventions  Educated on importance of maintaining up to date immunizations as advised by provider   Is the patient/caregiver able to teach back the hierarchy of who to call/visit for symptoms/problems? PCP, Specialist, Home health nurse, Urgent Care, ED, 911  Yes   Is the patient/caregiver able to teach back signs and symptoms of worsening condition:  Fever/chills, Shortness of breath, Chest pain   Is the patient/caregiver able to teach back importance of completing antibiotic course of  treatment?  Yes   Week 1 call completed?  Yes          Yogesh Santoyo RN

## 2019-04-27 ENCOUNTER — READMISSION MANAGEMENT (OUTPATIENT)
Dept: CALL CENTER | Facility: HOSPITAL | Age: 64
End: 2019-04-27

## 2019-04-27 NOTE — OUTREACH NOTE
COPD/PN Week 2 Survey      Responses   Facility patient discharged from?  [facility]   Does the patient have one of the following disease processes/diagnoses(primary or secondary)?  COPD/Pneumonia   Was the primary reason for admission:  Pneumonia   Week 2 attempt successful?  Yes   Call start time  1806   Call end time  1810   Medication alerts for this patient  Lorstan is still elevated and will recheck renal functions this week   Meds reviewed with patient/caregiver?  Yes   Is the patient taking all medications as directed (includes completed medication regime)?  Yes   Has the patient kept scheduled appointments due by today?  Yes   What is the patient's perception of their health status since discharge?  Improving   Is the patient able to teach back COPD zones?  No [patient had pnuemonia]   Week 2 call completed?  Yes   Graduated/Revoked comments  -- [has good back to work and is doing well]   Wrap up additional comments  patient has gone back to work          Linda Steiner RN

## 2019-05-04 ENCOUNTER — READMISSION MANAGEMENT (OUTPATIENT)
Dept: CALL CENTER | Facility: HOSPITAL | Age: 64
End: 2019-05-04

## 2019-05-04 NOTE — OUTREACH NOTE
COPD/PN Week 3 Survey      Responses   Facility patient discharged from?  [facility]   Does the patient have one of the following disease processes/diagnoses(primary or secondary)?  COPD/Pneumonia   Was the primary reason for admission:  Pneumonia   Week 3 attempt successful?  Yes   Call start time  1439   Revoke  Readmitted [Readmitted to another facility.]          Zuleyka Garcia RN

## 2019-08-05 ENCOUNTER — HOSPITAL ENCOUNTER (OUTPATIENT)
Dept: WOUND CARE | Facility: HOSPITAL | Age: 64
Discharge: HOME | End: 2019-08-05
Admitting: INTERNAL MEDICINE

## 2019-08-05 ENCOUNTER — TRANSCRIBE ORDERS (OUTPATIENT)
Dept: WOUND CARE | Facility: HOSPITAL | Age: 64
End: 2019-08-05

## 2019-08-05 DIAGNOSIS — IMO0002 WOUND ABSCESS: ICD-10-CM

## 2019-08-05 DIAGNOSIS — IMO0002 WOUND ABSCESS: Primary | ICD-10-CM

## 2019-08-05 PROCEDURE — G0463 HOSPITAL OUTPT CLINIC VISIT: HCPCS

## 2019-08-05 NOTE — NURSING NOTE
Patient seen this AM r/t peristomal ulceration and leaking at 9-12 o'clock. Leaking is related to ulceration bleeding not from stool. Area cleaned, applied silver nitrate to bleeding areas of ulceration, applied stoma powder, and sealed with glue. Patient has not been using stoma powder. Recommended that he applies stoma powder prior to application of glue. This will allow for a dryer surface and hopefully allow for three days wear time. Placed Barrier ring and Rufina paste around stoma prior to application of Coloplast Morgan convexity. Patient has a follow-up with Dr. Mayo next week to have local steroid injection for the wound. Patient to contact WOC nurse if needs needs arise. Thanks

## 2019-08-09 ENCOUNTER — TRANSCRIBE ORDERS (OUTPATIENT)
Dept: WOUND CARE | Facility: HOSPITAL | Age: 64
End: 2019-08-09

## 2019-08-09 DIAGNOSIS — Z71.89 OSTOMY NURSE CONSULTATION: Primary | ICD-10-CM

## 2019-09-26 ENCOUNTER — HOSPITAL ENCOUNTER (OUTPATIENT)
Dept: WOUND CARE | Facility: HOSPITAL | Age: 64
Discharge: HOME | End: 2019-09-26
Admitting: COLON & RECTAL SURGERY

## 2019-09-26 ENCOUNTER — TRANSCRIBE ORDERS (OUTPATIENT)
Dept: WOUND CARE | Facility: HOSPITAL | Age: 64
End: 2019-09-26

## 2019-09-26 DIAGNOSIS — Z71.89 OSTOMY NURSE CONSULTATION: ICD-10-CM

## 2019-09-26 DIAGNOSIS — Z71.89 OSTOMY NURSE CONSULTATION: Primary | ICD-10-CM

## 2019-09-26 PROCEDURE — G0463 HOSPITAL OUTPT CLINIC VISIT: HCPCS

## 2019-09-26 NOTE — NURSING NOTE
Patient seen this morning for peristomal skin assessment r/t new leaking issues. Patient continues with chronic wounds r/t his ulcerative colitis. He has been doing well with good wear time but has recently experienced some weight gain that has changed the peristomal tissue structure. Patient wears a convexity appliance with a yvan ring an paste. Patient at this time only need a shallow convexity with minimal paste. He has been doing to many things that has is contributing to the leaking. Silver nitrate applied to ulceration at 9 o'clock. Applied Rufina paste and then placed his Coloplast Morgan convexity appliances. Questions answered. Is to contact me if any further needs arise. Thanks

## 2019-12-26 ENCOUNTER — TRANSCRIBE ORDERS (OUTPATIENT)
Dept: WOUND CARE | Facility: HOSPITAL | Age: 64
End: 2019-12-26

## 2019-12-26 ENCOUNTER — HOSPITAL ENCOUNTER (OUTPATIENT)
Dept: WOUND CARE | Facility: HOSPITAL | Age: 64
Discharge: HOME | End: 2019-12-26
Admitting: COLON & RECTAL SURGERY

## 2019-12-26 DIAGNOSIS — Z71.89 OSTOMY NURSE CONSULTATION: Primary | ICD-10-CM

## 2019-12-26 DIAGNOSIS — Z71.89 OSTOMY NURSE CONSULTATION: ICD-10-CM

## 2019-12-26 PROCEDURE — G0463 HOSPITAL OUTPT CLINIC VISIT: HCPCS

## 2019-12-26 NOTE — NURSING NOTE
Patient seen this afternoon r/t chronic peristomal wound. Patient has a end ileostomy r/t crohn's. 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 Patient has been getting 3 days wear time but has been experiencing bleeding from the wound and has caused some leaking. At this time wound area is at 9 o'clock and is raised above the skin surface. Applied silver nitrate to area. Peristomal skin cleaned and applied stoma powder to denuded skin areas. Placed home appliance (Coloplast [place] convexity). Placed barrier ring and Rufina paste prior to appliance application. Provided patient with extra silver nitrate sticks to burn wound area with next several appliance changes. Patient instructed to contact WOC nurse if further needs arise. Thanks

## 2020-05-07 ENCOUNTER — HOSPITAL ENCOUNTER (OUTPATIENT)
Dept: WOUND CARE | Facility: HOSPITAL | Age: 65
Discharge: HOME | End: 2020-05-07
Admitting: COLON & RECTAL SURGERY

## 2020-05-07 ENCOUNTER — TRANSCRIBE ORDERS (OUTPATIENT)
Dept: WOUND CARE | Facility: HOSPITAL | Age: 65
End: 2020-05-07

## 2020-05-07 DIAGNOSIS — Z71.89 OSTOMY NURSE CONSULTATION: ICD-10-CM

## 2020-05-07 DIAGNOSIS — Z71.89 OSTOMY NURSE CONSULTATION: Primary | ICD-10-CM

## 2020-05-07 PROCEDURE — G0463 HOSPITAL OUTPT CLINIC VISIT: HCPCS

## 2020-05-07 NOTE — NURSING NOTE
"Patient seen this morning r/t chronic wound area to his peristomal skin and newly developed leaking concern:     Patients wear a Coloplast [place] shallow convexity 1-piece. He has had pyoderma in the past and has a chronic peristomal ulceration at 8-9 o'clock.     Patient states that ulceration will heal at times and then reopens.   Currently ulceration is open and bleeding.   Silver nitrate applied to entire wound bed.   Area cleaned and applied stoma powder to mild peristomal excoriation.     Patient also likes to wear a barrier ring and use stoma paste prior to appliance application.   I feel that he is using to many different \"layers\" so to speak under his appliance which is adding unneeded pressure. This could be contributing to to chronic nature of the wound.   However, patient wants to continue with his current plan of care r/t fear of appliance leaking.     Place hi in his home appliance and placed Coloplast barrier strips around the appliance.   Questions answered and encouragement provided.    Patient is to contact me if further needs arise.     Thanks   "

## 2020-07-10 ENCOUNTER — HOSPITAL ENCOUNTER (OUTPATIENT)
Dept: WOUND CARE | Facility: HOSPITAL | Age: 65
Discharge: HOME | End: 2020-07-10
Admitting: COLON & RECTAL SURGERY

## 2020-07-10 PROCEDURE — G0463 HOSPITAL OUTPT CLINIC VISIT: HCPCS

## 2020-07-10 NOTE — NURSING NOTE
Mr. Noonan was seen this afternoon r/t bleeding peristomal wound and appliance leaking:     Over the past several days he has has been having to change his appliance more frequently and has been experiencing stool undermining. His chronic lateral peristomal ulceration has had bleeding.     The bleeding I feel has contributed to the stool undermining and thus the leaking.   He also has to many layers between the appliance barrier and his skin. He uses a [place] convexity appliance with barrier ring and stoma paste. I feel that the layers are interfering with the seal of the appliance.     I applied silver nitrate to the ulceration.   Area cleaned and applied stoma powder.   Applied a small amount of stoma paste to medial side of the lateral peristomal ulceration.   This should keep the area dry and allow for better healing potential.   Placed his home appliance (Coloplast [place] convex precut at 29mm).   Reviewed care needs and questions answered.     He is to contact me on Monday, 7- to tell me how things went.     Thanks

## 2020-11-03 ENCOUNTER — HOSPITAL ENCOUNTER (OUTPATIENT)
Dept: WOUND CARE | Facility: HOSPITAL | Age: 65
Discharge: HOME | End: 2020-11-03
Admitting: COLON & RECTAL SURGERY

## 2020-11-03 PROCEDURE — G0463 HOSPITAL OUTPT CLINIC VISIT: HCPCS

## 2020-11-03 NOTE — NURSING NOTE
Mr. Noonan seen this afternoon for peristomal skin assessment:     He has chronic wound areas that resolve then reappear.   Has a history of Pyoderma gangrenosum.   He normally gets three days of appliance wear time.        At this time lateral peristomal wound area looks about normal.   Has new ulceration area at the medial peristomal edge at 2 o'clock.   Areas cleaned and silver nitrate applied.   Applied stoma powder and sealed with barrier spray.     I then applied stoma paste at medial and lateral peristomal and wound edges.   Placed his home Coloplast Morgan Convexity cut at 35mm.   He normally wears it at 30mm, which I feel is part of the issue.   Then applied barrier stripe around the appliance.     Discussed care needs and to continually to use silver nitrate at home.     He is to contact me if further issues arise.     Thanks

## 2021-02-03 ENCOUNTER — HOSPITAL ENCOUNTER (OUTPATIENT)
Dept: WOUND CARE | Facility: HOSPITAL | Age: 66
Discharge: HOME | End: 2021-02-03
Admitting: COLON & RECTAL SURGERY

## 2021-02-03 PROCEDURE — G0463 HOSPITAL OUTPT CLINIC VISIT: HCPCS

## 2021-02-03 NOTE — NURSING NOTE
Mr. Noonan seen this afternoon for ileostomy peristomal wound assessment:     He has a history of pyoderma and has received local steroid injections in the past.   Wound areas are chronic and will heal and then reopen.     At this time lateral peristomal area presents with a large shallow ulceration.   Medial side presents with 2 small ulceration areas measuring at 0.5x0.5x0.2cm.   Moderate amount of red/brown drainage.   The drainage is causing his appliances to leak and poor wear time.     He is currently wearing a Lizzy but usually wears a Coloplast Morgan 1-piece convexity.     Wound areas cleaned and applied silver nitrate to all ulceration areas.   I then placed a piece of Hydrofiber Ag in the lateral side ulceration.  Area was then picture framed with stoma paste.  I will try him in a new appliance, Shaw Premier CeraPlus 19455, which is designed for peristomal wounds and sink irritation.     I am hoping with these new changes the wound will start to resolve and allow for greater  appliance wear time.     I provided him with extra appliances, silver nitrate, Hydrofiber Ag dressings, and stoma paste.     He is to follow-up with me next Tuesday, 2/9/2021 at 0800 for wound reassessment.     Thanks

## 2021-02-09 ENCOUNTER — HOSPITAL ENCOUNTER (OUTPATIENT)
Dept: WOUND CARE | Facility: HOSPITAL | Age: 66
Discharge: HOME | End: 2021-02-09
Admitting: COLON & RECTAL SURGERY

## 2021-02-09 PROCEDURE — G0463 HOSPITAL OUTPT CLINIC VISIT: HCPCS

## 2021-02-09 NOTE — NURSING NOTE
Mr. Noonan was seen this morning for peristomal skin reassessment and peristomal wound management:     At this time lateral peristomal wound is 50% resolved.   Area is much improved and had been able to wear his appliances for 3 days without leaking.     Wound area is granulated above the skin level.   Area cleaned and applied Silver nitrate.   Small pinpoint wound area noted on the medial side.   Area was cleaned and silver nitrate applied.     Some peristomal redness noted, denuded areas, and possible yeast dermatitis.   Area cleaned, applies stoma powder, applied Miconazole powder, and the sealed with barrier spray.     Mr. Noonan wanted to try a new appliance, a Coloplast Flip.   Placed a piece of Hydrofiber Ag over lateral wound site.   Applied paste around Hydrofiber, and then applied the new appliance.   Secured with Barrier strips.     He is to follow-up with me in 2 weeks from Wednesday on 2/24/2021 at 0800.   Questions answered and encouragement provided.     Thanks

## 2021-03-03 ENCOUNTER — HOSPITAL ENCOUNTER (OUTPATIENT)
Dept: WOUND CARE | Facility: HOSPITAL | Age: 66
Discharge: HOME | End: 2021-03-03
Admitting: COLON & RECTAL SURGERY

## 2021-03-03 PROCEDURE — G0463 HOSPITAL OUTPT CLINIC VISIT: HCPCS

## 2021-03-03 NOTE — NURSING NOTE
Mr. Noonan seen this AM for follow-up assessment of peristomal ulcerations:    At this time there is significant improvement.   Good wound contraction and reepithelialization can be seen.   There remains only one open ulceration area at this time at the lateral peristomal edge.      Area cleaned and Silver Nitrate applied.   Applied stoma powder and barrier spray.     Will continue with Hydrofiber Ag dressing.   Placed small piece of hydrofiber over ulceration wound bed.   Then picture framed with stoma paste.     Placed him in his current home appliance, Coloplast Morgan 1-piece convexity.   Then applied barrier stripes to the the outer part of the the appliance and secure with a stoma belt.     Reviewed care needs.   He is to continue with Hydrofiber Ag dressing with every appliance change.     No follow-up needed at this time.   He is to contact me if wound area fails to resolve or becomes larger while implementing the current plan of care.    Thanks

## 2021-06-07 ENCOUNTER — HOSPITAL ENCOUNTER (OUTPATIENT)
Dept: WOUND CARE | Facility: HOSPITAL | Age: 66
Discharge: HOME | End: 2021-06-07
Admitting: FAMILY MEDICINE

## 2021-06-07 PROCEDURE — G0463 HOSPITAL OUTPT CLINIC VISIT: HCPCS

## 2021-06-07 NOTE — NURSING NOTE
Follow up regarding peristomal ulcerations.    Mr. Noonan repots improvement in his peristomal ulceration with current [facility] interventions in place.    Silver nitrate sticks applied to peristomal ulcerations due to recent bleeding.  Hydrofiber silver applied to wound bed of each ulceration. Peristomal powder and barrier spray applied to areas of breakdown proximal to his stoma.  The stoma is red, moist and protruding above skin level.     Stoma paste used to picture frame around Hydrofiber dressings and around the entire stoma itself.  Coloplast sensura denisse cut to fit ostomy and applied.  Patient supplied barrier extenders used to reinforce appliance per patient request.     Patient is going on a trip later in the week and said he would follow up if needing to be seen once he returns.

## 2021-06-15 ENCOUNTER — HOSPITAL ENCOUNTER (OUTPATIENT)
Dept: WOUND CARE | Facility: HOSPITAL | Age: 66
Discharge: HOME | End: 2021-06-15
Admitting: COLON & RECTAL SURGERY

## 2021-06-15 PROCEDURE — G0463 HOSPITAL OUTPT CLINIC VISIT: HCPCS

## 2021-06-15 NOTE — NURSING NOTE
Mr. Noonan was seen this mourning for follow-up assessment of his chronic peristomal wounds:     Last appliance change was on Saturday, 6/12/2021.   Current appliance: Coloplast [place] convex 1-piece     At this time bilateral peristomal ulcerations present with some contraction with right side > left side. The wound beds are no longer hypergranulted and the wound beds are below the skin surface which has aided in wound contraction.    I cleaned wounds and peristomal skin with cleansing foam. Some moderate bleeding noted in wound beds, silver nitrate applied. I then applied stoma powder and and wound powder to wound beds to dry wound surface. He also has noted chronic MASD to his inferior peristomal edge. This is related to him cutting his appliance opening to large. He has had some recent weight gain which has caused the stoma to stretch leading to a wider but shorter stomal surface. His stoma measures [id number].     I place pieces hydrofiber Ag over wound surfaces, picture framed with stoma paste and then applied his current home appliance (Coloplast Morgan convex 1-piece).     Reviewed care needs and discussed diet in regards to his high output. Discussed the BRAT diet, hydration, and continued use of imodium and lomotil.     Questions answered and encouragements provided.     No follow-up appointment is indicated at this time.   He is to contact me if further needs arise or wounds fail to progress in healing.     Thanks

## 2021-07-12 ENCOUNTER — HOSPITAL ENCOUNTER (OUTPATIENT)
Dept: WOUND CARE | Facility: HOSPITAL | Age: 66
Discharge: HOME | End: 2021-07-12
Admitting: FAMILY MEDICINE

## 2021-07-12 PROCEDURE — G0463 HOSPITAL OUTPT CLINIC VISIT: HCPCS

## 2021-07-12 NOTE — NURSING NOTE
Mr. Noonan was seen this morning for follow-up assessment of his peristomal ulcerations:     Patients appliance has been on for three days and he has had good wear time with out any leaks. His current appliance is a Coloplast Morgan 1-piece convexity.     His appliance was removed and stomal care was performed.    Bilateral ulcerations remain.  Mild bleeding noted.   Wounds cleaned and irrigated with wound cleanser.   Applied his home Bactine and the applied Silver nitrate to all wound beds.   I then applied barrier spray to his periwound skin.     We will treat his ulcerations with Hydrofiber Ag rope at this time.   Placed his home appliance, barrier stripes, and then secured with his stomal belt.     He is to stick with one wound dressing type for at least a week.   If no improvement is seen he is to use Hydrofera blue and alternate dressing type weekly.     POC discussed with him and questions answered.     No follow-up is needed at this time.   He is to contact the [facility] office if a follow-up appointment is needed in the future.     Thanks

## 2021-10-22 ENCOUNTER — HOSPITAL ENCOUNTER (OUTPATIENT)
Dept: WOUND CARE | Facility: HOSPITAL | Age: 66
Discharge: HOME | End: 2021-10-22
Admitting: FAMILY MEDICINE

## 2021-10-22 PROCEDURE — G0463 HOSPITAL OUTPT CLINIC VISIT: HCPCS

## 2021-10-22 NOTE — NURSING NOTE
Mr Noonan was seen this afternoon for peristomal wound reassessment.     At this time wound areas are stable and slight improvement to the lateral ulceration. Some contraction and reepithelialization can be seen.  Medial ulceration is bleeding and moist.     Silver nitrate applied to both ulcerations after areas were cleaned with cleansing foam. Then crusted with Stoma powder and barrier spray.   Will continue with Hydrofiber Ag to wounds.   Placed small pice of Hydrofiber Ag to bilateral wound beds.   Picture framed with Stomahesive paste.   Then placed home appliance (Colplast [place] convexity 1-piece).   Secured with Barrier strips.     Discussed care needs with him.   Recommend to continue with current POC and Hydrofiber Ag with every appliance change.   Questions answered and encouragement provided.      Follow-up appointment is note needed at this time.   He was instructed to contact WOC if needs arise.     Thanks

## 2022-01-31 ENCOUNTER — HOSPITAL ENCOUNTER (OUTPATIENT)
Dept: WOUND CARE | Facility: HOSPITAL | Age: 67
Discharge: HOME | End: 2022-01-31
Admitting: FAMILY MEDICINE

## 2022-01-31 PROCEDURE — G0463 HOSPITAL OUTPT CLINIC VISIT: HCPCS

## 2022-10-11 ENCOUNTER — TELEPHONE (OUTPATIENT)
Dept: WOUND CARE | Facility: HOSPITAL | Age: 67
End: 2022-10-11

## 2022-10-11 NOTE — TELEPHONE ENCOUNTER
Patient called and stated that he was still at [facility] following an admission to another hospital for pneumonia and was having significant peristomal skin irritation.  Responded that he was using the crusting technique every time he changed his pouch.  Recommended Calmoseptine, which patient said that he had, and educated that he would need to use a small amount of that ointment after he cleansed with water and make sure that he rubbed it  in thoroughly and allowed to dry for a few minutes before applying the crusting technique and then the pouch.  Patient was appreciative of advice and was going to give it a try the next time he changed his pouch.  Discussed the length of time that patient was wearing pouch which he stated that usually changes it every 3 days.  Encourage patient to call back if he finds the Calmoseptine does not alleviate the peristomal skin irritation or if he has any other concerns.

## 2022-11-11 ENCOUNTER — HOSPITAL ENCOUNTER (OUTPATIENT)
Dept: WOUND CARE | Facility: HOSPITAL | Age: 67
Discharge: HOME | End: 2022-11-11
Admitting: COLON & RECTAL SURGERY

## 2022-11-11 PROCEDURE — G0463 HOSPITAL OUTPT CLINIC VISIT: HCPCS

## 2022-11-11 NOTE — NURSING NOTE
[facility] follow up for ostomy education    Surgery date: September 19, 2017    Ostomy type: End ileostomy    Appliance in place: Coloplast Sensura denisse 1 piece light convexity drainable pouch    Last pouch change: 11/11/2022    Stoma Assessment: Stoma is roughly 30 mm in diameter protruding above skin level and moist.  The peristomal skin however is extremely red and at this time no bleeding and it has a rough bumpy dry texture.  There are 2 areas at the 3:00 in the 9:00 aspect that are semiopen the 3:00 glider greater then bed 9:00.  9:00 after cleansing and application of wound source wound powder there is a little purple discoloration lead this [facility] asked patient if he had had pyoderma gangrenosum before upon chart review yes patient has pyoderma gangrenosum and has received steroid injections.  At this time Woc does not feel like this is an inflammation of his pyoderma although Hydrofiber AG was applied to the denuded area at the 3:00 and 9:00 aspects.  This was surrounded with a little bit of stoma paste and and then all the peristomal redness was lightly dusted with miconazole powder barrier spray let dry repeat 1 time.  This was then Woc applied to precut pouch that the patient supplied putting Rufina's paste around the cut edge on the wafer which is how the patient states that he does this.  Heat was applied to the wafer for about 2 minutes and then barrier extenders were applied after this and the patient reapplied his belt.  At this time everything was well fitting Wo believes that this may be a fungal rash patient states that he was in the ICU recently and intubated and [facility] and at that time is where the peristomal breakdown happened.  Unclear if it was due to leaking or inappropriate pouching or if he is having a fungal rash from all the antibiotics that he received.                  It must be noted that he has a body rash all over of small raised red dots some have encrustations at the  crests although these are not zit like they are all less than 1.5 cm and they itch extreme weight.  Patient did state he took a Benadryl 1 night and that did relieve the symptoms.  Woc believes this to be a medication rash although this could be a different kind of rash and that could be the culprit of this peristomal breakdown.  No fevers no pain no malaise.  Patient informed to call Woc back if the crusting with miconazole powder does not work.      Woc will continue to follow.     Please contact with questions or concerns.

## 2022-12-06 ENCOUNTER — HOSPITAL ENCOUNTER (OUTPATIENT)
Dept: WOUND CARE | Facility: HOSPITAL | Age: 67
Discharge: HOME | End: 2022-12-06
Admitting: COLON & RECTAL SURGERY

## 2022-12-06 PROCEDURE — G0463 HOSPITAL OUTPT CLINIC VISIT: HCPCS

## 2022-12-06 NOTE — NURSING NOTE
Patient seen today with concerns about surrounding peristomal dermatitis.  The overall presentation still looks yeasty but it is a lot better than before most of the redness is now gone and it is not in the shape of his ostomy pouch.  Patient reports that the only thing different going on is that he has received a steroid stepdown.  There is still evidence of small red spots all over body.    Woc still suspects that this is more of a allergic reaction then any contact dermatitis is still may be resolving dermatitis from what ever caused the allergic reaction.  Worrisome to note more than just being like some kind of rash that may be resolving as that underneath the stoma there is a large swollen area of the peristomal skin that is completely red and very hard upon palpation does not look like an abscess or just like looks like hard swollen inflamed skin.    Patient has had a history of pyoderma and has received silver nitrate as well as intradermal steroid injections.  Will contact Dr. Cardona's office to see if another steroid shot may be warranted as Woc is in a complete loss for what to do for this for now weeks crusted with stoma powder barrier spray applied Rufina's barrier paste and used his Coloplast Sensura denisse convexity.  Patient is still getting 3 days where time      Woc will follow.

## 2023-01-11 ENCOUNTER — HOSPITAL ENCOUNTER (OUTPATIENT)
Dept: WOUND CARE | Facility: HOSPITAL | Age: 68
Discharge: HOME | End: 2023-01-11
Admitting: COLON & RECTAL SURGERY
Payer: COMMERCIAL

## 2023-01-11 PROCEDURE — G0463 HOSPITAL OUTPT CLINIC VISIT: HCPCS

## 2023-01-11 NOTE — NURSING NOTE
Outpatient Ostomy Therapy Note     Ostomy: end ileostomy     Appliance Type:   Coloplast SenSura denisse convex     Stoma Description:  Red moist protruding, appears to have a granuloma on the inferior aspect of his stoma which is very sensitive to touch     Stoma Size:   35 mm     Peristomal Skin:   Yeast dermatitis               Last Appliance Change:  1/9/2023     Accessories:   Perform crusting technique with antifungal powder then barrier spray then stoma powder then barrier spray.  A piece of Hydrofiber AG was then applied to the granuloma covered with Hollihesive and bordered with stoma paste.       Education:   Provided instruction on how to perform crusting with antifungal powder and use of Hydrofiber AG over his granuloma.     Supplies Provided:  Hollihesive and hydrofiber AG     Plan of Care:   Encouraged patient to reach out to WOC if he continues to have issues with pouch leakage.  I feel that we may need to use silver nitrate to help control his granuloma if there is not improvement.       Teaching provided to:   Patient     Comments:   No follow-up made at this time.    Singh Manning RN, BSN, CCRN, CWOCN  Wound, Ostomy and Continence (WOC) Department  [facility]

## 2023-06-08 ENCOUNTER — TELEPHONE (OUTPATIENT)
Dept: WOUND CARE | Facility: HOSPITAL | Age: 68
End: 2023-06-08
Payer: COMMERCIAL

## 2023-06-08 NOTE — TELEPHONE ENCOUNTER
Zackary Noonan   1955  [id number]    Summary: Patient called regarding leakage and worsening of irritation to inferior side of stoma.  Patient states that he has been using Opticell AG but that the stool has been leaking into the Opticell Ag dressing.  I reminded him that he needs to be using paste to block the stool from entering the Opticell Ag and wound bed area.  Patient reported that he was able to figure this out after a few weeks and is happier with the way the wound care dressing and appliance are sticking.  Patient was wondering if he could be seen by her outpatient clinic, I let him know that this clinic is almost ready to be up and running and he could let us know in a few weeks time if he still needs to be seen.  Patient was wondering if he needs to be seen by Dr. Cardona for steroid injection.  Patient reports that the wound inferior to his stoma appears to be overgrown and states that he thinks his stoma is growing into the skin.  I let him know that this definitely needs a colorectal surgeon assessment.  Patient to contact Dr. Cardona.    Time spent teleconferencing with patient was: 30 minutes

## 2023-08-02 ENCOUNTER — TELEPHONE (OUTPATIENT)
Dept: WOUND CARE | Facility: HOSPITAL | Age: 68
End: 2023-08-02
Payer: COMMERCIAL

## 2023-08-25 ENCOUNTER — TELEPHONE (OUTPATIENT)
Dept: WOUND CARE | Facility: HOSPITAL | Age: 68
End: 2023-08-25
Payer: COMMERCIAL

## 2023-08-25 NOTE — TELEPHONE ENCOUNTER
"Zackary Noonan   1955  [id number]    Summary: Patient called saying that his wound inferior to stoma is stable.  There is a new wound next to the stoma that he is worried about.  Stated he would like to be seen by ostomy nurses soon as possible, he even consider coming to the emergency department.  Let him know this would be an option if the wound were to become unmanageable or he has issues with ostomy, patient then stated that he has it \"in check as of right now\".  Let patient know that ostomy clinic would be reopening late September and that he would on the call list to be brought in for an appointment.  Patient is using Hydrofiber AG.  Spoke about a few options including Hydrofera Blue, different barriers, barrier rings that we could try.  Encourage patient to eat a well-balanced diet with plenty of protein in the meantime, notify MD for worsening wounds/drainage, consider changing pouch more frequently.  Time spent teleconferencing with patient was: 15 minutes  "

## 2023-10-31 ENCOUNTER — HOSPITAL ENCOUNTER (OUTPATIENT)
Dept: WOUND CARE | Facility: HOSPITAL | Age: 68
Discharge: HOME | End: 2023-10-31
Payer: MEDICARE

## 2023-10-31 PROCEDURE — G0463 HOSPITAL OUTPT CLINIC VISIT: HCPCS

## 2023-10-31 NOTE — NURSING NOTE
[facility] outpatient ostomy visit.    History and current problem: continued care for pyoderma, other skin irritation. Is receiving steroid injections into peristomal areas from time to time with Dr. Nielsen. Has received silver nitrate in past for hypertrophied lesions. Now has painful sore below stoma, healing 9 o clock pyoderma sore    Surgery date: 2017  Surgeon: Gia    Patient arrived walking with cane. No family at beside.     Stoma Type: permanent end  Diameter/shape: 25x27 slight oval  Location: right  Protrusion: above  Mucosal condition and color: pink, moist, some scattered granulomas  Peristomal skin: suspected pyoderma 9 o clock is superficially open with no drainage or pain, large suspected pseudoverrucous lesions vs hypertrophy from 3-7 o clock, raised quite a bit  Supportive Tissue: peristomal hernia  Character of output: soft now, but takes lomotil and imodium around the clock to prevent explosive liquid  Emptying frequency per day: depends on the day    Current pouching system: coloplast one piece light convexity precut to 29 but patient then covers half of the hole with hollihesive and Hydrofiber AG, uses paste to window pane Hydrofiber and circumferentially, powder, spray, belt  Last appliance change: 4 days ago-stool was saturated on Hydrofiber  Ostomy care: vashe before and after silver nitrate, patient also used his own lidocaine spray before hand, Hydrofiber to back of coloplast two piece xpro shallow convexity, paste circumferentially and to window pane area, powder, spray to dry and protect before  Goals of care-prevention of stool from sitting against area-likely happening on day 3-4 when effluent break through paste and saturates Hydrofiber,   Goal wear time: 2 days  Image:     Recommendations:   Weekly silver nitrate cautery  Reduce wear time to 2 days for now  We may try xpro convexity (slightly less convexity) if they have one piece product, we may try precut to 25 with patient cutting  slightly on side to produce oval shape, we will still use silver Hydrofiber for now as patient has been doing until next week  Continue with stoma powder and spray to dry and protect lesion      I spent 90 minutes, face-to-face, caring for Zackary today.     Follow up appointment: Yes. Future appointment date and time: 11/7 10am

## 2023-11-07 ENCOUNTER — HOSPITAL ENCOUNTER (OUTPATIENT)
Dept: WOUND CARE | Facility: HOSPITAL | Age: 68
Discharge: HOME | End: 2023-11-07
Admitting: COLON & RECTAL SURGERY
Payer: MEDICARE

## 2023-11-07 PROCEDURE — G0463 HOSPITAL OUTPT CLINIC VISIT: HCPCS

## 2023-11-07 NOTE — NURSING NOTE
"[facility] outpatient ostomy visit.     History and current problem: continued care for pyoderma, other skin irritation. Is receiving steroid injections into peristomal areas from time to time with Dr. Nielsen. Has received silver nitrate in past for hypertrophied lesions. Now has painful sore below stoma, healing 9 o clock pyoderma sore     Surgery date: 2017  Surgeon: Gia     Patient arrived walking with cane. No family at beside.      Stoma Type: permanent end  Diameter/shape: 56n88vd slight oval  Location: right  Protrusion: above  Mucosal condition and color: pink, moist, some scattered granulomas  Peristomal skin: suspected pyoderma 9 o clock is closed but fragile appearing scarring with no drainage or pain, large suspected pseudoverrucous lesions vs hypertrophy from 3-7 o clock, raised quite a bit  Supportive Tissue: peristomal hernia  Character of output: soft now, but takes lomotil and imodium around the clock to prevent explosive liquid  Emptying frequency per day: depends on the day     Current pouching system: coloplast one piece light convexity precut to 29 but patient then covers half of the hole with hollihesive and Hydrofiber AG, uses paste to window pane Hydrofiber and circumferentially, powder, spray, belt  Last appliance change: 4 days ago-stool was saturated on Hydrofiber  Ostomy care: vashe before and after silver nitrate, patient also used his own lidocaine spray before hand, coloplast one piece soft convexity, slim ceraplus ring, paste circumferentially and to caulk between sore and stoma, powder, spray to dry and protect before  Goals of care-prevention of stool from sitting against area  Goal wear time: 2 days  Image: none today     Recommendations:   Weekly silver nitrate cautery  Reduce wear time to 2 days for now  Remove hydrofiber from regimen, add ceraplus slim ring   Cut to exact fit of [id number]  Will call [facility] and see if oval cutter from [facility] still available  \"Caulk\"  between sore and " stoma with stoma paste  Continue with stoma powder and spray to dry and protect lesion      I spent 90 minutes, face-to-face, caring for Zackary today.      Follow up appointment: Yes. Future appointment date and time: 11/14 10am

## 2023-11-13 ENCOUNTER — HOSPITAL ENCOUNTER (OUTPATIENT)
Dept: WOUND CARE | Facility: HOSPITAL | Age: 68
Discharge: HOME | End: 2023-11-13
Admitting: COLON & RECTAL SURGERY
Payer: MEDICARE

## 2023-11-13 PROCEDURE — G0463 HOSPITAL OUTPT CLINIC VISIT: HCPCS

## 2023-11-13 NOTE — NURSING NOTE
[facility] outpatient ostomy visit.     History and current problem: continued care for skin irritation. Is receiving steroid injections into peristomal areas from time to time with Dr. Nielsen. Has received silver nitrate in past for hypertrophied lesions. Now has painful sore below stoma, healed but fragile 9 o clock pyoderma sore     Surgery date: 2017  Surgeon: Gia     Patient arrived walking with cane. No family at beside.      Stoma Type: permanent end  Diameter/shape: 52h30kn slight oval  Location: right  Protrusion: above  Mucosal condition and color: pink, moist, some scattered granulomas  Peristomal skin: suspected pyoderma 9 o clock is closed but fragile appearing scarring with no drainage or pain, large suspected pseudoverrucous lesions vs hypertrophy from 3-7 o clock, raised quite a bit-slightly less elevated but not my much  Supportive Tissue: peristomal hernia  Character of output: soft now, but takes lomotil and imodium around the clock to prevent explosive liquid  Emptying frequency per day: depends on the day     Current pouching system: coloplast one piece soft convexity, slim ceraplus ring  Last appliance change: 3 days  Ostomy care: vashe before and after silver nitrate, patient also used his own lidocaine spray before hand, coloplast one piece soft convexity [id number], slim ceraplus ring, paste only to valley between hypertrophy and stoma, powder/spray to hypertrophied area only  Goals of care-prevention of stool from sitting against area  Goal wear time: 3 days  Image: none today     Recommendations:   Weekly silver nitrate cautery-taking next week off to rest area  Can go back to q3-4 day changing  Remove hydrofiber from regimen, add ceraplus slim ring, paste only to valley between stoma and hypertrophy  Cut to exact fit of [id number] stencil-reinforced this is the most important thing  Opted against stoma cutter tool  Continue with stoma powder and spray to dry and protect lesion only, do not need  everywhere just hypertrophied area-reinforced why  He has slim ceraplus rings and new soft convex bags, will give a few extra next visit as he is a little behind on supplies from when we asked him to do q2day changes for a few weeks.      I spent 60 minutes, face-to-face, caring for Zackary today.      Follow up appointment: Yes. Future appointment date and time: 11/27 10am

## 2023-11-27 ENCOUNTER — HOSPITAL ENCOUNTER (OUTPATIENT)
Dept: WOUND CARE | Facility: HOSPITAL | Age: 68
Discharge: HOME | End: 2023-11-27
Admitting: COLON & RECTAL SURGERY
Payer: MEDICARE

## 2023-11-27 PROCEDURE — G0463 HOSPITAL OUTPT CLINIC VISIT: HCPCS

## 2023-11-27 NOTE — NURSING NOTE
[facility] outpatient ostomy visit.     History and current problem: continued care for skin irritation, has received steroid injections into peristomal areas from time to time with Dr. Nielsen. Has received silver nitrate in past for hypertrophied lesions. Now has painful sore below stoma, healed but fragile 9 o clock pyoderma sore     Surgery date: 2017  Surgeon: Gia     Patient arrived walking with cane. No family at beside.      Stoma Type: permanent end  Diameter/shape: 11f24mc slight oval  Location: right  Protrusion: above  Mucosal condition and color: pink, moist, some scattered granulomas  Peristomal skin: suspected pyoderma 9 o clock is closed but fragile appearing scarring with no drainage or pain, large suspected pseudoverrucous lesions vs hypertrophy from 3-7 o clock, raised quite a bit-a lot less elevated today  Supportive Tissue: peristomal hernia  Character of output: soft now, but takes lomotil and imodium around the clock to prevent explosive liquid  Emptying frequency per day: depends on the day     Current pouching system: coloplast one piece soft convexity, slim ceraplus ring  Last appliance change: 3 days  Ostomy care: vashe before and after silver nitrate, patient also used his own lidocaine spray before hand, coloplast one piece soft convexity [id number], slim ceraplus ring, paste only to valley between hypertrophy and stoma, powder/spray to hypertrophied area only  Goals of care-prevention of stool from sitting against area  Goal wear time: 3 days  Image: none today     Recommendations:   Biweekly silver nitrate cautery-  Can go back to q3-4 day changing  Cut to exact fit of [id number] stencil-reinforced this is the most important thing  Continue with stoma powder and spray to dry and protect lesion only, do not need everywhere just hypertrophied area-reinforced why      I spent 60 minutes, face-to-face, caring for Zackary today.      Follow up appointment: Yes. Future appointment date and time:  12/12 10am

## 2023-12-14 ENCOUNTER — HOSPITAL ENCOUNTER (OUTPATIENT)
Dept: WOUND CARE | Facility: HOSPITAL | Age: 68
Discharge: HOME | End: 2023-12-14
Payer: MEDICARE

## 2023-12-14 PROCEDURE — G0463 HOSPITAL OUTPT CLINIC VISIT: HCPCS

## 2023-12-14 NOTE — NURSING NOTE
[facility] outpatient ostomy visit.     History and current problem: continued care for skin irritation, has received steroid injections into peristomal areas from time to time with Dr. Nielsen. Has received silver nitrate in past for hypertrophied lesions. Now has painful sore below stoma, healed but fragile 9 o clock pyoderma sore     Surgery date: 2017  Surgeon: Gia     No family at beside.      Stoma Type: permanent end  Diameter/shape: 29l76px slight oval  Location: right  Protrusion: above  Mucosal condition and color: pink, moist, less granulomas  Peristomal skin: slightly more pMASD due to not using ring due to being in a hurry, suspected pyoderma 9 o clock is closed but fragile appearing scarring with no drainage or pain, vastly improved suspected pseudoverrucous lesions vs hypertrophy from 3-7 o clock, barely elevated  Supportive Tissue: peristomal hernia  Character of output: soft now, but takes lomotil and imodium around the clock to prevent explosive liquid  Emptying frequency per day: depends on the day     Current pouching system: coloplast one piece soft convexity, slim ceraplus ring  Last appliance change: 3 days  Ostomy care: water, did not cauterize anything today, coloplast one piece soft convexity [id number], slim ceraplus ring, paste only to valley between hypertrophy and stoma, powder/spray peristomal MASD  Goals of care-prevention of stool from sitting against area  Goal wear time: 3 days  Image: none today     Recommendations:   Follow up as needed, continue plan, the biggest difference has been cutting smaller hole to match size and shape of stoma.  Cut to exact fit of [id number] stencil-reinforced this is the most important thing  Continue with stoma powder and spray to dry and protect lesion only, do not need everywhere just hypertrophied area-reinforced why  Continue slim barrier ring  Contact ostomy nurse for worsening skin condition and as needed for support.      I spent 45 minutes,  face-to-face, caring for Zackary today.      Follow up appointment: no. Future appointment date and time: PRN.

## 2024-01-05 ENCOUNTER — HOSPITAL ENCOUNTER (OUTPATIENT)
Dept: WOUND CARE | Facility: HOSPITAL | Age: 69
Discharge: HOME | End: 2024-01-05
Payer: MEDICARE

## 2024-01-05 PROCEDURE — G0463 HOSPITAL OUTPT CLINIC VISIT: HCPCS

## 2024-01-05 NOTE — NURSING NOTE
[facility] outpatient ostomy visit.     History and current problem: recent leakage x2, wants checked out, previous pyoderma to 9 oclock still healed and suspected pseudoverrucous lesions vs hypertrophy from 3-7 o clock almost completely gone.     Surgery date: 2017  Surgeon: Gia     No family at beside.      Stoma Type: permanent end  Diameter/shape: 22t95cm slight oval (measured closer to 94h45dc today)-new template given to patient  Location: right  Protrusion: above  Mucosal condition and color: pink, moist,2 small granulomas cauterized  Peristomal skin: slightly scant dots of likely yeast  Supportive Tissue: peristomal hernia  Character of output: soft now, but takes lomotil and imodium around the clock to prevent explosive liquid  Emptying frequency per day: depends on the day     Current pouching system: coloplast one piece soft convexity, slim ceraplus ring  Last appliance change: 3 days  Ostomy care: water, coloplast one piece soft convexity [id number], slim ceraplus ring, paste only to valley between hypertrophy and stoma, antifungal powder and sealed in with barrier spray x1  Goals of care-prevention of stool from sitting against area  Goal wear time: 3 days  Image: none today     Recommendations:   Follow up as needed, continue plan, cut slightly larger horizontally  Cut to exact fit of [id number] stencil-reinforced this is the most important thing  Continue with stoma powder and spray to dry and protect lesion only, do not need everywhere just hypertrophied area-reinforced why  Continue slim barrier ring  Contact ostomy nurse for worsening skin condition and as needed for support.      Education provided:    How to measure the stoma and the importance of cutting the appliance to limit amout of peristomal skin exposed.     I spent 30 minutes, face-to-face, caring for Zackary today.     Follow up appointment: No. Future appointment date and time: PRN

## 2024-02-28 ENCOUNTER — TELEPHONE (OUTPATIENT)
Dept: WOUND CARE | Facility: HOSPITAL | Age: 69
End: 2024-02-28
Payer: MEDICARE

## 2024-02-28 NOTE — TELEPHONE ENCOUNTER
Zackary Noonan   1955  [id number]    Summary: Received phone call from patient patient states that he would like to be seen in outpatient ostomy clinic.  Let patient know that we are booked up this week and can possibly think about seeing him next week.  Patient states that he had a leak under his barrier that he did not notice for a day or 2 causing some red areas, when he then went to go treat the red area with creams and powders the new barrier began to not stick.  Recommended patient prioritize good seal on barrier instead of creams and sprays and powders.  If skin does open up and become raw recommend light dusting of stoma powder, wiping away excess, light barrier spray.  Recommended changing pouch a day or 2 early in the meantime.  Patient will call if area worsens with treatment.    Time spent teleconferencing with patient was: 15 minutes

## 2024-04-19 ENCOUNTER — HOSPITAL ENCOUNTER (OUTPATIENT)
Dept: WOUND CARE | Facility: HOSPITAL | Age: 69
Discharge: HOME | End: 2024-04-19
Payer: MEDICARE

## 2024-04-19 PROCEDURE — G0463 HOSPITAL OUTPT CLINIC VISIT: HCPCS

## 2024-04-19 NOTE — NURSING NOTE
[facility] outpatient ostomy visit.     History and current problem: recent leakage x1  Surgery date: 2017  Surgeon: Gia     No family at beside.      Stoma Type: permanent end  Diameter/shape: 76c71qb slight oval (measured closer to 29z44ju today)-new template given to patient  Location: right  Protrusion: above  Mucosal condition and color: pink, moist no granulomas  Peristomal skin: slight epidermal peeling in some areas, area of previous pseudoverrucous lesions that had healed is recurring mildly   Supportive Tissue: peristomal hernia  Character of output: soft now, but takes lomotil and imodium around the clock to prevent explosive liquid  Emptying frequency per day: depends on the day     Current pouching system: coloplast one piece soft convexity, slim ceraplus ring, bead of paste, powder and spray  Last appliance change: 3 days  Ostomy care: water, silver nitrate x1 to pseudoverussous area inferior to stoma, cleansed, stoma powder and barrier spray, coloplast one piece soft convexity 20g77ap, slim ceraplus ring, paste only to valley between hypertrophy and stoma,   Goals of care-prevention of stool from sitting against area  Goal wear time: 3 days  Image:       Recommendations:   Follow up in a few weeks for another cauterization  Cut to exact fit of [id number] stencil-reinforced this is the most important thing  Continue with stoma powder and spray to dry and protect lesion only, do not need everywhere just hypertrophied area-reinforced why  Continue slim barrier ring  Contact ostomy nurse for worsening skin condition and as needed for support.    I spent 60 minutes, face-to-face, caring for Zackary today.     Follow up appointment: Yes. Future appointment date and time: Friday May 10 10am.

## 2024-05-13 ENCOUNTER — HOSPITAL ENCOUNTER (OUTPATIENT)
Dept: WOUND CARE | Facility: HOSPITAL | Age: 69
Discharge: HOME | End: 2024-05-13
Admitting: COLON & RECTAL SURGERY
Payer: MEDICARE

## 2024-05-13 PROCEDURE — G0463 HOSPITAL OUTPT CLINIC VISIT: HCPCS

## 2024-05-13 NOTE — NURSING NOTE
[facility] outpatient ostomy visit.     History and current problem: recent leakage x1, seeking advice on leg wound care  Surgery date: 2017  Surgeon: Gia     No family at beside.      Stoma Type: permanent end  Diameter/shape: 11s12lq slight oval (measured closer to 39k20jn today)-new template given to patient  Location: right  Protrusion: above  Mucosal condition and color: pink, moist no granulomas  Peristomal skin: slight epidermal peeling in some areas, area of previous pseudoverrucous lesions that had healed is recurring a little more then previous  Supportive Tissue: peristomal hernia  Character of output: soft now, but takes lomotil and imodium around the clock to prevent explosive liquid  Emptying frequency per day: depends on the day     Current pouching system: coloplast one piece soft convexity, slim ceraplus ring, bead of paste, powder and spray  Last appliance change: 3 days  Ostomy care: water, silver nitrate x1 to pseudoverussous area inferior to stoma, cleansed, stoma powder and barrier spray-needs more than one dose of nitrate to lesion but we were both out of supplies, coloplast one piece soft convexity 44t47ro, slim ceraplus ring, paste to valley between hypertrophy and stoma,   Goals of care-prevention of stool from sitting against area  Goal wear time: 3 days  Image: none today      Recommendations:   Follow up in a month for another cauterization, Patient to obtain more doses of silver nitrate, [facility] nurse to do the same and look into other therapies-possibly wart remover over the counter as alternative to nitrate??  Gave patient VASHE cleanser to use occasionally around stoma to provide antimicrobial and pH balancing capabilities  Cut to exact fit of [id number] stencil-reinforced this is the most important thing  Continue with stoma powder and spray to dry and protect lesion   Continue slim barrier ring  Contact ostomy nurse for worsening skin condition and as needed for support.  For leg hematoma  with small abrasion present below knee I recommended abx ointment, nonstick pad, light ace wrap, notify PCP if nonhealing wounds develop or evidence of infection.    I spent 60 minutes, face-to-face, caring for Zackary today.     Follow up appointment: Yes. Future appointment date and time: 6/18/24 10am

## 2024-06-03 ENCOUNTER — HOSPITAL ENCOUNTER (OUTPATIENT)
Dept: WOUND CARE | Facility: HOSPITAL | Age: 69
Discharge: HOME | End: 2024-06-03
Admitting: COLON & RECTAL SURGERY
Payer: MEDICARE

## 2024-06-03 PROCEDURE — G0463 HOSPITAL OUTPT CLINIC VISIT: HCPCS

## 2024-06-03 NOTE — NURSING NOTE
[facility] outpatient ostomy visit.    History and current problem: continued wound below stoma, more irritation surrounding after leak.   Also wants left leg wound (was hematoma) looked at for advice on plan of care.    Surgery date: 2017  Surgeon: Gia     No family at beside.      Stoma Type: permanent end  Diameter/shape: 79d08ts slight oval (measured closer to 82s08ng today)-new template given to patient  Location: right  Protrusion: above  Mucosal condition and color: pink, moist no granulomas  Peristomal skin: more epidermal peeling in some areas than previous, area of previous pseudoverrucous lesions inferior to stoma still present   Supportive Tissue: peristomal hernia  Character of output: soft now, but takes lomotil and imodium around the clock to prevent explosive liquid  Emptying frequency per day: depends on the day     Current pouching system: coloplast one piece soft convexity, slim ceraplus ring, bead of paste, powder and spray  Last appliance change: 3 days  Ostomy care: water, silver nitrate x1 to pseudoverussous area inferior to stoma, cleansed, stoma powder and barrier spray-needs more than one dose of nitrate to lesion but we were both out of supplies, coloplast one piece soft convexity [id number], slim ceraplus ring, paste to valley between hypertrophy and stoma,   Goals of care-prevention of stool from sitting against area  Goal wear time: 3 days  Image: none today      Recommendations:   Follow up at appt in a few weeks for another cauterization  Gave patient VASHE cleanser to use occasionally around stoma to provide antimicrobial and pH balancing capabilities  Cut to exact fit of [id number] stencil-reinforced this is the most important thing  Continue with stoma powder and spray to dry and protect lesion   Continue slim barrier ring  Contact ostomy nurse for worsening skin condition and as needed for support.  For leg open ulcer with surrounding swelling. Recommend meeting with PCP in next few  weeks for further recommendations. Gave some sample silver based dressings to gently pack in wound.    I spent 60 minutes, face-to-face, caring for Zackary today.     Follow up appointment: No. Future appointment date and time: already made June 18th.

## 2024-07-16 ENCOUNTER — HOSPITAL ENCOUNTER (OUTPATIENT)
Dept: WOUND CARE | Facility: HOSPITAL | Age: 69
Discharge: HOME | End: 2024-07-16
Admitting: COLON & RECTAL SURGERY
Payer: MEDICARE

## 2024-07-16 PROCEDURE — G0463 HOSPITAL OUTPT CLINIC VISIT: HCPCS

## 2024-07-16 NOTE — NURSING NOTE
[facility] outpatient ostomy visit.     History and current problem: some more redness and irritation with change yesterday states he is using antifungal powder then spray that stoma powder than spray and using barrier cream.     Surgery date: 2017  Surgeon: Gia     No family at beside.      Stoma Type: permanent end ileostomy  Diameter/shape: 20g59al slight oval (measured closer to 86w61jo today)-new template given to patient  Location: right  Protrusion: above  Mucosal condition and color: pink, moist no granulomas  Peristomal skin: pseudoverrucous lesion inferior smaller than last visit, a little bit more redness around stoma but no major denudation or open sores.  Still has the same fragile skin  Supportive Tissue: peristomal hernia  Character of output: soft now, but takes lomotil and imodium around the clock to prevent explosive liquid  Emptying frequency per day: depends on the day     Current pouching system: coloplast one piece soft convexity, slim ceraplus ring, bead of paste, powder and spray  Last appliance change:1 days  Ostomy care: Vashe soak, dried, stoma powder and barrier spray, coloplast one piece soft convexity [id number], slim ceraplus ring, paste to valley between hypertrophy and stoma,   Goals of care-prevention of stool from sitting against area  Goal wear time: 3 days  Image: none today      Recommendations:   Currently see evidence of fungal skin manifestation no need for antifungal powder as this may reduce new appliance from sticking  Gave patient VASHE cleanser to use occasionally around stoma to provide antimicrobial/antifungal and pH balancing capabilities  Cut to exact fit of [id number] stencil-reinforced this is the most important thing  Continue with stoma powder and spray to dry and protect lesion   Continue slim barrier ring  Contact ostomy nurse for worsening skin condition and as needed for support.  Do not use any creams near ostomy, this could be contributing to skin irritation,  this was explained to patient previously and reinforced today    Education provided:    Accessory products reviewed    I spent 60 minutes, face-to-face, caring for Zackary today.     Follow up appointment: No. Future appointment date and time: PRN

## 2024-11-18 ENCOUNTER — HOSPITAL ENCOUNTER (OUTPATIENT)
Dept: WOUND CARE | Facility: HOSPITAL | Age: 69
Discharge: HOME | End: 2024-11-18
Admitting: COLON & RECTAL SURGERY
Payer: MEDICARE

## 2024-11-18 PROCEDURE — G0463 HOSPITAL OUTPT CLINIC VISIT: HCPCS

## 2024-11-18 NOTE — NURSING NOTE
[facility] outpatient ostomy visit.     History and current problem:  a few leaks,      Surgery date: 2017  Surgeon: Gia     No family at beside.      Stoma Type: permanent end ileostomy  Diameter/shape: 26z94pn slight oval (measured closer to 35b06ta today)-new template given to patient  Location: right  Protrusion: above  Mucosal condition and color: pink, moist no granulomas  Peristomal skin: pseudoverrucous lesion inferior smaller than last visit, a little bit more redness around stoma but no major denudation or open sores.  Still has the same fragile skin  Supportive Tissue: peristomal hernia  Character of output: soft now, but takes lomotil and imodium around the clock to prevent explosive liquid  Emptying frequency per day: depends on the day     Current pouching system: coloplast one piece soft convexity, slim ceraplus ring, bead of paste, powder and spray  Last appliance change:1 days  Ostomy care: Vashe soak, dried, silver nitrate to inferior wart-like area of skin, stoma powder and barrier spray, coloplast one piece soft convexity [id number], slim ceraplus ring, paste to valley between hypertrophy and stoma,   Goals of care-prevention of stool from sitting against area  Goal wear time: 3 days  Image: none today      Recommendations:   Gave patient VASHE cleanser to use occasionally around stoma to provide antimicrobial/antifungal and pH balancing capabilities  Cut to exact fit of [id number] stencil-reinforced this is the most important thing  Continue with stoma powder and spray to dry and protect lesion   Continue slim barrier ring  Contact ostomy nurse for worsening skin condition and as needed for support.  Do not use any creams near ostomy, this could be contributing to skin irritation, this was explained to patient previously and reinforced today     Education provided:    Accessory products reviewed     I spent 60 minutes, face-to-face, caring for Zackary today.      Follow up appointment: No. Future  appointment date and time: PRN    I spent 60 minutes, face-to-face, caring for Zackary today.     Follow up appointment: No. Future appointment date and time: PRN

## 2024-12-20 ENCOUNTER — HOSPITAL ENCOUNTER (OUTPATIENT)
Dept: WOUND CARE | Facility: HOSPITAL | Age: 69
Discharge: HOME | End: 2024-12-20
Payer: MEDICARE

## 2024-12-20 PROCEDURE — G0463 HOSPITAL OUTPT CLINIC VISIT: HCPCS

## 2024-12-20 NOTE — NURSING NOTE
[facility] outpatient ostomy visit.  History and current problem:  a few leaks, after recent issues with small bowel obstruction and lavage in ER at outside hospital, he worries his stoma shame has changed slightly     Surgery date: 2017  Surgeon: Gia     No family at beside.      Stoma Type: permanent end ileostomy  Diameter/shape: slightly larger, in measurement, about 25o16ej  Location: right  Protrusion: above  Mucosal condition and color: pink, moist no granulomas  Peristomal skin: pseudoverrucous lesion inferior still present  Supportive Tissue: peristomal hernia  Character of output: soft now, but takes lomotil and imodium around the clock to prevent explosive liquid  Emptying frequency per day: depends on the day     Current pouching system: coloplast one piece soft convexity, slim ceraplus ring, bead of paste, powder and spray  Last appliance change:1 days  Ostomy care: Vashe soak, dried, silver nitrate to inferior wart-like area of skin, stoma powder and barrier spray, coloplast one piece soft convexity, slim ceraplus ring, paste to valley between hypertrophy and stoma,   Goals of care-prevention of stool from sitting against area  Goal wear time: 3 days  Image: none today      Recommendations:   Gave patient VASHE cleanser to use occasionally around stoma to provide antimicrobial/antifungal and pH balancing capabilities  Cut to exact fit -gave stencil  Continue with stoma powder and spray to dry and protect lesion   Continue slim barrier ring  Contact ostomy nurse for worsening skin condition and as needed for support.  Do not use any creams near ostomy, this could be contributing to skin irritation, this was explained to patient previously and reinforced today     Education provided:    Accessory products reviewed    I spent 45 minutes, face-to-face, caring for Zackary today.     Follow up appointment: No. Future appointment date and time: PRN

## 2025-01-17 ENCOUNTER — HOSPITAL ENCOUNTER (OUTPATIENT)
Dept: WOUND CARE | Facility: HOSPITAL | Age: 70
Discharge: HOME | End: 2025-01-17
Payer: MEDICARE

## 2025-01-17 PROCEDURE — G0463 HOSPITAL OUTPT CLINIC VISIT: HCPCS

## 2025-01-17 NOTE — NURSING NOTE
[facility] outpatient ostomy visit.  History and current problem:  a few leaks towards midline, would like new template, advice on supplies for upcoming cruise     Surgery date: 2017  Surgeon: Gia     No family at beside.      Stoma Type: permanent end ileostomy  Diameter/shape: about 13y99pz  Location: right  Protrusion: above  Mucosal condition and color: pink, moist no granulomas  Peristomal skin: pseudoverrucous lesion inferior still present  Supportive Tissue: peristomal hernia  Character of output: soft now, but takes lomotil and imodium around the clock to prevent explosive liquid  Emptying frequency per day: depends on the day     Current pouching system: coloplast one piece soft convexity, slim ceraplus ring, bead of paste, powder and spray  Last appliance change:1 days  Ostomy care: Vashe soak, dried, stoma powder and barrier spray, coloplast one piece soft convexity, slim ceraplus ring, paste to valley between hypertrophy and stoma,   Goals of care-prevention of stool from sitting against area  Goal wear time: 3 days  Image: none today      Recommendations:   Continue vashe  Cut to exact fit -gave stencil  Continue with stoma powder and spray to dry and protect lesion   Continue slim barrier ring  Contact ostomy nurse for worsening skin condition and as needed for support.  Do not use any creams near ostomy, this could be contributing to skin irritation, this was explained to patient previously and reinforced today  Ensure extra supplies on cruise are on carry on in case of lost luggage  Willing to try new samples, [facility] to send samples in a few weeks when patient back home, we will try out samples at a later date in outpatient clinic.     Education provided:    Accessory products reviewed    I spent 45 minutes, face-to-face, caring for Zackary today.     Follow up appointment: No. Future appointment date and time: PRN

## 2025-05-20 ENCOUNTER — TELEPHONE (OUTPATIENT)
Dept: NEUROSURGERY | Facility: CLINIC | Age: 70
End: 2025-05-20

## 2025-05-20 NOTE — TELEPHONE ENCOUNTER
CALL PATIENT BACK TO RESCHEDULE 05/20/25 VISIT - SAME DAY CANCEL/RESCHEDULE.    **ATTEMPTED TO W/T TO THE OFFICE PER OUR WORKFLOW, BUT THEY DID NOT ANSWERING**

## 2025-07-01 ENCOUNTER — OFFICE VISIT (OUTPATIENT)
Dept: NEUROSURGERY | Facility: CLINIC | Age: 70
End: 2025-07-01
Payer: MEDICARE

## 2025-07-01 VITALS
SYSTOLIC BLOOD PRESSURE: 142 MMHG | WEIGHT: 149 LBS | DIASTOLIC BLOOD PRESSURE: 76 MMHG | BODY MASS INDEX: 23.95 KG/M2 | HEIGHT: 66 IN | TEMPERATURE: 97.6 F

## 2025-07-01 DIAGNOSIS — M54.42 CHRONIC MIDLINE LOW BACK PAIN WITH BILATERAL SCIATICA: Primary | ICD-10-CM

## 2025-07-01 DIAGNOSIS — G89.29 CHRONIC MIDLINE LOW BACK PAIN WITH BILATERAL SCIATICA: Primary | ICD-10-CM

## 2025-07-01 DIAGNOSIS — M54.41 CHRONIC MIDLINE LOW BACK PAIN WITH BILATERAL SCIATICA: Primary | ICD-10-CM

## 2025-07-01 RX ORDER — NYSTATIN AND TRIAMCINOLONE ACETONIDE 100000; 1 [USP'U]/G; MG/G
OINTMENT TOPICAL
COMMUNITY
Start: 2025-01-14

## 2025-07-01 RX ORDER — LOSARTAN POTASSIUM 50 MG/1
50 TABLET ORAL DAILY
COMMUNITY
Start: 2025-06-24 | End: 2025-09-22

## 2025-07-01 RX ORDER — LANOLIN ALCOHOL/MO/W.PET/CERES
1 CREAM (GRAM) TOPICAL EVERY 12 HOURS SCHEDULED
COMMUNITY
Start: 2025-06-14

## 2025-07-01 RX ORDER — ATORVASTATIN CALCIUM 20 MG/1
20 TABLET, FILM COATED ORAL DAILY
COMMUNITY

## 2025-07-01 RX ORDER — AZITHROMYCIN 250 MG/1
TABLET, FILM COATED ORAL
COMMUNITY
Start: 2025-06-24

## 2025-07-01 RX ORDER — CLONIDINE HYDROCHLORIDE 0.1 MG/1
1 TABLET ORAL 2 TIMES DAILY
COMMUNITY

## 2025-07-01 RX ORDER — PANTOPRAZOLE SODIUM 40 MG/1
40 TABLET, DELAYED RELEASE ORAL DAILY
COMMUNITY
Start: 2025-04-03

## 2025-07-01 RX ORDER — LATANOPROST 50 UG/ML
SOLUTION/ DROPS OPHTHALMIC
COMMUNITY
Start: 2025-06-23

## 2025-07-01 RX ORDER — GABAPENTIN 300 MG/1
CAPSULE ORAL
COMMUNITY
Start: 2025-04-04

## 2025-07-03 NOTE — PROGRESS NOTES
Subjective     Chief Complaint: neck and right shoulder pain                                 Back pain and bilateral hip pain                                 Sensory alteration and weakness of left foot/drop foot                                   Patient ID: Zackary Noonan II is a 70 y.o. male seen for consultation today at the request of Dr Cristian Arias    History of Present Illness  Mr Noonan is a very pleasant 70yoM. He is right handed and is a recently retired . He presents today with neck pain, back pain, and bilateral hip and buttock pain. This is worse with most activities and better with gentle movement, gabapentin, and tylenol arthritis.   His neck pain has stiffness and shoulder pain on the right. He also notes some numbness and tingling in the fingers of his left hand and left hand weakness. He plays the drums and has trouble holding the drum sticks with his left. He has had a right rotator cuff tear and has had carpal tunnel surgery on the right previously   He has had back pain and hip pain for some time and last year developed some foot drop on the left which has caused him to fall. He now has a foot brace which helps quite a bit to not catch his toes so often. He also reports numbness in both of his feet.   He has had quite a few medical setbacks over the last year or two. He has Chron's disease and has taken chronic steroids for this, so his bone quality is poor. He is s/p iliostomy Earlier this year, he ended up getting aspiration pneumonia of stomach contents and has struggled with recovery from this. He also apparently had a small stroke at one point. He is recently retired but has not had a chance to do any of the things he had hoped because of his multiple medical issues.  He also has recurrent DVTs He has an IVC filter and is anticoagulated. At some point in the past, he had an L2 transverse process fracture.   He is here and has multiple scans for our review.     The following  portions of the patient's history were reviewed and updated as appropriate: allergies, current medications, past family history, past medical history, past social history, past surgical history and problem list.    Past Medical History:   Diagnosis Date    Anxiety     Arthritis     Asthma     Clostridium difficile infection 01/2017    treated per dr carter with fecal transplant 8-2017     H/O recurrent pneumonia     Hypertension     MRSA infection 2016    right lower extremity wound    Pulmonary nodule     Followed by Dr. Galaviz     Recurrent deep vein thrombosis (DVT)     x 3 LLE; chronic anticoagulation therapy     Ulcerative colitis     Wears glasses     Wears hearing aid       Past Surgical History:   Procedure Laterality Date    BRONCHOSCOPY      by Dr. Galaviz for pulmonary nodule    BRONCHOSCOPY N/A 11/7/2016    Procedure: BRONCHOSCOPY;  Surgeon: Eben Roberts MD;  Location:  [facility];  Service:     COLON RESECTION N/A 9/19/2017    Procedure: TOTAL ABDOMINAL COLECTOMY WITH CREATION OF ILEOSTOMY;  Surgeon: David Nielsen MD;  Location:  [facility];  Service:     COLONOSCOPY  08/2017    UMBILICAL HERNIA REPAIR      VASECTOMY      VASECTOMY REVERSAL        Family history:   Family History   Problem Relation Age of Onset    Hypertension Mother        Social history:   Social History     Socioeconomic History    Marital status:    Tobacco Use    Smoking status: Never    Smokeless tobacco: Never   Substance and Sexual Activity    Alcohol use: Yes     Comment: 4 drinks/week    Drug use: No    Sexual activity: Defer       Review of Systems   Constitutional:  Negative for activity change, appetite change, chills, diaphoresis, fatigue, fever and unexpected weight change.   HENT:  Negative for congestion, dental problem, drooling, ear discharge, ear pain, facial swelling, hearing loss, mouth sores, nosebleeds, postnasal drip, rhinorrhea, sinus pressure, sinus pain, sneezing, sore throat, tinnitus,  "trouble swallowing and voice change.    Eyes:  Negative for photophobia, pain, discharge, redness, itching and visual disturbance.   Respiratory:  Negative for apnea, cough, choking, chest tightness, shortness of breath, wheezing and stridor.    Cardiovascular:  Negative for chest pain, palpitations and leg swelling.   Gastrointestinal:  Negative for abdominal distention, abdominal pain, anal bleeding, blood in stool, constipation, diarrhea, nausea, rectal pain and vomiting.   Endocrine: Negative for cold intolerance, heat intolerance, polydipsia, polyphagia and polyuria.   Genitourinary:  Negative for decreased urine volume, difficulty urinating, dysuria, enuresis, flank pain, frequency, genital sores, hematuria, penile discharge, penile pain, penile swelling, scrotal swelling, testicular pain and urgency.   Musculoskeletal:  Positive for back pain. Negative for arthralgias, gait problem, joint swelling, myalgias, neck pain and neck stiffness.   Skin:  Negative for color change, pallor, rash and wound.   Allergic/Immunologic: Negative for environmental allergies, food allergies and immunocompromised state.   Neurological:  Negative for dizziness, tremors, seizures, syncope, facial asymmetry, speech difficulty, weakness, light-headedness, numbness and headaches.   Hematological:  Negative for adenopathy. Does not bruise/bleed easily.   Psychiatric/Behavioral:  Negative for agitation, behavioral problems, confusion, decreased concentration, dysphoric mood, hallucinations, self-injury, sleep disturbance and suicidal ideas. The patient is not nervous/anxious and is not hyperactive.        Objective   Blood pressure 142/76, temperature 97.6 °F (36.4 °C), height 167.6 cm (66\"), weight 67.6 kg (149 lb).  Body mass index is 24.05 kg/m².    Physical Exam  CONSTITUTIONAL:   - Patient is somewhat frail appearing.   - Pleasant and appears stated age.  PSYCHIATRIC:  - Normal mood and affect  - Behavior is normal.  HENT:   Head: " Normocephalic and Atraumatic.   Eyes:     - Pupils are equal, round, and reactive to light.     - EOM are normal.   CV:   - Regular rate and rhythm on palpable radial pulse   PULMONARY:   - Speaking in full sentences, breathing non labored  - mild wheezing  SKIN:  - Clean, dry and intact   MUSCULOSKELETAL:  - Neck/Back tenderness to palpation is not observed.   - Strength is intact in the upper and lower extremities to direct testing.  - Muscle tone is generally reduced throughout  - Gait is very wide based and unsteady. He has a walker but is able to take a few steps without it. He also has a left foot brace present due to his foot drop.   - ROM in neck/back is reduced .  - Straight leg raise is negative   NEUROLOGICAL:  - A&Ox3  - Attention span, language function, and cognition are intact.  - subjective numbness and tingling in the fingers of his left hand.   - Deep tendon reflexes are 2+ in bilateral upper extremities, 0_ in bilateral lower extremities.     - Shaw's Sign is negative bilaterally.  - No clonus is elicited.  - Coordination is intact.     Patient's Body mass index is 36.04 kg/m². indicating that she meets the BMI criteria for morbid obesity (BMI > 40 or > 35 with obesity - related health    Assessment & Plan     Independent Review of Radiographic Studies:    MRI of the lumbar spine dated 3/3/2025 was reviewed.  This shows severe degenerative changes throughout as well as a levoscoliosis.  At L1-2 there is an osteophyte and a disc bulge with some left foraminal stenosis.  L2-3 and L3-4 have a broad based disc bulge with bilateral foraminal stenosis and mild to moderate central stenosis.  At L4-5 there is significant central canal stenosis due to a combination of disc disease, osteophytes and posterior element hypertrophy.  There is also foraminal stenosis at L5-S1.  Joint effusions are present at essentially every level    MRI of the cervical spine also from 3/3/2025 was reviewed.  C 3 and 4 are  spontaneously fused.  There is a listhesis of L4 on L5 resulting in moderately severe central cord compression worse on the right.  There does not appear to be cord signal change at this level.  Degenerative changes are also quite advanced at C5-6 and C6-7 with left foraminal stenosis at C6-7  These scans were reviewed with Mr. Noonan and his wife who was present with him.  I went over the findings in detail.    Medical Decision Making:    Mr. Noonan has advanced degenerative changes throughout his cervical and lumbar spine.  He has central canal stenosis and left foraminal stenosis and a foot drop that has been present on the left for over a year.  He is not likely to get recovery from this regardless of any neurosurgical intervention.  He does have a wide based gait often seen with cervical myelopathy and he does have some central canal stenosis in his cervical spine.  However, he does not endorse bilateral hand numbness and tingling.  He is not hyperreflexic on exam.  No Trenton's or clonus was appreciated on physical exam.    he is currently getting physical therapy at home.  He has had injections previously.  He is still being treated for his aspiration pneumonia and has a history of blood clots, has an IVC and is on Eliquis.  I do not see any simple surgery that would relieve his pain.  I am concerned about his cervical stenosis becoming more advanced and I reviewed the signs and symptoms of cervical myelopathy with him today.  With the degenerative changes in his lumbar spine in tandem with his underlying medical issues, I think surgery would be too risky for him currently.  I would like to obtain flexion-extension x-rays of the lumbar spine to better appreciate the bony anatomy.  He has follow-ups with pulmonology over the next several weeks.  I would like to see him back in about a month to check in with him.  If his neck and upper extremity symptoms become worse he should call the office right away and we can  see him sooner.    Diagnoses and all orders for this visit:    1. Chronic midline low back pain with bilateral sciatica (Primary)  -     XR Spine Lumbar Complete With Flex & Ext; Future    Other orders  -     MRI outside films; Future  -     MRI outside films; Future  -     MRI outside films; Future  -     MRI outside films; Future  -     MRI outside films; Future  -     CT outside films; Future  -     CT outside films; Future  -     MRI outside films; Future      Return in about 4 weeks (around 7/29/2025).       This document signed by Kiersten Hoyt PA-C  July 3, 2025 15:33 EDT

## 2025-07-31 ENCOUNTER — HOSPITAL ENCOUNTER (OUTPATIENT)
Dept: GENERAL RADIOLOGY | Facility: HOSPITAL | Age: 70
Discharge: HOME | End: 2025-07-31
Admitting: PHYSICIAN ASSISTANT
Payer: MEDICARE

## 2025-07-31 DIAGNOSIS — G89.29 CHRONIC MIDLINE LOW BACK PAIN WITH BILATERAL SCIATICA: ICD-10-CM

## 2025-07-31 DIAGNOSIS — M54.42 CHRONIC MIDLINE LOW BACK PAIN WITH BILATERAL SCIATICA: ICD-10-CM

## 2025-07-31 DIAGNOSIS — M54.41 CHRONIC MIDLINE LOW BACK PAIN WITH BILATERAL SCIATICA: ICD-10-CM

## 2025-07-31 PROCEDURE — 72114 X-RAY EXAM L-S SPINE BENDING: CPT

## 2025-08-01 ENCOUNTER — OFFICE VISIT (OUTPATIENT)
Dept: NEUROSURGERY | Facility: CLINIC | Age: 70
End: 2025-08-01
Payer: MEDICARE

## 2025-08-01 VITALS — WEIGHT: 147.6 LBS | HEIGHT: 66 IN | BODY MASS INDEX: 23.72 KG/M2 | TEMPERATURE: 98.2 F

## 2025-08-01 DIAGNOSIS — M48.02 SPINAL STENOSIS IN CERVICAL REGION: Primary | ICD-10-CM

## 2025-08-01 DIAGNOSIS — M41.26 OTHER IDIOPATHIC SCOLIOSIS, LUMBAR REGION: ICD-10-CM

## 2025-08-01 DIAGNOSIS — M48.062 SPINAL STENOSIS, LUMBAR REGION, WITH NEUROGENIC CLAUDICATION: ICD-10-CM

## 2025-08-01 RX ORDER — ASPIRIN 81 MG/1
1 TABLET ORAL DAILY
COMMUNITY

## 2025-08-01 NOTE — PROGRESS NOTES
Subjective     Chief Complaint: neck and right shoulder pain                                 Back pain and bilateral hip pain                                 Sensory alteration and weakness of left foot/drop foot    Patient ID: Zackary Noonan II is a 70 y.o. male is here today for follow-up.    Back Pain  Associated symptoms: no abdominal pain, no chest pain, no dysuria, no fever, no headaches, no numbness and no weakness    Hip Pain   Pertinent negatives include no numbness.   Gait Problem  Symptoms: no abdominal pain, no chest pain, no chills, no congestion, no cough, no diaphoresis, no fatigue, no fever, no headaches, no myalgias, no nausea, no neck pain, no numbness, no rash, no sore throat, no dysuria, no vomiting and no weakness        History of Present Illness:  Mr Noonan is a very pleasant R handed 70yoM.  retired . He was initially seen in early July for complaints of neck pain, back pain, and bilateral hip and buttock pain. This is worse with most activities and better with gentle movement, gabapentin, and tylenol arthritis.   His neck pain has stiffness and shoulder pain on the right. He also notes some numbness and tingling in the fingers of his left hand and left hand weakness. He plays the drums and has trouble holding the drum sticks with his left. He has had a right rotator cuff tear and has had carpal tunnel surgery on the right previously   He has had back pain and hip pain for some time and last year developed some foot drop on the left which has caused him to fall. He now has a foot brace which helps quite a bit to not catch his toes so often. He also reports numbness in both of his feet.  He has been using a walker at baseline    He has had quite a few medical setbacks over the last year or two. He has Chron's disease and has taken chronic steroids for this, so his bone quality is poor. He is s/p iliostomy Earlier this year, he ended up getting aspiration pneumonia of stomach  contents and has struggled with recovery from this. He also apparently had a small stroke at one point. He is recently retired but has not had a chance to do any of the things he had hoped because of his multiple medical issues.  He also has recurrent DVTs He has an IVC filter and is anticoagulated. At some point in the past, he had an L2 transverse process fracture.     At his previous visit MRI of the lumbar spine dated 3/3/2025 was reviewed.  He has severe degenerative changes and levoscoliosis with mild to moderate central stenosis at L2-3 and L3-4 and severe central stenosis at L4-5.  Cervical MRI also showed a listhesis of L4 on L5 with right sided central cord compression without cord signal change.  He has spontaneous fusion of C3 and C4.  He has degenerative changes throughout with left foraminal stenosis that is fairly advanced at C6-7.  He has been getting physical therapy at home.  He was still quite ill when I saw him previously, but he is doing somewhat better.  He has seen pulmonology who said that he could have surgery if he is a candidate    The following portions of the patient's history were reviewed and updated as appropriate: allergies, current medications, past family history, past medical history, past social history, past surgical history and problem list.    Past Medical History:   Diagnosis Date    Anxiety     Arthritis     Asthma     Clostridium difficile infection 01/2017    treated per dr carter with fecal transplant 8-2017     H/O recurrent pneumonia     Hypertension     MRSA infection 2016    right lower extremity wound    Pulmonary nodule     Followed by Dr. Galaviz     Recurrent deep vein thrombosis (DVT)     x 3 LLE; chronic anticoagulation therapy     Ulcerative colitis     Wears glasses     Wears hearing aid       Past Surgical History:   Procedure Laterality Date    BRONCHOSCOPY      by Dr. Galaviz for pulmonary nodule    BRONCHOSCOPY N/A 11/7/2016    Procedure: BRONCHOSCOPY;   Surgeon: Eben Roberts MD;  Location:  [facility];  Service:     COLON RESECTION N/A 9/19/2017    Procedure: TOTAL ABDOMINAL COLECTOMY WITH CREATION OF ILEOSTOMY;  Surgeon: David Nielsen MD;  Location:  [facility];  Service:     COLONOSCOPY  08/2017    UMBILICAL HERNIA REPAIR      VASECTOMY      VASECTOMY REVERSAL          Family history:   Family History   Problem Relation Age of Onset    Hypertension Mother        Social history:   Social History     Socioeconomic History    Marital status:    Tobacco Use    Smoking status: Never    Smokeless tobacco: Never   Vaping Use    Vaping status: Never Used   Substance and Sexual Activity    Alcohol use: Yes     Comment: 4 drinks/week    Drug use: No    Sexual activity: Defer       Review of Systems   Constitutional:  Negative for activity change, appetite change, chills, diaphoresis, fatigue, fever and unexpected weight change.   HENT:  Negative for congestion, dental problem, drooling, ear discharge, ear pain, facial swelling, hearing loss, mouth sores, nosebleeds, postnasal drip, rhinorrhea, sinus pressure, sinus pain, sneezing, sore throat, tinnitus, trouble swallowing and voice change.    Eyes:  Negative for photophobia, pain, discharge, redness, itching and visual disturbance.   Respiratory:  Negative for apnea, cough, choking, chest tightness, shortness of breath, wheezing and stridor.    Cardiovascular:  Negative for chest pain, palpitations and leg swelling.   Gastrointestinal:  Negative for abdominal distention, abdominal pain, anal bleeding, blood in stool, constipation, diarrhea, nausea, rectal pain and vomiting.   Endocrine: Negative for cold intolerance, heat intolerance, polydipsia, polyphagia and polyuria.   Genitourinary:  Negative for decreased urine volume, difficulty urinating, dysuria, enuresis, flank pain, frequency, genital sores, hematuria, penile discharge, penile pain, penile swelling, scrotal swelling, testicular pain and urgency.  "  Musculoskeletal:  Positive for arthralgias, back pain and gait problem. Negative for joint swelling, myalgias, neck pain and neck stiffness.   Skin:  Negative for color change, pallor, rash and wound.   Allergic/Immunologic: Negative for environmental allergies, food allergies and immunocompromised state.   Neurological:  Negative for dizziness, tremors, seizures, syncope, facial asymmetry, speech difficulty, weakness, light-headedness, numbness and headaches.   Hematological:  Negative for adenopathy. Does not bruise/bleed easily.   Psychiatric/Behavioral:  Negative for agitation, behavioral problems, confusion, decreased concentration, dysphoric mood, hallucinations, self-injury, sleep disturbance and suicidal ideas. The patient is not nervous/anxious and is not hyperactive.    All other systems reviewed and are negative.      Objective   Temperature 98.2 °F (36.8 °C), temperature source Temporal, height 167.6 cm (66\"), weight 67 kg (147 lb 9.6 oz).  Body mass index is 23.82 kg/m².    Physical Exam  Physical Exam  CONSTITUTIONAL:   - Patient is somewhat frail appearing.   - Pleasant and appears stated age.  PSYCHIATRIC:  - Normal mood and affect  - Behavior is normal.  HENT:   Head: Normocephalic and Atraumatic.   Eyes:     - Pupils are equal, round, and reactive to light.     - EOM are normal.   CV:   - Regular rate and rhythm on palpable radial pulse   PULMONARY:   - Speaking in full sentences, breathing non labored  - mild wheezing  SKIN:  - Clean, dry and intact   MUSCULOSKELETAL:   - Strength is i4/5 and intact in the upper and lower extremities to direct testing except dorsiflexion on the left foot   - Muscle tone is generally reduced throughout  - Gait is very wide based and unsteady. He has a walker but is able to take a few steps without it. He also has a left foot brace present due to his foot drop.   - ROM in neck/back is reduced .  - Straight leg raise is negative   NEUROLOGICAL:  - A&Ox3  - " Attention span, language function, and cognition are intact.  - subjective numbness and tingling in the fingers of his left hand.   - Deep tendon reflexes are 2+ in bilateral upper extremities, 0_ in bilateral lower extremities.     - Shaw's Sign is negative bilaterally.  - No clonus is elicited.  - Coordination is intact.    Patient's Body mass index is 23.82 kg/m². indicating that he meets the BMI criteria for normal weight (the patient states that he has lost weight and muscle mass and is eager to regain it)     Assessment & Plan     Independent Review of Radiographic Studies:    X x-rays of the lumbar spine dated 7/31/2025 were reviewed.  They show levoscoliosis centered at L3 that extends into the top of the lumbar spine.  There is a listhesis of L3 on L4 that appears to be stable on flexion versus extension views    MRI of the lumbar spine dated 3/3/2025 was reviewed.  This shows severe degenerative changes throughout as well as a levoscoliosis.  At L1-2 there is an osteophyte and a disc bulge with some left foraminal stenosis.  L2-3 and L3-4 have a broad based disc bulge with bilateral foraminal stenosis and mild to moderate central stenosis.  At L4-5 there is significant central canal stenosis due to a combination of disc disease, osteophytes and posterior element hypertrophy.  There is also foraminal stenosis at L5-S1.  Joint effusions are present at essentially every level     MRI of the cervical spine also from 3/3/2025 was reviewed.  C 3 and 4 are spontaneously fused.  There is a listhesis of L4 on L5 resulting in moderately severe central cord compression worse on the right.  There does not appear to be cord signal change at this level.  Degenerative changes are also quite advanced at C5-6 and C6-7 with left foraminal stenosis at C6-7    Medical Decision Making:    I reviewed the films and symptoms with Dr. Carlos in detail.  He would like to refer Mr. Noonan to pain management for injections.  Though  he has significant central stenosis, due to his scoliosis and bone quality surgery would be a last resort as we run the risk of further destabilizing his spine.  His cervical degenerative changes and stenosis appear to be stable at this point without cord signal change.  I have placed a referral to pain management.  We will follow-up with him on an as-needed basis going forward    Diagnoses and all orders for this visit:    1. Spinal stenosis in cervical region (Primary)  -     Ambulatory Referral to Pain Management    2. Other idiopathic scoliosis, lumbar region  -     Ambulatory Referral to Pain Management    3. Spinal stenosis, lumbar region, with neurogenic claudication  -     Ambulatory Referral to Pain Management    \  Return if symptoms worsen or fail to improve.      This document signed by Kiersten Hoyt PA-C  August 2, 2025 13:52 EDT

## 2025-08-02 PROBLEM — M48.062 SPINAL STENOSIS, LUMBAR REGION, WITH NEUROGENIC CLAUDICATION: Status: ACTIVE | Noted: 2025-08-02

## 2025-08-02 PROBLEM — M41.9 LUMBAR SCOLIOSIS: Status: ACTIVE | Noted: 2025-08-02

## 2025-08-02 PROBLEM — M48.02 SPINAL STENOSIS IN CERVICAL REGION: Status: ACTIVE | Noted: 2025-08-02

## 2025-08-27 ENCOUNTER — OFFICE VISIT (OUTPATIENT)
Dept: PAIN MEDICINE | Facility: CLINIC | Age: 70
End: 2025-08-27
Payer: MEDICARE

## 2025-08-27 VITALS — HEIGHT: 66 IN | BODY MASS INDEX: 24.91 KG/M2 | WEIGHT: 155 LBS

## 2025-08-27 DIAGNOSIS — M25.511 CHRONIC RIGHT SHOULDER PAIN: ICD-10-CM

## 2025-08-27 DIAGNOSIS — G89.29 CHRONIC RIGHT SHOULDER PAIN: ICD-10-CM

## 2025-08-27 DIAGNOSIS — M47.817 LUMBOSACRAL SPONDYLOSIS WITHOUT MYELOPATHY: ICD-10-CM

## 2025-08-27 DIAGNOSIS — M48.062 SPINAL STENOSIS OF LUMBAR REGION WITH NEUROGENIC CLAUDICATION: Primary | ICD-10-CM

## 2025-08-27 DIAGNOSIS — M48.02 CERVICAL SPINAL STENOSIS: ICD-10-CM

## 2025-08-27 DIAGNOSIS — G89.4 CHRONIC PAIN SYNDROME: ICD-10-CM

## 2025-08-27 DIAGNOSIS — M54.12 CERVICAL RADICULOPATHY: ICD-10-CM

## 2025-08-27 RX ORDER — ALENDRONATE SODIUM 70 MG/1
TABLET ORAL
COMMUNITY
Start: 2025-08-05

## 2025-08-29 ENCOUNTER — PATIENT ROUNDING (BHMG ONLY) (OUTPATIENT)
Dept: PAIN MEDICINE | Facility: CLINIC | Age: 70
End: 2025-08-29
Payer: MEDICARE

## (undated) DEVICE — GOWN,REINF,POLY,ECL,PP SLV,XL: Brand: MEDLINE

## (undated) DEVICE — SOL LR 1000ML

## (undated) DEVICE — 3M™ STERI-DRAPE™ INSTRUMENT POUCH 1018: Brand: STERI-DRAPE™

## (undated) DEVICE — DRSNG WND BORDR/ADHS NONADHR/GZ LF 4X10IN STRL

## (undated) DEVICE — LEX BASIC NO DRAPE: Brand: MEDLINE INDUSTRIES, INC.

## (undated) DEVICE — ANTIBACTERIAL UNDYED BRAIDED (POLYGLACTIN 910), SYNTHETIC ABSORBABLE SUTURE: Brand: COATED VICRYL

## (undated) DEVICE — SUT VIC 0 TIES 18IN J912G

## (undated) DEVICE — SUT VIC 3/0 TIES J104T

## (undated) DEVICE — LEGGINGS, PAIR, 29X43, STERILE: Brand: MEDLINE

## (undated) DEVICE — TOTAL TRAY, 16FR 10ML SIL FOLEY, URN: Brand: MEDLINE

## (undated) DEVICE — SUT PDS 1 TP1 48IN Z880G BX/12

## (undated) DEVICE — SUT PROLN 2/0 KS 30IN 8623H

## (undated) DEVICE — SUT PROLN 2/0 PC3 8833H

## (undated) DEVICE — POOLE SUCTION INSTRUMENT WITH REMOVABLE SHEATH: Brand: POOLE

## (undated) DEVICE — TRY SKINPREP DRYPREP

## (undated) DEVICE — SUT VIC 3/0 SH CR8 18IN J864D

## (undated) DEVICE — 3M™ IOBAN™ 2 ANTIMICROBIAL INCISE DRAPE 6650EZ: Brand: IOBAN™ 2

## (undated) DEVICE — GLV SURG TRIUMPH ORTHO W/ALOE PF LTX 7.0

## (undated) DEVICE — 3M™ IOBAN™ 2 ANTIMICROBIAL INCISE DRAPE 6651EZ: Brand: IOBAN™ 2

## (undated) DEVICE — CANNULA,ADULT,SOFT-TOUCH,7TUBE,SC: Brand: MEDLINE

## (undated) DEVICE — DRAPE,UNDERBUTTOCKS,STERILE: Brand: MEDLINE

## (undated) DEVICE — DBD-DRAPE,LAP,CHOLE,W/TROUGHS,STERILE: Brand: MEDLINE

## (undated) DEVICE — ADAPT ST INFUS ADMIN SYR 70IN

## (undated) DEVICE — CLTH CLENS READYCLEANSE PERI CARE PK/5

## (undated) DEVICE — APPL CHLORAPREP W/TINT 26ML BLU

## (undated) DEVICE — SPNG LAP PREWASH SFTPK 18X18IN 5PK STRL

## (undated) DEVICE — AIRWY SZ11

## (undated) DEVICE — DEV OPN LIGASURE CRV 180D 36MM 13.5CM  1P/U

## (undated) DEVICE — GOWN,SLEEVE,STERILE,W/CSR WRAP,1/P: Brand: MEDLINE

## (undated) DEVICE — SUT VIC PLS CTD ANTIB 2/0 18IN VCP111G

## (undated) DEVICE — SAFESECURE,SECUREMENT,FOLEY CATH,STERILE: Brand: MEDLINE

## (undated) DEVICE — JELLY,LUBE,STERILE,FLIP TOP,TUBE,2-OZ: Brand: MEDLINE

## (undated) DEVICE — SYS SKIN CLS DERMABOND PRINEO W/22CM MESH TP

## (undated) DEVICE — SUT VIC 0 CTD BR 18IN UNDYE VCP724D

## (undated) DEVICE — Device